# Patient Record
Sex: MALE | Race: WHITE | NOT HISPANIC OR LATINO | Employment: OTHER | URBAN - METROPOLITAN AREA
[De-identification: names, ages, dates, MRNs, and addresses within clinical notes are randomized per-mention and may not be internally consistent; named-entity substitution may affect disease eponyms.]

---

## 2019-02-14 ENCOUNTER — HOSPITAL ENCOUNTER (EMERGENCY)
Facility: HOSPITAL | Age: 69
Discharge: LEFT BEFORE DISCHARGE | End: 2019-02-14
Payer: OTHER GOVERNMENT

## 2019-02-14 ENCOUNTER — NURSE TRIAGE (OUTPATIENT)
Dept: ADMINISTRATIVE | Facility: CLINIC | Age: 69
End: 2019-02-14

## 2019-02-14 VITALS
DIASTOLIC BLOOD PRESSURE: 82 MMHG | TEMPERATURE: 98 F | HEART RATE: 86 BPM | SYSTOLIC BLOOD PRESSURE: 146 MMHG | HEIGHT: 71 IN | BODY MASS INDEX: 21 KG/M2 | WEIGHT: 150 LBS | OXYGEN SATURATION: 97 % | RESPIRATION RATE: 18 BRPM

## 2019-02-14 DIAGNOSIS — M54.50 LOWER BACK PAIN: ICD-10-CM

## 2019-02-14 PROCEDURE — 99283 EMERGENCY DEPT VISIT LOW MDM: CPT

## 2019-02-14 NOTE — TELEPHONE ENCOUNTER
"    Reason for Disposition   Patient sounds very sick or weak to the triager    Protocols used: ST BACK PAIN-A-[place]    Ho states he was "overcome with low back pain last night" when bringing groceries in to his hunting camp in [place], [place] and "I went to my knees."  States he did not walk last night due to the severity of pain, 8-9/10.  Says today, he found he had a "pill that says 7.5 mg with tylenol" and he took it a few minutes ago and can walk now.  He is barely able to walk, pain is still severe, and he wants to know what to do.  Recommend ED now for evaluation.  [facility] [place], and does not know "if anyone will get paid if I go anywhere else.  Encouraged him to either call insurer or go to the [facility] for [facility].  Recommended he:    CALL BACK IF:  *  Numbness or weakness occur  *  Bowel/bladder problems occur  *  Pain lasts for more than 2 weeks  *  You become worse  "

## 2019-02-14 NOTE — PROGRESS NOTES
02/14/20194:14 PM  I have evaluated and performed a medical screening assessment on this patient while awaiting a thorough evaluation and treatment. All of the emergency department beds are occupied at this time. When appropriate, laboratory studies will be ordered from triage. The patient has been advised of this process and care plan. Patient complains of lost feeling in lower legs and fell on floor yesterday and took pain medications and got better; this morning had lower back pain.     Orders pending: lumbar xray.  Disposition: stable

## 2019-02-15 ENCOUNTER — HOSPITAL ENCOUNTER (EMERGENCY)
Facility: HOSPITAL | Age: 69
Discharge: HOME | End: 2019-02-15
Attending: EMERGENCY MEDICINE
Payer: OTHER GOVERNMENT

## 2019-02-15 VITALS
OXYGEN SATURATION: 99 % | HEIGHT: 71 IN | HEART RATE: 85 BPM | DIASTOLIC BLOOD PRESSURE: 88 MMHG | SYSTOLIC BLOOD PRESSURE: 154 MMHG | BODY MASS INDEX: 20.99 KG/M2 | RESPIRATION RATE: 12 BRPM | TEMPERATURE: 99 F | WEIGHT: 149.94 LBS

## 2019-02-15 DIAGNOSIS — S39.012A STRAIN OF LUMBAR REGION, INITIAL ENCOUNTER: Primary | ICD-10-CM

## 2019-02-15 PROCEDURE — 99283 EMERGENCY DEPT VISIT LOW MDM: CPT

## 2019-02-15 NOTE — ED PROVIDER NOTES
"Encounter Date: 2/15/2019       History     Chief Complaint   Patient presents with    Back Pain     low back pain. Legs "gave out" yesterday and fell.     Patient is a 68 y.o. male who presents to the ED 02/15/2019 with a chief complaint of low back pain. Patient states 2 days ago he felt like his legs gave out while he was in the kitchen and he fell to the ground.  He denies hitting his head or loss of consciousness.  He denies any neck pain.  He states he felt like his legs were numb for 30 min but then returned to normal function spontaneously.  He has been ambulating eating.  He denies any loss of bowel or bladder control.  Denies any fever.  He has no history of cancer or IV drug abuse.  He states his back pain is getting better but is still a fracture present.  He has been taking his Norco for his knee pain which is helping.  The patient presented to the emergency department and was triaged at than left due to the long wait yesterday.             Review of patient's allergies indicates:  No Known Allergies  Past Medical History:   Diagnosis Date    Hypertension      Past Surgical History:   Procedure Laterality Date    KNEE SURGERY       History reviewed. No pertinent family history.  Social History     Tobacco Use    Smoking status: Former Smoker   Substance Use Topics    Alcohol use: No    Drug use: Not on file     Review of Systems   Constitutional: Negative for chills and fever.   Respiratory: Negative for chest tightness and shortness of breath.    Cardiovascular: Negative for chest pain.   Gastrointestinal: Negative for abdominal pain.   Genitourinary: Negative for difficulty urinating and dysuria.   Musculoskeletal: Positive for back pain. Negative for arthralgias and myalgias.   Skin: Negative for rash and wound.   Neurological: Negative for syncope, weakness and numbness.   Hematological: Does not bruise/bleed easily.       Physical Exam     Initial Vitals [02/15/19 0930]   BP Pulse Resp Temp " SpO2   (!) 154/88 85 12 98.7 °F (37.1 °C) 99 %      MAP       --         Physical Exam    Nursing note and vitals reviewed.  Constitutional: He appears well-developed and well-nourished.   HENT:   Head: Normocephalic and atraumatic.   Eyes: Conjunctivae are normal. Pupils are equal, round, and reactive to light. Right eye exhibits no discharge. Left eye exhibits no discharge.   Neck: Normal range of motion. Neck supple.   Cardiovascular: Normal rate, regular rhythm, normal heart sounds and intact distal pulses.   Pulmonary/Chest: Breath sounds normal.   Musculoskeletal: Normal range of motion.        Cervical back: Normal.        Thoracic back: Normal.        Lumbar back: He exhibits tenderness and pain. He exhibits normal range of motion, no bony tenderness, no swelling, no edema, no deformity, no laceration, no spasm and normal pulse.        Back:    Neurological: He is alert and oriented to person, place, and time. He has normal strength. No sensory deficit.   Skin: Skin is warm and dry.   Psychiatric: He has a normal mood and affect.         ED Course   Procedures  Labs Reviewed - No data to display       Imaging Results    None          Medical Decision Making:   Differential Diagnosis:   Lumbar strain  Fracture  Cauda equina        APC / Resident Notes:   Patient is a 68 y.o. male who presents to the ED 02/15/2019 who underwent emergent evaluation for low back pain. Patient had fall 2 days ago.  The patient has 5/5 strength and normal sensation bilateral lower extremities. He is ambulatory in the emergency department.  He has right paraspinal lumbar spine tenderness.  X-rays lumbar spine reviewed from  2/14/2019 without acute findings.  I do not think fracture.  Multiple chronic changes noted on x-ray.  This is discussed with patient in detail.  There are no signs of neurovascular compromise.  I do not think cauda equina or epidural abscess.  Patient is afebrile.  There are no signs of spinal cord impingement  I do not think emergent MRI or further imaging is indicated this time.  Patient will continue his Norco as he is prescribed it.  He is also encouraged to follow up with his PCP for referral to Ortho Spine physician.  Patient does not want referral today as he states he must go through [facility]. Based on my clinical evaluation, I do not appreciate any immediate, emergent, or life threatening condition or etiology that warrants additional workup today and feel that the patient can be discharged with close follow up care. Case discussed with Dr. Mendez who also evaluated patient and  who is agreeable to plan of care. Follow up and return precautions discussed; patient verbalized understanding and is agreeable to plan of care. Patient discharged home in stable condition.                         Clinical Impression:   The encounter diagnosis was Strain of lumbar region, initial encounter.      Disposition:   Disposition: Discharged  Condition: Stable                        Elisabet Soto NP  02/15/19 1351

## 2019-10-03 ENCOUNTER — HOSPITAL ENCOUNTER (EMERGENCY)
Facility: HOSPITAL | Age: 69
Discharge: HOME | End: 2019-10-03
Attending: EMERGENCY MEDICINE
Payer: OTHER GOVERNMENT

## 2019-10-03 VITALS
SYSTOLIC BLOOD PRESSURE: 142 MMHG | OXYGEN SATURATION: 96 % | BODY MASS INDEX: 20.99 KG/M2 | WEIGHT: 149.94 LBS | HEART RATE: 88 BPM | RESPIRATION RATE: 18 BRPM | DIASTOLIC BLOOD PRESSURE: 80 MMHG | HEIGHT: 71 IN | TEMPERATURE: 98 F

## 2019-10-03 DIAGNOSIS — S61.209A EXTENSOR TENDON LACERATION OF FINGER WITH OPEN WOUND, INITIAL ENCOUNTER: ICD-10-CM

## 2019-10-03 DIAGNOSIS — S56.429A EXTENSOR TENDON LACERATION OF FINGER WITH OPEN WOUND, INITIAL ENCOUNTER: ICD-10-CM

## 2019-10-03 DIAGNOSIS — S61.211A LACERATION OF LEFT INDEX FINGER WITHOUT FOREIGN BODY WITHOUT DAMAGE TO NAIL, INITIAL ENCOUNTER: Primary | ICD-10-CM

## 2019-10-03 PROCEDURE — 25000003 PHARM REV CODE 250: Performed by: NURSE PRACTITIONER

## 2019-10-03 PROCEDURE — 12001 RPR S/N/AX/GEN/TRNK 2.5CM/<: CPT

## 2019-10-03 PROCEDURE — 99283 EMERGENCY DEPT VISIT LOW MDM: CPT | Mod: 25

## 2019-10-03 RX ORDER — CEPHALEXIN 500 MG/1
500 CAPSULE ORAL 4 TIMES DAILY
Qty: 20 CAPSULE | Refills: 0 | Status: SHIPPED | OUTPATIENT
Start: 2019-10-03 | End: 2019-10-08

## 2019-10-03 RX ORDER — LIDOCAINE HYDROCHLORIDE 20 MG/ML
10 INJECTION, SOLUTION EPIDURAL; INFILTRATION; INTRACAUDAL; PERINEURAL
Status: COMPLETED | OUTPATIENT
Start: 2019-10-03 | End: 2019-10-03

## 2019-10-03 RX ADMIN — LIDOCAINE HYDROCHLORIDE 200 MG: 20 INJECTION, SOLUTION EPIDURAL; INFILTRATION; INTRACAUDAL; PERINEURAL at 04:10

## 2019-10-03 NOTE — ED NOTES
Given written and verbal DC instructions questions answered per MD aware to follow up with PCP encouraged to return if needed. Given RX with teaching

## 2019-10-03 NOTE — ED PROVIDER NOTES
Encounter Date: 10/3/2019    SCRIBE #1 NOTE: I, Telma Cao and am scribing for, and in the presence of, COLE Martin.       History     Chief Complaint   Patient presents with    Laceration     Cut 3rd and 4th digit on left hand.      Time seen by provider: 4:26 PM on 10/03/2019    Vic Reyez is a 69 y.o. male with PMHx of HTN who presents to the ED with an onset of lacerations to the left 2nd and 3rd fingers. The patient reports that he was cutting chicken approximately 2 hours ago when he accidentally cut his fingers. He reports that he couldn't stop the bleeding on his own. His last tetanus shot was around a year ago. The patient denies any other symptoms at this time. No PSHx of the left hand. No known drug allergies noted.    The history is provided by the patient.     Review of patient's allergies indicates:  No Known Allergies  Past Medical History:   Diagnosis Date    Hypertension      Past Surgical History:   Procedure Laterality Date    KNEE SURGERY       History reviewed. No pertinent family history.  Social History     Tobacco Use    Smoking status: Former Smoker   Substance Use Topics    Alcohol use: No    Drug use: Not on file     Review of Systems   Constitutional: Negative for fever.   HENT: Negative for sore throat.    Respiratory: Negative for shortness of breath.    Cardiovascular: Negative for chest pain.   Gastrointestinal: Negative for nausea.   Genitourinary: Negative for dysuria.   Musculoskeletal: Negative for back pain.   Skin: Positive for wound (left 2nd and 3rd fingers). Negative for rash.   Neurological: Negative for weakness.   Hematological: Does not bruise/bleed easily.       Physical Exam     Initial Vitals [10/03/19 1611]   BP Pulse Resp Temp SpO2   (!) 142/80 88 18 98 °F (36.7 °C) 96 %      MAP       --         Physical Exam    Nursing note and vitals reviewed.  Constitutional: Vital signs are normal. He appears well-developed and well-nourished.   HENT:    Head: Normocephalic and atraumatic.   Eyes: Pupils are equal, round, and reactive to light.   Neck: Neck supple.   Cardiovascular: Normal rate, regular rhythm, normal heart sounds and intact distal pulses. Exam reveals no gallop and no friction rub.    No murmur heard.  Pulmonary/Chest: Breath sounds normal. He has no wheezes. He has no rhonchi. He has no rales.   Abdominal: Normal appearance.   Neurological: He is alert and oriented to person, place, and time. He has normal strength.   Skin: Skin is warm and dry. Capillary refill takes less than 2 seconds. Abrasion and laceration noted.   Left index finger: 1 cm horizontal laceration between the PIP and DIP joints on the dorsal surface. The laceration is not circumferential. No visible tendon, bone, or foreign body. Normal flexion and extension at the PIP joints. Normal flexion at DIP joint but limited extension. <2 seconds capillary refill on the finger. Nail bed is intact. No significant swelling or deformity.     Left middle finger: Abrasion between the DIP and PIP joints. Normal flexion and extension at the DIP and PIP joints. No significant swelling or deformity to either finger.   Psychiatric: He has a normal mood and affect. His speech is normal and behavior is normal.         ED Course   Lac Repair  Date/Time: 10/3/2019 6:01 PM  Performed by: Elisabet Soto NP  Authorized by: Karthik Yang III, MD   Consent Done: Yes  Body area: upper extremity  Location details: left index finger  Laceration length: 1 cm  Foreign bodies: no foreign bodies  Anesthesia: local infiltration    Anesthesia:  Local Anesthetic: lidocaine 2% without epinephrine  Anesthetic total: 3 mL  Patient sedated: no  Preparation: Patient was prepped and draped in the usual sterile fashion.  Irrigation solution: saline  Irrigation method: syringe  Amount of cleaning: extensive  Skin closure: Ethilon (5-0)  Number of sutures: 5  Patient tolerance: Patient tolerated the procedure well with  no immediate complications    Splint Application  Date/Time: 10/3/2019 6:29 PM  Performed by: Mayte Guillermo RN  Authorized by: Karthik Yang III, MD   Consent Done: Yes  Location details: left index finger  Splint type: static finger  Post-procedure: The splinted body part was neurovascularly unchanged following the procedure.  Patient tolerance: Patient tolerated the procedure well with no immediate complications        Labs Reviewed - No data to display       Imaging Results          X-Ray Finger 2 or More Views Left (Final result)  Result time 10/03/19 17:31:50    Final result by Amy Rosas MD (10/03/19 17:31:50)                 Impression:      As above      Electronically signed by: Amy Rosas MD  Date:    10/03/2019  Time:    17:31             Narrative:    EXAMINATION:  XR FINGER 2 OR MORE VIEWS LEFT    CLINICAL HISTORY:  laceration;    TECHNIQUE:  Three views left index finger    COMPARISON:  None    FINDINGS:  There is soft tissue injury posteriorly at the level the distal aspect of the middle phalanx.  No acute fracture or dislocation.  No radiopaque foreign body seen in the soft tissues.                                 Medical Decision Making:   History:   Old Medical Records: I decided to obtain old medical records.  Differential Diagnosis:   Laceration  Contusion  Tendon injury  Clinical Tests:   Radiological Study: Ordered and Reviewed       APC / Resident Notes:   Patient is a 69 y.o. male who presents to the ED 10/03/2019 who underwent emergent evaluation for laceration to left index finger.  X-ray of left index finger without acute findings per Dr. Yang.  No visible bone or tendon.  No significant swelling or deformity.  Patient has some difficulty with full extension of the finger which is possibly due to swelling however discussed possible extensor tendon injury with patient he is given a referral to a hand surgeon for this.  Patient is also placed in finger splint.  There is  no underlying fracture.  Laceration is repaired as above.  Patient tolerated well.  Tetanus is up-to-date.  He is given prophylactic antibiotics.  I do not think IV antibiotics or emergent surgical consultation is indicated at this time. Based on my clinical evaluation, I do not appreciate any immediate, emergent, or life threatening condition or etiology that warrants additional workup today and feel that the patient can be discharged with close follow up care. Case discussed with Dr. Yang who is agreeable to plan of care. Follow up and return precautions discussed; patient verbalized understanding and is agreeable to plan of care. Patient discharged home in stable condition.               Scribe Attestation:   Scribe #1: I performed the above scribed service and the documentation accurately describes the services I performed. I attest to the accuracy of the note.    Attending Attestation:           Physician Attestation for Scribe:  Physician Attestation Statement for Scribe #1: I, Elisabet Soto, reviewed documentation, as scribed by in my presence, and it is both accurate and complete.     Comments: I, COLE Martin, personally performed the services described in this documentation. All medical record entries made by the scribe were at my direction and in my presence.  I have reviewed the chart and agree that the record reflects my personal performance and is accurate and complete. COLE Martin.  8:29 PM 10/03/2019                 Clinical Impression:       ICD-10-CM ICD-9-CM   1. Laceration of left index finger without foreign body without damage to nail, initial encounter S61.211A 883.0   2. Extensor tendon laceration of finger with open wound, initial encounter S56.429A 883.2    S61.209A          Disposition:   Disposition: Discharged  Condition: Stable                        Elisabet Soto NP  10/03/19 2029

## 2019-10-23 ENCOUNTER — HOSPITAL ENCOUNTER (EMERGENCY)
Facility: HOSPITAL | Age: 69
Discharge: HOME | End: 2019-10-23
Attending: EMERGENCY MEDICINE
Payer: COMMERCIAL

## 2019-10-23 VITALS
RESPIRATION RATE: 18 BRPM | HEART RATE: 88 BPM | TEMPERATURE: 98 F | DIASTOLIC BLOOD PRESSURE: 76 MMHG | HEIGHT: 71 IN | OXYGEN SATURATION: 98 % | WEIGHT: 170.44 LBS | SYSTOLIC BLOOD PRESSURE: 129 MMHG | BODY MASS INDEX: 23.86 KG/M2

## 2019-10-23 DIAGNOSIS — Z48.02 VISIT FOR SUTURE REMOVAL: Primary | ICD-10-CM

## 2019-10-23 PROCEDURE — 99281 EMR DPT VST MAYX REQ PHY/QHP: CPT

## 2019-10-23 NOTE — ED PROVIDER NOTES
Encounter Date: 10/23/2019       History     Chief Complaint   Patient presents with    Suture / Staple Removal     Needs stitches removed from left 4th digit.      Vic Reyez is a 69 y.o. Male presenting for suture removal to his left index finger.  He had the sutures placed in this facility about 2 weeks ago.  He feels as if the finger is healing well.  He has noticed no drainage or bleeding.  No redness.  No numbness, tingling or weakness.     The history is provided by the patient.     Review of patient's allergies indicates:  No Known Allergies  Past Medical History:   Diagnosis Date    Hypertension      Past Surgical History:   Procedure Laterality Date    KNEE SURGERY       No family history on file.  Social History     Tobacco Use    Smoking status: Former Smoker   Substance Use Topics    Alcohol use: No    Drug use: Not on file     Review of Systems   Constitutional: Negative for chills and fever.   Musculoskeletal: Negative for arthralgias, back pain, joint swelling, myalgias, neck pain and neck stiffness.   Skin: Positive for wound. Negative for color change, pallor and rash.   Neurological: Negative for weakness and numbness.   Hematological: Does not bruise/bleed easily.       Physical Exam     Initial Vitals [10/23/19 1257]   BP Pulse Resp Temp SpO2   129/76 88 18 98.1 °F (36.7 °C) 98 %      MAP       --         Physical Exam    Nursing note and vitals reviewed.  Constitutional: He appears well-developed and well-nourished. He is not diaphoretic. No distress.   Cardiovascular: Intact distal pulses.   Musculoskeletal: Normal range of motion. He exhibits no edema or tenderness.   4 intact sutures noted to left index finger without erythema, swelling or decreased ROM.    Neurological: He is alert and oriented to person, place, and time. He has normal strength. No sensory deficit.   Skin: Skin is warm and dry. No rash and no abscess noted. No erythema.   Psychiatric: He has a normal mood and  affect.         ED Course   Suture Removal  Date/Time: 10/23/2019 4:04 PM  Performed by: Jane Bradley PA-C  Authorized by: Karthik Yang III, MD   Body area: upper extremity  Location details: left index finger  Wound Appearance: clean, well healed, nontender, nonpurulent and normal color  Sutures Removed: 4  Post-removal: no dressing applied  Facility: sutures placed in this facility  Patient tolerance: Patient tolerated the procedure well with no immediate complications        Labs Reviewed - No data to display       Imaging Results    None                APC / Resident Notes:   Pt tolerated suture removal well.  He will be discharged home to follow-up with his PCP as needed.                   Clinical Impression:       ICD-10-CM ICD-9-CM   1. Visit for suture removal Z48.02 V58.32                                Jane Bradley PA-C  10/23/19 1606

## 2021-09-15 ENCOUNTER — HOSPITAL ENCOUNTER (EMERGENCY)
Facility: HOSPITAL | Age: 71
Discharge: LEFT BEFORE DISCHARGE | End: 2021-09-16
Attending: FAMILY MEDICINE
Payer: MEDICARE

## 2021-09-15 DIAGNOSIS — R53.1 WEAKNESS: ICD-10-CM

## 2021-09-15 DIAGNOSIS — U07.1 COVID-19: Primary | ICD-10-CM

## 2021-09-15 DIAGNOSIS — E87.20 LACTIC ACIDOSIS: ICD-10-CM

## 2021-09-15 DIAGNOSIS — U07.1 COVID-19 VIRUS DETECTED: ICD-10-CM

## 2021-09-15 DIAGNOSIS — I95.9 HYPOTENSION: ICD-10-CM

## 2021-09-15 LAB
ALBUMIN SERPL BCP-MCNC: 2.4 G/DL (ref 3.5–5.2)
ALLENS TEST: ABNORMAL
ALP SERPL-CCNC: 203 U/L (ref 55–135)
ALT SERPL W/O P-5'-P-CCNC: 36 U/L (ref 10–44)
ANION GAP SERPL CALC-SCNC: 14 MMOL/L (ref 8–16)
ANION GAP SERPL CALC-SCNC: 17 MMOL/L (ref 8–16)
AST SERPL-CCNC: 41 U/L (ref 10–40)
BASOPHILS # BLD AUTO: 0.01 K/UL (ref 0–0.2)
BASOPHILS NFR BLD: 0.3 % (ref 0–1.9)
BILIRUB SERPL-MCNC: 0.8 MG/DL (ref 0.1–1)
BILIRUB UR QL STRIP: NEGATIVE
BUN SERPL-MCNC: <2 MG/DL (ref 8–23)
BUN SERPL-MCNC: <2 MG/DL (ref 8–23)
CALCIUM SERPL-MCNC: 7.1 MG/DL (ref 8.7–10.5)
CALCIUM SERPL-MCNC: 9 MG/DL (ref 8.7–10.5)
CHLORIDE SERPL-SCNC: 104 MMOL/L (ref 95–110)
CHLORIDE SERPL-SCNC: 99 MMOL/L (ref 95–110)
CK MB SERPL-MCNC: 1.8 NG/ML (ref 0.1–6.5)
CK MB SERPL-RTO: 3.2 % (ref 0–5)
CK SERPL-CCNC: 56 U/L (ref 20–200)
CK SERPL-CCNC: 56 U/L (ref 20–200)
CLARITY UR: CLEAR
CO2 SERPL-SCNC: 10 MMOL/L (ref 23–29)
CO2 SERPL-SCNC: 18 MMOL/L (ref 23–29)
COLOR UR: YELLOW
CREAT SERPL-MCNC: 0.3 MG/DL (ref 0.5–1.4)
CREAT SERPL-MCNC: 0.6 MG/DL (ref 0.5–1.4)
DELSYS: ABNORMAL
DIFFERENTIAL METHOD: ABNORMAL
EOSINOPHIL # BLD AUTO: 0.2 K/UL (ref 0–0.5)
EOSINOPHIL NFR BLD: 6.9 % (ref 0–8)
ERYTHROCYTE [DISTWIDTH] IN BLOOD BY AUTOMATED COUNT: 13.4 % (ref 11.5–14.5)
EST. GFR  (AFRICAN AMERICAN): >60 ML/MIN/1.73 M^2
EST. GFR  (AFRICAN AMERICAN): >60 ML/MIN/1.73 M^2
EST. GFR  (NON AFRICAN AMERICAN): >60 ML/MIN/1.73 M^2
EST. GFR  (NON AFRICAN AMERICAN): >60 ML/MIN/1.73 M^2
ETHANOL SERPL-MCNC: 163 MG/DL (ref 0–10)
FIO2: 21
GLUCOSE SERPL-MCNC: 106 MG/DL (ref 70–110)
GLUCOSE SERPL-MCNC: 69 MG/DL (ref 70–110)
GLUCOSE UR QL STRIP: NEGATIVE
HCO3 UR-SCNC: 21 MMOL/L (ref 24–28)
HCT VFR BLD AUTO: 37.1 % (ref 40–54)
HGB BLD-MCNC: 13.1 G/DL (ref 14–18)
HGB UR QL STRIP: ABNORMAL
IMM GRANULOCYTES # BLD AUTO: 0.01 K/UL (ref 0–0.04)
IMM GRANULOCYTES NFR BLD AUTO: 0.3 % (ref 0–0.5)
KETONES UR QL STRIP: NEGATIVE
LACTATE SERPL-SCNC: 2.9 MMOL/L (ref 0.5–2.2)
LACTATE SERPL-SCNC: 3.2 MMOL/L (ref 0.5–2.2)
LACTATE SERPL-SCNC: 8.6 MMOL/L (ref 0.5–2.2)
LEUKOCYTE ESTERASE UR QL STRIP: NEGATIVE
LYMPHOCYTES # BLD AUTO: 1 K/UL (ref 1–4.8)
LYMPHOCYTES NFR BLD: 35.7 % (ref 18–48)
MCH RBC QN AUTO: 32 PG (ref 27–31)
MCHC RBC AUTO-ENTMCNC: 35.3 G/DL (ref 32–36)
MCV RBC AUTO: 91 FL (ref 82–98)
MODE: ABNORMAL
MONOCYTES # BLD AUTO: 0.5 K/UL (ref 0.3–1)
MONOCYTES NFR BLD: 15.8 % (ref 4–15)
NEUTROPHILS # BLD AUTO: 1.2 K/UL (ref 1.8–7.7)
NEUTROPHILS NFR BLD: 41 % (ref 38–73)
NITRITE UR QL STRIP: NEGATIVE
NRBC BLD-RTO: 0 /100 WBC
PCO2 BLDA: 32.7 MMHG (ref 35–45)
PH SMN: 7.42 [PH] (ref 7.35–7.45)
PH UR STRIP: 6 [PH] (ref 5–8)
PLATELET # BLD AUTO: 232 K/UL (ref 150–450)
PMV BLD AUTO: 8.3 FL (ref 9.2–12.9)
PO2 BLDA: 49 MMHG (ref 40–60)
POC BE: -4 MMOL/L
POC SATURATED O2: 85 % (ref 95–100)
POC TCO2: 22 MMOL/L (ref 24–29)
POTASSIUM SERPL-SCNC: 4.2 MMOL/L (ref 3.5–5.1)
POTASSIUM SERPL-SCNC: 4.4 MMOL/L (ref 3.5–5.1)
PROT SERPL-MCNC: 7.5 G/DL (ref 6–8.4)
PROT UR QL STRIP: NEGATIVE
RBC # BLD AUTO: 4.09 M/UL (ref 4.6–6.2)
SAMPLE: ABNORMAL
SARS-COV-2 RDRP RESP QL NAA+PROBE: POSITIVE
SITE: ABNORMAL
SODIUM SERPL-SCNC: 131 MMOL/L (ref 136–145)
SODIUM SERPL-SCNC: 131 MMOL/L (ref 136–145)
SP GR UR STRIP: <=1.005 (ref 1–1.03)
SP02: 96
TROPONIN I SERPL DL<=0.01 NG/ML-MCNC: 0.01 NG/ML (ref 0–0.03)
URN SPEC COLLECT METH UR: ABNORMAL
UROBILINOGEN UR STRIP-ACNC: NEGATIVE EU/DL
WBC # BLD AUTO: 2.91 K/UL (ref 3.9–12.7)

## 2021-09-15 PROCEDURE — 63600175 PHARM REV CODE 636 W HCPCS: Performed by: PHYSICIAN ASSISTANT

## 2021-09-15 PROCEDURE — 85025 COMPLETE CBC W/AUTO DIFF WBC: CPT | Performed by: PHYSICIAN ASSISTANT

## 2021-09-15 PROCEDURE — 93005 ELECTROCARDIOGRAM TRACING: CPT

## 2021-09-15 PROCEDURE — 99285 EMERGENCY DEPT VISIT HI MDM: CPT | Mod: 25

## 2021-09-15 PROCEDURE — 96372 THER/PROPH/DIAG INJ SC/IM: CPT | Mod: 59

## 2021-09-15 PROCEDURE — 96374 THER/PROPH/DIAG INJ IV PUSH: CPT

## 2021-09-15 PROCEDURE — 25000003 PHARM REV CODE 250: Performed by: PHYSICIAN ASSISTANT

## 2021-09-15 PROCEDURE — 96375 TX/PRO/DX INJ NEW DRUG ADDON: CPT

## 2021-09-15 PROCEDURE — 84484 ASSAY OF TROPONIN QUANT: CPT | Performed by: PHYSICIAN ASSISTANT

## 2021-09-15 PROCEDURE — U0002 COVID-19 LAB TEST NON-CDC: HCPCS | Performed by: PHYSICIAN ASSISTANT

## 2021-09-15 PROCEDURE — 71045 XR CHEST AP PORTABLE: ICD-10-PCS | Mod: 26,,, | Performed by: RADIOLOGY

## 2021-09-15 PROCEDURE — 83605 ASSAY OF LACTIC ACID: CPT | Performed by: FAMILY MEDICINE

## 2021-09-15 PROCEDURE — 81003 URINALYSIS AUTO W/O SCOPE: CPT | Mod: 59 | Performed by: PHYSICIAN ASSISTANT

## 2021-09-15 PROCEDURE — 80048 BASIC METABOLIC PNL TOTAL CA: CPT | Performed by: PHYSICIAN ASSISTANT

## 2021-09-15 PROCEDURE — 82553 CREATINE MB FRACTION: CPT | Performed by: PHYSICIAN ASSISTANT

## 2021-09-15 PROCEDURE — 71045 X-RAY EXAM CHEST 1 VIEW: CPT | Mod: 26,,, | Performed by: RADIOLOGY

## 2021-09-15 PROCEDURE — 99900035 HC TECH TIME PER 15 MIN (STAT)

## 2021-09-15 PROCEDURE — 82077 ASSAY SPEC XCP UR&BREATH IA: CPT | Performed by: PHYSICIAN ASSISTANT

## 2021-09-15 PROCEDURE — 71045 X-RAY EXAM CHEST 1 VIEW: CPT | Mod: TC,FY

## 2021-09-15 PROCEDURE — 83605 ASSAY OF LACTIC ACID: CPT | Mod: 91 | Performed by: PHYSICIAN ASSISTANT

## 2021-09-15 PROCEDURE — 80053 COMPREHEN METABOLIC PANEL: CPT | Performed by: PHYSICIAN ASSISTANT

## 2021-09-15 PROCEDURE — 87040 BLOOD CULTURE FOR BACTERIA: CPT | Performed by: PHYSICIAN ASSISTANT

## 2021-09-15 PROCEDURE — 82803 BLOOD GASES ANY COMBINATION: CPT

## 2021-09-15 RX ORDER — AZITHROMYCIN 250 MG/1
500 TABLET, FILM COATED ORAL
Status: DISCONTINUED | OUTPATIENT
Start: 2021-09-15 | End: 2021-09-15

## 2021-09-15 RX ORDER — DEXAMETHASONE SODIUM PHOSPHATE 10 MG/ML
6 INJECTION INTRAMUSCULAR; INTRAVENOUS
Status: COMPLETED | OUTPATIENT
Start: 2021-09-15 | End: 2021-09-15

## 2021-09-15 RX ADMIN — SODIUM CHLORIDE, SODIUM LACTATE, POTASSIUM CHLORIDE, AND CALCIUM CHLORIDE 1000 ML: .6; .31; .03; .02 INJECTION, SOLUTION INTRAVENOUS at 05:09

## 2021-09-15 RX ADMIN — SODIUM CHLORIDE, SODIUM LACTATE, POTASSIUM CHLORIDE, AND CALCIUM CHLORIDE 1000 ML: .6; .31; .03; .02 INJECTION, SOLUTION INTRAVENOUS at 09:09

## 2021-09-15 RX ADMIN — DEXAMETHASONE SODIUM PHOSPHATE 6 MG: 10 INJECTION INTRAMUSCULAR; INTRAVENOUS at 06:09

## 2021-09-15 RX ADMIN — CEFTRIAXONE 1 G: 1 INJECTION, SOLUTION INTRAVENOUS at 06:09

## 2021-09-15 RX ADMIN — FOLIC ACID: 5 INJECTION, SOLUTION INTRAMUSCULAR; INTRAVENOUS; SUBCUTANEOUS at 05:09

## 2021-09-16 VITALS
RESPIRATION RATE: 11 BRPM | OXYGEN SATURATION: 95 % | HEART RATE: 102 BPM | TEMPERATURE: 99 F | BODY MASS INDEX: 22.9 KG/M2 | HEIGHT: 70 IN | WEIGHT: 160 LBS | SYSTOLIC BLOOD PRESSURE: 139 MMHG | DIASTOLIC BLOOD PRESSURE: 98 MMHG

## 2021-09-21 LAB
BACTERIA BLD CULT: NORMAL
BACTERIA BLD CULT: NORMAL

## 2021-12-13 ENCOUNTER — IMMUNIZATION (OUTPATIENT)
Dept: FAMILY MEDICINE | Facility: CLINIC | Age: 71
End: 2021-12-13
Payer: MEDICARE

## 2021-12-13 DIAGNOSIS — Z23 NEED FOR VACCINATION: Primary | ICD-10-CM

## 2021-12-13 PROCEDURE — 0001A COVID-19, MRNA, LNP-S, PF, 30 MCG/0.3 ML DOSE VACCINE: CPT | Mod: PBBFAC | Performed by: FAMILY MEDICINE

## 2022-01-10 ENCOUNTER — IMMUNIZATION (OUTPATIENT)
Dept: FAMILY MEDICINE | Facility: CLINIC | Age: 72
End: 2022-01-10
Payer: MEDICARE

## 2022-01-10 DIAGNOSIS — Z23 NEED FOR VACCINATION: Primary | ICD-10-CM

## 2022-01-10 PROCEDURE — 0002A COVID-19, MRNA, LNP-S, PF, 30 MCG/0.3 ML DOSE VACCINE: CPT | Mod: PBBFAC | Performed by: FAMILY MEDICINE

## 2022-01-10 NOTE — PROGRESS NOTES
Patient arrive to the clinic for an immunization.      Vic Reyez arrive to clinic awake and alert.  Pt verified name and .   Allergies reviewed with patient.  Pt given 2nd Pfizer vaccine via Intramuscular Left deltoid.    Well tolerated by patient.  denies further needs.    Patient instructed to wait 15 mins after injection.   Patient states no vaccines in the last 14 days.  Vaccine information sheet was given to patient. Patient voiced understanding.    Desire Peña LPN

## 2022-01-31 ENCOUNTER — HOSPITAL ENCOUNTER (EMERGENCY)
Facility: HOSPITAL | Age: 72
Discharge: ANOTHER HOSPITAL | End: 2022-02-01
Attending: FAMILY MEDICINE
Payer: MEDICARE

## 2022-01-31 DIAGNOSIS — K74.60 CIRRHOSIS OF LIVER WITH ASCITES, UNSPECIFIED HEPATIC CIRRHOSIS TYPE: Primary | ICD-10-CM

## 2022-01-31 DIAGNOSIS — R06.02 SOB (SHORTNESS OF BREATH): ICD-10-CM

## 2022-01-31 DIAGNOSIS — R18.8 CIRRHOSIS OF LIVER WITH ASCITES, UNSPECIFIED HEPATIC CIRRHOSIS TYPE: Primary | ICD-10-CM

## 2022-01-31 DIAGNOSIS — R93.2 ABNORMAL FINDINGS ON IMAGING OF BILIARY TRACT: ICD-10-CM

## 2022-01-31 DIAGNOSIS — E80.6 HYPERBILIRUBINEMIA: ICD-10-CM

## 2022-01-31 LAB
ALBUMIN SERPL BCP-MCNC: 1.5 G/DL (ref 3.5–5.2)
ALP SERPL-CCNC: 712 U/L (ref 55–135)
ALT SERPL W/O P-5'-P-CCNC: 34 U/L (ref 10–44)
AMMONIA PLAS-SCNC: 59 UMOL/L (ref 10–50)
AMYLASE SERPL-CCNC: 52 U/L (ref 20–110)
ANION GAP SERPL CALC-SCNC: 12 MMOL/L (ref 8–16)
APTT BLDCRRT: 35.8 SEC (ref 21–32)
AST SERPL-CCNC: 107 U/L (ref 10–40)
BASOPHILS # BLD AUTO: 0.03 K/UL (ref 0–0.2)
BASOPHILS NFR BLD: 0.4 % (ref 0–1.9)
BILIRUB SERPL-MCNC: 21.8 MG/DL (ref 0.1–1)
BILIRUB UR QL STRIP: ABNORMAL
BUN SERPL-MCNC: 5 MG/DL (ref 8–23)
CALCIUM SERPL-MCNC: 8.2 MG/DL (ref 8.7–10.5)
CHLORIDE SERPL-SCNC: 100 MMOL/L (ref 95–110)
CLARITY UR: ABNORMAL
CO2 SERPL-SCNC: 16 MMOL/L (ref 23–29)
COLOR UR: ABNORMAL
CREAT SERPL-MCNC: 0.7 MG/DL (ref 0.5–1.4)
DIFFERENTIAL METHOD: ABNORMAL
EOSINOPHIL # BLD AUTO: 0.1 K/UL (ref 0–0.5)
EOSINOPHIL NFR BLD: 0.6 % (ref 0–8)
ERYTHROCYTE [DISTWIDTH] IN BLOOD BY AUTOMATED COUNT: 16 % (ref 11.5–14.5)
EST. GFR  (AFRICAN AMERICAN): >60 ML/MIN/1.73 M^2
EST. GFR  (NON AFRICAN AMERICAN): >60 ML/MIN/1.73 M^2
GLUCOSE SERPL-MCNC: 121 MG/DL (ref 70–110)
GLUCOSE UR QL STRIP: NEGATIVE
HCT VFR BLD AUTO: 31 % (ref 40–54)
HGB BLD-MCNC: 11.7 G/DL (ref 14–18)
HGB UR QL STRIP: ABNORMAL
IMM GRANULOCYTES # BLD AUTO: 0.04 K/UL (ref 0–0.04)
IMM GRANULOCYTES NFR BLD AUTO: 0.5 % (ref 0–0.5)
INR PPP: 1.6 (ref 0.8–1.2)
KETONES UR QL STRIP: NEGATIVE
LACTATE SERPL-SCNC: 2.6 MMOL/L (ref 0.5–2.2)
LEUKOCYTE ESTERASE UR QL STRIP: NEGATIVE
LIPASE SERPL-CCNC: 3 U/L (ref 4–60)
LYMPHOCYTES # BLD AUTO: 1.3 K/UL (ref 1–4.8)
LYMPHOCYTES NFR BLD: 15.7 % (ref 18–48)
MAGNESIUM SERPL-MCNC: 2 MG/DL (ref 1.6–2.6)
MCH RBC QN AUTO: 33.3 PG (ref 27–31)
MCHC RBC AUTO-ENTMCNC: 37.7 G/DL (ref 32–36)
MCV RBC AUTO: 88 FL (ref 82–98)
MONOCYTES # BLD AUTO: 0.8 K/UL (ref 0.3–1)
MONOCYTES NFR BLD: 10.3 % (ref 4–15)
NEUTROPHILS # BLD AUTO: 5.9 K/UL (ref 1.8–7.7)
NEUTROPHILS NFR BLD: 72.5 % (ref 38–73)
NITRITE UR QL STRIP: NEGATIVE
NRBC BLD-RTO: 0 /100 WBC
PH UR STRIP: 7 [PH] (ref 5–8)
PLATELET # BLD AUTO: 184 K/UL (ref 150–450)
PMV BLD AUTO: 9.1 FL (ref 9.2–12.9)
POTASSIUM SERPL-SCNC: 2.8 MMOL/L (ref 3.5–5.1)
PROT SERPL-MCNC: 8.1 G/DL (ref 6–8.4)
PROT UR QL STRIP: NEGATIVE
PROTHROMBIN TIME: 16.2 SEC (ref 9–12.5)
RBC # BLD AUTO: 3.51 M/UL (ref 4.6–6.2)
SARS-COV-2 RDRP RESP QL NAA+PROBE: NEGATIVE
SODIUM SERPL-SCNC: 128 MMOL/L (ref 136–145)
SP GR UR STRIP: 1.01 (ref 1–1.03)
TROPONIN I SERPL DL<=0.01 NG/ML-MCNC: 0.01 NG/ML (ref 0–0.03)
URN SPEC COLLECT METH UR: ABNORMAL
UROBILINOGEN UR STRIP-ACNC: NEGATIVE EU/DL
WBC # BLD AUTO: 8.07 K/UL (ref 3.9–12.7)

## 2022-01-31 PROCEDURE — 83690 ASSAY OF LIPASE: CPT | Performed by: FAMILY MEDICINE

## 2022-01-31 PROCEDURE — 93010 ELECTROCARDIOGRAM REPORT: CPT | Mod: ,,, | Performed by: INTERNAL MEDICINE

## 2022-01-31 PROCEDURE — 85730 THROMBOPLASTIN TIME PARTIAL: CPT | Performed by: FAMILY MEDICINE

## 2022-01-31 PROCEDURE — 83735 ASSAY OF MAGNESIUM: CPT | Performed by: FAMILY MEDICINE

## 2022-01-31 PROCEDURE — 71045 X-RAY EXAM CHEST 1 VIEW: CPT | Mod: 26,,, | Performed by: RADIOLOGY

## 2022-01-31 PROCEDURE — 96375 TX/PRO/DX INJ NEW DRUG ADDON: CPT | Mod: 59

## 2022-01-31 PROCEDURE — 85610 PROTHROMBIN TIME: CPT | Performed by: FAMILY MEDICINE

## 2022-01-31 PROCEDURE — 51702 INSERT TEMP BLADDER CATH: CPT

## 2022-01-31 PROCEDURE — U0002 COVID-19 LAB TEST NON-CDC: HCPCS | Performed by: FAMILY MEDICINE

## 2022-01-31 PROCEDURE — 82150 ASSAY OF AMYLASE: CPT | Performed by: FAMILY MEDICINE

## 2022-01-31 PROCEDURE — 93005 ELECTROCARDIOGRAM TRACING: CPT | Mod: 59

## 2022-01-31 PROCEDURE — 25000003 PHARM REV CODE 250: Performed by: FAMILY MEDICINE

## 2022-01-31 PROCEDURE — 74177 CT ABDOMEN PELVIS WITH CONTRAST: ICD-10-PCS | Mod: 26,,, | Performed by: RADIOLOGY

## 2022-01-31 PROCEDURE — 93010 EKG 12-LEAD: ICD-10-PCS | Mod: ,,, | Performed by: INTERNAL MEDICINE

## 2022-01-31 PROCEDURE — 74177 CT ABD & PELVIS W/CONTRAST: CPT | Mod: 26,,, | Performed by: RADIOLOGY

## 2022-01-31 PROCEDURE — 96361 HYDRATE IV INFUSION ADD-ON: CPT

## 2022-01-31 PROCEDURE — 71045 X-RAY EXAM CHEST 1 VIEW: CPT | Mod: TC,FY

## 2022-01-31 PROCEDURE — 96365 THER/PROPH/DIAG IV INF INIT: CPT | Mod: 59

## 2022-01-31 PROCEDURE — 99291 CRITICAL CARE FIRST HOUR: CPT | Mod: 25

## 2022-01-31 PROCEDURE — 25500020 PHARM REV CODE 255: Performed by: FAMILY MEDICINE

## 2022-01-31 PROCEDURE — 80053 COMPREHEN METABOLIC PANEL: CPT | Performed by: FAMILY MEDICINE

## 2022-01-31 PROCEDURE — 74177 CT ABD & PELVIS W/CONTRAST: CPT | Mod: TC

## 2022-01-31 PROCEDURE — 82140 ASSAY OF AMMONIA: CPT | Performed by: FAMILY MEDICINE

## 2022-01-31 PROCEDURE — 87040 BLOOD CULTURE FOR BACTERIA: CPT | Mod: 59 | Performed by: FAMILY MEDICINE

## 2022-01-31 PROCEDURE — 81003 URINALYSIS AUTO W/O SCOPE: CPT | Performed by: FAMILY MEDICINE

## 2022-01-31 PROCEDURE — 85025 COMPLETE CBC W/AUTO DIFF WBC: CPT | Performed by: FAMILY MEDICINE

## 2022-01-31 PROCEDURE — 63600175 PHARM REV CODE 636 W HCPCS: Performed by: FAMILY MEDICINE

## 2022-01-31 PROCEDURE — 71045 XR CHEST AP PORTABLE: ICD-10-PCS | Mod: 26,,, | Performed by: RADIOLOGY

## 2022-01-31 PROCEDURE — 83605 ASSAY OF LACTIC ACID: CPT | Performed by: FAMILY MEDICINE

## 2022-01-31 PROCEDURE — 80074 ACUTE HEPATITIS PANEL: CPT | Performed by: FAMILY MEDICINE

## 2022-01-31 PROCEDURE — 96367 TX/PROPH/DG ADDL SEQ IV INF: CPT

## 2022-01-31 PROCEDURE — 84484 ASSAY OF TROPONIN QUANT: CPT | Performed by: FAMILY MEDICINE

## 2022-01-31 RX ORDER — POTASSIUM CHLORIDE 20 MEQ/1
40 TABLET, EXTENDED RELEASE ORAL
Status: COMPLETED | OUTPATIENT
Start: 2022-01-31 | End: 2022-01-31

## 2022-01-31 RX ORDER — POTASSIUM CHLORIDE 7.45 MG/ML
10 INJECTION INTRAVENOUS
Status: COMPLETED | OUTPATIENT
Start: 2022-01-31 | End: 2022-01-31

## 2022-01-31 RX ORDER — POTASSIUM CHLORIDE 7.45 MG/ML
10 INJECTION INTRAVENOUS ONCE
Status: DISCONTINUED | OUTPATIENT
Start: 2022-01-31 | End: 2022-01-31

## 2022-01-31 RX ADMIN — SODIUM CHLORIDE 1000 ML: 0.9 INJECTION, SOLUTION INTRAVENOUS at 09:01

## 2022-01-31 RX ADMIN — POTASSIUM CHLORIDE 10 MEQ: 10 INJECTION, SOLUTION INTRAVENOUS at 10:01

## 2022-01-31 RX ADMIN — POTASSIUM CHLORIDE 40 MEQ: 1500 TABLET, EXTENDED RELEASE ORAL at 09:01

## 2022-01-31 RX ADMIN — CEFTRIAXONE 2 G: 2 INJECTION, SOLUTION INTRAVENOUS at 11:01

## 2022-01-31 RX ADMIN — IOHEXOL 75 ML: 350 INJECTION, SOLUTION INTRAVENOUS at 08:01

## 2022-02-01 VITALS
OXYGEN SATURATION: 99 % | TEMPERATURE: 98 F | DIASTOLIC BLOOD PRESSURE: 75 MMHG | SYSTOLIC BLOOD PRESSURE: 108 MMHG | HEIGHT: 71 IN | RESPIRATION RATE: 16 BRPM | WEIGHT: 180 LBS | BODY MASS INDEX: 25.2 KG/M2 | HEART RATE: 79 BPM

## 2022-02-01 LAB
HAV IGM SERPL QL IA: NEGATIVE
HBV CORE IGM SERPL QL IA: NEGATIVE
HBV SURFACE AG SERPL QL IA: NEGATIVE
HCV AB SERPL QL IA: NEGATIVE

## 2022-02-01 PROCEDURE — 63600175 PHARM REV CODE 636 W HCPCS

## 2022-02-01 PROCEDURE — 96375 TX/PRO/DX INJ NEW DRUG ADDON: CPT | Mod: 59

## 2022-02-01 PROCEDURE — 25000003 PHARM REV CODE 250: Performed by: EMERGENCY MEDICINE

## 2022-02-01 RX ORDER — LIDOCAINE HYDROCHLORIDE 20 MG/ML
10 SOLUTION OROPHARYNGEAL ONCE
Status: COMPLETED | OUTPATIENT
Start: 2022-02-01 | End: 2022-02-01

## 2022-02-01 RX ORDER — ONDANSETRON 2 MG/ML
INJECTION INTRAMUSCULAR; INTRAVENOUS
Status: COMPLETED
Start: 2022-02-01 | End: 2022-02-01

## 2022-02-01 RX ORDER — ONDANSETRON 2 MG/ML
4 INJECTION INTRAMUSCULAR; INTRAVENOUS ONCE
Status: COMPLETED | OUTPATIENT
Start: 2022-02-01 | End: 2022-02-01

## 2022-02-01 RX ORDER — MAG HYDROX/ALUMINUM HYD/SIMETH 200-200-20
30 SUSPENSION, ORAL (FINAL DOSE FORM) ORAL ONCE
Status: COMPLETED | OUTPATIENT
Start: 2022-02-01 | End: 2022-02-01

## 2022-02-01 RX ORDER — DICYCLOMINE HYDROCHLORIDE 10 MG/5ML
20 SOLUTION ORAL
Status: COMPLETED | OUTPATIENT
Start: 2022-02-01 | End: 2022-02-01

## 2022-02-01 RX ADMIN — ONDANSETRON 4 MG: 2 INJECTION INTRAMUSCULAR; INTRAVENOUS at 04:02

## 2022-02-01 RX ADMIN — LIDOCAINE HYDROCHLORIDE 10 ML: 20 SOLUTION ORAL; TOPICAL at 07:02

## 2022-02-01 RX ADMIN — DICYCLOMINE HYDROCHLORIDE 20 MG: 10 SOLUTION ORAL at 07:02

## 2022-02-01 RX ADMIN — ALUMINUM HYDROXIDE, MAGNESIUM HYDROXIDE, AND SIMETHICONE 30 ML: 200; 200; 20 SUSPENSION ORAL at 07:02

## 2022-02-01 NOTE — ED PROVIDER NOTES
Encounter Date: 1/31/2022       History     Chief Complaint   Patient presents with    Shortness of Breath     Patient comes our facility with complaints of jaundice.  The [facility].  They are not taking any patient at this time.  Patient stated that he talk to [facility] in informed them of his acute jaundice which started about 1 week prior.  Patient states they told him to come to the ER to be evaluated.  Patient denies any abdominal pain or chest pain.  Patient does have some mild to moderate shortness of breath without cough or wheezing.  Patient denies any previous history of jaundice.  Patient continues to drink what he describes as 2 beers per day.  Patient denies significant alcohol consumption.  Patient has had abdominal distension that he states has been present for 2 days now.  Patient denies any bleeding or easy bruising.        Review of patient's allergies indicates:  No Known Allergies  Past Medical History:   Diagnosis Date    Hypertension      Past Surgical History:   Procedure Laterality Date    APPENDECTOMY      EYE SURGERY      JOINT REPLACEMENT      KNEE SURGERY      RECONSTRUCTION OF SHOULDER Right      History reviewed. No pertinent family history.  Social History     Tobacco Use    Smoking status: Current Every Day Smoker     Packs/day: 0.50     Types: Cigarettes    Smokeless tobacco: Never Used   Substance Use Topics    Alcohol use: Yes     Alcohol/week: 4.0 standard drinks     Types: 4 Cans of beer per week    Drug use: Never     Review of Systems   Respiratory: Positive for shortness of breath. Negative for chest tightness and wheezing.    Cardiovascular: Negative for chest pain.   Gastrointestinal: Positive for abdominal distention. Negative for abdominal pain.   Skin: Positive for color change (Jaundice).        Diffuse jaundice   All other systems reviewed and are negative.      Physical Exam     Initial Vitals [01/31/22  1832]   BP Pulse Resp Temp SpO2   105/88 91 15 97.6 °F (36.4 °C) 100 %      MAP       --         Physical Exam    Nursing note and vitals reviewed.  Constitutional: He appears well-developed and well-nourished. No distress.   HENT:   Head: Normocephalic and atraumatic.   Nose: Nose normal.   Mouth/Throat: No oropharyngeal exudate.   Eyes: Conjunctivae and EOM are normal. Right eye exhibits no discharge. Left eye exhibits no discharge. Scleral icterus is present.   Neck: Neck supple.   Normal range of motion.  Cardiovascular: Normal rate, regular rhythm, normal heart sounds and intact distal pulses.   No murmur heard.  Pulmonary/Chest: Breath sounds normal. No respiratory distress. He has no wheezes. He has no rales.   Abdominal: Abdomen is soft. Bowel sounds are normal. He exhibits distension. He exhibits no mass. There is no abdominal tenderness.   Ascites There is no rebound and no guarding.   Musculoskeletal:         General: No tenderness or edema. Normal range of motion.      Cervical back: Normal range of motion and neck supple.     Lymphadenopathy:     He has no cervical adenopathy.   Neurological: He is alert and oriented to person, place, and time. He has normal strength. No cranial nerve deficit. GCS score is 15. GCS eye subscore is 4. GCS verbal subscore is 5. GCS motor subscore is 6.   Skin: Skin is warm and dry. Capillary refill takes less than 2 seconds. No rash and no abscess noted. No erythema. No pallor.   Psychiatric: He has a normal mood and affect. His behavior is normal. Judgment and thought content normal.         ED Course   Procedures  Labs Reviewed   CBC W/ AUTO DIFFERENTIAL - Abnormal; Notable for the following components:       Result Value    RBC 3.51 (*)     Hemoglobin 11.7 (*)     Hematocrit 31.0 (*)     MCH 33.3 (*)     MCHC 37.7 (*)     RDW 16.0 (*)     MPV 9.1 (*)     Lymph % 15.7 (*)     All other components within normal limits   COMPREHENSIVE METABOLIC PANEL - Abnormal; Notable  for the following components:    Sodium 128 (*)     Potassium 2.8 (*)     CO2 16 (*)     Glucose 121 (*)     BUN 5 (*)     Calcium 8.2 (*)     Albumin 1.5 (*)     Total Bilirubin 21.8 (*)     Alkaline Phosphatase 712 (*)      (*)     All other components within normal limits   URINALYSIS, REFLEX TO URINE CULTURE - Abnormal; Notable for the following components:    Color, UA Brown (*)     Appearance, UA Hazy (*)     Bilirubin (UA) 3+ (*)     Occult Blood UA Trace (*)     All other components within normal limits    Narrative:     Preferred Collection Type->Urine, Clean Catch  Specimen Source->Urine   LIPASE - Abnormal; Notable for the following components:    Lipase 3 (*)     All other components within normal limits   AMMONIA - Abnormal; Notable for the following components:    Ammonia 59 (*)     All other components within normal limits    Narrative:     Recoll. [id number] by [facility] at 01/31/2022 19:33, reason: incorrect   tube used to draw   LACTIC ACID, PLASMA - Abnormal; Notable for the following components:    Lactate (Lactic Acid) 2.6 (*)     All other components within normal limits   PROTIME-INR - Abnormal; Notable for the following components:    Prothrombin Time 16.2 (*)     INR 1.6 (*)     All other components within normal limits   APTT - Abnormal; Notable for the following components:    aPTT 35.8 (*)     All other components within normal limits   CULTURE, BLOOD   CULTURE, BLOOD   MAGNESIUM   SARS-COV-2 RNA AMPLIFICATION, QUAL    Narrative:     Is the patient symptomatic?->No   AMYLASE   TROPONIN I   HEPATITIS PANEL, ACUTE     EKG Readings: (Independently Interpreted)   Sinus rhythm with ventricular rate of 92. No ST elevations or depressions.  Motion artifact noted.       Imaging Results          CT Abdomen Pelvis With Contrast (In process)                X-Ray Chest AP Portable (In process)               X-Rays:   Independently Interpreted Readings:   Other Readings:  PROCEDURE  INFORMATION:  Exam: CT Abdomen And Pelvis With Contrast  Exam date and time: 1/31/2022 8:37 PM  Age: 71 years old  Clinical indication: Bloating and other: Jaundice; Patient HX: Sudden onset abd distention, severe  painless jaundice, ascites  TECHNIQUE:  Imaging protocol: Computed tomography of the abdomen and pelvis with contrast.  Contrast material: [id number]; Contrast volume: 75 ml; Contrast route: INTRAVENOUS (IV);  COMPARISON:  CT abdomen pelvis WITHOUT CONTRAST 2/23/2008 8:46 PM  FINDINGS:  Lungs: There is pulmonary parenchymal calcification consistent with remote granulomatous  organism exposure. There is minimal bibasilar atelectasis. Minimal linear atelectasis noted right  middle lobe and lingula.  Pleural spaces: Perhaps trace left pleural effusion.  Liver: Interval decreased size of the liver which currently has a cirrhotic appearance. There are 3  small hepatic cysts. Few other tiny low-density hepatic lesions are noted too small to characterize.  There is a mild intrahepatic biliary ductal dilation. There is wall thickening of the CBD. Some  increased density material noted distal CBD. Maximum CBD diameter is approximately 14 mm along  proximal segment.  Gallbladder and bile ducts: There is gallbladder sludge. The gallbladder is not distended. There is  wall thickening of the gallbladder mostly along the fundus.  Pancreas: Normal. No ductal dilation.  Spleen: There are splenic granulomas. The spleen measures 13 cm.  Adrenal glands: Normal. No mass.  Kidneys and ureters: Normal. No hydronephrosis.  VIDHYA LYNCH Accession: [id number] MRN: [id number]  Preliminary Radiology Report   (QA) DISCREPANCY?  If there is a discrepancy between the preliminary and final interpretation, please notify [facility] via [email or web address].  If you do not have access to our QA portal, call our QA team at [phone]  CONFIDENTIALITY STATEMENT  This report is intended only for the use of the referring physician,  and only in accordance with law, If you received this in error, call [phone]  Page 2 of 2  Stomach and bowel: There is diverticulosis of the colon without evidence of diverticulitis.No bowel  dilation.There is no evidence of intestinal obstruction. There is wall thickening cecum and right colon.  Some mild diffuse small bowel wall thickening is noted.The aorta demonstrates moderate  atherosclerotic calcification.  Appendix: There has been an appendectomy.  Intraperitoneal space: There is free fluid within the abdomen and pelvis.  Vasculature: The aorta is ectatic. There is short segment nonocclusive posterior wall thrombus  proximal portal vein causing about 40% stenosis. Proximal mesenteric vein is not well opacified there  may possibly be a thrombus. There are small varices within the anterior upper abdomen.  Lymph nodes: Unremarkable. No enlarged lymph nodes.  Urinary bladder: The bladder is moderately distended.  Reproductive: Unremarkable as visualized.  Bones/joints: Unremarkable. No acute fracture.  Soft tissues: There is anasarca.  IMPRESSION:  1. Numerous abnormalities.  2. Biliary ductal dilation secondary to some increased density within the distal CBD which may  represent sludge or noncalcified stone, indeterminate on this study and is apparently causing biliary  obstruction. The gallbladder is contracted however contains sludge or noncalcified stones and wall  thickening of the distal fundus is noted, a mass lesion is not excluded.  3. Portal vein thrombus. Possibly thrombus proximal mesenteric vein.  4. Cirrhosis. Heterogeneous liver. Small varices. Mild splenomegaly. Periodic screening for HCC is  advised.  5. Large amount of ascites.  6. Mild body wall edema.  7. Diffuse small bowel enteritis. Nonspecific colitis of the right colon and cecum. Findings may also be  related to hypoproteinemia.  Thank you for allowing us to participate in the care of your patient.  Dictated and Authenticated by:  Everett Pandya MD  01/31/2022 9:18 PM Central Time ([place] & [place]        Chest x-ray shows no acute findings.  Chronic changes noted throughout lung parenchyma.    Medications   iohexoL (OMNIPAQUE 350) injection 75 mL (75 mLs Intravenous Given 1/31/22 2040)   potassium chloride SA CR tablet 40 mEq (40 mEq Oral Given 1/31/22 2101)   sodium chloride 0.9% bolus 1,000 mL (1,000 mLs Intravenous New Bag 1/31/22 2142)   potassium chloride 10 mEq in 100 mL IVPB (0 mEq Intravenous Stopped 1/31/22 2228)   cefTRIAXone (ROCEPHIN) 2 g/50 mL D5W IVPB (0 g Intravenous Stopped 2/1/22 0009)     Medical Decision Making:   ED Management:  Patient comes our facility with significant jaundice.  Bilirubin was over 21. This appears to be an acute episode of painless jaundice.  Patient has noted ascites.  CT scan does show cirrhosis in addition to biliary outlet obstruction with dilated CBD.  There is concern for biliary mass versus biliary sludge.  Additionally, patient had a lactic acidosis and judicial IV fluids were given.  Meld score is 23. Potassium 2.8.  Supplemental potassium given to patient.  Patient will be transferred to facility with General surgery, hepatology, GI.  Currently, the patient has been accepted to [facility] by Dr. Rogers.  Dr. Jones of GI will evaluate patient on arrival.  Patient is currently waiting availability at [facility] in order for transfer to be initiated.        Total critical care time 50 minutes.                      Clinical Impression:   Final diagnoses:  [R06.02] SOB (shortness of breath)  [K74.60, R18.8] Cirrhosis of liver with ascites, unspecified hepatic cirrhosis type (Primary)  [E80.6] Hyperbilirubinemia  [R93.2] Abnormal findings on imaging of biliary tract - Biliary outlet obstruction secondary to biliary sludge versus mass          ED Disposition Condition    Transfer to Another Facility Stable              Neo Robles MD  02/01/22 0973

## 2022-02-01 NOTE — PROGRESS NOTES
Report given to Ivy on the [facility]. Patient expected to go to room 1052. Awaiting EMS for transfer.

## 2022-02-01 NOTE — PLAN OF CARE
Outside Transfer Acceptance Note / [facility] Referral [facility]    Referring facility: [facility]   Referring provider: ARTI SHERMAN  Accepting facility:   Accepting provider: TWYLA BRENNER  Admitting provider: Sue  Reason for transfer: Higher Level of Care   Transfer diagnosis: Biliary obstruction, possible ETOH cirrhosis  Transfer specialty requested: Hepatology  Transfer specialty notified: yes  Transfer level: NUMBER 1-5: 2  Bed type requested: Med-tele  Isolation status: No active isolations   Admission class or status: IP- Inpatient    Past Medical History:   Diagnosis Date    Hypertension      Narrative     Mr. Reyez is a 70yo man with a past medical history of HTN and ETOH abuse (daily beer drinker, but apparently heavier in the past).  He now presents to the ED at [place] with one week of worsening jaundice and fatigue.  He has also noted abdominal swelling.    In the ED he had normal vital signs with no fever.  His labs were significantly abnormal:   INR 1.6, Na 128, K 2.8, , Alb 1.5, TB 22, , ALT 34, Lipase 3.  His CBC showed WBC 8, Hg 11.7, .  NH3 was elevated to 59, though no signs of encephalopathy.    He had a CT abdomen at [facility] which revealed signs of biliary obstruction, dilated CBD proximally to 14mm with wall thickening, mild IH/EH biliary duct dilatation, changes of cirrhosis, and sludge vs a mass in the CBD.    He will need [facility] and Hepatology consults, work up for newly diagnosed cirrhosis and ascites, Liver US and doppler, and paracentesis.  The case was discussed with Dr. Mccollum with [facility], who is willing to assist in the case.    He was treated with empiric Rocephin 2g iv x 1, KCL 40meq po x 1 and 10 meq IV x1, and iv fluids in the ED.  Blood cultures are drawn.    MELD-Na score: 28 at 1/31/2022  6:57 PM  MELD score: 23 at 1/31/2022  6:57 PM  Calculated from:  Serum Creatinine: 0.7 mg/dL (Using min of 1 mg/dL) at 1/31/2022  6:57 PM  Serum Sodium:  128 mmol/L at 1/31/2022  6:57 PM  Total Bilirubin: 21.8 mg/dL at 1/31/2022  6:57 PM  INR(ratio): 1.6 at 1/31/2022  6:57 PM  Age: 71 years       Objective     Vitals:   Temp:  [97.6 °F (36.4 °C)] 97.6 °F (36.4 °C)  Pulse:  [72-91] 86  Resp:  [8-18] 14  SpO2:  [100 %] 100 %  BP: (100-126)/(73-90) 111/76     Recent Labs:   Recent Results (from the past 24 hour(s))   CBC auto differential    Collection Time: 01/31/22  6:57 PM   Result Value Ref Range    WBC 8.07 3.90 - 12.70 K/uL    RBC 3.51 (L) 4.60 - 6.20 M/uL    Hemoglobin 11.7 (L) 14.0 - 18.0 g/dL    Hematocrit 31.0 (L) 40.0 - 54.0 %    MCV 88 82 - 98 fL    MCH 33.3 (H) 27.0 - 31.0 pg    MCHC 37.7 (H) 32.0 - 36.0 g/dL    RDW 16.0 (H) 11.5 - 14.5 %    Platelets 184 150 - 450 K/uL    MPV 9.1 (L) 9.2 - 12.9 fL    Immature Granulocytes 0.5 0.0 - 0.5 %    Gran # (ANC) 5.9 1.8 - 7.7 K/uL    Immature Grans (Abs) 0.04 0.00 - 0.04 K/uL    Lymph # 1.3 1.0 - 4.8 K/uL    Mono # 0.8 0.3 - 1.0 K/uL    Eos # 0.1 0.0 - 0.5 K/uL    Baso # 0.03 0.00 - 0.20 K/uL    nRBC 0 0 /100 WBC    Gran % 72.5 38.0 - 73.0 %    Lymph % 15.7 (L) 18.0 - 48.0 %    Mono % 10.3 4.0 - 15.0 %    Eosinophil % 0.6 0.0 - 8.0 %    Basophil % 0.4 0.0 - 1.9 %    Differential Method Automated    Comprehensive metabolic panel    Collection Time: 01/31/22  6:57 PM   Result Value Ref Range    Sodium 128 (L) 136 - 145 mmol/L    Potassium 2.8 (L) 3.5 - 5.1 mmol/L    Chloride 100 95 - 110 mmol/L    CO2 16 (L) 23 - 29 mmol/L    Glucose 121 (H) 70 - 110 mg/dL    BUN 5 (L) 8 - 23 mg/dL    Creatinine 0.7 0.5 - 1.4 mg/dL    Calcium 8.2 (L) 8.7 - 10.5 mg/dL    Total Protein 8.1 6.0 - 8.4 g/dL    Albumin 1.5 (L) 3.5 - 5.2 g/dL    Total Bilirubin 21.8 (H) 0.1 - 1.0 mg/dL    Alkaline Phosphatase 712 (H) 55 - 135 U/L     (H) 10 - 40 U/L    ALT 34 10 - 44 U/L    Anion Gap 12 8 - 16 mmol/L    eGFR if African American >60.0 >60 mL/min/1.73 m^2    eGFR if non African American >60.0 >60 mL/min/1.73 m^2   Magnesium     Collection Time: 01/31/22  6:57 PM   Result Value Ref Range    Magnesium 2.0 1.6 - 2.6 mg/dL   Lipase    Collection Time: 01/31/22  6:57 PM   Result Value Ref Range    Lipase 3 (L) 4 - 60 U/L   Amylase    Collection Time: 01/31/22  6:57 PM   Result Value Ref Range    Amylase 52 20 - 110 U/L   Lactic acid, plasma    Collection Time: 01/31/22  6:57 PM   Result Value Ref Range    Lactate (Lactic Acid) 2.6 (H) 0.5 - 2.2 mmol/L   Troponin I    Collection Time: 01/31/22  6:57 PM   Result Value Ref Range    Troponin I 0.009 0.000 - 0.026 ng/mL   Protime-INR    Collection Time: 01/31/22  6:57 PM   Result Value Ref Range    Prothrombin Time 16.2 (H) 9.0 - 12.5 sec    INR 1.6 (H) 0.8 - 1.2   APTT    Collection Time: 01/31/22  6:57 PM   Result Value Ref Range    aPTT 35.8 (H) 21.0 - 32.0 sec   COVID-19 Rapid Screening    Collection Time: 01/31/22  7:45 PM   Result Value Ref Range    SARS-CoV-2 RNA, Amplification, Qual Negative Negative   Ammonia    Collection Time: 01/31/22  7:57 PM   Result Value Ref Range    Ammonia 59 (H) 10 - 50 umol/L   Urinalysis, Reflex to Urine Culture Urine, Clean Catch    Collection Time: 01/31/22  9:57 PM    Specimen: Urine, Catheterized   Result Value Ref Range    Specimen UA Urine, Clean Catch     Color, UA Brown (A) Yellow, Straw, [place]    Appearance, UA Hazy (A) Clear    pH, UA 7.0 5.0 - 8.0    Specific Gravity, UA 1.010 1.005 - 1.030    Protein, UA Negative Negative    Glucose, UA Negative Negative    Ketones, UA Negative Negative    Bilirubin (UA) 3+ (A) Negative    Occult Blood UA Trace (A) Negative    Nitrite, UA Negative Negative    Urobilinogen, UA Negative Negative EU/dL    Leukocytes, UA Negative Negative          IV access:        Peripheral IV - Single Lumen 01/31/22 1845 18 G Anterior;Right Forearm (Active)            Peripheral IV - Single Lumen 01/31/22 1843 18 G Anterior;Left Forearm (Active)     Infusions: None  Allergies: Review of patient's allergies indicates:  No Known  Allergies   NPO: No      Anticoagulation:   Anticoagulants     None           Instructions      Salo Santos-  Admit to Hospital Medicine  Upon patient arrival to floor, please send SecureChat to [facility] or call extension [phone] (if no answer, this will flip to a beeper, so enter your call back number) for Hospital Medicine admit team assignment and for additional admit orders for the patient.  Do not page the attending physician associated with the patient on arrival (this physician may not be on duty at the time of arrival).  Rather, always call [phone] to reach the triage physician for orders and team assignment.

## 2022-02-02 ENCOUNTER — HOSPITAL ENCOUNTER (INPATIENT)
Facility: HOSPITAL | Age: 72
LOS: 3 days | Discharge: HOME | DRG: 423 | End: 2022-02-05
Attending: INTERNAL MEDICINE | Admitting: INTERNAL MEDICINE
Payer: MEDICARE

## 2022-02-02 DIAGNOSIS — K83.1 COMMON BILE DUCT OBSTRUCTION: ICD-10-CM

## 2022-02-02 DIAGNOSIS — I10 PRIMARY HYPERTENSION: Chronic | ICD-10-CM

## 2022-02-02 DIAGNOSIS — K83.1 BILIARY OBSTRUCTION: ICD-10-CM

## 2022-02-02 DIAGNOSIS — F10.20 ALCOHOL USE DISORDER, SEVERE, DEPENDENCE: Chronic | ICD-10-CM

## 2022-02-02 DIAGNOSIS — K72.90 DECOMPENSATED HEPATIC CIRRHOSIS: Primary | ICD-10-CM

## 2022-02-02 DIAGNOSIS — D68.9 COAGULOPATHY: ICD-10-CM

## 2022-02-02 DIAGNOSIS — K74.60 DECOMPENSATED HEPATIC CIRRHOSIS: Primary | ICD-10-CM

## 2022-02-02 DIAGNOSIS — K86.9 PANCREATIC LESION: ICD-10-CM

## 2022-02-02 PROBLEM — K59.00 CONSTIPATION: Status: ACTIVE | Noted: 2022-02-02

## 2022-02-02 PROBLEM — E87.1 HYPONATREMIA: Status: ACTIVE | Noted: 2022-02-02

## 2022-02-02 PROBLEM — F10.10 ALCOHOL ABUSE: Status: ACTIVE | Noted: 2022-02-02

## 2022-02-02 PROBLEM — E87.6 HYPOKALEMIA: Status: ACTIVE | Noted: 2022-02-02

## 2022-02-02 PROBLEM — N17.9 AKI (ACUTE KIDNEY INJURY): Status: ACTIVE | Noted: 2022-02-02

## 2022-02-02 LAB
A1AT SERPL-MCNC: 156 MG/DL (ref 100–190)
ALBUMIN FLD-MCNC: <0.4 G/DL
ALBUMIN SERPL BCP-MCNC: 1.1 G/DL (ref 3.5–5.2)
ALP SERPL-CCNC: 552 U/L (ref 55–135)
ALT SERPL W/O P-5'-P-CCNC: 24 U/L (ref 10–44)
AMYLASE, BODY FLUID: 6 U/L
ANION GAP SERPL CALC-SCNC: 5 MMOL/L (ref 8–16)
APPEARANCE FLD: CLEAR
AST SERPL-CCNC: 82 U/L (ref 10–40)
BASOPHILS # BLD AUTO: 0.02 K/UL (ref 0–0.2)
BASOPHILS NFR BLD: 0.2 % (ref 0–1.9)
BILIRUB FLD-MCNC: 1.9 MG/DL
BILIRUB SERPL-MCNC: 17.1 MG/DL (ref 0.1–1)
BODY FLD TYPE: NORMAL
BODY FLUID SOURCE AMYLASE: NORMAL
BODY FLUID SOURCE, BILIRUBIN: NORMAL
BUN SERPL-MCNC: 6 MG/DL (ref 8–23)
CALCIUM SERPL-MCNC: 8 MG/DL (ref 8.7–10.5)
CERULOPLASMIN SERPL-MCNC: 51 MG/DL (ref 15–45)
CHLORIDE SERPL-SCNC: 106 MMOL/L (ref 95–110)
CO2 SERPL-SCNC: 20 MMOL/L (ref 23–29)
COLOR FLD: YELLOW
CREAT SERPL-MCNC: 0.7 MG/DL (ref 0.5–1.4)
CREAT UR-MCNC: 41 MG/DL (ref 23–375)
DIFFERENTIAL METHOD: ABNORMAL
EOSINOPHIL # BLD AUTO: 0.1 K/UL (ref 0–0.5)
EOSINOPHIL NFR BLD: 0.9 % (ref 0–8)
ERYTHROCYTE [DISTWIDTH] IN BLOOD BY AUTOMATED COUNT: 16.4 % (ref 11.5–14.5)
EST. GFR  (AFRICAN AMERICAN): >60 ML/MIN/1.73 M^2
EST. GFR  (NON AFRICAN AMERICAN): >60 ML/MIN/1.73 M^2
ETHANOL SERPL-MCNC: <10 MG/DL
FERRITIN SERPL-MCNC: 862 NG/ML (ref 20–300)
GLUCOSE SERPL-MCNC: 106 MG/DL (ref 70–110)
HCT VFR BLD AUTO: 26.3 % (ref 40–54)
HGB BLD-MCNC: 9.7 G/DL (ref 14–18)
HIV 1+2 AB+HIV1 P24 AG SERPL QL IA: NEGATIVE
IGG SERPL-MCNC: 2949 MG/DL (ref 650–1600)
IMM GRANULOCYTES # BLD AUTO: 0.04 K/UL (ref 0–0.04)
IMM GRANULOCYTES NFR BLD AUTO: 0.5 % (ref 0–0.5)
IRON SERPL-MCNC: 96 UG/DL (ref 45–160)
LACTATE SERPL-SCNC: 1.3 MMOL/L (ref 0.5–2.2)
LYMPHOCYTES # BLD AUTO: 1.3 K/UL (ref 1–4.8)
LYMPHOCYTES NFR BLD: 15.2 % (ref 18–48)
LYMPHOCYTES NFR FLD MANUAL: 73 %
MAGNESIUM SERPL-MCNC: 1.9 MG/DL (ref 1.6–2.6)
MCH RBC QN AUTO: 32.6 PG (ref 27–31)
MCHC RBC AUTO-ENTMCNC: 36.9 G/DL (ref 32–36)
MCV RBC AUTO: 88 FL (ref 82–98)
MESOTHL CELL NFR FLD MANUAL: 1 %
MONOCYTES # BLD AUTO: 0.9 K/UL (ref 0.3–1)
MONOCYTES NFR BLD: 10.5 % (ref 4–15)
MONOS+MACROS NFR FLD MANUAL: 20 %
NEUTROPHILS # BLD AUTO: 6.3 K/UL (ref 1.8–7.7)
NEUTROPHILS NFR BLD: 72.7 % (ref 38–73)
NEUTROPHILS NFR FLD MANUAL: 6 %
NRBC BLD-RTO: 0 /100 WBC
OSMOLALITY UR: 386 MOSM/KG (ref 50–1200)
PHOSPHATE SERPL-MCNC: 2.4 MG/DL (ref 2.7–4.5)
PLATELET # BLD AUTO: 160 K/UL (ref 150–450)
PMV BLD AUTO: 8.9 FL (ref 9.2–12.9)
POTASSIUM SERPL-SCNC: 3.2 MMOL/L (ref 3.5–5.1)
PROT SERPL-MCNC: 6.4 G/DL (ref 6–8.4)
PROT UR-MCNC: 29 MG/DL (ref 0–15)
PROT/CREAT UR: 0.71 MG/G{CREAT} (ref 0–0.2)
RBC # BLD AUTO: 2.98 M/UL (ref 4.6–6.2)
SATURATED IRON: 60 % (ref 20–50)
SODIUM SERPL-SCNC: 131 MMOL/L (ref 136–145)
SODIUM UR-SCNC: 120 MMOL/L (ref 20–250)
SPECIMEN SOURCE: NORMAL
TOTAL IRON BINDING CAPACITY: 161 UG/DL (ref 250–450)
TRANSFERRIN SERPL-MCNC: 109 MG/DL (ref 200–375)
WBC # BLD AUTO: 8.67 K/UL (ref 3.9–12.7)
WBC # FLD: 75 /CU MM

## 2022-02-02 PROCEDURE — 99223 1ST HOSP IP/OBS HIGH 75: CPT | Mod: ,,, | Performed by: INTERNAL MEDICINE

## 2022-02-02 PROCEDURE — 82390 ASSAY OF CERULOPLASMIN: CPT | Performed by: STUDENT IN AN ORGANIZED HEALTH CARE EDUCATION/TRAINING PROGRAM

## 2022-02-02 PROCEDURE — 84100 ASSAY OF PHOSPHORUS: CPT | Performed by: STUDENT IN AN ORGANIZED HEALTH CARE EDUCATION/TRAINING PROGRAM

## 2022-02-02 PROCEDURE — 86225 DNA ANTIBODY NATIVE: CPT | Performed by: STUDENT IN AN ORGANIZED HEALTH CARE EDUCATION/TRAINING PROGRAM

## 2022-02-02 PROCEDURE — 87205 SMEAR GRAM STAIN: CPT | Performed by: STUDENT IN AN ORGANIZED HEALTH CARE EDUCATION/TRAINING PROGRAM

## 2022-02-02 PROCEDURE — 99223 PR INITIAL HOSPITAL CARE,LEVL III: ICD-10-PCS | Mod: GC,,, | Performed by: STUDENT IN AN ORGANIZED HEALTH CARE EDUCATION/TRAINING PROGRAM

## 2022-02-02 PROCEDURE — 25000003 PHARM REV CODE 250: Performed by: STUDENT IN AN ORGANIZED HEALTH CARE EDUCATION/TRAINING PROGRAM

## 2022-02-02 PROCEDURE — 89051 BODY FLUID CELL COUNT: CPT | Performed by: STUDENT IN AN ORGANIZED HEALTH CARE EDUCATION/TRAINING PROGRAM

## 2022-02-02 PROCEDURE — 82077 ASSAY SPEC XCP UR&BREATH IA: CPT | Performed by: STUDENT IN AN ORGANIZED HEALTH CARE EDUCATION/TRAINING PROGRAM

## 2022-02-02 PROCEDURE — 80321 ALCOHOLS BIOMARKERS 1OR 2: CPT | Performed by: STUDENT IN AN ORGANIZED HEALTH CARE EDUCATION/TRAINING PROGRAM

## 2022-02-02 PROCEDURE — 99223 1ST HOSP IP/OBS HIGH 75: CPT | Mod: GC,,, | Performed by: STUDENT IN AN ORGANIZED HEALTH CARE EDUCATION/TRAINING PROGRAM

## 2022-02-02 PROCEDURE — 82042 OTHER SOURCE ALBUMIN QUAN EA: CPT | Performed by: STUDENT IN AN ORGANIZED HEALTH CARE EDUCATION/TRAINING PROGRAM

## 2022-02-02 PROCEDURE — 49082 ABD PARACENTESIS: CPT

## 2022-02-02 PROCEDURE — 80053 COMPREHEN METABOLIC PANEL: CPT | Performed by: STUDENT IN AN ORGANIZED HEALTH CARE EDUCATION/TRAINING PROGRAM

## 2022-02-02 PROCEDURE — 82247 BILIRUBIN TOTAL: CPT | Performed by: STUDENT IN AN ORGANIZED HEALTH CARE EDUCATION/TRAINING PROGRAM

## 2022-02-02 PROCEDURE — 20600001 HC STEP DOWN PRIVATE ROOM

## 2022-02-02 PROCEDURE — 86038 ANTINUCLEAR ANTIBODIES: CPT | Performed by: STUDENT IN AN ORGANIZED HEALTH CARE EDUCATION/TRAINING PROGRAM

## 2022-02-02 PROCEDURE — 86235 NUCLEAR ANTIGEN ANTIBODY: CPT | Mod: 59 | Performed by: STUDENT IN AN ORGANIZED HEALTH CARE EDUCATION/TRAINING PROGRAM

## 2022-02-02 PROCEDURE — 83735 ASSAY OF MAGNESIUM: CPT | Performed by: STUDENT IN AN ORGANIZED HEALTH CARE EDUCATION/TRAINING PROGRAM

## 2022-02-02 PROCEDURE — 86039 ANTINUCLEAR ANTIBODIES (ANA): CPT | Performed by: STUDENT IN AN ORGANIZED HEALTH CARE EDUCATION/TRAINING PROGRAM

## 2022-02-02 PROCEDURE — 36415 COLL VENOUS BLD VENIPUNCTURE: CPT | Performed by: STUDENT IN AN ORGANIZED HEALTH CARE EDUCATION/TRAINING PROGRAM

## 2022-02-02 PROCEDURE — 86256 FLUORESCENT ANTIBODY TITER: CPT | Performed by: STUDENT IN AN ORGANIZED HEALTH CARE EDUCATION/TRAINING PROGRAM

## 2022-02-02 PROCEDURE — A9585 GADOBUTROL INJECTION: HCPCS | Performed by: STUDENT IN AN ORGANIZED HEALTH CARE EDUCATION/TRAINING PROGRAM

## 2022-02-02 PROCEDURE — 63600175 PHARM REV CODE 636 W HCPCS: Performed by: STUDENT IN AN ORGANIZED HEALTH CARE EDUCATION/TRAINING PROGRAM

## 2022-02-02 PROCEDURE — 82103 ALPHA-1-ANTITRYPSIN TOTAL: CPT | Performed by: STUDENT IN AN ORGANIZED HEALTH CARE EDUCATION/TRAINING PROGRAM

## 2022-02-02 PROCEDURE — 99223 PR INITIAL HOSPITAL CARE,LEVL III: ICD-10-PCS | Mod: ,,, | Performed by: INTERNAL MEDICINE

## 2022-02-02 PROCEDURE — 82150 ASSAY OF AMYLASE: CPT | Performed by: STUDENT IN AN ORGANIZED HEALTH CARE EDUCATION/TRAINING PROGRAM

## 2022-02-02 PROCEDURE — 87075 CULTR BACTERIA EXCEPT BLOOD: CPT | Performed by: STUDENT IN AN ORGANIZED HEALTH CARE EDUCATION/TRAINING PROGRAM

## 2022-02-02 PROCEDURE — 87389 HIV-1 AG W/HIV-1&-2 AB AG IA: CPT | Performed by: STUDENT IN AN ORGANIZED HEALTH CARE EDUCATION/TRAINING PROGRAM

## 2022-02-02 PROCEDURE — 84466 ASSAY OF TRANSFERRIN: CPT | Performed by: STUDENT IN AN ORGANIZED HEALTH CARE EDUCATION/TRAINING PROGRAM

## 2022-02-02 PROCEDURE — 49083 ABD PARACENTESIS W/IMAGING: CPT

## 2022-02-02 PROCEDURE — 87102 FUNGUS ISOLATION CULTURE: CPT | Performed by: STUDENT IN AN ORGANIZED HEALTH CARE EDUCATION/TRAINING PROGRAM

## 2022-02-02 PROCEDURE — 87070 CULTURE OTHR SPECIMN AEROBIC: CPT | Performed by: STUDENT IN AN ORGANIZED HEALTH CARE EDUCATION/TRAINING PROGRAM

## 2022-02-02 PROCEDURE — 83935 ASSAY OF URINE OSMOLALITY: CPT | Performed by: STUDENT IN AN ORGANIZED HEALTH CARE EDUCATION/TRAINING PROGRAM

## 2022-02-02 PROCEDURE — 25500020 PHARM REV CODE 255: Performed by: STUDENT IN AN ORGANIZED HEALTH CARE EDUCATION/TRAINING PROGRAM

## 2022-02-02 PROCEDURE — 85025 COMPLETE CBC W/AUTO DIFF WBC: CPT | Performed by: STUDENT IN AN ORGANIZED HEALTH CARE EDUCATION/TRAINING PROGRAM

## 2022-02-02 PROCEDURE — 82728 ASSAY OF FERRITIN: CPT | Performed by: STUDENT IN AN ORGANIZED HEALTH CARE EDUCATION/TRAINING PROGRAM

## 2022-02-02 PROCEDURE — 83605 ASSAY OF LACTIC ACID: CPT | Performed by: STUDENT IN AN ORGANIZED HEALTH CARE EDUCATION/TRAINING PROGRAM

## 2022-02-02 PROCEDURE — 84156 ASSAY OF PROTEIN URINE: CPT | Performed by: STUDENT IN AN ORGANIZED HEALTH CARE EDUCATION/TRAINING PROGRAM

## 2022-02-02 PROCEDURE — 86235 NUCLEAR ANTIGEN ANTIBODY: CPT | Mod: 91 | Performed by: STUDENT IN AN ORGANIZED HEALTH CARE EDUCATION/TRAINING PROGRAM

## 2022-02-02 PROCEDURE — 84300 ASSAY OF URINE SODIUM: CPT | Performed by: STUDENT IN AN ORGANIZED HEALTH CARE EDUCATION/TRAINING PROGRAM

## 2022-02-02 PROCEDURE — 82784 ASSAY IGA/IGD/IGG/IGM EACH: CPT | Performed by: STUDENT IN AN ORGANIZED HEALTH CARE EDUCATION/TRAINING PROGRAM

## 2022-02-02 RX ORDER — NALOXONE HCL 0.4 MG/ML
0.02 VIAL (ML) INJECTION
Status: DISCONTINUED | OUTPATIENT
Start: 2022-02-02 | End: 2022-02-05 | Stop reason: HOSPADM

## 2022-02-02 RX ORDER — IBUPROFEN 200 MG
16 TABLET ORAL
Status: DISCONTINUED | OUTPATIENT
Start: 2022-02-02 | End: 2022-02-05 | Stop reason: HOSPADM

## 2022-02-02 RX ORDER — SPIRONOLACTONE 25 MG/1
50 TABLET ORAL DAILY
Status: DISCONTINUED | OUTPATIENT
Start: 2022-02-02 | End: 2022-02-04

## 2022-02-02 RX ORDER — IBUPROFEN 200 MG
24 TABLET ORAL
Status: DISCONTINUED | OUTPATIENT
Start: 2022-02-02 | End: 2022-02-05 | Stop reason: HOSPADM

## 2022-02-02 RX ORDER — ONDANSETRON 2 MG/ML
4 INJECTION INTRAMUSCULAR; INTRAVENOUS EVERY 8 HOURS PRN
Status: DISCONTINUED | OUTPATIENT
Start: 2022-02-02 | End: 2022-02-05 | Stop reason: HOSPADM

## 2022-02-02 RX ORDER — TALC
6 POWDER (GRAM) TOPICAL NIGHTLY PRN
Status: DISCONTINUED | OUTPATIENT
Start: 2022-02-02 | End: 2022-02-05 | Stop reason: HOSPADM

## 2022-02-02 RX ORDER — GLUCAGON 1 MG
1 KIT INJECTION
Status: DISCONTINUED | OUTPATIENT
Start: 2022-02-02 | End: 2022-02-05 | Stop reason: HOSPADM

## 2022-02-02 RX ORDER — SODIUM CHLORIDE 0.9 % (FLUSH) 0.9 %
10 SYRINGE (ML) INJECTION EVERY 12 HOURS PRN
Status: DISCONTINUED | OUTPATIENT
Start: 2022-02-02 | End: 2022-02-05 | Stop reason: HOSPADM

## 2022-02-02 RX ORDER — FUROSEMIDE 20 MG/1
20 TABLET ORAL DAILY
Status: DISCONTINUED | OUTPATIENT
Start: 2022-02-02 | End: 2022-02-03

## 2022-02-02 RX ORDER — LORAZEPAM 2 MG/ML
2 INJECTION INTRAMUSCULAR EVERY 4 HOURS PRN
Status: DISCONTINUED | OUTPATIENT
Start: 2022-02-02 | End: 2022-02-04

## 2022-02-02 RX ORDER — LACTULOSE 10 G/15ML
10 SOLUTION ORAL 3 TIMES DAILY
Status: DISCONTINUED | OUTPATIENT
Start: 2022-02-02 | End: 2022-02-02

## 2022-02-02 RX ORDER — LIDOCAINE HYDROCHLORIDE AND EPINEPHRINE 10; 10 MG/ML; UG/ML
1 INJECTION, SOLUTION INFILTRATION; PERINEURAL ONCE
Status: COMPLETED | OUTPATIENT
Start: 2022-02-02 | End: 2022-02-02

## 2022-02-02 RX ORDER — GADOBUTROL 604.72 MG/ML
10 INJECTION INTRAVENOUS
Status: COMPLETED | OUTPATIENT
Start: 2022-02-02 | End: 2022-02-02

## 2022-02-02 RX ORDER — SODIUM,POTASSIUM PHOSPHATES 280-250MG
2 POWDER IN PACKET (EA) ORAL ONCE
Status: COMPLETED | OUTPATIENT
Start: 2022-02-02 | End: 2022-02-02

## 2022-02-02 RX ADMIN — FUROSEMIDE 20 MG: 20 TABLET ORAL at 09:02

## 2022-02-02 RX ADMIN — CEFTRIAXONE 1 G: 1 INJECTION, POWDER, FOR SOLUTION INTRAMUSCULAR; INTRAVENOUS at 03:02

## 2022-02-02 RX ADMIN — SPIRONOLACTONE 50 MG: 25 TABLET ORAL at 09:02

## 2022-02-02 RX ADMIN — RIFAXIMIN 550 MG: 550 TABLET ORAL at 09:02

## 2022-02-02 RX ADMIN — POTASSIUM & SODIUM PHOSPHATES POWDER PACK 280-160-250 MG 2 PACKET: 280-160-250 PACK at 09:02

## 2022-02-02 RX ADMIN — LACTULOSE 10 G: 20 SOLUTION ORAL at 09:02

## 2022-02-02 RX ADMIN — LIDOCAINE HYDROCHLORIDE,EPINEPHRINE BITARTRATE 1 ML: 10; .01 INJECTION, SOLUTION INFILTRATION; PERINEURAL at 12:02

## 2022-02-02 RX ADMIN — GADOBUTROL 10 ML: 604.72 INJECTION INTRAVENOUS at 06:02

## 2022-02-02 NOTE — ED NOTES
VSR called to CHON Glasgow at [phone], room 1052, all questions answered. RN will call on pt departure.

## 2022-02-02 NOTE — ASSESSMENT & PLAN NOTE
Mild, asymptomatic. Thought to be due to cirrhosis. Mild LE edema, otherwise no JVD or hypoxia.     - furosemide 20 mg qd, was previously on this regimen outpatient  - f/u Mannie Criselda

## 2022-02-02 NOTE — ASSESSMENT & PLAN NOTE
Last BM 3 days ago. No abdominal pain, passing gas. Low suspicion for SBO at this time.   - scheduled lactulose

## 2022-02-02 NOTE — ASSESSMENT & PLAN NOTE
Hx of alcohol abuse, 6 drinks daily after work with co-workers. Now drinks 1-2 beers/night.   - encouraged cessation   - CIWA q4 hr  - monitor symptoms, start benzos if concern for AW, low at this time

## 2022-02-02 NOTE — ASSESSMENT & PLAN NOTE
BP 99/57 on admission. On home metoprolol 25 mg qd.   - hold antihypertensive home meds  - MAPs>65

## 2022-02-02 NOTE — H&P
[facility] Medicine  History & Physical    Patient Name: Vic Reyez  MRN: [id number]  Patient Class: IP- Inpatient  Admission Date: 2/2/2022  Attending Physician: Riki Jaime MD   Primary Care Provider: [facility]         Patient information was obtained from patient and ER records.     Subjective:     Principal Problem:Biliary obstruction    Chief Complaint: No chief complaint on file.       HPI: Mr. Reyez is a 71-year-old-man with HTN, constipation, and EtOH abuse, who presented to OSH with 1 week of worsening jaundice, abdominal distension and fatigue, found to have new decompensated cirrhosis and bilary obstruction. Transferred for AES and Hepatology evaluation. Patient reports no abdominal pain. States neighbor encouraged him to be evaluated for new yellowing. Noted his urine has been brown for ~ 1 week. No BM in 3 days. No fever or chills. Some nausea with meals. No emesis or hematemesis. No melena or hematochezia. No confusion, feels like himself. Lives alone. Drinks 1-2 beers/day. Previously drank 6 beers/day.     At OSH ED, afebrile, VS normal. Exam with ascites and normal mentation. INR 1.6, Na 128, K 2.8, , Alb 1.5, TB 22, , ALT 34, Lipase 3. WBC 8, Hg 11.7, .  Ammonia, 69. CT abdomen with bilary obstruction, dilated CBD 14mm with wall thickening, mild intra and extrahepatic biliary duct dilation, liver with chages consistent with cirrhosissigns of biliary obstruction, dilated CBD proximally to 14mm with wall thickening, mild IH/EH biliary duct dilatation, changes of cirrhosis, and portal hypertension. Sludge vs mass in CBD.     Case discussed with Dr. Mccollum in AES prior to transfer. Was treated with empiric Rocephin 2g iv x 1, KCL 40meq po x 1 and 10 meq IV x1, and iv fluids in the ED.  Blood cultures are drawn.     MELD-Na score: 28 at 1/31/2022  6:57 PM  MELD score: 23 at 1/31/2022  6:57 PM  Calculated from:  Serum Creatinine: 0.7 mg/dL  (Using min of 1 mg/dL) at 1/31/2022  6:57 PM  Serum Sodium: 128 mmol/L at 1/31/2022  6:57 PM  Total Bilirubin: 21.8 mg/dL at 1/31/2022  6:57 PM  INR(ratio): 1.6 at 1/31/2022  6:57 PM  Age: 71 years    Upon arrival to [facility], patient is alert and oriented, in no acute distress. Afebrile, BP 99/57, HR 79, stating well on RA without tachypnea. Repeat labs pending. AES and Hepatology consulted. Emperic IV ceftriaxone continued. Started lactulose, rifaximin, PO furosemide and spironolactone. NPO.       Past Medical History:   Diagnosis Date    Hypertension        Past Surgical History:   Procedure Laterality Date    APPENDECTOMY      EYE SURGERY      JOINT REPLACEMENT      KNEE SURGERY      RECONSTRUCTION OF SHOULDER Right        Review of patient's allergies indicates:  No Known Allergies    Current Facility-Administered Medications on File Prior to Encounter   Medication    [COMPLETED] aluminum-magnesium hydroxide-simethicone 200-200-20 mg/5 mL suspension 30 mL    And    [COMPLETED] LIDOcaine HCl 2% oral solution 10 mL    [COMPLETED] dicyclomine 10 mg/5 mL syrup 20 mg    [COMPLETED] ondansetron injection 4 mg     Current Outpatient Medications on File Prior to Encounter   Medication Sig    aspirin (ECOTRIN) 81 MG EC tablet Take 81 mg by mouth once daily.    calcium-vitamin D (OSCAL) 250 (625)-125 mg-unit per tablet Take 1 tablet by mouth once daily.    docusate sodium (COLACE) 50 MG capsule Take by mouth 2 (two) times daily.    ferrous fumarate (HEMOCYTE) 324 mg (106 mg iron) Tab Take by mouth.    hydrocodone-acetaminophen 7.5-325mg (NORCO) 7.5-325 mg per tablet Take 1 tablet by mouth every 6 (six) hours as needed.    metoprolol tartrate (LOPRESSOR) 25 MG tablet Take 25 mg by mouth 2 (two) times daily.    naproxen (NAPROSYN) 500 MG tablet Take 500 mg by mouth 2 (two) times daily.    pravastatin (PRAVACHOL) 40 MG tablet Take 40 mg by mouth once daily.     Family History    None       Tobacco Use     Smoking status: Current Every Day Smoker     Packs/day: 0.50     Types: Cigarettes    Smokeless tobacco: Never Used   Substance and Sexual Activity    Alcohol use: Yes     Alcohol/week: 4.0 standard drinks     Types: 4 Cans of beer per week    Drug use: Never    Sexual activity: Not Currently     Review of Systems   Constitutional: Negative for chills and fever.   HENT: Negative for congestion and rhinorrhea.    Eyes: Negative for discharge, redness and visual disturbance.   Respiratory: Negative for cough and shortness of breath.    Cardiovascular: Positive for leg swelling. Negative for chest pain and palpitations.   Gastrointestinal: Positive for abdominal distention, constipation and nausea. Negative for abdominal pain and diarrhea.   Genitourinary: Negative for dysuria, flank pain and hematuria.        Dark urine   Musculoskeletal: Negative for arthralgias and gait problem.   Skin: Positive for color change (yellowing).   Neurological: Negative for syncope, facial asymmetry, speech difficulty, light-headedness, numbness and headaches.   Psychiatric/Behavioral: Negative for agitation and confusion.     Objective:     Vital Signs (Most Recent):  Temp: 98.1 °F (36.7 °C) (02/02/22 0135)  Pulse: 79 (02/02/22 0135)  Resp: 17 (02/02/22 0135)  BP: (!) 99/57 (02/02/22 0135)  SpO2: 97 % (02/02/22 0135) Vital Signs (24h Range):  Temp:  [98 °F (36.7 °C)-98.1 °F (36.7 °C)] 98.1 °F (36.7 °C)  Pulse:  [77-96] 79  Resp:  [0-51] 17  SpO2:  [96 %-100 %] 97 %  BP: ()/(57-86) 99/57     Weight: 78.8 kg (173 lb 11.6 oz)  Body mass index is 24.23 kg/m².    Physical Exam  Constitutional:       General: He is not in acute distress.     Appearance: He is ill-appearing. He is not toxic-appearing or diaphoretic.   HENT:      Head: Normocephalic and atraumatic.      Nose: No congestion or rhinorrhea.      Mouth/Throat:      Mouth: Mucous membranes are moist.      Pharynx: Oropharynx is clear.   Eyes:      General: Scleral icterus  present.   Cardiovascular:      Rate and Rhythm: Normal rate and regular rhythm.      Pulses: Normal pulses.      Heart sounds: Normal heart sounds. No murmur heard.      Pulmonary:      Effort: Pulmonary effort is normal. No respiratory distress.      Breath sounds: Normal breath sounds. No wheezing or rales.   Abdominal:      General: Bowel sounds are normal. There is distension.      Tenderness: There is no abdominal tenderness. There is no right CVA tenderness, left CVA tenderness, guarding or rebound.      Comments: ascites with fluid wave    Musculoskeletal:         General: Normal range of motion.      Cervical back: Normal range of motion and neck supple.      Right lower leg: Edema (+1) present.      Left lower leg: Edema (+1) present.   Skin:     Coloration: Skin is jaundiced.   Neurological:      General: No focal deficit present.      Mental Status: He is alert and oriented to person, place, and time.      Sensory: No sensory deficit.      Motor: No weakness.      Comments: No asterixis    Psychiatric:         Mood and Affect: Mood normal.         Thought Content: Thought content normal.             Significant Labs: All pertinent labs within the past 24 hours have been reviewed.    Significant Imaging: I have reviewed all pertinent imaging results/findings within the past 24 hours.    Assessment/Plan:     * Biliary obstruction  Mr. Reyez is a 71-year-old-man with HTN, constipation, and EtOH abuse, who presented to OSH with 1 week of worsening jaundice, abdominal distension and fatigue, found to have new decompensated cirrhosis and bilary obstruction. Transferred for AES and Hepatology evaluation.     At OSH ED, afebrile, VS normal. Exam with ascites and normal mentation. INR 1.6, Na 128, K 2.8, , Alb 1.5, TB 22, , ALT 34, Lipase 3. WBC 8, Hg 11.7, .  Ammonia, 69. CT abdomen with bilary obstruction, dilated CBD 14mm with wall thickening, mild intra and extrahepatic biliary duct  dilation, liver with chages consistent with cirrhosissigns of biliary obstruction, dilated CBD proximally to 14mm with wall thickening, mild IH/EH biliary duct dilatation, changes of cirrhosis, and portal hypertension. Sludge vs mass in CBD.     Case discussed with Dr. Mccollum in AES prior to transfer. Was treated with empiric Rocephin 2g iv x 1, KCL 40meq po x 1 and 10 meq IV x1, and iv fluids in the ED.  Blood cultures are drawn.    No fevers or abdominal pain. Appears to be biliary obstruction without cholangitis.      MELD-Na score: 28 at 1/31/2022  6:57 PM  MELD score: 23 at 1/31/2022  6:57 PM  Calculated from:  Serum Creatinine: 0.7 mg/dL (Using min of 1 mg/dL) at 1/31/2022  6:57 PM  Serum Sodium: 128 mmol/L at 1/31/2022  6:57 PM  Total Bilirubin: 21.8 mg/dL at 1/31/2022  6:57 PM  INR(ratio): 1.6 at 1/31/2022  6:57 PM  Age: 71 years    - empiric IV ceftriaxone  - f/u blood cx   - AES consulted, appreciate recs  - NPO  - see decompensated cirrhosis management    Decompensated hepatic cirrhosis  1 week of worsening jaundice, abdominal distension and fatigue, found to have new decompensated cirrhosis and bilary obstruction. Transferred for AES and Hepatology evaluation.     MELD-Na score: 28 at 1/31/2022  6:57 PM  MELD score: 23 at 1/31/2022  6:57 PM  Calculated from:  Serum Creatinine: 0.7 mg/dL (Using min of 1 mg/dL) at 1/31/2022  6:57 PM  Serum Sodium: 128 mmol/L at 1/31/2022  6:57 PM  Total Bilirubin: 21.8 mg/dL at 1/31/2022  6:57 PM  INR(ratio): 1.6 at 1/31/2022  6:57 PM  Age: 71 years    No asterixis or HE.     - lactulose TID, titrate to 3 BMs/day  - rifaximin for SBP ppx  - lasix 20 mg qd  - spironolactone 50 mg qd  - empiric IV ceftriaxone   - hepatology consulted, appreciate recs  - f/u liver US with doppler  - ascites with fluid wave, however no abdominal pain or SOB, deferred therapeutic para     Constipation  Last BM 3 days ago. No abdominal pain, passing gas. Low suspicion for SBO at this time.   -  scheduled lactulose      Hyponatremia  Mild, asymptomatic. Thought to be due to cirrhosis. Mild LE edema, otherwise no JVD or hypoxia.     - furosemide 20 mg qd, was previously on this regimen outpatient  - f/u Uosm, Criselda    Hypokalemia  K 2.8,  replaced prior to arrival.     - f/u AM K  - started spironolactone 50 mg qd     SIMONE (acute kidney injury)  Patient with acute kidney injury likely d/t HRS Which is currently stable. Labs reviewed- Renal function/electrolytes with Estimated Creatinine Clearance: 106.1 mL/min (based on SCr of 0.7 mg/dL). according to latest data. Monitor urine output and serial BMP and adjust therapy as needed. Avoid nephrotoxins and renally dose meds for GFR listed above.   UA with 3+ bilirubin, otherwise unrevealing.     - f/u Criselda, consider HRS  - furosemide 20 mg qd, up-titrate as needed   - consider higher MAP goal if HRS   - f/u AM Cr    Primary hypertension  BP 99/57 on admission. On home metoprolol 25 mg qd.   - hold antihypertensive home meds  - MAPs>65    Alcohol abuse  Hx of alcohol abuse, 6 drinks daily after work with co-workers. Now drinks 1-2 beers/night.   - encouraged cessation   - CIWA q4 hr  - monitor symptoms, start benzos if concern for AW, low at this time      VTE Risk Mitigation (From admission, onward)         Ordered     IP VTE LOW RISK PATIENT  Once         02/02/22 0155     Place sequential compression device  Until discontinued         02/02/22 0155                   Cira Nunn MD  Department of Hospital Medicine   [facility]

## 2022-02-02 NOTE — HPI
Mr. Reyez is a 71-year-old-man with HTN, constipation, and EtOH abuse, who presented to OSH with 1 week of worsening jaundice, abdominal distension and fatigue, found to have new decompensated cirrhosis and bilary obstruction. Transferred for AES and Hepatology evaluation. Patient reports no abdominal pain. States neighbor encouraged him to be evaluated for new yellowing. Noted his urine has been brown for ~ 1 week. No BM in 3 days. No fever or chills. Some nausea with meals. No emesis or hematemesis. No melena or hematochezia. No confusion, feels like himself. Lives alone. Drinks 1-2 beers/day. Previously drank 6 beers/day.     At OSH ED, afebrile, VS normal. Exam with ascites and normal mentation. INR 1.6, Na 128, K 2.8, , Alb 1.5, TB 22, , ALT 34, Lipase 3. WBC 8, Hg 11.7, .  Ammonia, 69. CT abdomen with bilary obstruction, dilated CBD 14mm with wall thickening, mild intra and extrahepatic biliary duct dilation, liver with chages consistent with cirrhosissigns of biliary obstruction, dilated CBD proximally to 14mm with wall thickening, mild IH/EH biliary duct dilatation, changes of cirrhosis, and portal hypertension. Sludge vs mass in CBD.     Case discussed with Dr. Mccollum in AES prior to transfer. Was treated with empiric Rocephin 2g iv x 1, KCL 40meq po x 1 and 10 meq IV x1, and iv fluids in the ED.  Blood cultures are drawn.     MELD-Na score: 28 at 1/31/2022  6:57 PM  MELD score: 23 at 1/31/2022  6:57 PM  Calculated from:  Serum Creatinine: 0.7 mg/dL (Using min of 1 mg/dL) at 1/31/2022  6:57 PM  Serum Sodium: 128 mmol/L at 1/31/2022  6:57 PM  Total Bilirubin: 21.8 mg/dL at 1/31/2022  6:57 PM  INR(ratio): 1.6 at 1/31/2022  6:57 PM  Age: 71 years    [place] [facility] [place] is alert and oriented, in no acute distress. Afebrile, BP 99/57, HR 79, stating well on RA without tachypnea. Repeat labs pending. AES and Hepatology consulted. Emperic IV ceftriaxone continued. Started lactulose,  rifaximin, PO furosemide and spironolactone. NPO.

## 2022-02-02 NOTE — CONSULTS
[facility]  Advanced Endoscopy Service  Consult Note    Patient Name: Vic Reyez  MRN: [id number]  Admission Date: 2/2/2022  Hospital Length of Stay: 0 days  Code Status: Full Code   Attending Provider: Riki Jaime MD   Consulting Provider: Armando Bingham MD  Primary Care Physician: [facility]  Principal Problem:Biliary obstruction    Inpatient consult to Advanced Endoscopy Service (AES)  Consult performed by: Armando Bingham MD  Consult ordered by: Cira Nunn MD        Subjective:     HPI: Vic Reyez is a 71 y.o. male with history of alcohol use, HTN who presented to [place] with new onset jaundice, abdominal distention and fatigue, found to have decompensated cirrhosis. AES is consulted for biliary dilatation.    Patient presented to OSH with jaundice for one week. He denied any other symptoms, but per chart also complaining of abdominal distention at one point. He has never been told about liver disease before. Reports drinking 1-2 beers daily, and has a remote history of more heavy alcohol consumption. Denies history of hepatitis. Denies fevers, chills, shortness of breath, abdominal pain, leg swelling, confusion, overt GI bleeding. No history of gastric surgeries, still has GB in place. No blood thinners.    Here, afebrile borderline low blood pressures, otherwise his vital signs normal.  Platelets 184, INR 1.6  T bili 17.1, AST 82, ALT 24, alk-phos 552.  CTA s/p with contrast with cirrhosis, ascites nonocclusive portal vein thrombosis, cholelithiasis, mild intra and extra hepatic biliary dilatation with thickening of CBD.  On U/S, CBD is not dilated (4mm), no intrahepatic biliary dilatation.      Past Medical History:   Diagnosis Date    Hypertension        Past Surgical History:   Procedure Laterality Date    APPENDECTOMY      EYE SURGERY      JOINT REPLACEMENT      KNEE SURGERY      RECONSTRUCTION OF SHOULDER Right        No family history on  file.    Social History     Socioeconomic History    Marital status: Single   Tobacco Use    Smoking status: Current Every Day Smoker     Packs/day: 0.50     Types: Cigarettes    Smokeless tobacco: Never Used   Substance and Sexual Activity    Alcohol use: Yes     Alcohol/week: 4.0 standard drinks     Types: 4 Cans of beer per week    Drug use: Never    Sexual activity: Not Currently       Current Facility-Administered Medications on File Prior to Encounter   Medication Dose Route Frequency Provider Last Rate Last Admin    [COMPLETED] aluminum-magnesium hydroxide-simethicone 200-200-20 mg/5 mL suspension 30 mL  30 mL Oral Once Kashif Sinha MD   30 mL at 02/01/22 1933    And    [COMPLETED] LIDOcaine HCl 2% oral solution 10 mL  10 mL Oral Once Kashif Sinha MD   10 mL at 02/01/22 1933    [COMPLETED] dicyclomine 10 mg/5 mL syrup 20 mg  20 mg Oral ED 1 Time Kashif Sinha MD   20 mg at 02/01/22 1933    [COMPLETED] ondansetron injection 4 mg  4 mg Intravenous Once Riki ANTONIO Restrepo MD   4 mg at 02/01/22 1636     Current Outpatient Medications on File Prior to Encounter   Medication Sig Dispense Refill    aspirin (ECOTRIN) 81 MG EC tablet Take 81 mg by mouth once daily.      calcium-vitamin D (OSCAL) 250 (625)-125 mg-unit per tablet Take 1 tablet by mouth once daily.      docusate sodium (COLACE) 50 MG capsule Take by mouth 2 (two) times daily.      ferrous fumarate (HEMOCYTE) 324 mg (106 mg iron) Tab Take by mouth.      hydrocodone-acetaminophen 7.5-325mg (NORCO) 7.5-325 mg per tablet Take 1 tablet by mouth every 6 (six) hours as needed.      metoprolol tartrate (LOPRESSOR) 25 MG tablet Take 25 mg by mouth 2 (two) times daily.      naproxen (NAPROSYN) 500 MG tablet Take 500 mg by mouth 2 (two) times daily.      pravastatin (PRAVACHOL) 40 MG tablet Take 40 mg by mouth once daily.         Review of patient's allergies indicates:  No Known Allergies    Review of Systems:   Constitutional: no fever,  chills or change in weight   Eyes: no visual changes   ENT: no sore throat or dysphagia  Respiratory: no cough or shortness of breath   Cardiovascular: no chest pain or palpitations   Gastrointestinal: as per HPI  Hematologic/Lymphatic: no easy bruising or lymphadenopathy   Musculoskeletal: no arthralgias or myalgias   Neurological: no change in mental status  Behavioral/Psych: no change in mood    Objective:     Vitals:    02/02/22 0727   BP: 104/63   Pulse: 77   Resp: 18   Temp: 97.7 °F (36.5 °C)       General: Alert and Oriented, no distress  HEENT: Normocephalic, Atraumatic. + scleral icterus.  Resp:  Effort normal  Cardiac: RRR  Abdomen: Normoactive bowel sounds. Mildly distended. Soft. Non-tender. No peritoneal signs.  Extremities: No peripheral edema.   Neurologic: No gross neurological Deficits  Psych: Calm, cooperative. Normal mood and affect.    Significant Labs:  Recent Labs   Lab 01/31/22  1857   HGB 11.7*       Lab Results   Component Value Date    WBC 8.07 01/31/2022    HGB 11.7 (L) 01/31/2022    HCT 31.0 (L) 01/31/2022    MCV 88 01/31/2022     01/31/2022       Lab Results   Component Value Date     (L) 02/02/2022    K 3.2 (L) 02/02/2022     02/02/2022    CO2 20 (L) 02/02/2022    BUN 6 (L) 02/02/2022    CREATININE 0.7 02/02/2022    CALCIUM 8.0 (L) 02/02/2022    ANIONGAP 5 (L) 02/02/2022    ESTGFRAFRICA >60.0 02/02/2022    EGFRNONAA >60.0 02/02/2022       Lab Results   Component Value Date    ALT 24 02/02/2022    AST 82 (H) 02/02/2022    ALKPHOS 552 (H) 02/02/2022    BILITOT 17.1 (H) 02/02/2022       Lab Results   Component Value Date    INR 1.6 (H) 01/31/2022       Significant Imaging:  Reviewed pertinent radiology findings.       Assessment/Plan:     Vic Reyez is a 71 y.o. male with history of alcohol use, HTN who presented to [place] with new onset jaundice, abdominal distention and fatigue, found to have decompensated cirrhosis. AES is consulted for biliary  dilatation.    Problem List:  1. Decompensated cirrhosis  2. Elevated LFTs    Imaging showed mild biliary dilatation with CBD wall thickening on CT, but U/S today with no biliary dilatation. Would expect prominent biliary dilatation if this were a biliary source in setting of bilirubin 17. Suspect that his elevated LFTs are due to more alcoholic hepatitis than to a primary biliary source.     Plan:  - appreciate Hepatology assistance  - at this time, no plan for endoscopic intervention from AES standpoint      Thank you for involving us in the care of Vic Reyez. Please call with any additional questions, concerns or changes in the patient's clinical status.    Armando Bingham MD  Gastroenterology Fellow PGY VI   [facility]

## 2022-02-02 NOTE — MEDICAL/APP STUDENT
Progress Note  Hospital Medicine    Primary Team[place] [facility]  Admit Date: 2/2/2022   Length of Stay:  LOS: 0 days   SUBJECTIVE:   Reason for Admission:  Biliary obstruction    History of Present Illness: Patient is a 71 y.o. male that transferred to [facility] for evaluation of new onset jaundice, abdominal distension, and fatigue. PMHx is significant for hypertension and alcohol abuse.      ED Course: Pt's suspected biliary obs and decompensated cirrhosis was managed with empiric IV ceft and lactulose TID. US ordered. Pts BP on admission was low and home metoprolol was held. Alcohol abuse is monitored with CIWA q4 and cessation encouraged.    Interval History: NAEON. On hernandez rounds, pt was comfortable and upright. Cirrhosis is affirmed to be a new diagnosis. Plan of care was discussed with the patient and verbalized understanding. Seen by AES and hepatology.     Old chart was reviewed.    Review of Systems:  Review of Systems   Unable to perform ROS: Other (Pt at US)         OBJECTIVE:     Vital Signs (Most Recent)   Temp: 97.7 °F (36.5 °C) (02/02/22 0727)  Pulse: 77 (02/02/22 0727)  Resp: 18 (02/02/22 0727)  BP: 104/63 (02/02/22 0727)  SpO2: 99 % (02/02/22 0727) Body mass index is 24.23 kg/m².  Intake/Outake:  This Shift:  No intake/output data recorded.    Net I/O past 24h:     Intake/Output Summary (Last 24 hours) at 2/2/2022 1034  Last data filed at 2/2/2022 0415  Gross per 24 hour   Intake --   Output 150 ml   Net -150 ml             Physical Exam:  Physical Exam  Vitals reviewed: Unable to perform: Patient at US.          Laboratory:  CBC/Anemia Labs: Coags:    Recent Labs   Lab 01/31/22 1857 02/02/22 0537   WBC 8.07 8.67   HGB 11.7* 9.7*   HCT 31.0* 26.3*    160   MCV 88 88   RDW 16.0* 16.4*    Recent Labs   Lab 01/31/22 1857   INR 1.6*   APTT 35.8*        Chemistries: ABG:   Recent Labs   Lab 01/31/22 1857 02/02/22 0537   * 131*   K 2.8* 3.2*    106   CO2 16* 20*   BUN  5* 6*   CREATININE 0.7 0.7   CALCIUM 8.2* 8.0*   PROT 8.1 6.4   BILITOT 21.8* 17.1*   ALKPHOS 712* 552*   ALT 34 24   * 82*   MG 2.0 1.9   PHOS  --  2.4*    No results for input(s): PH, PCO2, PO2, HCO3, POCSATURATED, BE in the last 168 hours.     Diagnostic Results:   CT significant for:  - Nodular liver, partially distended gallbladder and circumferential thickening with 1.5cm poorly defined hyperdensity. Mild intra and extrahepatic biliary ductal dilation.   - Evidence of small perisplenic and perigastric varices. Heterogenous enhancement of SMV may represent small nonocclusive thrombosis.   - Extensive ascites and enlarged prota heaptic lymph nodes.    US significant for:  - Liver measuring 15.9cm, nodular liver  - Gallbladder wall thickening and underdistended  - Common duct not dilated  - Mildy enlarged spleen  - Moderate-volume ascites  - No evidence of thrombosis in portal system    Medications:  Scheduled Meds:   furosemide  20 mg Oral Daily    spironolactone  50 mg Oral Daily                             Continuous Infusions:  PRN Meds:dextrose 50%, dextrose 50%, glucagon (human recombinant), glucose, glucose, lorazepam, melatonin, naloxone, ondansetron, sodium chloride 0.9%     ASSESSMENT/PLAN:     Active Hospital Problems    Diagnosis  POA    *Biliary obstruction [K83.1]  Yes    Decompensated hepatic cirrhosis [K72.90, K74.60]  Yes    Alcohol abuse [F10.10]  Yes    Primary hypertension [I10]  Yes    SIMONE (acute kidney injury) [N17.9]  Yes    Hypokalemia [E87.6]  Yes    Hyponatremia [E87.1]  Yes    Constipation [K59.00]  Yes      Resolved Hospital Problems   No resolved problems to display.     1. Biliary Obs and decompensated cirrhosis  - Moderate ascites  - Labs trending down    Plan:  - Hepatology recs as follow  - D/C ceft IV 1mg (no evidence of SBP)  - D/C lactulose TID, rifaximin (No AMS)  - Check A1AT, ceruloplasmin, Fe panel, ODILIA, ASMA, IgG  - No steroids due to age.  - MRI abdomen  w/ w/out contrast and MRCP to rule out soft tissue obs  - Daily INR  - Check PETH  - Rehab  - Monitor labs and daily physical exam.    2. Sofy HTN  - BP below target (104/65)  - home BP meds (metoprolol 25mg qd) held    Plan:  - Continue to hold home BP meds    3. Alcohol abuse  - 1-2 beers today    Plan:  - CIWA q4  - Monitor for ISAI, benzos if needed  - Encourage cessation.    DVT ppx- None  CODE Status- FULL    Dispo- No, awaiting hepatology f/u    Dhara Lopez, MS3  [facility]  [phone]

## 2022-02-02 NOTE — ASSESSMENT & PLAN NOTE
Patient with acute kidney injury likely d/t HRS Which is currently stable. Labs reviewed- Renal function/electrolytes with Estimated Creatinine Clearance: 106.1 mL/min (based on SCr of 0.7 mg/dL). according to latest data. Monitor urine output and serial BMP and adjust therapy as needed. Avoid nephrotoxins and renally dose meds for GFR listed above.   UA with 3+ bilirubin, otherwise unrevealing.     - f/u Criselda, consider HRS  - furosemide 20 mg qd, up-titrate as needed   - consider higher MAP goal if HRS   - f/u [facility]

## 2022-02-02 NOTE — NURSING
Patient arrived on the unit per stretcher per [facility]. No verbalized complaints at this time. Respirations even and unlabored at this time.

## 2022-02-02 NOTE — PROCEDURES
"Vic Reyez is a 71 y.o. male patient.    Temp: 98.1 °F (36.7 °C) (02/02/22 1154)  Pulse: 81 (02/02/22 1154)  Resp: 18 (02/02/22 1154)  BP: (!) 100/59 (02/02/22 1154)  SpO2: 95 % (02/02/22 1154)  Weight: 78.8 kg (173 lb 11.6 oz) (02/02/22 0135)  Height: 5' 11" (180.3 cm) (02/02/22 0135)       Paracentesis    Date/Time: 2/2/2022 1:40 PM  Location procedure was performed: [facility] MEDICINE  Performed by: Rowena Hunter MD  Authorized by: Riki Jaime MD   Pre-operative diagnosis: decompensated cirrhosis  Post-operative diagnosis: decompensated cirrhosis  Consent Done: Yes  Consent: Verbal consent obtained.  Consent given by: patient  Patient understanding: patient states understanding of the procedure being performed  Patient consent: the patient's understanding of the procedure matches consent given  Procedure consent: procedure consent matches procedure scheduled  Relevant documents: relevant documents present and verified  Test results: test results available and properly labeled  Site marked: the operative site was marked  Imaging studies: imaging studies not available  Required items: required blood products, implants, devices, and special equipment available  Patient identity confirmed: name  Initial or subsequent exam: initial  Procedure purpose: diagnostic  Indications: new onset ascites  Anesthesia: local infiltration    Anesthesia:  Local Anesthetic: lidocaine 1% with epinephrine    Patient sedated: no  Preparation: Patient was prepped and draped in the usual sterile fashion.  Needle gauge: 20  Ultrasound guidance: yes  Puncture site: right lower quadrant  Fluid appearance: bilious  Complications: No  Estimated blood loss (mL): 0  Specimens: No  Implants: No          2/2/2022  "

## 2022-02-02 NOTE — ASSESSMENT & PLAN NOTE
1 week of worsening jaundice, abdominal distension and fatigue, found to have new decompensated cirrhosis and bilary obstruction. Transferred for AES and Hepatology evaluation.     MELD-Na score: 28 at 1/31/2022  6:57 PM  MELD score: 23 at 1/31/2022  6:57 PM  Calculated from:  Serum Creatinine: 0.7 mg/dL (Using min of 1 mg/dL) at 1/31/2022  6:57 PM  Serum Sodium: 128 mmol/L at 1/31/2022  6:57 PM  Total Bilirubin: 21.8 mg/dL at 1/31/2022  6:57 PM  INR(ratio): 1.6 at 1/31/2022  6:57 PM  Age: 71 years    No asterixis or HE.     - lactulose TID, titrate to 3 BMs/day  - rifaximin for SBP ppx  - lasix 20 mg qd  - spironolactone 50 mg qd  - empiric IV ceftriaxone   - hepatology consulted, appreciate recs  - f/u liver US with doppler  - ascites with fluid wave, however no abdominal pain or SOB, deferred therapeutic para

## 2022-02-02 NOTE — ASSESSMENT & PLAN NOTE
Mr. Reyez is a 71-year-old-man with HTN, constipation, and EtOH abuse, who presented to OSH with 1 week of worsening jaundice, abdominal distension and fatigue, found to have new decompensated cirrhosis and bilary obstruction. Transferred for AES and Hepatology evaluation.     At OSH ED, afebrile, VS normal. Exam with ascites and normal mentation. INR 1.6, Na 128, K 2.8, , Alb 1.5, TB 22, , ALT 34, Lipase 3. WBC 8, Hg 11.7, .  Ammonia, 69. CT abdomen with bilary obstruction, dilated CBD 14mm with wall thickening, mild intra and extrahepatic biliary duct dilation, liver with chages consistent with cirrhosissigns of biliary obstruction, dilated CBD proximally to 14mm with wall thickening, mild IH/EH biliary duct dilatation, changes of cirrhosis, and portal hypertension. Sludge vs mass in CBD.     Case discussed with Dr. Mccollum in AES prior to transfer. Was treated with empiric Rocephin 2g iv x 1, KCL 40meq po x 1 and 10 meq IV x1, and iv fluids in the ED.  Blood cultures are drawn.    No fevers or abdominal pain. Appears to be biliary obstruction without cholangitis.      MELD-Na score: 28 at 1/31/2022  6:57 PM  MELD score: 23 at 1/31/2022  6:57 PM  Calculated from:  Serum Creatinine: 0.7 mg/dL (Using min of 1 mg/dL) at 1/31/2022  6:57 PM  Serum Sodium: 128 mmol/L at 1/31/2022  6:57 PM  Total Bilirubin: 21.8 mg/dL at 1/31/2022  6:57 PM  INR(ratio): 1.6 at 1/31/2022  6:57 PM  Age: 71 years    - empiric IV ceftriaxone  - f/u blood cx   - AES consulted, appreciate recs  - NPO  - see decompensated cirrhosis management

## 2022-02-02 NOTE — PLAN OF CARE
Patient alert/oriented x4 in no acute distress. Cummings catheter remains in place. I & O measured. Ultrasound of liver completed. Bedside paracentesis completed by attending MD and labs collected. All scheduled medications administered and tolerated by patient this shift     Problem: Adult Inpatient Plan of Care  Goal: Plan of Care Review  Outcome: Ongoing, Progressing  Goal: Patient-Specific Goal (Individualized)  Outcome: Ongoing, Progressing  Goal: Absence of Hospital-Acquired Illness or Injury  Outcome: Ongoing, Progressing  Goal: Optimal Comfort and Wellbeing  Outcome: Ongoing, Progressing  Goal: Readiness for Transition of Care  Outcome: Ongoing, Progressing     Problem: Infection  Goal: Absence of Infection Signs and Symptoms  Outcome: Ongoing, Progressing     Problem: Skin Injury Risk Increased  Goal: Skin Health and Integrity  Outcome: Ongoing, Progressing     Problem: Fluid and Electrolyte Imbalance (Acute Kidney Injury/Impairment)  Goal: Fluid and Electrolyte Balance  Outcome: Ongoing, Progressing     Problem: Renal Function Impairment (Acute Kidney Injury/Impairment)  Goal: Effective Renal Function  Outcome: Ongoing, Progressing

## 2022-02-02 NOTE — CONSULTS
[facility]  Hepatology  Consult Note    Patient Name: Vic Reyez  MRN: [id number]  Admission Date: 2/2/2022  Hospital Length of Stay: 0 days  Code Status: Full Code   Attending Provider: Riki Jaime MD   Consulting Provider: Jl Walton MD  Primary Care Physician: [facility]  Principal Problem:Biliary obstruction    Inpatient consult to Hepatology  Consult performed by: Jl Walton MD  Consult ordered by: Cira Nunn MD        Subjective:     HPI: Vic Reyez is a 71 y.o. male with history HTN, Constipation and alcohol abuse. He presented with worsening jaundice, abdominal distention and fatigue for the past 1 week. Hepatology service was consulted for possible alcoholic cirrhosis.     No reported prior history of cirrhosis. He has history of heavy alcohol use reportedly 6 beers a day recently down to 2 beers a day. He reports darkening of his urine with fatigue for the past 1 week and his neighbor telling him that he appeared yellow few days ago. No reported history of cirrhosis.   Also reported constipation and no BM for 3 days.     CT scan of abdomen reported changes suggestive of early cirrhosis and portal hypertension. Extensive ascites throughout abdomen and pelvis.  Small nonocclusive thrombus of the proximal portal vein with questionable nonocclusive thrombus of the superior mesenteric vein versus incomplete opacification. Acute versus chronic cholecystitis with intraluminal noncalcified gallstone versus gallbladder mass. Mild intra and extrahepatic biliary ductal dilatation with mild circumferential wall thickening of the common bile duct.     He received his 2nd dose of COVID vaccine on 1/10/2022.    Since presentation VSS stable. Alkaline phosphatase 712, Na 128, Albumin 1.5, AST//34, INR 1.6, NH3 59, T.Bili 22, Plat 184, Hb 11.7, WBC 11, Cr 0.7. Acute hepatitis serology negative. . He was started on lasix and spironolactone along with  prophylactic Rocephin. Also started on rifaximin and lactulose. Doppler ultrasound negative for thrombosis and reports patency of portal and superior mesenteric vein.     Past Medical History:   Diagnosis Date    Hypertension        Past Surgical History:   Procedure Laterality Date    APPENDECTOMY      EYE SURGERY      JOINT REPLACEMENT      KNEE SURGERY      RECONSTRUCTION OF SHOULDER Right        No family history on file.    Social History     Socioeconomic History    Marital status: Single   Tobacco Use    Smoking status: Current Every Day Smoker     Packs/day: 0.50     Types: Cigarettes    Smokeless tobacco: Never Used   Substance and Sexual Activity    Alcohol use: Yes     Alcohol/week: 4.0 standard drinks     Types: 4 Cans of beer per week    Drug use: Never    Sexual activity: Not Currently       Current Facility-Administered Medications on File Prior to Encounter   Medication Dose Route Frequency Provider Last Rate Last Admin    [COMPLETED] aluminum-magnesium hydroxide-simethicone 200-200-20 mg/5 mL suspension 30 mL  30 mL Oral Once Kashif Sinha MD   30 mL at 02/01/22 1933    And    [COMPLETED] LIDOcaine HCl 2% oral solution 10 mL  10 mL Oral Once Kashif Sinha MD   10 mL at 02/01/22 1933    [COMPLETED] dicyclomine 10 mg/5 mL syrup 20 mg  20 mg Oral ED 1 Time Kashif Sinha MD   20 mg at 02/01/22 1933    [COMPLETED] ondansetron injection 4 mg  4 mg Intravenous Once Riki Restrepo MD   4 mg at 02/01/22 1636     Current Outpatient Medications on File Prior to Encounter   Medication Sig Dispense Refill    aspirin (ECOTRIN) 81 MG EC tablet Take 81 mg by mouth once daily.      calcium-vitamin D (OSCAL) 250 (625)-125 mg-unit per tablet Take 1 tablet by mouth once daily.      docusate sodium (COLACE) 50 MG capsule Take by mouth 2 (two) times daily.      ferrous fumarate (HEMOCYTE) 324 mg (106 mg iron) Tab Take by mouth.      hydrocodone-acetaminophen 7.5-325mg (NORCO) 7.5-325 mg per  tablet Take 1 tablet by mouth every 6 (six) hours as needed.      metoprolol tartrate (LOPRESSOR) 25 MG tablet Take 25 mg by mouth 2 (two) times daily.      naproxen (NAPROSYN) 500 MG tablet Take 500 mg by mouth 2 (two) times daily.      pravastatin (PRAVACHOL) 40 MG tablet Take 40 mg by mouth once daily.         Review of patient's allergies indicates:  No Known Allergies    Review of Systems   Constitutional: Negative for chills and fever.   HENT: Negative.    Eyes: Negative.    Respiratory: Negative.    Cardiovascular: Negative.    Gastrointestinal: Positive for constipation.   Genitourinary:        Dark urine   Musculoskeletal: Negative.    Skin:        Yellowing of skin   Neurological: Negative.    Psychiatric/Behavioral: Negative.         Objective:     Vitals:    02/02/22 0727   BP: 104/63   Pulse: 77   Resp: 18   Temp: 97.7 °F (36.5 °C)         Constitutional:  not in acute distress and well developed  HENT: Head: Normal, normocephalic, atraumatic.  Eyes: sclera icteric  Cardiovascular: regular rate and rhythm and no murmur  Respiratory: normal chest expansion & respiratory effort   and no accessory muscle use  GI: soft, non-tender, without masses or organomegaly and distended  Musculoskeletal: no muscular tenderness noted  Skin: jaundice present  Neurological: alert, oriented x3  Psychiatric: mood and affect are within normal limits    Significant Labs:  Recent Labs   Lab 01/31/22  1857 02/02/22  0537   HGB 11.7* 9.7*       Lab Results   Component Value Date    WBC 8.67 02/02/2022    HGB 9.7 (L) 02/02/2022    HCT 26.3 (L) 02/02/2022    MCV 88 02/02/2022     02/02/2022       Lab Results   Component Value Date     (L) 02/02/2022    K 3.2 (L) 02/02/2022     02/02/2022    CO2 20 (L) 02/02/2022    BUN 6 (L) 02/02/2022    CREATININE 0.7 02/02/2022    CALCIUM 8.0 (L) 02/02/2022    ANIONGAP 5 (L) 02/02/2022    ESTGFRAFRICA >60.0 02/02/2022    EGFRNONAA >60.0 02/02/2022       Lab Results    Component Value Date    ALT 24 02/02/2022    AST 82 (H) 02/02/2022    ALKPHOS 552 (H) 02/02/2022    BILITOT 17.1 (H) 02/02/2022       Lab Results   Component Value Date    INR 1.6 (H) 01/31/2022       Significant Imaging:  Reviewed pertinent radiology findings.       Assessment/Plan:      Vic Reyez is a 71 y.o. male with history HTN, Constipation and alcohol abuse. He presented with worsening jaundice, abdominal distention and fatigue for the past 1 week. Hepatology service was consulted for possible alcoholic cirrhosis    Problem List:  1. Alcoholic Hepatitis   2. Alcohol abuse        Assessment:  Mr. Ho presents with acute alcoholic hepatitis with MELD-Na score of 28. He is not encephalopathic and continues to improve. Doppler ultrasound is negative for portal vein thrombosis. Acute hepatitis panel is negative.     Recommendations:  - Since not encephalopathic recommend discontinue Rifaximin, lactulose.   - Discontinue empiric CTX. Recommend diagnostic paracentesis, will need SBP treatment if positive  - Recommend checking A1AT, Ceruloplasmin, iron panel, ODILIA, ASMA, IgG  - Maddrey's score of 45 however bilirubin down trending. Will not recommend steroids given his age.  Continue to monitor.   - recommend MRI Abdomen with and without contrast and MRCP to rule out soft tissue obstruction.  - Recommend daily INR.   - Check PETH.  - recommend rehab for alcohol abstinence.     Thank you for involving us in the care of Vic Reyez. Please call with any additional questions, concerns or changes in the patient's clinical status. We will continue to follow.    Jl Walton MD  Gastroenterology & Hepatology Fellow PGY IV   [facility]

## 2022-02-03 ENCOUNTER — ANESTHESIA EVENT (OUTPATIENT)
Dept: ENDOSCOPY | Facility: HOSPITAL | Age: 72
DRG: 423 | End: 2022-02-03
Payer: MEDICARE

## 2022-02-03 PROBLEM — K59.00 CONSTIPATION: Status: RESOLVED | Noted: 2022-02-02 | Resolved: 2022-02-03

## 2022-02-03 PROBLEM — D68.9 COAGULOPATHY: Status: ACTIVE | Noted: 2022-02-03

## 2022-02-03 PROBLEM — I10 PRIMARY HYPERTENSION: Chronic | Status: ACTIVE | Noted: 2022-02-02

## 2022-02-03 PROBLEM — F10.20 ALCOHOL USE DISORDER, SEVERE, DEPENDENCE: Chronic | Status: ACTIVE | Noted: 2022-02-02

## 2022-02-03 PROBLEM — F10.10 ALCOHOL ABUSE: Chronic | Status: ACTIVE | Noted: 2022-02-02

## 2022-02-03 PROBLEM — N17.9 AKI (ACUTE KIDNEY INJURY): Status: RESOLVED | Noted: 2022-02-02 | Resolved: 2022-02-03

## 2022-02-03 LAB
ALBUMIN SERPL BCP-MCNC: 1.2 G/DL (ref 3.5–5.2)
ALP SERPL-CCNC: 538 U/L (ref 55–135)
ALT SERPL W/O P-5'-P-CCNC: 27 U/L (ref 10–44)
ANION GAP SERPL CALC-SCNC: 5 MMOL/L (ref 8–16)
AST SERPL-CCNC: 80 U/L (ref 10–40)
BASOPHILS # BLD AUTO: 0.02 K/UL (ref 0–0.2)
BASOPHILS NFR BLD: 0.3 % (ref 0–1.9)
BILIRUB SERPL-MCNC: 16.5 MG/DL (ref 0.1–1)
BUN SERPL-MCNC: 6 MG/DL (ref 8–23)
CALCIUM SERPL-MCNC: 7.8 MG/DL (ref 8.7–10.5)
CHLORIDE SERPL-SCNC: 106 MMOL/L (ref 95–110)
CO2 SERPL-SCNC: 18 MMOL/L (ref 23–29)
CREAT SERPL-MCNC: 0.7 MG/DL (ref 0.5–1.4)
DIFFERENTIAL METHOD: ABNORMAL
EOSINOPHIL # BLD AUTO: 0.1 K/UL (ref 0–0.5)
EOSINOPHIL NFR BLD: 1.1 % (ref 0–8)
ERYTHROCYTE [DISTWIDTH] IN BLOOD BY AUTOMATED COUNT: 16.8 % (ref 11.5–14.5)
EST. GFR  (AFRICAN AMERICAN): >60 ML/MIN/1.73 M^2
EST. GFR  (NON AFRICAN AMERICAN): >60 ML/MIN/1.73 M^2
GLUCOSE SERPL-MCNC: 111 MG/DL (ref 70–110)
HCT VFR BLD AUTO: 26.7 % (ref 40–54)
HGB BLD-MCNC: 9.9 G/DL (ref 14–18)
IMM GRANULOCYTES # BLD AUTO: 0.02 K/UL (ref 0–0.04)
IMM GRANULOCYTES NFR BLD AUTO: 0.3 % (ref 0–0.5)
INR PPP: 1.6 (ref 0.8–1.2)
LYMPHOCYTES # BLD AUTO: 1.3 K/UL (ref 1–4.8)
LYMPHOCYTES NFR BLD: 18 % (ref 18–48)
MAGNESIUM SERPL-MCNC: 1.9 MG/DL (ref 1.6–2.6)
MCH RBC QN AUTO: 32.8 PG (ref 27–31)
MCHC RBC AUTO-ENTMCNC: 37.1 G/DL (ref 32–36)
MCV RBC AUTO: 88 FL (ref 82–98)
MONOCYTES # BLD AUTO: 0.9 K/UL (ref 0.3–1)
MONOCYTES NFR BLD: 12 % (ref 4–15)
NEUTROPHILS # BLD AUTO: 4.9 K/UL (ref 1.8–7.7)
NEUTROPHILS NFR BLD: 68.3 % (ref 38–73)
NRBC BLD-RTO: 0 /100 WBC
PHOSPHATE SERPL-MCNC: 2.6 MG/DL (ref 2.7–4.5)
PLATELET # BLD AUTO: 152 K/UL (ref 150–450)
PMV BLD AUTO: 8.8 FL (ref 9.2–12.9)
POTASSIUM SERPL-SCNC: 3.1 MMOL/L (ref 3.5–5.1)
PROT SERPL-MCNC: 6.2 G/DL (ref 6–8.4)
PROTHROMBIN TIME: 17.2 SEC (ref 9–12.5)
RBC # BLD AUTO: 3.02 M/UL (ref 4.6–6.2)
SARS-COV-2 RDRP RESP QL NAA+PROBE: NEGATIVE
SODIUM SERPL-SCNC: 129 MMOL/L (ref 136–145)
WBC # BLD AUTO: 7.23 K/UL (ref 3.9–12.7)

## 2022-02-03 PROCEDURE — 99232 SBSQ HOSP IP/OBS MODERATE 35: CPT | Mod: GC,,, | Performed by: HOSPITALIST

## 2022-02-03 PROCEDURE — 84100 ASSAY OF PHOSPHORUS: CPT | Performed by: STUDENT IN AN ORGANIZED HEALTH CARE EDUCATION/TRAINING PROGRAM

## 2022-02-03 PROCEDURE — 99223 PR INITIAL HOSPITAL CARE,LEVL III: ICD-10-PCS | Mod: ,,, | Performed by: PSYCHIATRY & NEUROLOGY

## 2022-02-03 PROCEDURE — 25000003 PHARM REV CODE 250: Performed by: STUDENT IN AN ORGANIZED HEALTH CARE EDUCATION/TRAINING PROGRAM

## 2022-02-03 PROCEDURE — 99223 1ST HOSP IP/OBS HIGH 75: CPT | Mod: ,,, | Performed by: PSYCHIATRY & NEUROLOGY

## 2022-02-03 PROCEDURE — 25000003 PHARM REV CODE 250: Performed by: HOSPITALIST

## 2022-02-03 PROCEDURE — 81256 HFE GENE: CPT | Performed by: STUDENT IN AN ORGANIZED HEALTH CARE EDUCATION/TRAINING PROGRAM

## 2022-02-03 PROCEDURE — 36415 COLL VENOUS BLD VENIPUNCTURE: CPT | Performed by: STUDENT IN AN ORGANIZED HEALTH CARE EDUCATION/TRAINING PROGRAM

## 2022-02-03 PROCEDURE — U0002 COVID-19 LAB TEST NON-CDC: HCPCS | Performed by: INTERNAL MEDICINE

## 2022-02-03 PROCEDURE — 83735 ASSAY OF MAGNESIUM: CPT | Performed by: STUDENT IN AN ORGANIZED HEALTH CARE EDUCATION/TRAINING PROGRAM

## 2022-02-03 PROCEDURE — 85610 PROTHROMBIN TIME: CPT | Performed by: STUDENT IN AN ORGANIZED HEALTH CARE EDUCATION/TRAINING PROGRAM

## 2022-02-03 PROCEDURE — 80053 COMPREHEN METABOLIC PANEL: CPT | Performed by: STUDENT IN AN ORGANIZED HEALTH CARE EDUCATION/TRAINING PROGRAM

## 2022-02-03 PROCEDURE — 20600001 HC STEP DOWN PRIVATE ROOM

## 2022-02-03 PROCEDURE — 63600175 PHARM REV CODE 636 W HCPCS: Performed by: STUDENT IN AN ORGANIZED HEALTH CARE EDUCATION/TRAINING PROGRAM

## 2022-02-03 PROCEDURE — 99232 PR SUBSEQUENT HOSPITAL CARE,LEVL II: ICD-10-PCS | Mod: GC,,, | Performed by: HOSPITALIST

## 2022-02-03 PROCEDURE — 85025 COMPLETE CBC W/AUTO DIFF WBC: CPT | Performed by: STUDENT IN AN ORGANIZED HEALTH CARE EDUCATION/TRAINING PROGRAM

## 2022-02-03 RX ORDER — FOLIC ACID 1 MG/1
1 TABLET ORAL DAILY
Qty: 360 TABLET | Refills: 0 | Status: SHIPPED | OUTPATIENT
Start: 2022-02-04 | End: 2022-11-30

## 2022-02-03 RX ORDER — SPIRONOLACTONE 50 MG/1
50 TABLET, FILM COATED ORAL DAILY
Qty: 30 TABLET | Refills: 11 | Status: SHIPPED | OUTPATIENT
Start: 2022-02-03 | End: 2022-02-05 | Stop reason: HOSPADM

## 2022-02-03 RX ORDER — THIAMINE HCL 100 MG
100 TABLET ORAL DAILY
Status: DISCONTINUED | OUTPATIENT
Start: 2022-02-03 | End: 2022-02-05 | Stop reason: HOSPADM

## 2022-02-03 RX ORDER — SODIUM,POTASSIUM PHOSPHATES 280-250MG
2 POWDER IN PACKET (EA) ORAL EVERY 4 HOURS
Status: COMPLETED | OUTPATIENT
Start: 2022-02-03 | End: 2022-02-03

## 2022-02-03 RX ORDER — LANOLIN ALCOHOL/MO/W.PET/CERES
100 CREAM (GRAM) TOPICAL DAILY
Qty: 360 TABLET | Refills: 0 | Status: SHIPPED | OUTPATIENT
Start: 2022-02-04 | End: 2023-01-19

## 2022-02-03 RX ORDER — MULTIVITAMIN
1 TABLET ORAL DAILY
Qty: 30 TABLET | Refills: 0 | Status: SHIPPED | OUTPATIENT
Start: 2022-02-04 | End: 2022-11-30

## 2022-02-03 RX ORDER — NAPROXEN 500 MG/1
500 TABLET ORAL 2 TIMES DAILY PRN
Qty: 30 TABLET | Refills: 2 | Status: SHIPPED | OUTPATIENT
Start: 2022-02-03 | End: 2022-02-03 | Stop reason: HOSPADM

## 2022-02-03 RX ORDER — FOLIC ACID 1 MG/1
1 TABLET ORAL DAILY
Status: DISCONTINUED | OUTPATIENT
Start: 2022-02-03 | End: 2022-02-05 | Stop reason: HOSPADM

## 2022-02-03 RX ORDER — MUPIROCIN 20 MG/G
OINTMENT TOPICAL 2 TIMES DAILY
Status: DISCONTINUED | OUTPATIENT
Start: 2022-02-03 | End: 2022-02-05 | Stop reason: HOSPADM

## 2022-02-03 RX ORDER — SODIUM,POTASSIUM PHOSPHATES 280-250MG
2 POWDER IN PACKET (EA) ORAL EVERY 4 HOURS
Status: DISCONTINUED | OUTPATIENT
Start: 2022-02-03 | End: 2022-02-03

## 2022-02-03 RX ADMIN — PHYTONADIONE 10 MG: 10 INJECTION, EMULSION INTRAMUSCULAR; INTRAVENOUS; SUBCUTANEOUS at 01:02

## 2022-02-03 RX ADMIN — SPIRONOLACTONE 50 MG: 25 TABLET ORAL at 11:02

## 2022-02-03 RX ADMIN — Medication 100 MG: at 09:02

## 2022-02-03 RX ADMIN — POTASSIUM & SODIUM PHOSPHATES POWDER PACK 280-160-250 MG 2 PACKET: 280-160-250 PACK at 08:02

## 2022-02-03 RX ADMIN — MUPIROCIN: 20 OINTMENT TOPICAL at 09:02

## 2022-02-03 RX ADMIN — POTASSIUM BICARBONATE 25 MEQ: 978 TABLET, EFFERVESCENT ORAL at 11:02

## 2022-02-03 RX ADMIN — FOLIC ACID 1 MG: 1 TABLET ORAL at 09:02

## 2022-02-03 RX ADMIN — MULTIPLE VITAMINS W/ MINERALS TAB 1 TABLET: TAB at 09:02

## 2022-02-03 RX ADMIN — POTASSIUM & SODIUM PHOSPHATES POWDER PACK 280-160-250 MG 2 PACKET: 280-160-250 PACK at 10:02

## 2022-02-03 RX ADMIN — POTASSIUM BICARBONATE 25 MEQ: 978 TABLET, EFFERVESCENT ORAL at 03:02

## 2022-02-03 NOTE — ASSESSMENT & PLAN NOTE
Mild, asymptomatic. Thought to be due to cirrhosis. Mild LE edema, otherwise no JVD or hypoxia. Urine studies collected after first dose of Lasix consistent with SIADH vs diuretic use.    - monitor on labs  - Lasix stopped 2/3

## 2022-02-03 NOTE — ASSESSMENT & PLAN NOTE
1 week of worsening jaundice, abdominal distension and fatigue, found to have new decompensated cirrhosis and bilary obstruction. Transferred for AES and Hepatology evaluation. No asterixis or HE. Initially concern for obstruction on CT a/p but no dilation on Liver US--no plan for scope. LFTs downtrending. Lasix stopped for hyponatremia.     MELD-Na score: 27 at 2/3/2022  3:40 AM  MELD score: 22 at 2/3/2022  3:40 AM  Calculated from:  Serum Creatinine: 0.7 mg/dL (Using min of 1 mg/dL) at 2/3/2022  3:40 AM  Serum Sodium: 129 mmol/L at 2/3/2022  3:40 AM  Total Bilirubin: 16.5 mg/dL at 2/3/2022  3:40 AM  INR(ratio): 1.6 at 2/3/2022  3:40 AM  Age: 71 years     IgG elevated. Alpha 1 antitrypsin negative.    Plan:  - spironolactone 50 mg QD  - Hepatology consulted, appreciate recommendations  - f/u diagnostic para labs  - f/u MRI abdomen w/wo contrast  - f/u ODILIA, anti-mitochondrial Ab, anti-smooth muscle Ab, hemochromatosis DNA analysis

## 2022-02-03 NOTE — PLAN OF CARE
Salo HEBERT  Initial Discharge Assessment       [facility] SCHEDULED AS REQUESTED WITH ZHOU BILLY OD    Admission Diagnosis: Common bile duct obstruction [K83.1]    Admission Date: 2/2/2022  Expected Discharge Date: 2/4/2022    Payor: [employer] MANAGED MEDICARE / Plan: [employer] MEDICARE PPO / Product Type: Medicare Advantage /     Extended Emergency Contact Information  Primary Emergency Contact: KodiBhargavi   [facility]  Home Phone: [phone]  Relation: Sister    Discharge Plan A: Home  Discharge Plan B: Home Health      [facility] [place] [facility] [address]  [address]  [place] [address]  Phone: [phone] Fax: [phone]      Patient is a 71 year old male admitted in transfer from [facility] with Jaundice.  Patient's medical and discharge plan pending. Patient will need a ride home at discharge.    Patient lives alone in a mobile home with 6 steps with no railing to enter. Patient stated he usually walks to the grocery or his sister drives him to the grocery and MD appointments. Patient used straight cane for for ambulation prior to admit. [facility] Discharge Booklet given to patient and/or family with understanding verbalized. CM name and number written on white board in patient's room with request to call for any questions and concerns. Will continue to follow for needs.    Lulu Lopez RN, [facility] (ext: [phone])        Initial Assessment (most recent)     Adult Discharge Assessment - 02/03/22 0959        Discharge Assessment    Assessment Type Discharge Planning Assessment     Confirmed/corrected address, phone number and insurance Yes     Confirmed Demographics Correct on Facesheet     Source of Information patient     Communicated NAFISA with patient/caregiver Yes     Reason For Admission Jaundice     Lives With alone     Facility Arrived From: [facility]     Do you expect to  return to your current living situation? Yes     Do you have help at home or someone to help you manage your care at home? No     Prior to hospitilization cognitive status: Alert/Oriented     Current cognitive status: Alert/Oriented     Walking or Climbing Stairs Difficulty ambulation difficulty, requires equipment     Mobility Management straight cane     Dressing/Bathing Difficulty none     Home Accessibility stairs to enter home     Number of Stairs, Main Entrance six     Stair Railings, Main Entrance railings safe and in good condition     Home Layout Able to live on 1st floor     Equipment Currently Used at Home cane, straight     Do you take prescription medications? Yes     Do you have prescription coverage? Yes     Do you have any problems affording any of your prescribed medications? No     Is the patient taking medications as prescribed? yes     Who is going to help you get home at discharge? NEEDS RIDE HOME     How do you get to doctors appointments? public transportation;family or friend will provide     Discharge Plan A Home     Discharge Plan B Home Health     Discharge Plan discussed with: Patient

## 2022-02-03 NOTE — ANESTHESIA PREPROCEDURE EVALUATION
[facility]  Anesthesia Pre-Operative Evaluation         Patient Name: Vic Reyez  YOB: 1950  MRN: [id number]    SUBJECTIVE:     Pre-operative evaluation for Procedure(s) (LRB):  ERCP (ENDOSCOPIC RETROGRADE CHOLANGIOPANCREATOGRAPHY) (N/A)     02/03/2022    Vic Reyez is a 71 y.o. male w/ a significant PMHx of alcohol use, HTN who presented to [place] with new onset jaundice, abdominal distention and fatigue, found to have decompensated cirrhosis.    Patient now presents for the above procedure(s).      LDA: None documented.       Peripheral IV - Single Lumen 01/31/22 1845 18 G Anterior;Right Forearm (Active)   Site Assessment Clean;Dry;Intact;No warmth;No drainage;No swelling;No redness 02/03/22 1600   Extremity Assessment Distal to IV No abnormal discoloration;No redness;No swelling;No warmth 02/03/22 0800   Line Status Flushed;Saline locked 02/03/22 0800   Dressing Status Clean;Dry;Intact 02/03/22 0800   Dressing Intervention Integrity maintained 02/03/22 0800   Dressing Change Due 02/03/22 02/02/22 2010   Site Change Due 02/03/22 02/03/22 0800   Reason Not Rotated Anticipated discharge 02/03/22 0800   Number of days: 2            Peripheral IV - Single Lumen 01/31/22 1843 18 G Anterior;Left Forearm (Active)   Site Assessment Clean;Dry;Intact;No warmth;No drainage;No redness;No swelling 02/03/22 1600   Extremity Assessment Distal to IV No abnormal discoloration;No redness;No swelling;No warmth 02/03/22 0800   Line Status Flushed;Saline locked 02/03/22 0800   Dressing Status Clean;Dry;Intact 02/03/22 0800   Dressing Intervention Integrity maintained 02/03/22 0800   Dressing Change Due 02/03/22 02/02/22 2010   Site Change Due 02/03/22 02/03/22 0800   Reason Not Rotated Anticipated discharge 02/03/22 0800   Number of days: 2       Prev airway: None documented.    Drips: None documented.      Patient Active Problem List   Diagnosis    Decompensated hepatic cirrhosis    Biliary  obstruction    Alcohol use disorder, severe, dependence    Primary hypertension    Hypokalemia    Hyponatremia    Coagulopathy       Review of patient's allergies indicates:  No Known Allergies    Current Inpatient Medications:   folic acid  1 mg Oral Daily    multivitamin  1 tablet Oral Daily    mupirocin   Nasal BID    spironolactone  50 mg Oral Daily    thiamine  100 mg Oral Daily       No current facility-administered medications on file prior to encounter.     Current Outpatient Medications on File Prior to Encounter   Medication Sig Dispense Refill    aspirin (ECOTRIN) 81 MG EC tablet Take 81 mg by mouth once daily.      calcium-vitamin D (OSCAL) 250 (625)-125 mg-unit per tablet Take 1 tablet by mouth once daily.      docusate sodium (COLACE) 50 MG capsule Take by mouth 2 (two) times daily.      ferrous fumarate (HEMOCYTE) 324 mg (106 mg iron) Tab Take by mouth.      hydrocodone-acetaminophen 7.5-325mg (NORCO) 7.5-325 mg per tablet Take 1 tablet by mouth every 6 (six) hours as needed.      pravastatin (PRAVACHOL) 40 MG tablet Take 40 mg by mouth once daily.         Past Surgical History:   Procedure Laterality Date    APPENDECTOMY      EYE SURGERY      JOINT REPLACEMENT      KNEE SURGERY      RECONSTRUCTION OF SHOULDER Right        Social History     Socioeconomic History    Marital status: Single   Tobacco Use    Smoking status: Current Every Day Smoker     Packs/day: 0.50     Types: Cigarettes    Smokeless tobacco: Never Used   Substance and Sexual Activity    Alcohol use: Yes     Alcohol/week: 4.0 standard drinks     Types: 4 Cans of beer per week    Drug use: Never    Sexual activity: Not Currently       OBJECTIVE:     Vital Signs Range (Last 24H):  Temp:  [36.5 °C (97.7 °F)-37 °C (98.6 °F)]   Pulse:  [77-93]   Resp:  [16-18]   BP: ()/(49-65)   SpO2:  [94 %-98 %]       Significant Labs:  Lab Results   Component Value Date    WBC 7.23 02/03/2022    HGB 9.9 (L) 02/03/2022     HCT 26.7 (L) 02/03/2022     02/03/2022    ALT 27 02/03/2022    AST 80 (H) 02/03/2022     (L) 02/03/2022    K 3.1 (L) 02/03/2022     02/03/2022    CREATININE 0.7 02/03/2022    BUN 6 (L) 02/03/2022    CO2 18 (L) 02/03/2022    INR 1.6 (H) 02/03/2022       Diagnostic Studies: No relevant studies.    EKG:   Results for orders placed or performed during the hospital encounter of 01/31/22   EKG 12-lead    Collection Time: 01/31/22  6:42 PM    Narrative    Test Reason : R06.02,    Vent. Rate : 092 BPM     Atrial Rate : 120 BPM     P-R Int : 000 ms          QRS Dur : 106 ms      QT Int : 376 ms       P-R-T Axes : 000 020 -04 degrees     QTc Int : 464 ms    Atrial fibrillation  Possible Anterior infarct (cited on or before 15-SEP-2021)  Abnormal ECG  When compared with ECG of 15-SEP-2021 20:52,  Atrial fibrillation has replaced Sinus rhythm  Questionable change in initial forces of Septal leads  Nonspecific T wave abnormality, worse in Inferior leads  T wave amplitude has decreased in Anterior leads  Confirmed by Lai Harrington MD ([id number]) on 2/1/2022 8:21:12 AM    Referred By: CLIFFORD   SELF           Confirmed By:Lai Harrington MD       2D ECHO:  TTE:  No results found for this or any previous visit.    TYRONE:  No results found for this or any previous visit.    ASSESSMENT/PLAN:                                                                                                                  02/03/2022  Vic Reyez is a 71 y.o., male.    Anesthesia Evaluation    I have reviewed the Patient Summary Reports.    I have reviewed the Nursing Notes. I have reviewed the NPO Status.      Review of Systems  Anesthesia Hx:  Denies Family Hx of Anesthesia complications.   Denies Personal Hx of Anesthesia complications.   Cardiovascular:   Hypertension    Hepatic/GI:   Liver Disease,    Psych:   Psychiatric History          Physical Exam  General:  Jaundice    Airway/Jaw/Neck:  Airway Findings: Mouth Opening: Normal General  Airway Assessment: Adult  Mallampati: II      Dental:  Dental Findings: In tact   Chest/Lungs:  Chest/Lungs Findings: Normal Respiratory Rate         Mental Status:  Mental Status Findings:  Cooperative, Alert and Oriented         Anesthesia Plan  Type of Anesthesia, risks & benefits discussed:  Anesthesia Type:  general, MAC    Patient's Preference:   Plan Factors:          Intra-op Monitoring Plan: standard ASA monitors  Intra-op Monitoring Plan Comments:   Post Op Pain Control Plan:   Post Op Pain Control Plan Comments:     Induction:   IV  Beta Blocker:         Informed Consent: Patient understands risks and agrees with Anesthesia plan.  Questions answered. Anesthesia consent signed with patient.  ASA Score: 3     Day of Surgery Review of History & Physical:    H&P update referred to the surgeon.         Ready For Surgery From Anesthesia Perspective.

## 2022-02-03 NOTE — HOSPITAL COURSE
Lasix discontinued for hyponatremia, spironolactone continued. Counseled patient that alcohol abstinence is critical to avoid worsening of his liver function. Addiction Psychiatry consulted regarding alcohol use - patient did not show interest in assistance with alcohol abstinence. Workup of etiology for cirrhosis showed elevated IgG and positive ODILIA, hemochromatosis DNA analysis and anti-smooth muscle Ab pending at discharge. MRI abdomen w/wo contrast showed pancreatic tail mass and possible biliary stricture. Underwent ERCP with AES 2/4 with stent placed for biliary stricture, FNA of enlarged lymph node, and brushings of bifurcation of hepatic duct and cells. Discharged on spironolactone 100 mg daily with AES follow up in 6 weeks for stent exchange and Hepatology follow up. Outpatient Addiction Psychiatry referral placed.

## 2022-02-03 NOTE — ASSESSMENT & PLAN NOTE
BP 99/57 on admission. On metoprolol 25 mg QD prior to admission.     - hold antihypertensive home meds for low BP

## 2022-02-03 NOTE — ASSESSMENT & PLAN NOTE
Last BM 3 days prior to transfer. No abdominal pain, passing gas. Low suspicion for SBO at this time.

## 2022-02-03 NOTE — NURSING
Patient returned back to the unit  Per transport per stretcher during shift change. No verbalized complaints at this time. Patient transferred from stretcher to his bed without complications.

## 2022-02-03 NOTE — SUBJECTIVE & OBJECTIVE
"Interval History: No acute events overnight. This morning patient states he wishes to have Cummings catheter removed so he can be more mobile, no other complaints. Last BM yesterday 2/3 which was "normal" per patient.     Review of Systems   Constitutional: Negative for chills and fever.   HENT: Negative for rhinorrhea and sneezing.    Eyes: Negative for pain and redness.   Respiratory: Negative for cough and shortness of breath.    Gastrointestinal: Positive for abdominal distention and nausea. Negative for abdominal pain and vomiting.   Genitourinary: Negative for difficulty urinating and dysuria.   Musculoskeletal: Negative for back pain and neck pain.   Skin: Positive for color change (jaundice). Negative for rash.   Neurological: Negative for tremors and speech difficulty.     Objective:     Vital Signs (Most Recent):  Temp: 97.9 °F (36.6 °C) (02/03/22 1144)  Pulse: 93 (02/03/22 1144)  Resp: 18 (02/03/22 1144)  BP: 96/65 (02/03/22 1144)  SpO2: 96 % (02/03/22 1144) Vital Signs (24h Range):  Temp:  [97.7 °F (36.5 °C)-98.6 °F (37 °C)] 97.9 °F (36.6 °C)  Pulse:  [77-93] 93  Resp:  [16-18] 18  SpO2:  [94 %-98 %] 96 %  BP: ()/(49-65) 96/65     Weight: 78.8 kg (173 lb 11.6 oz)  Body mass index is 24.23 kg/m².    Intake/Output Summary (Last 24 hours) at 2/3/2022 1228  Last data filed at 2/3/2022 1100  Gross per 24 hour   Intake 240 ml   Output 1200 ml   Net -960 ml      Physical Exam  Vitals and nursing note reviewed.   Constitutional:       General: He is not in acute distress.     Appearance: He is ill-appearing. He is not toxic-appearing or diaphoretic.   HENT:      Head: Normocephalic and atraumatic.      Nose: No congestion or rhinorrhea.      Mouth/Throat:      Mouth: Mucous membranes are moist.      Pharynx: Oropharynx is clear.   Eyes:      General: Scleral icterus present.      Extraocular Movements: Extraocular movements intact.   Cardiovascular:      Rate and Rhythm: Normal rate and regular rhythm.      " Pulses: Normal pulses.      Heart sounds: Normal heart sounds. No murmur heard.      Pulmonary:      Effort: Pulmonary effort is normal. No respiratory distress.      Breath sounds: Normal breath sounds.   Abdominal:      General: Bowel sounds are normal. There is distension.      Tenderness: There is no abdominal tenderness. There is no right CVA tenderness, left CVA tenderness, guarding or rebound.      Comments: ascites with fluid wave    Musculoskeletal:         General: Normal range of motion.      Cervical back: Normal range of motion and neck supple.      Right lower leg: Edema present.      Left lower leg: Edema present.   Skin:     General: Skin is warm and dry.      Capillary Refill: Capillary refill takes less than 2 seconds.      Coloration: Skin is jaundiced.   Neurological:      General: No focal deficit present.      Mental Status: He is alert and oriented to person, place, and time.      Sensory: No sensory deficit.      Motor: No weakness.      Comments: No asterixis    Psychiatric:         Mood and Affect: Mood normal.         Thought Content: Thought content normal.         Significant Labs: All pertinent labs within the past 24 hours have been reviewed.    Significant Imaging: I have reviewed all pertinent imaging results/findings within the past 24 hours.

## 2022-02-03 NOTE — ASSESSMENT & PLAN NOTE
Mr. Reyez is a 71-year-old-man with HTN, constipation, and EtOH abuse, who presented to OSH with 1 week of worsening jaundice, abdominal distension and fatigue, found to have new decompensated cirrhosis and bilary obstruction. Transferred for AES and Hepatology evaluation.     At OSH ED, afebrile, VS normal. Exam with ascites and normal mentation. INR 1.6, Na 128, K 2.8, , Alb 1.5, TB 22, , ALT 34, Lipase 3. WBC 8, Hg 11.7, .  Ammonia, 69. CT abdomen with bilary obstruction, dilated CBD 14mm with wall thickening, mild intra and extrahepatic biliary duct dilation, liver with changes consistent with cirrhosis signs of biliary obstruction, dilated CBD proximally to 14mm with wall thickening, mild IH/EH biliary duct dilatation, changes of cirrhosis, and portal hypertension. Sludge vs mass in CBD. However, no dilation seen on Liver US. Evaluated by AES, no plan for scope.    Case discussed with Dr. Mccollum in [facility] prior to transfer. Was treated with empiric Rocephin 2g iv x 1, KCL 40meq po x 1 and 10 meq IV x1, and iv fluids in the ED.  Blood cultures are drawn. No fevers or abdominal pain.       - AES consulted, appreciate recs  - f/u blood cx   - see decompensated cirrhosis management

## 2022-02-03 NOTE — ASSESSMENT & PLAN NOTE
Unclear baseline Creatinine--has been 0.4 to 0.7 recently. Labs reviewed- Renal function/electrolytes with Estimated Creatinine Clearance: 106.1 mL/min (based on SCr of 0.7 mg/dL). according to latest data. Monitor urine output and serial BMP and adjust therapy as needed. Avoid nephrotoxins and renally dose meds for GFR listed above.   UA with 3+ bilirubin, otherwise unrevealing.     - f/u Criselda, consider HRS  - furosemide 20 mg qd, up-titrate as needed   - consider higher MAP goal if HRS   - f/u AM Cr

## 2022-02-03 NOTE — TREATMENT PLAN
AES Treatment Plan    Patient seen yesterday. See consult note.  Contacted by Hepatology regarding MRCP findings of possible PSC and pancreatic tail lesion.  On review of MRCP, there is a narrowed area around the hilum with prominent intrahepatic bile ducts. The pancreatic lesion has a mural nodule, will plan on EUS as well.    - plan for EUS / ERCP tomorrow  - NPO after midnight  - IV Vit K 10mg today to correct INR  - hold any chemical DVT ppx      Thank you for involving us in the care of Vic Reyez. We will continue to follow. Please call with any additional questions, concerns or changes in the patient's clinical status.      Armando Bingham MD  Gastroenterology Fellow, PGY-VI  [facility]

## 2022-02-03 NOTE — MEDICAL/APP STUDENT
Progress Note  [facility]    Primary Team: [facility]  Admit Date: 2/2/2022   Length of Stay:  LOS: 1 day   SUBJECTIVE:   Reason for Admission:  Decompensated hepatic cirrhosis    History of Present Illness: Patient is a 71 y.o. male that transferred to [facility] for evaluation of new onset jaundice, abdominal distension, and fatigue. PMHx is significant for hypertension and alcohol abuse.    Hospital course:  Pt's suspected biliary obs and decompensated cirrhosis was initially managed with empiric IV ceft and lactulose TID which were D/C'd 2/2. Ascites is currentl;y treated with furosemide and spironolactone. Pts BP on admission was low and home metoprolol was held. Alcohol abuse is monitored with CIWA q4 and cessation encouraged.    Interval history: Paracentesis performed on 2/2. NAOEN. CIWA 0. Pt was comfortable and upright about to eat breakfast. Wound site dry. Urine is lighter in color compared to admission. Pt expressed desire to remove catheter out. Pt given additional potassium and phosphate in the morning to correct levels. Pt is jaundiced.    Old chart was reviewed.    Review of Systems:  Review of Systems   Constitutional: Negative for chills, fever and malaise/fatigue.   HENT: Negative.    Eyes: Negative.    Respiratory: Negative for shortness of breath.    Cardiovascular: Negative for chest pain and palpitations.   Gastrointestinal: Negative for abdominal pain, nausea and vomiting.   Genitourinary: Negative for dysuria.   Musculoskeletal: Negative for joint pain.   Skin: Negative for itching.   Neurological: Negative for sensory change, speech change, weakness and headaches.   Endo/Heme/Allergies: Negative.          OBJECTIVE:     Vital Signs (Most Recent)   Temp: 98.3 °F (36.8 °C) (02/03/22 0728)  Pulse: 85 (02/03/22 0728)  Resp: 18 (02/03/22 0728)  BP: 92/61 (02/03/22 0728)  SpO2: (!) 94 % (02/03/22 0728) Body mass index is 24.23 kg/m².  Intake/Outake:  This Shift:  No  intake/output data recorded.    Net I/O past 24h:     Intake/Output Summary (Last 24 hours) at 2/3/2022 0804  Last data filed at 2/3/2022 0700  Gross per 24 hour   Intake 360 ml   Output 1500 ml   Net -1140 ml             Physical Exam:  Physical Exam  Constitutional:       General: He is not in acute distress.  Cardiovascular:      Rate and Rhythm: Normal rate and regular rhythm.   Pulmonary:      Effort: No respiratory distress.      Breath sounds: Normal breath sounds.   Abdominal:      General: There is distension.      Tenderness: There is no abdominal tenderness. There is no right CVA tenderness or left CVA tenderness.   Musculoskeletal:      Right lower le+ Edema present.      Left lower le+ Edema present.   Skin:     Coloration: Skin is jaundiced.   Neurological:      Mental Status: He is alert and oriented to person, place, and time.          Laboratory:  CBC/Anemia Labs: Coags:    Recent Labs   Lab 2237 22  1016 22  0340   WBC 8.07 8.67  --  7.23   HGB 11.7* 9.7*  --  9.9*   HCT 31.0* 26.3*  --  26.7*    160  --  152   MCV 88 88  --  88   RDW 16.0* 16.4*  --  16.8*   IRON  --   --  96  --    FERRITIN  --   --  862*  --     Recent Labs   Lab 22  0340   INR 1.6* 1.6*   APTT 35.8*  --         Chemistries: ABG:   Recent Labs   Lab 22  0537 22  0340   * 131* 129*   K 2.8* 3.2* 3.1*    106 106   CO2 16* 20* 18*   BUN 5* 6* 6*   CREATININE 0.7 0.7 0.7   CALCIUM 8.2* 8.0* 7.8*   PROT 8.1 6.4 6.2   BILITOT 21.8* 17.1* 16.5*   ALKPHOS 712* 552* 538*   ALT 34 24 27   * 82* 80*   MG 2.0 1.9 1.9   PHOS  --  2.4* 2.6*    No results for input(s): PH, PCO2, PO2, HCO3, POCSATURATED, BE in the last 168 hours.     Diagnostic Results: MRI results in process  - Peritoneal fluid: Yellow, clear WBC 75, Amyalse 6, Bilirubin 1.9, albumin 0.4  - MRI results waiting  A-1 Antitrypsin: 156  IgG 2949  Fe profile: Iron  96, TIBC 161, Fe Sat 60, Transferrin 109, Ferritin 862  Ceruloplasmin: 51.0  Alcohol: <10  Hepatology Ab titers in process     MELD-Na score: 27 at 2/3/2022  3:40 AM  MELD score: 22 at 2/3/2022  3:40 AM  Calculated from:  Serum Creatinine: 0.7 mg/dL (Using min of 1 mg/dL) at 2/3/2022  3:40 AM  Serum Sodium: 129 mmol/L at 2/3/2022  3:40 AM  Total Bilirubin: 16.5 mg/dL at 2/3/2022  3:40 AM  INR(ratio): 1.6 at 2/3/2022  3:40 AM  Age: 71 years       Medications:  Scheduled Meds:   furosemide  20 mg Oral Daily    potassium bicarbonate  25 mEq Oral Q4H    potassium, sodium phosphates  2 packet Oral Q4H    spironolactone  50 mg Oral Daily                             Continuous Infusions:  PRN Meds:dextrose 50%, dextrose 50%, glucagon (human recombinant), glucose, glucose, lorazepam, melatonin, naloxone, ondansetron, sodium chloride 0.9%     ASSESSMENT/PLAN:     Active Hospital Problems    Diagnosis  POA    *Decompensated hepatic cirrhosis [K72.90, K74.60]  Yes    Biliary obstruction [K83.1]  Yes    Alcohol abuse [F10.10]  Yes     Chronic    Primary hypertension [I10]  Yes     Chronic    SIMONE (acute kidney injury) [N17.9]  Yes    Hypokalemia [E87.6]  Yes    Hyponatremia [E87.1]  Yes    Constipation [K59.00]  Yes      Resolved Hospital Problems   No resolved problems to display.     1. Biliary Obs and decompensated cirrhosis  - Moderate ascites  - Labs trending down  - D/C'd ceft IV 1mg  - D/C'd lactulose TID, rifaximin   - MRI abdomen ordere w/ w/out contrast and MRCP to rule out soft tissue obs  - Tx with spironolactone 50mg PO and furosemide 20mg PO daily     Plan:  - Check A1AT, ceruloplasmin, Fe panel, ODILIA, ASMA, IgG  - No steroids due to age.  - f/u MRI abdomen w/ w/out contrast and MRCP to rule out soft tissue obs  - Check PETH  - Continue tx with spironolactone 50mg PO and furosemide 20mg PO daily  - Rehab  - Monitor labs and daily obs.  - Consult with hepatology as needed   - Remove catheter     2. Sofy  HTN  - BP below target (92/61)  - home BP meds (metoprolol 25mg qd) held     Plan:  - Continue to hold home BP meds  - Monitor BP     3. Alcohol abuse  - 1-2 beers today     Plan:  - CIWA q4  - Monitor for ISAI, benzos if needed  - Encourage cessation.    DVT ppx- None  CODE Status- FULL    Dispo- No, awaiting MRI and labs    Dhara Lopez, MS3  [facility]  [phone]

## 2022-02-03 NOTE — PLAN OF CARE
Patient alert/oriented x4 in no acute distress. Cummings catheter discontinued today.  Awaiting post cath void. Patient had one bowel movement today. No complaints of pain today. Patient tolerating diet. Patient ambulated to restroom with standby assist and sat up in recliner chair.  All scheduled medications administered and tolerated by patient this shift. Patient will be NPO at midnight for ERCP tomorrow.     Problem: Adult Inpatient Plan of Care  Goal: Plan of Care Review  Outcome: Ongoing, Progressing  Goal: Patient-Specific Goal (Individualized)  Outcome: Ongoing, Progressing  Goal: Absence of Hospital-Acquired Illness or Injury  Outcome: Ongoing, Progressing  Goal: Optimal Comfort and Wellbeing  Outcome: Ongoing, Progressing  Goal: Readiness for Transition of Care  Outcome: Ongoing, Progressing     Problem: Infection  Goal: Absence of Infection Signs and Symptoms  Outcome: Ongoing, Progressing     Problem: Skin Injury Risk Increased  Goal: Skin Health and Integrity  Outcome: Ongoing, Progressing     Problem: Fluid and Electrolyte Imbalance (Acute Kidney Injury/Impairment)  Goal: Fluid and Electrolyte Balance  Outcome: Ongoing, Progressing     Problem: Oral Intake Inadequate (Acute Kidney Injury/Impairment)  Goal: Optimal Nutrition Intake  Outcome: Ongoing, Progressing     Problem: Renal Function Impairment (Acute Kidney Injury/Impairment)  Goal: Effective Renal Function  Outcome: Ongoing, Progressing

## 2022-02-03 NOTE — PLAN OF CARE
"No acute changes during the night. CIWA checked every four hours and has been a score of "0" each time. Patient has been alert and oriented with no changes in behaviors. Cummings catheter remains in place hanging to gravity. Urine is becoming lighter in color. Patient abdomen remains rounded, yet he denies abdominal pain. Checked the site of paracentesis site from yesterday, bandage remains dry. Patient denies any breathing difficulties. Systolic blood pressure remains in the 90's. Generalized jaundice remains present. Bilateral +2 nonpitting feet edema remains present.     Problem: Adult Inpatient Plan of Care  Goal: Plan of Care Review  Outcome: Ongoing, Progressing     Problem: Fluid and Electrolyte Imbalance (Acute Kidney Injury/Impairment)  Goal: Fluid and Electrolyte Balance  Outcome: Ongoing, Progressing     Problem: Renal Function Impairment (Acute Kidney Injury/Impairment)  Goal: Effective Renal Function  Outcome: Ongoing, Progressing     "

## 2022-02-03 NOTE — ASSESSMENT & PLAN NOTE
Hx of alcohol abuse, 6 drinks daily after work with co-workers. Now drinks 1-2 beers/night.     - Addiction Psychiatry consulted, appreciate recommendations   - encouraged cessation  - CIWA q4 hr with PRN ativan  - folate, thiamine, MVI

## 2022-02-03 NOTE — ASSESSMENT & PLAN NOTE
ASSESSMENT:     DIAGNOSES & PROBLEMS    Alcohol Use Disorder, Severe      STRENGTHS AND LIABILITIES   Strength: Patient has reasonable judgment., Strength: Patient is stable., Liability: Patient has poor health., Liability: pt appears to have very poor insight into degree of alcohol dependence and degree to which treatment will likely be necessary for obtaining long-term sobriety      MOTIVATION TO PURSUE RECOVERY: moderate    ACCEPTANCE OF ADDICTION: limited    ABILITY TO ADHERE TO TREATMENT PLAN: low      PLAN:       MANAGEMENT PLAN, TREATMENT GOALS, THERAPEUTIC TECHNIQUES/APPROACHES & CLINICAL REASONING     Though pt is not showing signs of active withdrawal and states last use was over a month ago, chart is inconsistent on last use, and it is reasonable to keep CIWA protocols & Lorazepam PRN in place at this time   No scheduled meds at this time   Addictions Psychiatry to see patient again tomorrow to re-assess willingness to pursue formal treatment options   Resources list provided today for patient to review      · Patient counseled on abstinence from alcohol and substances of abuse (illicit and prescription).  · Additional workup planned to address substance use disorder, in order to guide and refine ongoing management options, includes serial alcohol and drug laboratory testing (e.g. PETH, urine toxicology).  · Relapse prevention and motivational interviewing provided.  · Education provided on 12 step recovery programs.      PRESCRIPTION DRUG MANAGEMENT  - The risks and benefits of medication were discussed with this patient.  - Possible expectable adverse effects of any current or proposed individual psychotropic agents were discussed with this patient.  - Counseling was provided on the importance of full compliance with medication regimens.      In cases of emergency, daily coverage provided by Acute/ER Psych MD, NP, or SW, with contact numbers located in [facility] On Call Schedule    Case  discussed with staff addiction psychiatrist: BLUE GUTIÉRREZ MD

## 2022-02-03 NOTE — HPI
"2/3/2022 12:31 PM  Vic Reyez  1950  [id number]      ADDICTION CONSULT INITIAL EVALUATION     DEPARTMENT:  Psychiatry  SITE: [facility] [address]    DATE OF ADMISSION: 2/2/2022  1:30 AM  LENGTH OF STAY: 1 days    EXAMINING PRACTITIONER: Winston Jimenez    CONSULT REQUESTED BY: Winston Zendejas MD      SUBJECTIVE     CHIEF COMPLAINT  Vic Reyez is a 71 y.o. male who is seen today for an initial psychiatric evaluation by the addiction psychiatry consult service.  Vic Reyez presents with the chief complaint of: alcohol abuse with cirrhosis decompensation      HISTORY OF PRESENT ILLNESS    Per Primary MD:  Mr. Reyez is a 71-year-old-man with HTN, constipation, and EtOH abuse, who presented to OSH with 1 week of worsening jaundice, abdominal distension and fatigue, found to have new decompensated cirrhosis and bilary obstruction. Transferred for AES and Hepatology evaluation. Patient reports no abdominal pain. States neighbor encouraged him to be evaluated for new yellowing. Noted his urine has been brown for ~ 1 week. No BM in 3 days. No fever or chills. Some nausea with meals. No emesis or hematemesis. No melena or hematochezia. No confusion, feels like himself. Lives alone. Drinks 1-2 beers/day. Previously drank 6 beers/day    Per Addiction Psych MD:  Pt seen in room, is welcoming and receptive to examination.  States he has "no doubt" his drinking contributed to liver problems/cirrhosis.  Started drinking mildly in highschool, but began drinking heavily in the [employer], followed by heavy drinking in college and then continued drinking roughly a 6-pack of beer a day until this past New Year's Day, 2022.  States in the fall he started "getting a feeling" he should cut back, and reports he had one beer on New Year's Day and none since.  States his is finished with alcohol, however, does not feel he needs the help of recovery programs such as AA, rehab or medications.  Endorses attending AA " for roughly 2 years approximately 10 to 15 year ago, and while he found the camaraderie of the groups enjoyable did not find it helpful with alcohol use cessation.  Though he is not interested in help with cessation today, he is agreeable to accepting a resources list of alcohol use treatment options (provided to patient) and is also agreeable to having another conversation with addictions psychiatry tomorrow.     Patient denies any other history of substance use (no illicit or recreational substances other than alcohol).  Also reports he only drinks beer, no hard liqour.  States he is the only drinker in his family, noting no one else has substance use issues.  Denies personal or familial history of psychiatric disorders, symptoms or treatment.   Denies SI/HI at present or in the past.      COLLATERAL  None    SUBSTANCE ABUSE HISTORY  Substance(s) of Choice: alcohol  Substances Used: alcohol  History of IVDU?: no  Use of Alcohol: 6-beers daily most of adult life, reporting abstinence since 1/1/22  Average Consumption: as above  Last Drink: 1/1/22  Use of Medications for Alcohol/Opioid Use Disorder: no  History of Complicated Withdrawal: denies  History of Detox: no  Rehab History: no  AA/NA involvement: ~2 years in AA 10 to 15 years ago, not currently involved  Tobacco: no  Spouse/Partner Consumption: not   Patient Aware of Biomedical Complications: yes, aware it has contributed to his cirrhosis    DSM-5 SUBSTANCE USE DISORDER CRITERIA   Mild (1-3), Moderate (4-5), Severe (?6)  Often take in larger amounts or over a longer period of time than was intended.  Persistent desire or unsuccessful efforts to cut down or control use.  Great deal of time spent in activities necessary to obtain substance, use, or recover from effects.  Craving/strong desire for substance or urge to use.  Use resulting in failure to fulfill major role obligations at home, work or school.  Social, occupational, recreational activities  decreased because of use.  Continued use despite having persistent or recurrent social or interpersonal problems cause or exaserbated by the substance.  Recurrent use in situations in which it is physically hazardous.  Use despite physical or psychological problems that are likely to have been caused or exacerbated by the substance.  Tolerance, as defined by either of the following.   A. A need for markedly increased amounts of substance to achieve intoxication or desired effect. -OR-    B. A markedly diminished effect with continued use of the same amount of substance.  Withdrawal, as manifested by the following.   A. The characteristic withdrawal syndrome for substance. -AND-   B. Substance is taken to relieve or avoid withdrawal symptoms.    ARE THE CRITERIA MET FOR DSM-5 SUBSTANCE USE DISORDER: Alcohol Use Disorder, Severe      PSYCHIATRIC HISTORY  Reported Diagnose(s): Denies  Previous Medication Trials: Denies  Previous Psychiatric Hospitalizations: Denies  Outpatient Psychiatrist?: No      SUICIDE/HOMICIDE RISK ASSESSMENT  Current/active suicidal ideation/plan/intent: Denies  Previous suicide attempts: Denies  Current/active homicidal ideation/plan/intent: Denies      HISTORY OF TRAUMA, ABUSE & VIOLENCE  Trauma: Denies  Physical Abuse: no  Sexual Abuse: no  Violent Conduct: no    Access to Gun: yes       FAMILY PSYCHIATRIC HISTORY   Denies       SOCIAL HISTORY  Lives alone, , 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      PAST MEDICAL HISTORY   HTN, Cirrhosis, biliary obstruction      PSYCHOSOCIAL FACTORS  Stressors (Psychosocial and Environmental): financial and health.       PSYCHIATRIC ROS  Depression: denies major symptoms or episodes  Anxiety: denies major symptoms or episodes  Sharron: denies symptoms consistent with this   Psychosis: denies symptoms consistent with this    MEDICAL ROS    Complete review of systems performed covering Constitutional, Eyes, ENT/Mouth, Cardiovascular, Respiratory,  Gastrointestinal, Genitourinary, Musculoskeletal, Skin, Neurologic, Endocrine, Heme/Lymph, and Allergy/Immune.     Complete review of systems was negative with the exception of the following positive symptoms: jaundice, otherwise feels well      ALLERGIES  Patient has no known allergies.      MEDICATIONS    Psychotropics:  None    Infusions:       Scheduled:   folic acid  1 mg Oral Daily    multivitamin  1 tablet Oral Daily    mupirocin   Nasal BID    phytonadione ((AQUA-MEPHYTON) IVPB  10 mg Intravenous Once    potassium bicarbonate  25 mEq Oral Q4H    spironolactone  50 mg Oral Daily    thiamine  100 mg Oral Daily       PRN:  dextrose 50%, dextrose 50%, glucagon (human recombinant), glucose, glucose, lorazepam, melatonin, naloxone, ondansetron, sodium chloride 0.9%    Home Medications:  Prior to Admission medications    Medication Sig Start Date End Date Taking? Authorizing Provider   aspirin (ECOTRIN) 81 MG EC tablet Take 81 mg by mouth once daily.   Yes Historical Provider   calcium-vitamin D (OSCAL) 250 (625)-125 mg-unit per tablet Take 1 tablet by mouth once daily.    Historical Provider   docusate sodium (COLACE) 50 MG capsule Take by mouth 2 (two) times daily.    Historical Provider   ferrous fumarate (HEMOCYTE) 324 mg (106 mg iron) Tab Take by mouth.    Historical Provider   folic acid (FOLVITE) 1 MG tablet Take 1 tablet (1 mg total) by mouth once daily. 2/4/22 2/4/23  Mayte Bai MD   hydrocodone-acetaminophen 7.5-325mg (NORCO) 7.5-325 mg per tablet Take 1 tablet by mouth every 6 (six) hours as needed.    Historical Provider   multivitamin (THERAGRAN) per tablet Take 1 tablet by mouth once daily. 2/4/22 1/30/23  Mayte Bai MD   pravastatin (PRAVACHOL) 40 MG tablet Take 40 mg by mouth once daily.    Historical Provider   spironolactone (ALDACTONE) 50 MG tablet Take 1 tablet (50 mg total) by mouth once daily. 2/3/22 2/3/23  Mayte Bai MD   thiamine 100 MG tablet Take 1 tablet (100 mg total) by  "mouth once daily. 2/4/22 1/30/23  Mayte Bai MD   metoprolol tartrate (LOPRESSOR) 25 MG tablet Take 25 mg by mouth 2 (two) times daily.  2/3/22  Historical Provider   naproxen (NAPROSYN) 500 MG tablet Take 500 mg by mouth 2 (two) times daily.  2/3/22  Historical Provider   naproxen (NAPROSYN) 500 MG tablet Take 1 tablet (500 mg total) by mouth 2 (two) times daily as needed (pain). 2/3/22 2/3/22  Mayte Bai MD         OBJECTIVE:     EXAMINATION    Vitals:    02/03/22 0432 02/03/22 0728 02/03/22 0955 02/03/22 1144   BP: (!) 84/49 92/61 (!) 96/57 96/65   BP Location: Right arm Right arm     Patient Position: Lying Sitting  Sitting   Pulse: 86 85 84 93   Resp: 16 18  18   Temp: 98.6 °F (37 °C) 98.3 °F (36.8 °C)  97.9 °F (36.6 °C)   TempSrc: Oral Oral  Oral   SpO2: 98% (!) 94%  96%   Weight:       Height:           PAIN   0/10    CONSTITUTIONAL  General Appearance and Manner: Jaundiced, hospital gown, NAD    MUSCULOSKELETAL  Abnormal Involuntary Movements: None  Muscle Strength and Tone:  WNL  Gait and Station: WNL, though not observed ambulating    PSYCHIATRIC   Orientation: person, place, date, situation  Speech: normal rate, rhythm and tone, talkative but WNL  Language: normal and aphasic  Mood: "good"  Affect: full-range, appropriate  Thought Process: liear  Associations: in tact  Thought Content: no SI/HI, no delusional nor bizarre content displayed  Memory: long & short term in tact  Attention and Concentration: in tact  Fund of Knowledge: adequate for education  Insight: Limited  Judgment: Fair        LABS/IMAGING/STUDIES   Recent Results (from the past 24 hour(s))   WBC & Diff,Body Fluid Peritoneal Fluid    Collection Time: 02/02/22  1:55 PM   Result Value Ref Range    Body Fluid Type Peritoneal Fluid     Fluid Appearance Clear     Fluid Color Yellow     WBC, Body Fluid 75 /cu mm    Segs, Fluid 6 %    Lymphs, Fluid 73 %    Monocytes/Macrophages, Fluid 20 %    Mesothelial Cells, Fluid 1 %   Culture, Body " Fluid (Aerobic) w/ GS    Collection Time: 02/02/22  1:55 PM    Specimen: Peritoneal Fluid; Body Fluid   Result Value Ref Range    AEROBIC CULTURE - FLUID No growth     Gram Stain Result Rare WBC's     Gram Stain Result No organisms seen    Amylase, Peritoneal, Pleural Fluid or KHANH Drainage, In-House Peritoneal Fluid    Collection Time: 02/02/22  1:55 PM   Result Value Ref Range    Body Fluid Source Amylase Peritoneal Fluid     Amylase, Fluid 6 Not established U/L   Albumin, Peritoneal, Pleural Fluid or KHANH Drainage, In-House Peritoneal Fluid    Collection Time: 02/02/22  1:55 PM   Result Value Ref Range    Body Fluid Source, Albumin Peritoneal Fluid     Body Fluid, Albumin <0.4 See text g/dL   Bilirubin, Peritoneal, Pleural Fluid or KHANH Drainage, In-House Peritoneal Fluid    Collection Time: 02/02/22  1:55 PM   Result Value Ref Range    Body Fluid Source, Bilirubin Peritoneal Fluid     Bilirubin, BF 1.9 Not established mg/dL   Protime-INR    Collection Time: 02/03/22  3:40 AM   Result Value Ref Range    Prothrombin Time 17.2 (H) 9.0 - 12.5 sec    INR 1.6 (H) 0.8 - 1.2   Phosphorus    Collection Time: 02/03/22  3:40 AM   Result Value Ref Range    Phosphorus 2.6 (L) 2.7 - 4.5 mg/dL   Magnesium    Collection Time: 02/03/22  3:40 AM   Result Value Ref Range    Magnesium 1.9 1.6 - 2.6 mg/dL   Comprehensive Metabolic Panel (CMP)    Collection Time: 02/03/22  3:40 AM   Result Value Ref Range    Sodium 129 (L) 136 - 145 mmol/L    Potassium 3.1 (L) 3.5 - 5.1 mmol/L    Chloride 106 95 - 110 mmol/L    CO2 18 (L) 23 - 29 mmol/L    Glucose 111 (H) 70 - 110 mg/dL    BUN 6 (L) 8 - 23 mg/dL    Creatinine 0.7 0.5 - 1.4 mg/dL    Calcium 7.8 (L) 8.7 - 10.5 mg/dL    Total Protein 6.2 6.0 - 8.4 g/dL    Albumin 1.2 (L) 3.5 - 5.2 g/dL    Total Bilirubin 16.5 (H) 0.1 - 1.0 mg/dL    Alkaline Phosphatase 538 (H) 55 - 135 U/L    AST 80 (H) 10 - 40 U/L    ALT 27 10 - 44 U/L    Anion Gap 5 (L) 8 - 16 mmol/L    eGFR if African American >60.0 >60  mL/min/1.73 m^2    eGFR if non African American >60.0 >60 mL/min/1.73 m^2   CBC with Automated Differential    Collection Time: 02/03/22  3:40 AM   Result Value Ref Range    WBC 7.23 3.90 - 12.70 K/uL    RBC 3.02 (L) 4.60 - 6.20 M/uL    Hemoglobin 9.9 (L) 14.0 - 18.0 g/dL    Hematocrit 26.7 (L) 40.0 - 54.0 %    MCV 88 82 - 98 fL    MCH 32.8 (H) 27.0 - 31.0 pg    MCHC 37.1 (H) 32.0 - 36.0 g/dL    RDW 16.8 (H) 11.5 - 14.5 %    Platelets 152 150 - 450 K/uL    MPV 8.8 (L) 9.2 - 12.9 fL    Immature Granulocytes 0.3 0.0 - 0.5 %    Gran # (ANC) 4.9 1.8 - 7.7 K/uL    Immature Grans (Abs) 0.02 0.00 - 0.04 K/uL    Lymph # 1.3 1.0 - 4.8 K/uL    Mono # 0.9 0.3 - 1.0 K/uL    Eos # 0.1 0.0 - 0.5 K/uL    Baso # 0.02 0.00 - 0.20 K/uL    nRBC 0 0 /100 WBC    Gran % 68.3 38.0 - 73.0 %    Lymph % 18.0 18.0 - 48.0 %    Mono % 12.0 4.0 - 15.0 %    Eosinophil % 1.1 0.0 - 8.0 %    Basophil % 0.3 0.0 - 1.9 %    Differential Method Automated

## 2022-02-03 NOTE — CONSULTS
[facility]  Consult Note    Patient Name: Vic Reyez  MRN: [id number]   Code Status: Full Code  Admission Date: 2/2/2022  Hospital Length of Stay: 1 days  Attending Physician: Winston Zendejas MD  Primary Care Provider: [facility]    Current Legal Status: Uncontested    Patient information was obtained from patient, ER records, primary team and EMR.   Inpatient consult to Psychiatry  Consult performed by: Winston Jimenez MD  Consult ordered by: Mayte Bai MD        Subjective:     Principal Problem:Decompensated hepatic cirrhosis    Chief Complaint:  Alcohol use disorder     HPI:   2/3/2022 12:31 PM  Vic Reyez  1950  [id number]      ADDICTION CONSULT INITIAL EVALUATION     DEPARTMENT:  Psychiatry  SITE: [facility], [address]    DATE OF ADMISSION: 2/2/2022  1:30 AM  LENGTH OF STAY: 1 days    EXAMINING PRACTITIONER: Winston Jimenez    CONSULT REQUESTED BY: Winston Zendejas MD      SUBJECTIVE     CHIEF COMPLAINT  Vic Reyez is a 71 y.o. male who is seen today for an initial psychiatric evaluation by the addiction psychiatry consult service.  Vic Reyez presents with the chief complaint of: alcohol abuse with cirrhosis decompensation      HISTORY OF PRESENT ILLNESS    Per Primary MD:  Mr. Reyez is a 71-year-old-man with HTN, constipation, and EtOH abuse, who presented to OSH with 1 week of worsening jaundice, abdominal distension and fatigue, found to have new decompensated cirrhosis and bilary obstruction. Transferred for AES and Hepatology evaluation. Patient reports no abdominal pain. \[facility]\"" neighbor encouraged him to be evaluated for new yellowing. Noted his urine has been brown for ~ 1 week. No BM in 3 days. No fever or chills. Some nausea with meals. No emesis or hematemesis. No melena or hematochezia. No confusion, feels like himself. Lives alone. Drinks 1-2 beers/day. Previously drank 6 beers/day    Per Addiction Psych MD:  Pt seen in  "room, is welcoming and receptive to examination.  States he has "no doubt" his drinking contributed to liver problems/cirrhosis.  Started drinking mildly in highschool, but began drinking heavily in the [employer], followed by heavy drinking in college and then continued drinking roughly a 6-pack of beer a day until this past New Year's Day, 2022.  States in the fall he started "getting a feeling" he should cut back, and reports he had one beer on New Year's Day and none since.  States his is finished with alcohol, however, does not feel he needs the help of recovery programs such as AA, rehab or medications.  Endorses attending AA for roughly 2 years approximately 10 to 15 year ago, and while he found the camaraderie of the groups enjoyable did not find it helpful with alcohol use cessation.  Though he is not interested in help with cessation today, he is agreeable to accepting a resources list of alcohol use treatment options (provided to patient) and is also agreeable to having another conversation with addictions psychiatry tomorrow.     Patient denies any other history of substance use (no illicit or recreational substances other than alcohol).  Also reports he only drinks beer, no hard liqour.  States he is the only drinker in his family, noting no one else has substance use issues.  Denies personal or familial history of psychiatric disorders, symptoms or treatment.   Denies SI/HI at present or in the past.      COLLATERAL  None    SUBSTANCE ABUSE HISTORY  Substance(s) of Choice: alcohol  Substances Used: alcohol  History of IVDU?: no  Use of Alcohol: 6-beers daily most of adult life, reporting abstinence since 1/1/22  Average Consumption: as above  Last Drink: 1/1/22  Use of Medications for Alcohol/Opioid Use Disorder: no  History of Complicated Withdrawal: denies  History of Detox: no  Rehab History: no  AA/NA involvement: ~2 years in AA 10 to 15 years ago, not currently involved  Tobacco: no  Spouse/Partner " Consumption: not   Patient Aware of Biomedical Complications: yes, aware it has contributed to his cirrhosis    DSM-5 SUBSTANCE USE DISORDER CRITERIA   Mild (1-3), Moderate (4-5), Severe (?6)  1. Often take in larger amounts or over a longer period of time than was intended.  2. Persistent desire or unsuccessful efforts to cut down or control use.  3. Great deal of time spent in activities necessary to obtain substance, use, or recover from effects.  4. Craving/strong desire for substance or urge to use.  5. Use resulting in failure to fulfill major role obligations at home, work or school.  6. Social, occupational, recreational activities decreased because of use.  7. Continued use despite having persistent or recurrent social or interpersonal problems cause or exaserbated by the substance.  8. Recurrent use in situations in which it is physically hazardous.  9. Use despite physical or psychological problems that are likely to have been caused or exacerbated by the substance.  10. Tolerance, as defined by either of the following.   A. A need for markedly increased amounts of substance to achieve intoxication or desired effect. -OR-    B. A markedly diminished effect with continued use of the same amount of substance.  11. Withdrawal, as manifested by the following.   A. The characteristic withdrawal syndrome for substance. -AND-   B. Substance is taken to relieve or avoid withdrawal symptoms.    ARE THE CRITERIA MET FOR DSM-5 SUBSTANCE USE DISORDER: Alcohol Use Disorder, Severe      PSYCHIATRIC HISTORY  Reported Diagnose(s): Denies  Previous Medication Trials: Denies  Previous Psychiatric Hospitalizations: Denies  Outpatient Psychiatrist?: No      SUICIDE/HOMICIDE RISK ASSESSMENT  Current/active suicidal ideation/plan/intent: Denies  Previous suicide attempts: Denies  Current/active homicidal ideation/plan/intent: Denies      HISTORY OF TRAUMA, ABUSE & VIOLENCE  Trauma: Denies  Physical Abuse: no  Sexual  Abuse: no  Violent Conduct: no    Access to Gun: yes       FAMILY PSYCHIATRIC HISTORY   Denies       SOCIAL HISTORY  Lives alone, , 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      PAST MEDICAL HISTORY   HTN, Cirrhosis, biliary obstruction      PSYCHOSOCIAL FACTORS  Stressors (Psychosocial and Environmental): financial and health.       PSYCHIATRIC ROS  Depression: denies major symptoms or episodes  Anxiety: denies major symptoms or episodes  Sharron: denies symptoms consistent with this   Psychosis: denies symptoms consistent with this    MEDICAL ROS    Complete review of systems performed covering Constitutional, Eyes, ENT/Mouth, Cardiovascular, Respiratory, Gastrointestinal, Genitourinary, Musculoskeletal, Skin, Neurologic, Endocrine, Heme/Lymph, and Allergy/Immune.     Complete review of systems was negative with the exception of the following positive symptoms: jaundice, otherwise feels well      ALLERGIES  Patient has no known allergies.      MEDICATIONS    Psychotropics:  None    Infusions:       Scheduled:   folic acid  1 mg Oral Daily    multivitamin  1 tablet Oral Daily    mupirocin   Nasal BID    phytonadione ((AQUA-MEPHYTON) IVPB  10 mg Intravenous Once    potassium bicarbonate  25 mEq Oral Q4H    spironolactone  50 mg Oral Daily    thiamine  100 mg Oral Daily       PRN:  dextrose 50%, dextrose 50%, glucagon (human recombinant), glucose, glucose, lorazepam, melatonin, naloxone, ondansetron, sodium chloride 0.9%    Home Medications:  Prior to Admission medications    Medication Sig Start Date End Date Taking? Authorizing Provider   aspirin (ECOTRIN) 81 MG EC tablet Take 81 mg by mouth once daily.   Yes Historical Provider   calcium-vitamin D (OSCAL) 250 (625)-125 mg-unit per tablet Take 1 tablet by mouth once daily.    Historical Provider   docusate sodium (COLACE) 50 MG capsule Take by mouth 2 (two) times daily.    Historical Provider   ferrous fumarate (HEMOCYTE) 324 mg (106 mg iron) Tab Take  "by mouth.    Historical Provider   folic acid (FOLVITE) 1 MG tablet Take 1 tablet (1 mg total) by mouth once daily. 2/4/22 2/4/23  Mayte Bai MD   hydrocodone-acetaminophen 7.5-325mg (NORCO) 7.5-325 mg per tablet Take 1 tablet by mouth every 6 (six) hours as needed.    Historical Provider   multivitamin (THERAGRAN) per tablet Take 1 tablet by mouth once daily. 2/4/22 1/30/23  Mayte Bai MD   pravastatin (PRAVACHOL) 40 MG tablet Take 40 mg by mouth once daily.    Historical Provider   spironolactone (ALDACTONE) 50 MG tablet Take 1 tablet (50 mg total) by mouth once daily. 2/3/22 2/3/23  Mayte Bai MD   thiamine 100 MG tablet Take 1 tablet (100 mg total) by mouth once daily. 2/4/22 1/30/23  Mayte Bai MD   metoprolol tartrate (LOPRESSOR) 25 MG tablet Take 25 mg by mouth 2 (two) times daily.  2/3/22  Historical Provider   naproxen (NAPROSYN) 500 MG tablet Take 500 mg by mouth 2 (two) times daily.  2/3/22  Historical Provider   naproxen (NAPROSYN) 500 MG tablet Take 1 tablet (500 mg total) by mouth 2 (two) times daily as needed (pain). 2/3/22 2/3/22  Mayte Bai MD         OBJECTIVE:     EXAMINATION    Vitals:    02/03/22 0432 02/03/22 0728 02/03/22 0955 02/03/22 1144   BP: (!) 84/49 92/61 (!) 96/57 96/65   BP Location: Right arm Right arm     Patient Position: Lying Sitting  Sitting   Pulse: 86 85 84 93   Resp: 16 18  18   Temp: 98.6 °F (37 °C) 98.3 °F (36.8 °C)  97.9 °F (36.6 °C)   TempSrc: Oral Oral  Oral   SpO2: 98% (!) 94%  96%   Weight:       Height:           PAIN   0/10    CONSTITUTIONAL  General Appearance and Manner: Jaundiced, hospital gown, NAD    MUSCULOSKELETAL  Abnormal Involuntary Movements: None  Muscle Strength and Tone:  WNL  Gait and Station: WNL, though not observed ambulating    PSYCHIATRIC   Orientation: person, place, date, situation  Speech: normal rate, rhythm and tone, talkative but WNL  Language: normal and aphasic  Mood: "good"  Affect: full-range, appropriate  Thought " Process: liear  Associations: in tact  Thought Content: no SI/HI, no delusional nor bizarre content displayed  Memory: long & short term in tact  Attention and Concentration: in tact  Fund of Knowledge: adequate for education  Insight: Limited  Judgment: Fair        LABS/IMAGING/STUDIES   Recent Results (from the past 24 hour(s))   WBC & Diff,Body Fluid Peritoneal Fluid    Collection Time: 02/02/22  1:55 PM   Result Value Ref Range    Body Fluid Type Peritoneal Fluid     Fluid Appearance Clear     Fluid Color Yellow     WBC, Body Fluid 75 /cu mm    Segs, Fluid 6 %    Lymphs, Fluid 73 %    Monocytes/Macrophages, Fluid 20 %    Mesothelial Cells, Fluid 1 %   Culture, Body Fluid (Aerobic) w/ GS    Collection Time: 02/02/22  1:55 PM    Specimen: Peritoneal Fluid; Body Fluid   Result Value Ref Range    AEROBIC CULTURE - FLUID No growth     Gram Stain Result Rare WBC's     Gram Stain Result No organisms seen    Amylase, Peritoneal, Pleural Fluid or KHANH Drainage, In-House Peritoneal Fluid    Collection Time: 02/02/22  1:55 PM   Result Value Ref Range    Body Fluid Source Amylase Peritoneal Fluid     Amylase, Fluid 6 Not established U/L   Albumin, Peritoneal, Pleural Fluid or KHANH Drainage, In-House Peritoneal Fluid    Collection Time: 02/02/22  1:55 PM   Result Value Ref Range    Body Fluid Source, Albumin Peritoneal Fluid     Body Fluid, Albumin <0.4 See text g/dL   Bilirubin, Peritoneal, Pleural Fluid or KHANH Drainage, In-House Peritoneal Fluid    Collection Time: 02/02/22  1:55 PM   Result Value Ref Range    Body Fluid Source, Bilirubin Peritoneal Fluid     Bilirubin, BF 1.9 Not established mg/dL   Protime-INR    Collection Time: 02/03/22  3:40 AM   Result Value Ref Range    Prothrombin Time 17.2 (H) 9.0 - 12.5 sec    INR 1.6 (H) 0.8 - 1.2   Phosphorus    Collection Time: 02/03/22  3:40 AM   Result Value Ref Range    Phosphorus 2.6 (L) 2.7 - 4.5 mg/dL   Magnesium    Collection Time: 02/03/22  3:40 AM   Result Value Ref Range     Magnesium 1.9 1.6 - 2.6 mg/dL   Comprehensive Metabolic Panel (CMP)    Collection Time: 02/03/22  3:40 AM   Result Value Ref Range    Sodium 129 (L) 136 - 145 mmol/L    Potassium 3.1 (L) 3.5 - 5.1 mmol/L    Chloride 106 95 - 110 mmol/L    CO2 18 (L) 23 - 29 mmol/L    Glucose 111 (H) 70 - 110 mg/dL    BUN 6 (L) 8 - 23 mg/dL    Creatinine 0.7 0.5 - 1.4 mg/dL    Calcium 7.8 (L) 8.7 - 10.5 mg/dL    Total Protein 6.2 6.0 - 8.4 g/dL    Albumin 1.2 (L) 3.5 - 5.2 g/dL    Total Bilirubin 16.5 (H) 0.1 - 1.0 mg/dL    Alkaline Phosphatase 538 (H) 55 - 135 U/L    AST 80 (H) 10 - 40 U/L    ALT 27 10 - 44 U/L    Anion Gap 5 (L) 8 - 16 mmol/L    eGFR if African American >60.0 >60 mL/min/1.73 m^2    eGFR if non African American >60.0 >60 mL/min/1.73 m^2   CBC with Automated Differential    Collection Time: 02/03/22  3:40 AM   Result Value Ref Range    WBC 7.23 3.90 - 12.70 K/uL    RBC 3.02 (L) 4.60 - 6.20 M/uL    Hemoglobin 9.9 (L) 14.0 - 18.0 g/dL    Hematocrit 26.7 (L) 40.0 - 54.0 %    MCV 88 82 - 98 fL    MCH 32.8 (H) 27.0 - 31.0 pg    MCHC 37.1 (H) 32.0 - 36.0 g/dL    RDW 16.8 (H) 11.5 - 14.5 %    Platelets 152 150 - 450 K/uL    MPV 8.8 (L) 9.2 - 12.9 fL    Immature Granulocytes 0.3 0.0 - 0.5 %    Gran # (ANC) 4.9 1.8 - 7.7 K/uL    Immature Grans (Abs) 0.02 0.00 - 0.04 K/uL    Lymph # 1.3 1.0 - 4.8 K/uL    Mono # 0.9 0.3 - 1.0 K/uL    Eos # 0.1 0.0 - 0.5 K/uL    Baso # 0.02 0.00 - 0.20 K/uL    nRBC 0 0 /100 WBC    Gran % 68.3 38.0 - 73.0 %    Lymph % 18.0 18.0 - 48.0 %    Mono % 12.0 4.0 - 15.0 %    Eosinophil % 1.1 0.0 - 8.0 %    Basophil % 0.3 0.0 - 1.9 %    Differential Method Automated                Hospital Course: No notes on file    No new subjective & objective note has been filed under this hospital service since the last note was generated.    Assessment/Plan:     Alcohol use disorder, severe, dependence    ASSESSMENT:     DIAGNOSES & PROBLEMS    Alcohol Use Disorder, Severe      STRENGTHS AND LIABILITIES    Strength: Patient has reasonable judgment., Strength: Patient is stable., Liability: Patient has poor health., Liability: pt appears to have very poor insight into degree of alcohol dependence and degree to which treatment will likely be necessary for obtaining long-term sobriety      MOTIVATION TO PURSUE RECOVERY: moderate    ACCEPTANCE OF ADDICTION: limited    ABILITY TO ADHERE TO TREATMENT PLAN: low      PLAN:       MANAGEMENT PLAN, TREATMENT GOALS, THERAPEUTIC TECHNIQUES/APPROACHES & CLINICAL REASONING     Though pt is not showing signs of active withdrawal and states last use was over a month ago, chart is inconsistent on last use, and it is reasonable to keep CIWA protocols & Lorazepam PRN in place at this time   No scheduled meds at this time   Addictions Psychiatry to see patient again tomorrow to re-assess willingness to pursue formal treatment options   Resources list provided today for patient to review      · Patient counseled on abstinence from alcohol and substances of abuse (illicit and prescription).  · Additional workup planned to address substance use disorder, in order to guide and refine ongoing management options, includes serial alcohol and drug laboratory testing (e.g. PETH, urine toxicology).  · Relapse prevention and motivational interviewing provided.  · Education provided on 12 step recovery programs.      PRESCRIPTION DRUG MANAGEMENT  - The risks and benefits of medication were discussed with this patient.  - Possible expectable adverse effects of any current or proposed individual psychotropic agents were discussed with this patient.  - Counseling was provided on the importance of full compliance with medication regimens.      In cases of emergency, daily coverage provided by Acute/ER Psych MD, NP, or SW, with contact numbers located in [facility] On Call Schedule    Case discussed with staff addiction psychiatrist: BLEU GUTIÉRREZ MD     As above, will continue to  follow.    Juventino Jimenez MD  [employer]  PGY-4

## 2022-02-03 NOTE — PROGRESS NOTES
[facility] Medicine  Progress Note    Patient Name: Vic Reyez  MRN: [id number]  Patient Class: IP- Inpatient   Admission Date: 2/2/2022  Length of Stay: 1 days  Attending Physician: Winston Zendejas MD  Primary Care Provider: [facility]        Subjective:     Principal Problem:Decompensated hepatic cirrhosis        HPI:  Mr. Reyez is a 71-year-old-man with HTN, constipation, and EtOH abuse, who presented to OSH with 1 week of worsening jaundice, abdominal distension and fatigue, found to have new decompensated cirrhosis and bilary obstruction. Transferred for AES and Hepatology evaluation. Patient reports no abdominal pain. States neighbor encouraged him to be evaluated for new yellowing. Noted his urine has been brown for ~ 1 week. No BM in 3 days. No fever or chills. Some nausea with meals. No emesis or hematemesis. No melena or hematochezia. No confusion, feels like himself. Lives alone. Drinks 1-2 beers/day. Previously drank 6 beers/day.     At OSH ED, afebrile, VS normal. Exam with ascites and normal mentation. INR 1.6, Na 128, K 2.8, , Alb 1.5, TB 22, , ALT 34, Lipase 3. WBC 8, Hg 11.7, .  Ammonia, 69. CT abdomen with bilary obstruction, dilated CBD 14mm with wall thickening, mild intra and extrahepatic biliary duct dilation, liver with chages consistent with cirrhosissigns of biliary obstruction, dilated CBD proximally to 14mm with wall thickening, mild IH/EH biliary duct dilatation, changes of cirrhosis, and portal hypertension. Sludge vs mass in CBD.     Case discussed with Dr. Mccollum in [facility] prior to transfer. Was treated with empiric Rocephin 2g iv x 1, KCL 40meq po x 1 and 10 meq IV x1, and iv fluids in the ED.  Blood cultures are drawn.     MELD-Na score: 28 at 1/31/2022  6:57 PM  MELD score: 23 at 1/31/2022  6:57 PM  Calculated from:  Serum Creatinine: 0.7 mg/dL (Using min of 1 mg/dL) at 1/31/2022  6:57 PM  Serum Sodium: 128 mmol/L at  "1/31/2022  6:57 PM  Total Bilirubin: 21.8 mg/dL at 1/31/2022  6:57 PM  INR(ratio): 1.6 at 1/31/2022  6:57 PM  Age: 71 years    Upon arrival to [facility], patient is alert and oriented, in no acute distress. Afebrile, BP 99/57, HR 79, stating well on RA without tachypnea. Repeat labs pending. AES and Hepatology consulted. Emperic IV ceftriaxone continued. Started lactulose, rifaximin, PO furosemide and spironolactone. NPO.       Overview/Hospital Course:  Lasix discontinued for hyponatremia, spironolactone continued. Addiction Psychiatry consulted regarding alcohol use. MRI abdomen w/wo contrast completed, read pending.    Anticipate discharge with Hepatology follow up.      Interval History: No acute events overnight. This morning patient states he wishes to have Cummings catheter removed so he can be more mobile, no other complaints. Last BM yesterday 2/3 which was "normal" per patient.     Review of Systems   Constitutional: Negative for chills and fever.   HENT: Negative for rhinorrhea and sneezing.    Eyes: Negative for pain and redness.   Respiratory: Negative for cough and shortness of breath.    Gastrointestinal: Positive for abdominal distention and nausea. Negative for abdominal pain and vomiting.   Genitourinary: Negative for difficulty urinating and dysuria.   Musculoskeletal: Negative for back pain and neck pain.   Skin: Positive for color change (jaundice). Negative for rash.   Neurological: Negative for tremors and speech difficulty.     Objective:     Vital Signs (Most Recent):  Temp: 97.9 °F (36.6 °C) (02/03/22 1144)  Pulse: 93 (02/03/22 1144)  Resp: 18 (02/03/22 1144)  BP: 96/65 (02/03/22 1144)  SpO2: 96 % (02/03/22 1144) Vital Signs (24h Range):  Temp:  [97.7 °F (36.5 °C)-98.6 °F (37 °C)] 97.9 °F (36.6 °C)  Pulse:  [77-93] 93  Resp:  [16-18] 18  SpO2:  [94 %-98 %] 96 %  BP: ()/(49-65) 96/65     Weight: 78.8 kg (173 lb 11.6 oz)  Body mass index is 24.23 kg/m².    Intake/Output Summary (Last 24 hours) " at 2/3/2022 1228  Last data filed at 2/3/2022 1100  Gross per 24 hour   Intake 240 ml   Output 1200 ml   Net -960 ml      Physical Exam  Vitals and nursing note reviewed.   Constitutional:       General: He is not in acute distress.     Appearance: He is ill-appearing. He is not toxic-appearing or diaphoretic.   HENT:      Head: Normocephalic and atraumatic.      Nose: No congestion or rhinorrhea.      Mouth/Throat:      Mouth: Mucous membranes are moist.      Pharynx: Oropharynx is clear.   Eyes:      General: Scleral icterus present.      Extraocular Movements: Extraocular movements intact.   Cardiovascular:      Rate and Rhythm: Normal rate and regular rhythm.      Pulses: Normal pulses.      Heart sounds: Normal heart sounds. No murmur heard.      Pulmonary:      Effort: Pulmonary effort is normal. No respiratory distress.      Breath sounds: Normal breath sounds.   Abdominal:      General: Bowel sounds are normal. There is distension.      Tenderness: There is no abdominal tenderness. There is no right CVA tenderness, left CVA tenderness, guarding or rebound.      Comments: ascites with fluid wave    Musculoskeletal:         General: Normal range of motion.      Cervical back: Normal range of motion and neck supple.      Right lower leg: Edema present.      Left lower leg: Edema present.   Skin:     General: Skin is warm and dry.      Capillary Refill: Capillary refill takes less than 2 seconds.      Coloration: Skin is jaundiced.   Neurological:      General: No focal deficit present.      Mental Status: He is alert and oriented to person, place, and time.      Sensory: No sensory deficit.      Motor: No weakness.      Comments: No asterixis    Psychiatric:         Mood and Affect: Mood normal.         Thought Content: Thought content normal.         Significant Labs: All pertinent labs within the past 24 hours have been reviewed.    Significant Imaging: I have reviewed all pertinent imaging results/findings  within the past 24 hours.      Assessment/Plan:      * Decompensated hepatic cirrhosis  1 week of worsening jaundice, abdominal distension and fatigue, found to have new decompensated cirrhosis and bilary obstruction. Transferred for AES and Hepatology evaluation. No asterixis or HE. Initially concern for obstruction on CT a/p but no dilation on Liver US--no plan for scope. LFTs downtrending. Lasix stopped for hyponatremia.     MELD-Na score: 27 at 2/3/2022  3:40 AM  MELD score: 22 at 2/3/2022  3:40 AM  Calculated from:  Serum Creatinine: 0.7 mg/dL (Using min of 1 mg/dL) at 2/3/2022  3:40 AM  Serum Sodium: 129 mmol/L at 2/3/2022  3:40 AM  Total Bilirubin: 16.5 mg/dL at 2/3/2022  3:40 AM  INR(ratio): 1.6 at 2/3/2022  3:40 AM  Age: 71 years     IgG elevated. Alpha 1 antitrypsin negative.    Plan:  - spironolactone 50 mg QD  - Hepatology consulted, appreciate recommendations  - f/u diagnostic para labs  - f/u MRI abdomen w/wo contrast  - f/u ODILIA, anti-mitochondrial Ab, anti-smooth muscle Ab, hemochromatosis DNA analysis    Biliary obstruction  Mr. Reyez is a 71-year-old-man with HTN, constipation, and EtOH abuse, who presented to OSH with 1 week of worsening jaundice, abdominal distension and fatigue, found to have new decompensated cirrhosis and bilary obstruction. Transferred for AES and Hepatology evaluation.     At OSH ED, afebrile, VS normal. Exam with ascites and normal mentation. INR 1.6, Na 128, K 2.8, , Alb 1.5, TB 22, , ALT 34, Lipase 3. WBC 8, Hg 11.7, .  Ammonia, 69. CT abdomen with bilary obstruction, dilated CBD 14mm with wall thickening, mild intra and extrahepatic biliary duct dilation, liver with changes consistent with cirrhosis signs of biliary obstruction, dilated CBD proximally to 14mm with wall thickening, mild IH/EH biliary duct dilatation, changes of cirrhosis, and portal hypertension. Sludge vs mass in CBD. However, no dilation seen on Liver US. Evaluated by AES, no plan  for scope.    Case discussed with Dr. Mccollum in [facility] prior to transfer. Was treated with empiric Rocephin 2g iv x 1, KCL 40meq po x 1 and 10 meq IV x1, and iv fluids in the ED.  Blood cultures are drawn. No fevers or abdominal pain.       - AES consulted, appreciate recs  - f/u blood cx   - see decompensated cirrhosis management    Hyponatremia  Mild, asymptomatic. Thought to be due to cirrhosis. Mild LE edema, otherwise no JVD or hypoxia. Urine studies collected after first dose of Lasix consistent with SIADH vs diuretic use.    - monitor on labs  - Lasix stopped 2/3    Hypokalemia  K 2.8,  replaced prior to arrival.     - replete PRN to maintain K >4  - monitor on labs  - started spironolactone 50 mg QD     Primary hypertension  BP 99/57 on admission. On metoprolol 25 mg QD prior to admission.     - hold antihypertensive home meds for low BP    Alcohol use disorder, severe, dependence  Hx of alcohol abuse, 6 drinks daily after work with co-workers. Now drinks 1-2 beers/night.     - Addiction Psychiatry consulted, appreciate recommendations   - encouraged cessation  - CIWA q4 hr with PRN ativan  - folate, thiamine, MVI    VTE Risk Mitigation (From admission, onward)         Ordered     IP VTE LOW RISK PATIENT  Once         02/02/22 0155     Place sequential compression device  Until discontinued         02/02/22 0155                Discharge Planning   NAFISA: 2/5/2022     Code Status: Full Code   Is the patient medically ready for discharge?: No    Reason for patient still in hospital (select all that apply): Patient trending condition, Laboratory test and Consult recommendations  Discharge Plan A: Home          Mayte Bai MD  Department of Hospital Medicine   [facility]

## 2022-02-03 NOTE — ASSESSMENT & PLAN NOTE
K 2.8,  replaced prior to arrival.     - replete PRN to maintain K >4  - monitor on labs  - started spironolactone 50 mg QD

## 2022-02-04 ENCOUNTER — ANESTHESIA (OUTPATIENT)
Dept: ENDOSCOPY | Facility: HOSPITAL | Age: 72
DRG: 423 | End: 2022-02-04
Payer: MEDICARE

## 2022-02-04 PROBLEM — K86.9 PANCREATIC LESION: Status: ACTIVE | Noted: 2022-02-04

## 2022-02-04 LAB
ALBUMIN SERPL BCP-MCNC: 1.1 G/DL (ref 3.5–5.2)
ALP SERPL-CCNC: 505 U/L (ref 55–135)
ALT SERPL W/O P-5'-P-CCNC: 28 U/L (ref 10–44)
ANA PATTERN 1: NORMAL
ANA SER QL IF: POSITIVE
ANA TITR SER IF: NORMAL {TITER}
ANION GAP SERPL CALC-SCNC: 5 MMOL/L (ref 8–16)
AST SERPL-CCNC: 90 U/L (ref 10–40)
BASOPHILS # BLD AUTO: 0.03 K/UL (ref 0–0.2)
BASOPHILS NFR BLD: 0.4 % (ref 0–1.9)
BILIRUB SERPL-MCNC: 17.5 MG/DL (ref 0.1–1)
BUN SERPL-MCNC: 7 MG/DL (ref 8–23)
CALCIUM SERPL-MCNC: 7.9 MG/DL (ref 8.7–10.5)
CANCER AG19-9 SERPL-ACNC: 294.9 U/ML (ref 0–40)
CEA SERPL-MCNC: 3.7 NG/ML (ref 0–5)
CHLORIDE SERPL-SCNC: 103 MMOL/L (ref 95–110)
CO2 SERPL-SCNC: 20 MMOL/L (ref 23–29)
CREAT SERPL-MCNC: 0.6 MG/DL (ref 0.5–1.4)
DIFFERENTIAL METHOD: ABNORMAL
EOSINOPHIL # BLD AUTO: 0.1 K/UL (ref 0–0.5)
EOSINOPHIL NFR BLD: 1.5 % (ref 0–8)
ERYTHROCYTE [DISTWIDTH] IN BLOOD BY AUTOMATED COUNT: 17.1 % (ref 11.5–14.5)
EST. GFR  (AFRICAN AMERICAN): >60 ML/MIN/1.73 M^2
EST. GFR  (NON AFRICAN AMERICAN): >60 ML/MIN/1.73 M^2
GLUCOSE SERPL-MCNC: 97 MG/DL (ref 70–110)
HCT VFR BLD AUTO: 25.7 % (ref 40–54)
HGB BLD-MCNC: 9.6 G/DL (ref 14–18)
IMM GRANULOCYTES # BLD AUTO: 0.04 K/UL (ref 0–0.04)
IMM GRANULOCYTES NFR BLD AUTO: 0.6 % (ref 0–0.5)
INR PPP: 1.3 (ref 0.8–1.2)
LYMPHOCYTES # BLD AUTO: 1.6 K/UL (ref 1–4.8)
LYMPHOCYTES NFR BLD: 22.6 % (ref 18–48)
MAGNESIUM SERPL-MCNC: 1.8 MG/DL (ref 1.6–2.6)
MCH RBC QN AUTO: 33.7 PG (ref 27–31)
MCHC RBC AUTO-ENTMCNC: 37.4 G/DL (ref 32–36)
MCV RBC AUTO: 90 FL (ref 82–98)
MITOCHONDRIA AB TITR SER IF: NORMAL {TITER}
MONOCYTES # BLD AUTO: 0.9 K/UL (ref 0.3–1)
MONOCYTES NFR BLD: 12.9 % (ref 4–15)
NEUTROPHILS # BLD AUTO: 4.4 K/UL (ref 1.8–7.7)
NEUTROPHILS NFR BLD: 62 % (ref 38–73)
NRBC BLD-RTO: 0 /100 WBC
PHOSPHATE SERPL-MCNC: 2.6 MG/DL (ref 2.7–4.5)
PLATELET # BLD AUTO: 142 K/UL (ref 150–450)
PMV BLD AUTO: 9.5 FL (ref 9.2–12.9)
POTASSIUM SERPL-SCNC: 3.5 MMOL/L (ref 3.5–5.1)
PROT SERPL-MCNC: 6.1 G/DL (ref 6–8.4)
PROTHROMBIN TIME: 12.8 SEC (ref 9–12.5)
RBC # BLD AUTO: 2.85 M/UL (ref 4.6–6.2)
SODIUM SERPL-SCNC: 128 MMOL/L (ref 136–145)
WBC # BLD AUTO: 7.11 K/UL (ref 3.9–12.7)

## 2022-02-04 PROCEDURE — 88173 CYTOPATH EVAL FNA REPORT: CPT | Mod: 26,,, | Performed by: STUDENT IN AN ORGANIZED HEALTH CARE EDUCATION/TRAINING PROGRAM

## 2022-02-04 PROCEDURE — 85610 PROTHROMBIN TIME: CPT | Performed by: STUDENT IN AN ORGANIZED HEALTH CARE EDUCATION/TRAINING PROGRAM

## 2022-02-04 PROCEDURE — 63600175 PHARM REV CODE 636 W HCPCS: Performed by: NURSE ANESTHETIST, CERTIFIED REGISTERED

## 2022-02-04 PROCEDURE — 99232 PR SUBSEQUENT HOSPITAL CARE,LEVL II: ICD-10-PCS | Mod: ,,, | Performed by: PSYCHIATRY & NEUROLOGY

## 2022-02-04 PROCEDURE — 43242 EGD US FINE NEEDLE BX/ASPIR: CPT | Performed by: INTERNAL MEDICINE

## 2022-02-04 PROCEDURE — 88173 CYTOPATH EVAL FNA REPORT: CPT | Mod: 59 | Performed by: STUDENT IN AN ORGANIZED HEALTH CARE EDUCATION/TRAINING PROGRAM

## 2022-02-04 PROCEDURE — 88341 IMHCHEM/IMCYTCHM EA ADD ANTB: CPT | Performed by: STUDENT IN AN ORGANIZED HEALTH CARE EDUCATION/TRAINING PROGRAM

## 2022-02-04 PROCEDURE — 82378 CARCINOEMBRYONIC ANTIGEN: CPT | Performed by: STUDENT IN AN ORGANIZED HEALTH CARE EDUCATION/TRAINING PROGRAM

## 2022-02-04 PROCEDURE — 99233 SBSQ HOSP IP/OBS HIGH 50: CPT | Mod: GC,,, | Performed by: HOSPITALIST

## 2022-02-04 PROCEDURE — 37000009 HC ANESTHESIA EA ADD 15 MINS: Performed by: INTERNAL MEDICINE

## 2022-02-04 PROCEDURE — 84100 ASSAY OF PHOSPHORUS: CPT | Performed by: STUDENT IN AN ORGANIZED HEALTH CARE EDUCATION/TRAINING PROGRAM

## 2022-02-04 PROCEDURE — 88112 CYTOPATH CELL ENHANCE TECH: CPT | Performed by: STUDENT IN AN ORGANIZED HEALTH CARE EDUCATION/TRAINING PROGRAM

## 2022-02-04 PROCEDURE — 43274 ERCP DUCT STENT PLACEMENT: CPT | Mod: ,,, | Performed by: INTERNAL MEDICINE

## 2022-02-04 PROCEDURE — 97535 SELF CARE MNGMENT TRAINING: CPT

## 2022-02-04 PROCEDURE — 80053 COMPREHEN METABOLIC PANEL: CPT | Performed by: STUDENT IN AN ORGANIZED HEALTH CARE EDUCATION/TRAINING PROGRAM

## 2022-02-04 PROCEDURE — 74328 X-RAY BILE DUCT ENDOSCOPY: CPT | Performed by: INTERNAL MEDICINE

## 2022-02-04 PROCEDURE — 88342 CHG IMMUNOCYTOCHEMISTRY: ICD-10-PCS | Mod: 26,,, | Performed by: STUDENT IN AN ORGANIZED HEALTH CARE EDUCATION/TRAINING PROGRAM

## 2022-02-04 PROCEDURE — 88341 IMHCHEM/IMCYTCHM EA ADD ANTB: CPT | Mod: 26,,, | Performed by: STUDENT IN AN ORGANIZED HEALTH CARE EDUCATION/TRAINING PROGRAM

## 2022-02-04 PROCEDURE — 88172 PR  EVALUATION OF FNA SMEAR TO DETERMINE ADEQUACY, FIRST EVAL: ICD-10-PCS | Mod: 26,,, | Performed by: STUDENT IN AN ORGANIZED HEALTH CARE EDUCATION/TRAINING PROGRAM

## 2022-02-04 PROCEDURE — C2617 STENT, NON-COR, TEM W/O DEL: HCPCS | Performed by: INTERNAL MEDICINE

## 2022-02-04 PROCEDURE — 88305 TISSUE EXAM BY PATHOLOGIST: CPT | Mod: 59 | Performed by: STUDENT IN AN ORGANIZED HEALTH CARE EDUCATION/TRAINING PROGRAM

## 2022-02-04 PROCEDURE — 88342 IMHCHEM/IMCYTCHM 1ST ANTB: CPT | Mod: 26,,, | Performed by: STUDENT IN AN ORGANIZED HEALTH CARE EDUCATION/TRAINING PROGRAM

## 2022-02-04 PROCEDURE — 43242 PR UPGI ENDOSCOPY,FN NEEDLE BX,GUIDED: ICD-10-PCS | Mod: 51,,, | Performed by: INTERNAL MEDICINE

## 2022-02-04 PROCEDURE — 88112 CYTOPATH CELL ENHANCE TECH: CPT | Mod: 26,59,, | Performed by: STUDENT IN AN ORGANIZED HEALTH CARE EDUCATION/TRAINING PROGRAM

## 2022-02-04 PROCEDURE — 25000003 PHARM REV CODE 250: Performed by: INTERNAL MEDICINE

## 2022-02-04 PROCEDURE — 88172 CYTP DX EVAL FNA 1ST EA SITE: CPT | Mod: 59 | Performed by: STUDENT IN AN ORGANIZED HEALTH CARE EDUCATION/TRAINING PROGRAM

## 2022-02-04 PROCEDURE — 88341 PR IHC OR ICC EACH ADD'L SINGLE ANTIBODY  STAINPR: ICD-10-PCS | Mod: 26,,, | Performed by: STUDENT IN AN ORGANIZED HEALTH CARE EDUCATION/TRAINING PROGRAM

## 2022-02-04 PROCEDURE — D9220A PRA ANESTHESIA: Mod: CRNA,,, | Performed by: NURSE ANESTHETIST, CERTIFIED REGISTERED

## 2022-02-04 PROCEDURE — 43274 PR ERCP W/STENT PLCMNT BILIARY/PANCREATIC DUCT: ICD-10-PCS | Mod: ,,, | Performed by: INTERNAL MEDICINE

## 2022-02-04 PROCEDURE — 25000003 PHARM REV CODE 250: Performed by: NURSE ANESTHETIST, CERTIFIED REGISTERED

## 2022-02-04 PROCEDURE — 88112 PR  CYTOPATH, CELL ENHANCE TECH: ICD-10-PCS | Mod: 26,59,, | Performed by: STUDENT IN AN ORGANIZED HEALTH CARE EDUCATION/TRAINING PROGRAM

## 2022-02-04 PROCEDURE — D9220A PRA ANESTHESIA: ICD-10-PCS | Mod: ANES,,, | Performed by: ANESTHESIOLOGY

## 2022-02-04 PROCEDURE — 63600175 PHARM REV CODE 636 W HCPCS: Performed by: INTERNAL MEDICINE

## 2022-02-04 PROCEDURE — 99233 PR SUBSEQUENT HOSPITAL CARE,LEVL III: ICD-10-PCS | Mod: GC,,, | Performed by: HOSPITALIST

## 2022-02-04 PROCEDURE — 43274 ERCP DUCT STENT PLACEMENT: CPT | Performed by: INTERNAL MEDICINE

## 2022-02-04 PROCEDURE — 97165 OT EVAL LOW COMPLEX 30 MIN: CPT

## 2022-02-04 PROCEDURE — 83735 ASSAY OF MAGNESIUM: CPT | Performed by: STUDENT IN AN ORGANIZED HEALTH CARE EDUCATION/TRAINING PROGRAM

## 2022-02-04 PROCEDURE — 25000003 PHARM REV CODE 250: Performed by: STUDENT IN AN ORGANIZED HEALTH CARE EDUCATION/TRAINING PROGRAM

## 2022-02-04 PROCEDURE — 27202410 HC FORCEPS, WIRE-GUIDED: Performed by: INTERNAL MEDICINE

## 2022-02-04 PROCEDURE — 43261 ENDO CHOLANGIOPANCREATOGRAPH: CPT | Mod: 59,,, | Performed by: INTERNAL MEDICINE

## 2022-02-04 PROCEDURE — 43261 PR ERCP,BIOPSY: ICD-10-PCS | Mod: 59,,, | Performed by: INTERNAL MEDICINE

## 2022-02-04 PROCEDURE — 94761 N-INVAS EAR/PLS OXIMETRY MLT: CPT

## 2022-02-04 PROCEDURE — 88342 IMHCHEM/IMCYTCHM 1ST ANTB: CPT | Performed by: STUDENT IN AN ORGANIZED HEALTH CARE EDUCATION/TRAINING PROGRAM

## 2022-02-04 PROCEDURE — 27202127 HC STENT INTRODUCER: Performed by: INTERNAL MEDICINE

## 2022-02-04 PROCEDURE — 88173 PR  INTERPRETATION OF FNA SMEAR: ICD-10-PCS | Mod: 26,,, | Performed by: STUDENT IN AN ORGANIZED HEALTH CARE EDUCATION/TRAINING PROGRAM

## 2022-02-04 PROCEDURE — C1769 GUIDE WIRE: HCPCS | Performed by: INTERNAL MEDICINE

## 2022-02-04 PROCEDURE — 74328 X-RAY BILE DUCT ENDOSCOPY: CPT | Mod: 26,,, | Performed by: INTERNAL MEDICINE

## 2022-02-04 PROCEDURE — 20600001 HC STEP DOWN PRIVATE ROOM

## 2022-02-04 PROCEDURE — 88305 TISSUE EXAM BY PATHOLOGIST: ICD-10-PCS | Mod: 26,,, | Performed by: STUDENT IN AN ORGANIZED HEALTH CARE EDUCATION/TRAINING PROGRAM

## 2022-02-04 PROCEDURE — 43242 EGD US FINE NEEDLE BX/ASPIR: CPT | Mod: 51,,, | Performed by: INTERNAL MEDICINE

## 2022-02-04 PROCEDURE — D9220A PRA ANESTHESIA: ICD-10-PCS | Mod: CRNA,,, | Performed by: NURSE ANESTHETIST, CERTIFIED REGISTERED

## 2022-02-04 PROCEDURE — 36415 COLL VENOUS BLD VENIPUNCTURE: CPT | Performed by: STUDENT IN AN ORGANIZED HEALTH CARE EDUCATION/TRAINING PROGRAM

## 2022-02-04 PROCEDURE — 85025 COMPLETE CBC W/AUTO DIFF WBC: CPT | Performed by: STUDENT IN AN ORGANIZED HEALTH CARE EDUCATION/TRAINING PROGRAM

## 2022-02-04 PROCEDURE — D9220A PRA ANESTHESIA: Mod: ANES,,, | Performed by: ANESTHESIOLOGY

## 2022-02-04 PROCEDURE — 27202059 HC NEEDLE, FNA (ANY): Performed by: INTERNAL MEDICINE

## 2022-02-04 PROCEDURE — 27201674 HC SPHINCTERTOME: Performed by: INTERNAL MEDICINE

## 2022-02-04 PROCEDURE — 86301 IMMUNOASSAY TUMOR CA 19-9: CPT | Performed by: STUDENT IN AN ORGANIZED HEALTH CARE EDUCATION/TRAINING PROGRAM

## 2022-02-04 PROCEDURE — 37000008 HC ANESTHESIA 1ST 15 MINUTES: Performed by: INTERNAL MEDICINE

## 2022-02-04 PROCEDURE — 88305 TISSUE EXAM BY PATHOLOGIST: CPT | Mod: 26,,, | Performed by: STUDENT IN AN ORGANIZED HEALTH CARE EDUCATION/TRAINING PROGRAM

## 2022-02-04 PROCEDURE — 43261 ENDO CHOLANGIOPANCREATOGRAPH: CPT | Performed by: INTERNAL MEDICINE

## 2022-02-04 PROCEDURE — 88172 CYTP DX EVAL FNA 1ST EA SITE: CPT | Mod: 26,,, | Performed by: STUDENT IN AN ORGANIZED HEALTH CARE EDUCATION/TRAINING PROGRAM

## 2022-02-04 PROCEDURE — 74328 PR  X-RAY FOR BILE DUCT ENDOSCOPY: ICD-10-PCS | Mod: 26,,, | Performed by: INTERNAL MEDICINE

## 2022-02-04 PROCEDURE — 99232 SBSQ HOSP IP/OBS MODERATE 35: CPT | Mod: ,,, | Performed by: PSYCHIATRY & NEUROLOGY

## 2022-02-04 DEVICE — BILIARY STENT
Type: IMPLANTABLE DEVICE | Site: BILE DUCT | Status: NON-FUNCTIONAL
Brand: ADVANIX™ BILIARY
Removed: 2022-04-19

## 2022-02-04 RX ORDER — PROPOFOL 10 MG/ML
VIAL (ML) INTRAVENOUS
Status: DISCONTINUED | OUTPATIENT
Start: 2022-02-04 | End: 2022-02-04

## 2022-02-04 RX ORDER — SODIUM,POTASSIUM PHOSPHATES 280-250MG
2 POWDER IN PACKET (EA) ORAL ONCE
Status: COMPLETED | OUTPATIENT
Start: 2022-02-04 | End: 2022-02-04

## 2022-02-04 RX ORDER — FENTANYL CITRATE 50 UG/ML
INJECTION, SOLUTION INTRAMUSCULAR; INTRAVENOUS
Status: DISCONTINUED | OUTPATIENT
Start: 2022-02-04 | End: 2022-02-04

## 2022-02-04 RX ORDER — PROPOFOL 10 MG/ML
VIAL (ML) INTRAVENOUS CONTINUOUS PRN
Status: DISCONTINUED | OUTPATIENT
Start: 2022-02-04 | End: 2022-02-04

## 2022-02-04 RX ORDER — SPIRONOLACTONE 25 MG/1
75 TABLET ORAL DAILY
Status: DISCONTINUED | OUTPATIENT
Start: 2022-02-05 | End: 2022-02-05 | Stop reason: HOSPADM

## 2022-02-04 RX ADMIN — PROPOFOL 50 MG: 10 INJECTION, EMULSION INTRAVENOUS at 11:02

## 2022-02-04 RX ADMIN — PHYTONADIONE 10 MG: 10 INJECTION, EMULSION INTRAMUSCULAR; INTRAVENOUS; SUBCUTANEOUS at 02:02

## 2022-02-04 RX ADMIN — MUPIROCIN: 20 OINTMENT TOPICAL at 09:02

## 2022-02-04 RX ADMIN — GLYCOPYRROLATE 0.2 MG: 0.2 INJECTION INTRAMUSCULAR; INTRAVENOUS at 11:02

## 2022-02-04 RX ADMIN — POTASSIUM & SODIUM PHOSPHATES POWDER PACK 280-160-250 MG 2 PACKET: 280-160-250 PACK at 09:02

## 2022-02-04 RX ADMIN — MULTIPLE VITAMINS W/ MINERALS TAB 1 TABLET: TAB at 09:02

## 2022-02-04 RX ADMIN — FOLIC ACID 1 MG: 1 TABLET ORAL at 09:02

## 2022-02-04 RX ADMIN — Medication 100 MCG/KG/MIN: at 11:02

## 2022-02-04 RX ADMIN — Medication 100 MG: at 09:02

## 2022-02-04 RX ADMIN — FENTANYL CITRATE 50 MCG: 50 INJECTION INTRAMUSCULAR; INTRAVENOUS at 11:02

## 2022-02-04 NOTE — SUBJECTIVE & OBJECTIVE
Interval History: No acute events overnight. Continues to void spontaneously without difficulty after Cummings removal. Patient with no complaints this morning, awaiting ERCP.    Review of Systems   Constitutional: Negative for chills and fever.   HENT: Negative for rhinorrhea and sneezing.    Eyes: Negative for pain and redness.   Respiratory: Negative for cough and shortness of breath.    Gastrointestinal: Positive for abdominal distention and nausea. Negative for abdominal pain and vomiting.   Genitourinary: Negative for difficulty urinating and dysuria.   Musculoskeletal: Negative for back pain and neck pain.   Skin: Positive for color change (jaundice). Negative for rash.   Neurological: Negative for tremors and speech difficulty.     Objective:     Vital Signs (Most Recent):  Temp: 97.9 °F (36.6 °C) (02/04/22 1312)  Pulse: 102 (02/04/22 1330)  Resp: 16 (02/04/22 1330)  BP: (!) 94/52 (02/04/22 1330)  SpO2: 100 % (02/04/22 1330) Vital Signs (24h Range):  Temp:  [97.9 °F (36.6 °C)-98.3 °F (36.8 °C)] 97.9 °F (36.6 °C)  Pulse:  [] 102  Resp:  [16-18] 16  SpO2:  [93 %-100 %] 100 %  BP: ()/(51-67) 94/52     Weight: 78.8 kg (173 lb 11.6 oz)  Body mass index is 24.23 kg/m².    Intake/Output Summary (Last 24 hours) at 2/4/2022 1402  Last data filed at 2/4/2022 0600  Gross per 24 hour   Intake 240 ml   Output 700 ml   Net -460 ml      Physical Exam  Vitals and nursing note reviewed.   Constitutional:       General: He is not in acute distress.     Appearance: He is ill-appearing. He is not toxic-appearing or diaphoretic.   HENT:      Head: Normocephalic and atraumatic.      Nose: No congestion or rhinorrhea.      Mouth/Throat:      Mouth: Mucous membranes are moist.      Pharynx: Oropharynx is clear.   Eyes:      General: Scleral icterus present.      Extraocular Movements: Extraocular movements intact.   Cardiovascular:      Rate and Rhythm: Normal rate and regular rhythm.      Pulses: Normal pulses.      Heart  sounds: Normal heart sounds. No murmur heard.      Pulmonary:      Effort: Pulmonary effort is normal. No respiratory distress.      Breath sounds: Normal breath sounds.   Abdominal:      General: Bowel sounds are normal. There is distension.      Palpations: Abdomen is soft.      Tenderness: There is no abdominal tenderness. There is no right CVA tenderness, left CVA tenderness, guarding or rebound.      Comments: ascites with fluid wave    Musculoskeletal:         General: Normal range of motion.      Cervical back: Normal range of motion and neck supple.      Right lower leg: Edema present.      Left lower leg: Edema present.   Skin:     General: Skin is warm and dry.      Capillary Refill: Capillary refill takes less than 2 seconds.      Coloration: Skin is jaundiced.   Neurological:      General: No focal deficit present.      Mental Status: He is alert and oriented to person, place, and time.      Sensory: No sensory deficit.      Motor: No weakness.      Comments: No asterixis    Psychiatric:         Mood and Affect: Mood normal.         Thought Content: Thought content normal.         Significant Labs: All pertinent labs within the past 24 hours have been reviewed.    Significant Imaging: I have reviewed all pertinent imaging results/findings within the past 24 hours.

## 2022-02-04 NOTE — MEDICAL/APP STUDENT
Progress Note  [facility] Medicine    Primary Team: [facility] MED 1  Admit Date: 2/2/2022   Length of Stay:  LOS: 2 days   SUBJECTIVE:   Reason for Admission:  Decompensated hepatic cirrhosis    History of Present Illness: Patient is a 71 y.o. male that transferred to [facility] for evaluation of new onset jaundice, abdominal distension, and fatigue. PMHx is significant for hypertension and alcohol abuse.    Hospital course:  Pt's suspected biliary obs and decompensated cirrhosis was initially managed with empiric IV ceft and lactulose TID which were D/C'd 2/2. Ascites is currently treated with spironolactone. Pts BP on admission was low and home metoprolol was held. Alcohol abuse is monitored with CIWA q4 and cessation encouraged.    Interval history: Seen by psych substance abuse. Currently not interested in utilizing resources for cessation. Made NPO for ERCP today due to MRCP findings for poss PSC & pancreatic lesions. Seen by anesthesiology and fit for procedure. NAEON. Slept comfortably overnight. No complaints.     Old chart was reviewed.    Review of Systems:  Review of Systems   Constitutional: Negative for chills and fever.   HENT: Negative for congestion and sore throat.    Eyes: Negative for photophobia and redness.   Respiratory: Negative for cough and shortness of breath.    Cardiovascular: Negative for chest pain, palpitations, claudication and leg swelling.   Gastrointestinal: Negative for abdominal pain, nausea and vomiting.   Genitourinary: Negative for dysuria and hematuria.   Musculoskeletal: Negative for falls and myalgias.   Neurological: Negative for dizziness, weakness and headaches.         OBJECTIVE:     Vital Signs (Most Recent)   Temp: 98.1 °F (36.7 °C) (02/04/22 1108)  Pulse: 94 (02/04/22 1108)  Resp: 18 (02/04/22 1108)  BP: 104/67 (02/04/22 1108)  SpO2: 99 % (02/04/22 1108) Body mass index is 24.23 kg/m².  Intake/Outake:  This Shift:  No intake/output data recorded.    Net I/O  past 24h:     Intake/Output Summary (Last 24 hours) at 2022 1133  Last data filed at 2022 0600  Gross per 24 hour   Intake 240 ml   Output 700 ml   Net -460 ml             Physical Exam:  Physical Exam  Eyes:      General: Scleral icterus present.   Cardiovascular:      Rate and Rhythm: Normal rate and regular rhythm.   Pulmonary:      Effort: Pulmonary effort is normal.      Breath sounds: Normal breath sounds.   Abdominal:      General: There is distension.      Tenderness: There is no abdominal tenderness.   Musculoskeletal:      Right lower le+ Edema present.      Left lower le+ Edema present.   Skin:     Coloration: Skin is jaundiced.   Neurological:      Mental Status: He is alert and oriented to person, place, and time.          Laboratory:  CBC/Anemia Labs: Coags:    Recent Labs   Lab 22  0537 22  1016 22  0340 22  0539   WBC 8.67  --  7.23 7.11   HGB 9.7*  --  9.9* 9.6*   HCT 26.3*  --  26.7* 25.7*     --  152 142*   MCV 88  --  88 90   RDW 16.4*  --  16.8* 17.1*   IRON  --  96  --   --    FERRITIN  --  862*  --   --     Recent Labs   Lab 22  1857 22  0340 22  0539   INR 1.6* 1.6* 1.3*   APTT 35.8*  --   --         Chemistries: ABG:   Recent Labs   Lab 22  0537 22  0340 22  0539   * 129* 128*   K 3.2* 3.1* 3.5    106 103   CO2 20* 18* 20*   BUN 6* 6* 7*   CREATININE 0.7 0.7 0.6   CALCIUM 8.0* 7.8* 7.9*   PROT 6.4 6.2 6.1   BILITOT 17.1* 16.5* 17.5*   ALKPHOS 552* 538* 505*   ALT 24 27 28   AST 82* 80* 90*   MG 1.9 1.9 1.8   PHOS 2.4* 2.6* 2.6*    No results for input(s): PH, PCO2, PO2, HCO3, POCSATURATED, BE in the last 168 hours.     Diagnostic Results: CA 19-9 elevated at 294.9, CEA 3.7    Medications:  Scheduled Meds:   folic acid  1 mg Oral Daily    multivitamin  1 tablet Oral Daily    mupirocin   Nasal BID    phytonadione ((AQUA-MEPHYTON) IVPB  10 mg Intravenous Once    [START ON 2022] spironolactone   75 mg Oral Daily    thiamine  100 mg Oral Daily                             Continuous Infusions:  PRN Meds:dextrose 10%, dextrose 10%, glucagon (human recombinant), glucose, glucose, melatonin, naloxone, ondansetron, sodium chloride 0.9%     ASSESSMENT/PLAN:     Active Hospital Problems    Diagnosis  POA    *Decompensated hepatic cirrhosis [K72.90, K74.60]  Yes    Coagulopathy [D68.9]  Yes    Biliary obstruction [K83.1]  Yes    Alcohol use disorder, severe, dependence [F10.20]  Yes     Chronic    Primary hypertension [I10]  Yes     Chronic    Hypokalemia [E87.6]  Yes    Hyponatremia [E87.1]  Yes      Resolved Hospital Problems    Diagnosis Date Resolved POA    SIMONE (acute kidney injury) [N17.9] 02/03/2022 Yes    Constipation [K59.00] 02/03/2022 Yes     1. Biliary Obs and decompensated cirrhosis  - Moderate ascites  - Labs trending down  - D/C'd ceft IV 1mg  - D/C'd lactulose TID, rifaximin   - MRI abd showed possible PSC and pancreatic tail lesion  - Spironolactone dose increased to 75mg PO daily  - INR corrected with Vit K     Plan:  - Check A1AT, ceruloplasmin, Fe panel, ODILIA, ASMA, IgG  - No steroids due to age.  - Check PETH  - Continue tx with spironolactone 75mg PO  - Give vit K as needed  - Add furosemide when able  - Rehab  - Monitor labs and daily obs.  - Consult with hepatology for discharge goals     2. Sofy HTN  - BP below target (92/61)  - home BP meds (metoprolol 25mg qd) held     Plan:  - Continue to hold home BP meds  - Monitor BP     3. Alcohol abuse  - 1-2 beers today  - Seen by psych substance abuse     Plan:  - CIWA q4  - Monitor for ISAI, benzos if needed  - Encourage cessation.    DVT ppx- None  CODE Status- FULL    Dispo- No, awaiting recs from hepatology    Dhara Lopez, MS3  [facility]  [phone]

## 2022-02-04 NOTE — PROGRESS NOTES
Salo [facility] - [facility] Medicine  Progress Note    Patient Name: Vic Reyez  MRN: [id number]  Patient Class: IP- Inpatient   Admission Date: 2/2/2022  Length of Stay: 2 days  Attending Physician: Winston Zendejas MD  Primary Care Provider: [facility]        Subjective:     Principal Problem:Decompensated hepatic cirrhosis        HPI:  Mr. Reyez is a 71-year-old-man with HTN, constipation, and EtOH abuse, who presented to OSH with 1 week of worsening jaundice, abdominal distension and fatigue, found to have new decompensated cirrhosis and bilary obstruction. Transferred for AES and Hepatology evaluation. Patient reports no abdominal pain. States neighbor encouraged him to be evaluated for new yellowing. Noted his urine has been brown for ~ 1 week. No BM in 3 days. No fever or chills. Some nausea with meals. No emesis or hematemesis. No melena or hematochezia. No confusion, feels like himself. Lives alone. Drinks 1-2 beers/day. Previously drank 6 beers/day.     At OSH ED, afebrile, VS normal. Exam with ascites and normal mentation. INR 1.6, Na 128, K 2.8, , Alb 1.5, TB 22, , ALT 34, Lipase 3. WBC 8, Hg 11.7, .  Ammonia, 69. CT abdomen with bilary obstruction, dilated CBD 14mm with wall thickening, mild intra and extrahepatic biliary duct dilation, liver with chages consistent with cirrhosissigns of biliary obstruction, dilated CBD proximally to 14mm with wall thickening, mild IH/EH biliary duct dilatation, changes of cirrhosis, and portal hypertension. Sludge vs mass in CBD.     Case discussed with Dr. Mccollum in AES prior to transfer. Was treated with empiric Rocephin 2g iv x 1, KCL 40meq po x 1 and 10 meq IV x1, and iv fluids in the ED.  Blood cultures are drawn.     MELD-Na score: 28 at 1/31/2022  6:57 PM  MELD score: 23 at 1/31/2022  6:57 PM  Calculated from:  Serum Creatinine: 0.7 mg/dL (Using min of 1 mg/dL) at 1/31/2022  6:57 PM  Serum Sodium: 128 mmol/L  at 1/31/2022  6:57 PM  Total Bilirubin: 21.8 mg/dL at 1/31/2022  6:57 PM  INR(ratio): 1.6 at 1/31/2022  6:57 PM  Age: 71 years    Upon arrival to [facility], patient is alert and oriented, in no acute distress. Afebrile, BP 99/57, HR 79, stating well on RA without tachypnea. Repeat labs pending. AES and Hepatology consulted. Emperic IV ceftriaxone continued. Started lactulose, rifaximin, PO furosemide and spironolactone. NPO.       Overview/Hospital Course:  Lasix discontinued for hyponatremia, spironolactone continued. Addiction Psychiatry consulted regarding alcohol use. MRI abdomen w/wo contrast showed pancreatic tail mass and possible biliary stricture. Underwent ERCP with AES 2/4.    Anticipate discharge with Hepatology follow up.      Interval History: No acute events overnight. Continues to void spontaneously without difficulty after Cummings removal. Patient with no complaints this morning, awaiting ERCP.    Review of Systems   Constitutional: Negative for chills and fever.   HENT: Negative for rhinorrhea and sneezing.    Eyes: Negative for pain and redness.   Respiratory: Negative for cough and shortness of breath.    Gastrointestinal: Positive for abdominal distention and nausea. Negative for abdominal pain and vomiting.   Genitourinary: Negative for difficulty urinating and dysuria.   Musculoskeletal: Negative for back pain and neck pain.   Skin: Positive for color change (jaundice). Negative for rash.   Neurological: Negative for tremors and speech difficulty.     Objective:     Vital Signs (Most Recent):  Temp: 97.9 °F (36.6 °C) (02/04/22 1312)  Pulse: 102 (02/04/22 1330)  Resp: 16 (02/04/22 1330)  BP: (!) 94/52 (02/04/22 1330)  SpO2: 100 % (02/04/22 1330) Vital Signs (24h Range):  Temp:  [97.9 °F (36.6 °C)-98.3 °F (36.8 °C)] 97.9 °F (36.6 °C)  Pulse:  [] 102  Resp:  [16-18] 16  SpO2:  [93 %-100 %] 100 %  BP: ()/(51-67) 94/52     Weight: 78.8 kg (173 lb 11.6 oz)  Body mass index is 24.23  kg/m².    Intake/Output Summary (Last 24 hours) at 2/4/2022 1402  Last data filed at 2/4/2022 0600  Gross per 24 hour   Intake 240 ml   Output 700 ml   Net -460 ml      Physical Exam  Vitals and nursing note reviewed.   Constitutional:       General: He is not in acute distress.     Appearance: He is ill-appearing. He is not toxic-appearing or diaphoretic.   HENT:      Head: Normocephalic and atraumatic.      Nose: No congestion or rhinorrhea.      Mouth/Throat:      Mouth: Mucous membranes are moist.      Pharynx: Oropharynx is clear.   Eyes:      General: Scleral icterus present.      Extraocular Movements: Extraocular movements intact.   Cardiovascular:      Rate and Rhythm: Normal rate and regular rhythm.      Pulses: Normal pulses.      Heart sounds: Normal heart sounds. No murmur heard.      Pulmonary:      Effort: Pulmonary effort is normal. No respiratory distress.      Breath sounds: Normal breath sounds.   Abdominal:      General: Bowel sounds are normal. There is distension.      Palpations: Abdomen is soft.      Tenderness: There is no abdominal tenderness. There is no right CVA tenderness, left CVA tenderness, guarding or rebound.      Comments: ascites with fluid wave    Musculoskeletal:         General: Normal range of motion.      Cervical back: Normal range of motion and neck supple.      Right lower leg: Edema present.      Left lower leg: Edema present.   Skin:     General: Skin is warm and dry.      Capillary Refill: Capillary refill takes less than 2 seconds.      Coloration: Skin is jaundiced.   Neurological:      General: No focal deficit present.      Mental Status: He is alert and oriented to person, place, and time.      Sensory: No sensory deficit.      Motor: No weakness.      Comments: No asterixis    Psychiatric:         Mood and Affect: Mood normal.         Thought Content: Thought content normal.         Significant Labs: All pertinent labs within the past 24 hours have been  reviewed.    Significant Imaging: I have reviewed all pertinent imaging results/findings within the past 24 hours.      Assessment/Plan:      * Decompensated hepatic cirrhosis  71M with alcohol abuse presented to OSH for 1 week of worsening jaundice, abdominal distension and fatigue, found to have new decompensated cirrhosis and bilary obstruction. Transferred for AES and Hepatology evaluation. No asterixis or HE. Initially concern for obstruction on CT a/p but no dilation on Liver US--no plan for scope. LFTs downtrending. Lasix stopped for hyponatremia. MRI abdomen w/wo contrast showed splenomegaly, biliary duct dilation, cystic pancreatic tail lesion, and upper abdominal LAD.    MELD-Na score: 26 at 2/4/2022  5:39 AM  MELD score: 20 at 2/4/2022  5:39 AM  Calculated from:  Serum Creatinine: 0.6 mg/dL (Using min of 1 mg/dL) at 2/4/2022  5:39 AM  Serum Sodium: 128 mmol/L at 2/4/2022  5:39 AM  Total Bilirubin: 17.5 mg/dL at 2/4/2022  5:39 AM  INR(ratio): 1.3 at 2/4/2022  5:39 AM  Age: 71 years     IgG elevated. Alpha 1 antitrypsin negative.    Plan:  - spironolactone 75 mg QD  - Hepatology consulted, appreciate recommendations  - f/u diagnostic para labs  - f/u ODILIA, anti-mitochondrial Ab, anti-smooth muscle Ab, hemochromatosis DNA analysis    Pancreatic lesion  Seen on MRI abdomen. AES consulted.     - ERCP 2/4  - f/u ERCP results    Biliary obstruction  71-year-old-man with EtOH abuse who presented to OSH with 1 week of worsening jaundice, abdominal distension and fatigue, found to have new decompensated cirrhosis and bilary obstruction. Transferred for AES and Hepatology evaluation.     At OSH ED, afebrile, VS normal. Exam with ascites and normal mentation. INR 1.6, Na 128, K 2.8, , Alb 1.5, TB 22, , ALT 34, Lipase 3. WBC 8, Hg 11.7, .  Ammonia, 69. CT abdomen with bilary obstruction, dilated CBD 14mm with wall thickening, mild intra and extrahepatic biliary duct dilation, liver with changes  consistent with cirrhosis signs of biliary obstruction, dilated CBD proximally to 14mm with wall thickening, mild IH/EH biliary duct dilatation, changes of cirrhosis, and portal hypertension. Sludge vs mass in CBD. However, no dilation seen on Liver US. Evaluated by AES, initially no plan for scope.    Case discussed with Dr. Mccollum in AES prior to transfer. Was treated with empiric Rocephin 2g iv x 1, KCL 40meq po x 1 and 10 meq IV x1, and iv fluids in the ED.  Blood cultures are drawn. No fevers or abdominal pain.       - AES consulted, appreciate recs  - f/u blood cx   - see decompensated cirrhosis management    Hyponatremia  Mild, asymptomatic. Thought to be due to cirrhosis. Mild LE edema, otherwise no JVD or hypoxia. Urine studies collected after first dose of Lasix consistent with SIADH vs diuretic use.    - monitor on labs  - Lasix stopped 2/3    Hypokalemia  Initial K 2.8,  replaced prior to arrival.     - replete PRN to maintain K >4  - monitor on labs  - spironolactone 75 mg QD     Primary hypertension  BP 99/57 on admission. On metoprolol 25 mg QD prior to admission.     - hold antihypertensive home meds for low BP    Alcohol use disorder, severe, dependence  Hx of alcohol abuse, 6 drinks daily after work with co-workers. Now drinks 1-2 beers/night. CIWA and PRN ativan discontinued after out of withdrawal window based on reported last drink 1/30.    - Addiction Psychiatry consulted, appreciate recommendations    - patient declined intervention or therapy at this time   - outpatient Psych referral  - encouraged cessation  - folate, thiamine, MVI    VTE Risk Mitigation (From admission, onward)         Ordered     IP VTE LOW RISK PATIENT  Once         02/02/22 0155     Place sequential compression device  Until discontinued         02/02/22 0155                Discharge Planning   NAFISA: 2/5/2022     Code Status: Full Code   Is the patient medically ready for discharge?: No    Reason for patient still in  hospital (select all that apply): Laboratory test, Treatment, Imaging and Consult recommendations  Discharge Plan A: Home          Mayte Bai MD  Department of Hospital Medicine   [facility] - Endoscopy

## 2022-02-04 NOTE — PLAN OF CARE
POC reviewed w/ pt, verbalized understanding. Pt AAOx4. VS stable on RA. IS bedside. Denies nausea, chest pain, & SOB. SCD's in place. Pt remains free of fall & injury. No acute events. Bed in lowest position, call light in reach, frequent rounds made for safety.    WCTM

## 2022-02-04 NOTE — ANESTHESIA POSTPROCEDURE EVALUATION
Anesthesia Post Evaluation    Patient: Vic Reyez    Procedure(s) Performed: Procedure(s) (LRB):  ERCP (ENDOSCOPIC RETROGRADE CHOLANGIOPANCREATOGRAPHY) (N/A)  ULTRASOUND, UPPER GI TRACT, ENDOSCOPIC    Final Anesthesia Type: general      Patient location during evaluation: PACU  Patient participation: Yes- Able to Participate  Level of consciousness: awake and alert and oriented  Post-procedure vital signs: reviewed and stable  Pain management: adequate  Airway patency: patent    PONV status at discharge: No PONV  Anesthetic complications: no      Cardiovascular status: blood pressure returned to baseline, hemodynamically stable and stable  Respiratory status: unassisted, room air and spontaneous ventilation  Hydration status: euvolemic  Follow-up not needed.          Vitals Value Taken Time   /72 02/04/22 1407   Temp 36.2 °C (97.1 °F) 02/04/22 1407   Pulse 96 02/04/22 1407   Resp 18 02/04/22 1407   SpO2 96 % 02/04/22 1407         Event Time   Out of Recovery 02/04/2022 13:48:10         Pain/Minda Score: Minda Score: 10 (2/4/2022  1:30 PM)

## 2022-02-04 NOTE — ASSESSMENT & PLAN NOTE
ASSESSMENT:     DIAGNOSES & PROBLEMS    Alcohol Use Disorder, Severe      STRENGTHS AND LIABILITIES   Strength: Patient has reasonable judgment., Strength: Patient is stable., Liability: Patient has poor health., Liability: pt appears to have very poor insight into degree of alcohol dependence and degree to which treatment will likely be necessary for obtaining long-term sobriety      MOTIVATION TO PURSUE RECOVERY: moderate    ACCEPTANCE OF ADDICTION: limited    ABILITY TO ADHERE TO TREATMENT PLAN: low      PLAN:       MANAGEMENT PLAN, TREATMENT GOALS, THERAPEUTIC TECHNIQUES/APPROACHES & CLINICAL REASONING     At this time pt continues to show limited insight into likely need for some form of substance use treatment or support to maintain long term sobriety; declines current offers of intervention or therapy.  o Pt encouraged to reach out to [facility] in the future if he later becomes interested in substance-related psychiatry services; pt acknowledges.  o Resources list for outpatient treatment options provided to pt on 2/3/22.   No medications/treatment indicated at this time given patient refusal of such intervention.   Pt appears to be outside of withdrawal window (continues to maintain sobriety since New Year's Day and has been in hospital without access to alcohol since 1/31/22), OK to discontinue PRN Ativan      · Patient counseled on abstinence from alcohol and substances of abuse (illicit and prescription).  · Additional workup planned to address substance use disorder, in order to guide and refine ongoing management options, includes serial alcohol and drug laboratory testing (e.g. PETH, urine toxicology).  · Relapse prevention and motivational interviewing provided.  · Education provided on 12 step recovery programs.      PRESCRIPTION DRUG MANAGEMENT  - The risks and benefits of medication were discussed with this patient.  - Possible expectable adverse effects of any current or proposed  individual psychotropic agents were discussed with this patient.  - Counseling was provided on the importance of full compliance with medication regimens.      In cases of emergency, daily coverage provided by Acute/ER Psych MD, NP, or SW, with contact numbers located in [facility] On Call Schedule    Case discussed with staff addiction psychiatrist: BLUE GUTIÉRREZ MD

## 2022-02-04 NOTE — ASSESSMENT & PLAN NOTE
Initial K 2.8,  replaced prior to arrival.     - replete PRN to maintain K >4  - monitor on labs  - spironolactone 75 mg QD

## 2022-02-04 NOTE — ASSESSMENT & PLAN NOTE
71M with alcohol abuse presented to OSH for 1 week of worsening jaundice, abdominal distension and fatigue, found to have new decompensated cirrhosis and bilary obstruction. Transferred for AES and Hepatology evaluation. No asterixis or HE. Initially concern for obstruction on CT a/p but no dilation on Liver US--no plan for scope. LFTs downtrending. Lasix stopped for hyponatremia. MRI abdomen w/wo contrast showed splenomegaly, biliary duct dilation, cystic pancreatic tail lesion, and upper abdominal LAD.    MELD-Na score: 26 at 2/4/2022  5:39 AM  MELD score: 20 at 2/4/2022  5:39 AM  Calculated from:  Serum Creatinine: 0.6 mg/dL (Using min of 1 mg/dL) at 2/4/2022  5:39 AM  Serum Sodium: 128 mmol/L at 2/4/2022  5:39 AM  Total Bilirubin: 17.5 mg/dL at 2/4/2022  5:39 AM  INR(ratio): 1.3 at 2/4/2022  5:39 AM  Age: 71 years     IgG elevated. Alpha 1 antitrypsin negative. Anti mitochondrial Ab negative. ODILIA positive.    Plan:  - spironolactone 75 mg QD  - Hepatology consulted, appreciate recommendations  - f/u diagnostic para labs  - f/u anti-smooth muscle Ab, hemochromatosis DNA analysis

## 2022-02-04 NOTE — PROVATION PATIENT INSTRUCTIONS
Discharge Summary/Instructions after an Endoscopic Procedure  Patient Name: Vic Reyez  Patient MRN: [id number]  Patient YOB: 1950  Friday, February 4, 2022  Chuy Bay MD  Dear patient,  As a result of recent federal legislation (The Federal Cures Act), you may   receive lab or pathology results from your procedure in your [facility]   account before your physician is able to contact you. Your physician or   their representative will relay the results to you with their   recommendations at their soonest availability.  Thank you,  RESTRICTIONS:  During your procedure today, you received medications for sedation.  These   medications may affect your judgment, balance and coordination.  Therefore,   for 24 hours, you have the following restrictions:   - DO NOT drive a car, operate machinery, make legal/financial decisions,   sign important papers or drink alcohol.    ACTIVITY:  Today: no heavy lifting, straining or running due to procedural   sedation/anesthesia.  The following day: return to full activity including work.  DIET:  Eat and drink normally unless instructed otherwise.     TREATMENT FOR COMMON SIDE EFFECTS:  - Mild abdominal pain, nausea, belching, bloating or excessive gas:  rest,   eat lightly and use a heating pad.  - Sore Throat: treat with throat lozenges and/or gargle with warm salt   water.  - Because air was used during the procedure, expelling large amounts of air   from your rectum or belching is normal.  - If a bowel prep was taken, you may not have a bowel movement for 1-3 days.    This is normal.  SYMPTOMS TO WATCH FOR AND REPORT TO YOUR PHYSICIAN:  1. Abdominal pain or bloating, other than gas cramps.  2. Chest pain.  3. Back pain.  4. Signs of infection such as: chills or fever occurring within 24 hours   after the procedure.  5. Rectal bleeding, which would show as bright red, maroon, or black stools.   (A tablespoon of blood from the rectum is not serious, especially if    hemorrhoids are present.)  6. Vomiting.  7. Weakness or dizziness.  GO DIRECTLY TO THE NEAREST EMERGENCY ROOM IF YOU HAVE ANY OF THE FOLLOWING:      Difficulty breathing              Chills and/or fever over 101 F   Persistent vomiting and/or vomiting blood   Severe abdominal pain   Severe chest pain   Black, tarry stools   Bleeding- more than one tablespoon   Any other symptom or condition that you feel may need urgent attention  Your doctor recommends these additional instructions:  If any biopsies were taken, your doctors clinic will contact you in 1 to 2   weeks with any results.  - [facility] to [facility].   - Resume previous diet.   - Continue present medications.   - Await cytology results and await path results.   - Repeat ERCP in 6 weeks to exchange stent.  For questions, problems or results please call your physician - Chuy Bay MD at Work:  [phone].  [facility], EMERGENCY ROOM PHONE NUMBER: [phone]  IF A COMPLICATION OR EMERGENCY SITUATION ARISES AND YOU ARE UNABLE TO REACH   YOUR PHYSICIAN - GO DIRECTLY TO THE EMERGENCY ROOM.  Chuy Bay MD  2/4/2022 1:14:19 PM  This report has been verified and signed electronically.  Dear patient,  As a result of recent federal legislation (The Federal Cures Act), you may   receive lab or pathology results from your procedure in your [facility]   account before your physician is able to contact you. Your physician or   their representative will relay the results to you with their   recommendations at their soonest availability.  Thank you,  PROVATION

## 2022-02-04 NOTE — ASSESSMENT & PLAN NOTE
71M with alcohol abuse presented to OSH for 1 week of worsening jaundice, abdominal distension and fatigue, found to have new decompensated cirrhosis and bilary obstruction. Transferred for AES and Hepatology evaluation. No asterixis or HE. Initially concern for obstruction on CT a/p but no dilation on Liver US--no plan for scope. LFTs downtrending. Lasix stopped for hyponatremia. MRI abdomen w/wo contrast showed splenomegaly, biliary duct dilation, cystic pancreatic tail lesion, and upper abdominal LAD.    MELD-Na score: 26 at 2/4/2022  5:39 AM  MELD score: 20 at 2/4/2022  5:39 AM  Calculated from:  Serum Creatinine: 0.6 mg/dL (Using min of 1 mg/dL) at 2/4/2022  5:39 AM  Serum Sodium: 128 mmol/L at 2/4/2022  5:39 AM  Total Bilirubin: 17.5 mg/dL at 2/4/2022  5:39 AM  INR(ratio): 1.3 at 2/4/2022  5:39 AM  Age: 71 years     IgG elevated. Alpha 1 antitrypsin negative.    Plan:  - spironolactone 75 mg QD  - Hepatology consulted, appreciate recommendations  - f/u diagnostic para labs  - f/u ODILIA, anti-mitochondrial Ab, anti-smooth muscle Ab, hemochromatosis DNA analysis

## 2022-02-04 NOTE — PLAN OF CARE
POC reviewed with patient, sibling. AAOX4, VSS on RA. Skin intact, jaundice. Voided per urinal, concentrated brown urine. No BM. NPO before ERCP, Low Na+ diet thereafter. No complaints of pain or nausea. Call light within reach. Frequent monitoring occurring.

## 2022-02-04 NOTE — PLAN OF CARE
Problem: Occupational Therapy Goal  Goal: Occupational Therapy Goal  Outcome: Met      OT evaluation completed with goals met for discharge home. Patient is functioning for ADLs and ADL transfers at an independent level which is his baseline. Patient with no further skilled OT needs.

## 2022-02-04 NOTE — TRANSFER OF CARE
"Anesthesia Transfer of Care Note    Patient: Vic Reyez    Procedure(s) Performed: Procedure(s) (LRB):  ERCP (ENDOSCOPIC RETROGRADE CHOLANGIOPANCREATOGRAPHY) (N/A)  ULTRASOUND, UPPER GI TRACT, ENDOSCOPIC    Patient location: PACU    Anesthesia Type: general    Transport from OR: Transported from OR on 6-10 L/min O2 by face mask with adequate spontaneous ventilation    Post pain: adequate analgesia    Post assessment: no apparent anesthetic complications and tolerated procedure well    Post vital signs: stable    Level of consciousness: lethargic    Nausea/Vomiting: no nausea/vomiting    Complications: none    Transfer of care protocol was followed      Last vitals:   Visit Vitals  BP (!) 87/52   Pulse 101   Temp 36.6 °C (97.9 °F) (Temporal)   Resp 16   Ht 5' 11" (1.803 m)   Wt 78.8 kg (173 lb 11.6 oz)   SpO2 100%   BMI 24.23 kg/m²     "

## 2022-02-04 NOTE — PT/OT/SLP EVAL
"Occupational Therapy   Evaluation and Discharge Note    Name: Vic Reyez  MRN: [id number]  Admitting Diagnosis:  Decompensated hepatic cirrhosis   Recent Surgery: Procedure(s) (LRB):  ERCP (ENDOSCOPIC RETROGRADE CHOLANGIOPANCREATOGRAPHY) (N/A)  ULTRASOUND, UPPER GI TRACT, ENDOSCOPIC Day of Surgery    Recommendations:     Discharge Recommendations: home  Discharge Equipment Recommendations:  none  Barriers to discharge:  None    Assessment:     Vic Reyez is a 71 y.o. male with a medical diagnosis of Decompensated hepatic cirrhosis. At this time, patient is functioning at their prior level of function and does not require further acute OT services.     Plan:     During this hospitalization, patient does not require further acute OT services.  Please re-consult if situation changes.    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  Patient found supine with  (no active lines) upon OT entry to room.    General Precautions: Standard, fall   Orthopedic Precautions:N/A   Braces: N/A  Respiratory Status: Room air     Occupational Performance:    Bed Mobility:    · Patient completed Scooting/Bridging with independence  · Patient completed Supine to Sit with independence  · Patient completed Sit to Supine with " independence    Functional Mobility/Transfers:  · Patient completed Sit <> Stand Transfer with independence  with  no assistive device   · Functional Mobility: patient ambulated to/from bathroom with no use of assistive device at supervision     Activities of Daily Living:  · Grooming: independence standing at sink to brush teeth, wash face and shave    · Patient fully dressed upon OT arrival     Cognitive/Visual Perceptual:  Cognitive/Psychosocial Skills:     -       Oriented to: Person, Place and Time   -       Follows Commands/attention:Follows multistep  commands    Physical Exam:  Upper Extremity Range of Motion:     -       Right Upper Extremity: WFL  -       Left Upper Extremity: WFL  Upper Extremity Strength:    -       Right Upper Extremity: WFL  -       Left Upper Extremity: WFL    AMPAC 6 Click ADL:  AMPAC Total Score: 24    Treatment & Education:  -Patient educated on role of OT and plan of care   Education:    Patient left supine with call button in reach    GOALS:   Multidisciplinary Problems     Occupational Therapy Goals     Not on file          Multidisciplinary Problems (Resolved)        Problem: Occupational Therapy Goal    Goal Priority Disciplines Outcome Interventions   Occupational Therapy Goal   (Resolved)     OT, PT/OT Met                    History:     Past Medical History:   Diagnosis Date    Alcohol abuse 2/2/2022    Decompensated hepatic cirrhosis 2/2/2022    Hypertension     Lab test positive for detection of COVID-19 virus 09/2021       Past Surgical History:   Procedure Laterality Date    APPENDECTOMY      EYE SURGERY      JOINT REPLACEMENT      KNEE SURGERY      RECONSTRUCTION OF SHOULDER Right        Time Tracking:     OT Date of Treatment: 02/04/22  OT Start Time: 0852  OT Stop Time: 0910  OT Total Time (min): 18 min    Billable Minutes:Evaluation 10  Self Care/Home Management 8    2/4/2022

## 2022-02-04 NOTE — NURSING TRANSFER
Nursing Transfer Note      2/4/2022     Reason patient is being transferred: back to room post endo procedure    Transfer To: 1052    Transfer via stretcher    Transfer with pt clothes    Transported by PCT    Medicines sent: none    Any special needs or follow-up needed: no    Chart send with patient: Yes    Patient reassessed at: 0915

## 2022-02-04 NOTE — ASSESSMENT & PLAN NOTE
71-year-old-man with EtOH abuse who presented to OSH with 1 week of worsening jaundice, abdominal distension and fatigue, found to have new decompensated cirrhosis and bilary obstruction. Transferred for [facility] and Hepatology evaluation.     At OSH ED, afebrile, VS normal. Exam with ascites and normal mentation. INR 1.6, Na 128, K 2.8, , Alb 1.5, TB 22, , ALT 34, Lipase 3. WBC 8, Hg 11.7, .  Ammonia, 69. CT abdomen with bilary obstruction, dilated CBD 14mm with wall thickening, mild intra and extrahepatic biliary duct dilation, liver with changes consistent with cirrhosis signs of biliary obstruction, dilated CBD proximally to 14mm with wall thickening, mild IH/EH biliary duct dilatation, changes of cirrhosis, and portal hypertension. Sludge vs mass in CBD. However, no dilation seen on Liver US. Evaluated by [facility], initially no plan for scope.    Case discussed with Dr. Mccollum in [facility] prior to transfer. Was treated with empiric Rocephin 2g iv x 1, KCL 40meq po x 1 and 10 meq IV x1, and iv fluids in the ED.  Blood cultures are drawn. No fevers or abdominal pain.       - [facility] consulted, appreciate recs  - f/u blood cx   - see decompensated cirrhosis management

## 2022-02-04 NOTE — TREATMENT PLAN
[facility] Treatment Plan      EUS and ERCP completed  - normal esophagus, stomach and examined duodenum. PHG. Pancreatic abnormalities with diffuse echogenicity, hyperechoic foci, lobularity in entire pancreas s/p FNA. Enlarged lymph node in nilson hepatis region s/p FNA, cystic lesion in pancreatic tail  - localized biliary stricture, biliary sphincterotomy performed, biopsy at bifurcation of hepatic duct and cells obtained by brushing, CBD stent placed    - s/p EUS and ERCP (see procedure note)  - resume diet  - resume meds  - f/u path  - repeat ERCP in 6 weeks to exchange stent      Thank you for involving us in the care of Vic Reyez. Please call with any additional questions, concerns or changes in the patient's clinical status.      Armando Bingham MD  Gastroenterology Fellow, PGY-VI  [facility]

## 2022-02-04 NOTE — PROGRESS NOTES
[facility]  Psychiatry  Progress Note    Patient Name: Vic Reyez  MRN: [id number]   Code Status: Full Code  Admission Date: 2/2/2022  Hospital Length of Stay: 2 days  Expected Discharge Date: 2/5/2022  Attending Physician: Winston Zendejas MD  Primary Care Provider: [facility]    Current Legal Status: Uncontested    Patient information was obtained from patient, ER records and medical record.       Subjective:     Patient is a 71 y.o., male, presents with:    Principal Problem:Decompensated hepatic cirrhosis    Chief Complaint: Alcohol dependence    HPI:   2/3/2022 12:31 PM  Vic Reyez  1950  [id number]      ADDICTION CONSULT INITIAL EVALUATION     DEPARTMENT:  Psychiatry  SITE: [facility], [address]    DATE OF ADMISSION: 2/2/2022  1:30 AM  LENGTH OF STAY: 1 days    EXAMINING PRACTITIONER: Winston Jimenez    CONSULT REQUESTED BY: Winston Zendejas MD      SUBJECTIVE     CHIEF COMPLAINT  Vic Reyez is a 71 y.o. male who is seen today for an initial psychiatric evaluation by the addiction psychiatry consult service.  Vic Reyez presents with the chief complaint of: alcohol abuse with cirrhosis decompensation      HISTORY OF PRESENT ILLNESS    Per Primary MD:  Mr. Reyez is a 71-year-old-man with HTN, constipation, and EtOH abuse, who presented to OSH with 1 week of worsening jaundice, abdominal distension and fatigue, found to have new decompensated cirrhosis and bilary obstruction. Transferred for AES and Hepatology evaluation. Patient reports no abdominal pain. [facility] neighbor encouraged him to be evaluated for new yellowing. Noted his urine has been brown for ~ 1 week. No BM in 3 days. No fever or chills. Some nausea with meals. No emesis or hematemesis. No melena or hematochezia. No confusion, feels like himself. Lives alone. Drinks 1-2 beers/day. Previously drank 6 beers/day    Per Addiction Psych MD:  Pt seen in room, is welcoming and receptive  "to examination.  States he has "no doubt" his drinking contributed to liver problems/cirrhosis.  Started drinking mildly in highschool, but began drinking heavily in the [employer], followed by heavy drinking in college and then continued drinking roughly a 6-pack of beer a day until this past New Year's Day, 2022.  States in the fall he started "getting a feeling" he should cut back, and reports he had one beer on New Year's Day and none since.  States his is finished with alcohol, however, does not feel he needs the help of recovery programs such as AA, rehab or medications.  Endorses attending AA for roughly 2 years approximately 10 to 15 year ago, and while he found the camaraderie of the groups enjoyable did not find it helpful with alcohol use cessation.  Though he is not interested in help with cessation today, he is agreeable to accepting a resources list of alcohol use treatment options (provided to patient) and is also agreeable to having another conversation with addictions psychiatry tomorrow.     Patient denies any other history of substance use (no illicit or recreational substances other than alcohol).  Also reports he only drinks beer, no hard liqour.  States he is the only drinker in his family, noting no one else has substance use issues.  Denies personal or familial history of psychiatric disorders, symptoms or treatment.   Denies SI/HI at present or in the past.      COLLATERAL  None    SUBSTANCE ABUSE HISTORY  Substance(s) of Choice: alcohol  Substances Used: alcohol  History of IVDU?: no  Use of Alcohol: 6-beers daily most of adult life, reporting abstinence since 1/1/22  Average Consumption: as above  Last Drink: 1/1/22  Use of Medications for Alcohol/Opioid Use Disorder: no  History of Complicated Withdrawal: denies  History of Detox: no  Rehab History: no  AA/NA involvement: ~2 years in AA 10 to 15 years ago, not currently involved  Tobacco: no  Spouse/Partner Consumption: not   Patient " Aware of Biomedical Complications: yes, aware it has contributed to his cirrhosis    DSM-5 SUBSTANCE USE DISORDER CRITERIA   Mild (1-3), Moderate (4-5), Severe (?6)  1. Often take in larger amounts or over a longer period of time than was intended.  2. Persistent desire or unsuccessful efforts to cut down or control use.  3. Great deal of time spent in activities necessary to obtain substance, use, or recover from effects.  4. Craving/strong desire for substance or urge to use.  5. Use resulting in failure to fulfill major role obligations at home, work or school.  6. Social, occupational, recreational activities decreased because of use.  7. Continued use despite having persistent or recurrent social or interpersonal problems cause or exaserbated by the substance.  8. Recurrent use in situations in which it is physically hazardous.  9. Use despite physical or psychological problems that are likely to have been caused or exacerbated by the substance.  10. Tolerance, as defined by either of the following.   A. A need for markedly increased amounts of substance to achieve intoxication or desired effect. -OR-    B. A markedly diminished effect with continued use of the same amount of substance.  11. Withdrawal, as manifested by the following.   A. The characteristic withdrawal syndrome for substance. -AND-   B. Substance is taken to relieve or avoid withdrawal symptoms.    ARE THE CRITERIA MET FOR DSM-5 SUBSTANCE USE DISORDER: Alcohol Use Disorder, Severe      PSYCHIATRIC HISTORY  Reported Diagnose(s): Denies  Previous Medication Trials: Denies  Previous Psychiatric Hospitalizations: Denies  Outpatient Psychiatrist?: No      SUICIDE/HOMICIDE RISK ASSESSMENT  Current/active suicidal ideation/plan/intent: Denies  Previous suicide attempts: Denies  Current/active homicidal ideation/plan/intent: Denies      HISTORY OF TRAUMA, ABUSE & VIOLENCE  Trauma: Denies  Physical Abuse: no  Sexual Abuse: no  Violent Conduct:  no    Access to Gun: yes       FAMILY PSYCHIATRIC HISTORY   Denies       SOCIAL HISTORY  Lives alone, , 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      PAST MEDICAL HISTORY   HTN, Cirrhosis, biliary obstruction      PSYCHOSOCIAL FACTORS  Stressors (Psychosocial and Environmental): financial and health.       PSYCHIATRIC ROS  Depression: denies major symptoms or episodes  Anxiety: denies major symptoms or episodes  Sharron: denies symptoms consistent with this   Psychosis: denies symptoms consistent with this    MEDICAL ROS    Complete review of systems performed covering Constitutional, Eyes, ENT/Mouth, Cardiovascular, Respiratory, Gastrointestinal, Genitourinary, Musculoskeletal, Skin, Neurologic, Endocrine, Heme/Lymph, and Allergy/Immune.     Complete review of systems was negative with the exception of the following positive symptoms: jaundice, otherwise feels well      ALLERGIES  Patient has no known allergies.      MEDICATIONS    Psychotropics:  None    Infusions:       Scheduled:   folic acid  1 mg Oral Daily    multivitamin  1 tablet Oral Daily    mupirocin   Nasal BID    phytonadione ((AQUA-MEPHYTON) IVPB  10 mg Intravenous Once    potassium bicarbonate  25 mEq Oral Q4H    spironolactone  50 mg Oral Daily    thiamine  100 mg Oral Daily       PRN:  dextrose 50%, dextrose 50%, glucagon (human recombinant), glucose, glucose, lorazepam, melatonin, naloxone, ondansetron, sodium chloride 0.9%    Home Medications:  Prior to Admission medications    Medication Sig Start Date End Date Taking? Authorizing Provider   aspirin (ECOTRIN) 81 MG EC tablet Take 81 mg by mouth once daily.   Yes Historical Provider   calcium-vitamin D (OSCAL) 250 (625)-125 mg-unit per tablet Take 1 tablet by mouth once daily.    Historical Provider   docusate sodium (COLACE) 50 MG capsule Take by mouth 2 (two) times daily.    Historical Provider   ferrous fumarate (HEMOCYTE) 324 mg (106 mg iron) Tab Take by mouth.    Historical  "Provider   folic acid (FOLVITE) 1 MG tablet Take 1 tablet (1 mg total) by mouth once daily. 2/4/22 2/4/23  Mayte Bai MD   hydrocodone-acetaminophen 7.5-325mg (NORCO) 7.5-325 mg per tablet Take 1 tablet by mouth every 6 (six) hours as needed.    Historical Provider   multivitamin (THERAGRAN) per tablet Take 1 tablet by mouth once daily. 2/4/22 1/30/23  Mayte Bai MD   pravastatin (PRAVACHOL) 40 MG tablet Take 40 mg by mouth once daily.    Historical Provider   spironolactone (ALDACTONE) 50 MG tablet Take 1 tablet (50 mg total) by mouth once daily. 2/3/22 2/3/23  Mayte Bai MD   thiamine 100 MG tablet Take 1 tablet (100 mg total) by mouth once daily. 2/4/22 1/30/23  Mayte Bai MD   metoprolol tartrate (LOPRESSOR) 25 MG tablet Take 25 mg by mouth 2 (two) times daily.  2/3/22  Historical Provider   naproxen (NAPROSYN) 500 MG tablet Take 500 mg by mouth 2 (two) times daily.  2/3/22  Historical Provider   naproxen (NAPROSYN) 500 MG tablet Take 1 tablet (500 mg total) by mouth 2 (two) times daily as needed (pain). 2/3/22 2/3/22  Mayte Bai MD         OBJECTIVE:     EXAMINATION    Vitals:    02/03/22 0432 02/03/22 0728 02/03/22 0955 02/03/22 1144   BP: (!) 84/49 92/61 (!) 96/57 96/65   BP Location: Right arm Right arm     Patient Position: Lying Sitting  Sitting   Pulse: 86 85 84 93   Resp: 16 18  18   Temp: 98.6 °F (37 °C) 98.3 °F (36.8 °C)  97.9 °F (36.6 °C)   TempSrc: Oral Oral  Oral   SpO2: 98% (!) 94%  96%   Weight:       Height:           PAIN   0/10    CONSTITUTIONAL  General Appearance and Manner: Jaundiced, hospital gown, NAD    MUSCULOSKELETAL  Abnormal Involuntary Movements: None  Muscle Strength and Tone:  WNL  Gait and Station: WNL, though not observed ambulating    PSYCHIATRIC   Orientation: person, place, date, situation  Speech: normal rate, rhythm and tone, talkative but WNL  Language: normal and aphasic  Mood: "good"  Affect: full-range, appropriate  Thought Process: " liear  Associations: in tact  Thought Content: no SI/HI, no delusional nor bizarre content displayed  Memory: long & short term in tact  Attention and Concentration: in tact  Fund of Knowledge: adequate for education  Insight: Limited  Judgment: Fair        LABS/IMAGING/STUDIES   Recent Results (from the past 24 hour(s))   WBC & Diff,Body Fluid Peritoneal Fluid    Collection Time: 02/02/22  1:55 PM   Result Value Ref Range    Body Fluid Type Peritoneal Fluid     Fluid Appearance Clear     Fluid Color Yellow     WBC, Body Fluid 75 /cu mm    Segs, Fluid 6 %    Lymphs, Fluid 73 %    Monocytes/Macrophages, Fluid 20 %    Mesothelial Cells, Fluid 1 %   Culture, Body Fluid (Aerobic) w/ GS    Collection Time: 02/02/22  1:55 PM    Specimen: Peritoneal Fluid; Body Fluid   Result Value Ref Range    AEROBIC CULTURE - FLUID No growth     Gram Stain Result Rare WBC's     Gram Stain Result No organisms seen    Amylase, Peritoneal, Pleural Fluid or KHANH Drainage, In-House Peritoneal Fluid    Collection Time: 02/02/22  1:55 PM   Result Value Ref Range    Body Fluid Source Amylase Peritoneal Fluid     Amylase, Fluid 6 Not established U/L   Albumin, Peritoneal, Pleural Fluid or KHANH Drainage, In-House Peritoneal Fluid    Collection Time: 02/02/22  1:55 PM   Result Value Ref Range    Body Fluid Source, Albumin Peritoneal Fluid     Body Fluid, Albumin <0.4 See text g/dL   Bilirubin, Peritoneal, Pleural Fluid or KHANH Drainage, In-House Peritoneal Fluid    Collection Time: 02/02/22  1:55 PM   Result Value Ref Range    Body Fluid Source, Bilirubin Peritoneal Fluid     Bilirubin, BF 1.9 Not established mg/dL   Protime-INR    Collection Time: 02/03/22  3:40 AM   Result Value Ref Range    Prothrombin Time 17.2 (H) 9.0 - 12.5 sec    INR 1.6 (H) 0.8 - 1.2   Phosphorus    Collection Time: 02/03/22  3:40 AM   Result Value Ref Range    Phosphorus 2.6 (L) 2.7 - 4.5 mg/dL   Magnesium    Collection Time: 02/03/22  3:40 AM   Result Value Ref Range     Magnesium 1.9 1.6 - 2.6 mg/dL   Comprehensive Metabolic Panel (CMP)    Collection Time: 02/03/22  3:40 AM   Result Value Ref Range    Sodium 129 (L) 136 - 145 mmol/L    Potassium 3.1 (L) 3.5 - 5.1 mmol/L    Chloride 106 95 - 110 mmol/L    CO2 18 (L) 23 - 29 mmol/L    Glucose 111 (H) 70 - 110 mg/dL    BUN 6 (L) 8 - 23 mg/dL    Creatinine 0.7 0.5 - 1.4 mg/dL    Calcium 7.8 (L) 8.7 - 10.5 mg/dL    Total Protein 6.2 6.0 - 8.4 g/dL    Albumin 1.2 (L) 3.5 - 5.2 g/dL    Total Bilirubin 16.5 (H) 0.1 - 1.0 mg/dL    Alkaline Phosphatase 538 (H) 55 - 135 U/L    AST 80 (H) 10 - 40 U/L    ALT 27 10 - 44 U/L    Anion Gap 5 (L) 8 - 16 mmol/L    eGFR if African American >60.0 >60 mL/min/1.73 m^2    eGFR if non African American >60.0 >60 mL/min/1.73 m^2   CBC with Automated Differential    Collection Time: 02/03/22  3:40 AM   Result Value Ref Range    WBC 7.23 3.90 - 12.70 K/uL    RBC 3.02 (L) 4.60 - 6.20 M/uL    Hemoglobin 9.9 (L) 14.0 - 18.0 g/dL    Hematocrit 26.7 (L) 40.0 - 54.0 %    MCV 88 82 - 98 fL    MCH 32.8 (H) 27.0 - 31.0 pg    MCHC 37.1 (H) 32.0 - 36.0 g/dL    RDW 16.8 (H) 11.5 - 14.5 %    Platelets 152 150 - 450 K/uL    MPV 8.8 (L) 9.2 - 12.9 fL    Immature Granulocytes 0.3 0.0 - 0.5 %    Gran # (ANC) 4.9 1.8 - 7.7 K/uL    Immature Grans (Abs) 0.02 0.00 - 0.04 K/uL    Lymph # 1.3 1.0 - 4.8 K/uL    Mono # 0.9 0.3 - 1.0 K/uL    Eos # 0.1 0.0 - 0.5 K/uL    Baso # 0.02 0.00 - 0.20 K/uL    nRBC 0 0 /100 WBC    Gran % 68.3 38.0 - 73.0 %    Lymph % 18.0 18.0 - 48.0 %    Mono % 12.0 4.0 - 15.0 %    Eosinophil % 1.1 0.0 - 8.0 %    Basophil % 0.3 0.0 - 1.9 %    Differential Method Automated                Hospital Course: 2/4/22: Pt seen in room this morning, awake and alert.  Able to discuss his medical care, and remains with insight into the fact that alcohol use contributed to his cirrhosis & decompensation, but remains steadfast in resolve that he has stopped drinking and needs no additional help or treatment.  Reports stable  "mood, minimal anxiety, no alcohol cravings, no SI/HI and is future oriented in his thinking.  Has no requests for additional help from psychiatry at this time.       Interval History: see hospital course    Family History    None       Tobacco Use    Smoking status: Current Every Day Smoker     Packs/day: 0.50     Types: Cigarettes    Smokeless tobacco: Never Used   Substance and Sexual Activity    Alcohol use: Yes     Alcohol/week: 4.0 standard drinks     Types: 4 Cans of beer per week    Drug use: Never    Sexual activity: Not Currently     Psychotherapeutics (From admission, onward)            None           Review of Systems   Constitutional: Negative.    Gastrointestinal: Negative.    Musculoskeletal: Negative.    Neurological: Negative.    Psychiatric/Behavioral: Negative.      Objective:     Vital Signs (Most Recent):  Temp: 97.9 °F (36.6 °C) (02/04/22 0740)  Pulse: 84 (02/04/22 0740)  Resp: 18 (02/04/22 0740)  BP: (!) 94/59 (02/04/22 0740)  SpO2: 95 % (02/04/22 0740) Vital Signs (24h Range):  Temp:  [97.9 °F (36.6 °C)-98.3 °F (36.8 °C)] 97.9 °F (36.6 °C)  Pulse:  [79-93] 84  Resp:  [16-18] 18  SpO2:  [93 %-100 %] 95 %  BP: ()/(51-66) 94/59     Height: 5' 11" (180.3 cm)  Weight: 78.8 kg (173 lb 11.6 oz)  Body mass index is 24.23 kg/m².      Intake/Output Summary (Last 24 hours) at 2/4/2022 0900  Last data filed at 2/4/2022 0600  Gross per 24 hour   Intake 240 ml   Output 950 ml   Net -710 ml       Physical Exam  Psychiatric:      Comments: Mental Status Exam:  Appearance: age appropriate, lying in bed, sweatpants with hospital gown, jaundice possibly mildly improved (less yellow appearing)  Behavior/Cooperation: normal, friendly and cooperative  Speech: normal tone, normal rate, normal pitch, normal volume  Mood: "good"  Affect: euthymic and inappropriate  Thought Process: normal and logical  Thought Content: normal, no suicidality, no homicidality, delusions, or paranoia   Orientation: person, " place, situation, time/date  Memory: Intact  Attention Span/Concentration: Normal  Insight: limited  Judgment: fair            Significant Labs: All pertinent labs within the past 24 hours have been reviewed.    Significant Imaging: None       Scheduled Medications:   folic acid  1 mg Oral Daily    multivitamin  1 tablet Oral Daily    mupirocin   Nasal BID    potassium, sodium phosphates  2 packet Oral Once    spironolactone  50 mg Oral Daily    thiamine  100 mg Oral Daily       PRN Medications:  dextrose 50%, dextrose 50%, glucagon (human recombinant), glucose, glucose, melatonin, naloxone, ondansetron, sodium chloride 0.9%    Review of patient's allergies indicates:  No Known Allergies    Assessment/Plan:     Alcohol use disorder, severe, dependence    ASSESSMENT:     DIAGNOSES & PROBLEMS    Alcohol Use Disorder, Severe      STRENGTHS AND LIABILITIES   Strength: Patient has reasonable judgment., Strength: Patient is stable., Liability: Patient has poor health., Liability: pt appears to have very poor insight into degree of alcohol dependence and degree to which treatment will likely be necessary for obtaining long-term sobriety      MOTIVATION TO PURSUE RECOVERY: moderate    ACCEPTANCE OF ADDICTION: limited    ABILITY TO ADHERE TO TREATMENT PLAN: low      PLAN:       MANAGEMENT PLAN, TREATMENT GOALS, THERAPEUTIC TECHNIQUES/APPROACHES & CLINICAL REASONING     At this time pt continues to show limited insight into likely need for some form of substance use treatment or support to maintain long term sobriety; declines current offers of intervention or therapy.  o Pt encouraged to reach out to [facility] in the future if he later becomes interested in substance-related psychiatry services; pt acknowledges.  o Resources list for outpatient treatment options provided to pt on 2/3/22.   No medications/treatment indicated at this time given patient refusal of such intervention.   Pt appears to be outside  of withdrawal window (continues to maintain sobriety since New Year's Day and has been in hospital without access to alcohol since 1/31/22), OK to discontinue PRN Ativan      · Patient counseled on abstinence from alcohol and substances of abuse (illicit and prescription).  · Additional workup planned to address substance use disorder, in order to guide and refine ongoing management options, includes serial alcohol and drug laboratory testing (e.g. PETH, urine toxicology).  · Relapse prevention and motivational interviewing provided.  · Education provided on 12 step recovery programs.      PRESCRIPTION DRUG MANAGEMENT  - The risks and benefits of medication were discussed with this patient.  - Possible expectable adverse effects of any current or proposed individual psychotropic agents were discussed with this patient.  - Counseling was provided on the importance of full compliance with medication regimens.      In cases of emergency, daily coverage provided by Acute/ER Psych MD, NP, or SW, with contact numbers located in [facility] On Call Schedule    Case discussed with staff addiction psychiatrist: BLUE GUTIÉRREZ MD     Addictions Psychiatry will sign off at this time, thank you for involving us in this patient's care.     Juventino Jimenez MD  [facility]  PGY-4

## 2022-02-04 NOTE — HOSPITAL COURSE
2/4/22: Pt seen in room this morning, awake and alert.  Able to discuss his medical care, and remains with insight into the fact that alcohol use contributed to his cirrhosis & decompensation, but remains steadfast in resolve that he has stopped drinking and needs no additional help or treatment.  Reports stable mood, minimal anxiety, no alcohol cravings, no SI/HI and is future oriented in his thinking.  Has no requests for additional help from psychiatry at this time.

## 2022-02-04 NOTE — NURSING TRANSFER
Nursing Transfer Note      2/4/2022     Reason patient is being transferred: returning from ERCP    Transfer From: Endoscopy    Transfer via stretcher    Transfer with patient belongings    Transported by Pct    Medicines sent: Vit K    Any special needs or follow-up needed: none    Chart send with patient: Yes    Notified: patient notified sister    Patient reassessed at:  (02/04/22 1410)    Upon arrival to floor: patient oriented to room, call bell in reach and bed in lowest position

## 2022-02-04 NOTE — ASSESSMENT & PLAN NOTE
71-year-old-man with EtOH abuse who presented to OSH with 1 week of worsening jaundice, abdominal distension and fatigue, found to have new decompensated cirrhosis and bilary obstruction. No fevers or abdominal pain. Transferred for AES and Hepatology evaluation.     At OSH ED, afebrile, VS normal. Exam with ascites and normal mentation. INR 1.6, Na 128, K 2.8, , Alb 1.5, TB 22, , ALT 34, Lipase 3. WBC 8, Hg 11.7, .  Ammonia, 69. CT abdomen with bilary obstruction, dilated CBD 14mm with wall thickening, mild intra and extrahepatic biliary duct dilation, liver with changes consistent with cirrhosis signs of biliary obstruction, dilated CBD proximally to 14mm with wall thickening, mild IH/EH biliary duct dilatation, changes of cirrhosis, and portal hypertension. Sludge vs mass in CBD.     Evaluated by AES, initially no plan for scope due to no dilation seen on Liver US. MRI abdomen with possible biliary stricture. Biliary stent placed during ERCP 2/4.     - AES consulted, appreciate recommendations  - f/u pathology  - f/u blood cx   - see decompensated cirrhosis management

## 2022-02-04 NOTE — ASSESSMENT & PLAN NOTE
"Seen on MRI abdomen. AES consulted. ERCP showed "pancreatic abnormalities with diffuse echogenicity, hyperechoic foci, lobularity in entire pancreas s/p FNA. Enlarged lymph node in nilson hepatis region s/p FNA, cystic lesion in pancreatic tail."    - s/p ERCP 2/4  - f/u pathology  "

## 2022-02-04 NOTE — SUBJECTIVE & OBJECTIVE
"Interval History: see hospital course    Family History    None       Tobacco Use    Smoking status: Current Every Day Smoker     Packs/day: 0.50     Types: Cigarettes    Smokeless tobacco: Never Used   Substance and Sexual Activity    Alcohol use: Yes     Alcohol/week: 4.0 standard drinks     Types: 4 Cans of beer per week    Drug use: Never    Sexual activity: Not Currently     Psychotherapeutics (From admission, onward)            None           Review of Systems   Constitutional: Negative.    Gastrointestinal: Negative.    Musculoskeletal: Negative.    Neurological: Negative.    Psychiatric/Behavioral: Negative.      Objective:     Vital Signs (Most Recent):  Temp: 97.9 °F (36.6 °C) (02/04/22 0740)  Pulse: 84 (02/04/22 0740)  Resp: 18 (02/04/22 0740)  BP: (!) 94/59 (02/04/22 0740)  SpO2: 95 % (02/04/22 0740) Vital Signs (24h Range):  Temp:  [97.9 °F (36.6 °C)-98.3 °F (36.8 °C)] 97.9 °F (36.6 °C)  Pulse:  [79-93] 84  Resp:  [16-18] 18  SpO2:  [93 %-100 %] 95 %  BP: ()/(51-66) 94/59     Height: 5' 11" (180.3 cm)  Weight: 78.8 kg (173 lb 11.6 oz)  Body mass index is 24.23 kg/m².      Intake/Output Summary (Last 24 hours) at 2/4/2022 0900  Last data filed at 2/4/2022 0600  Gross per 24 hour   Intake 240 ml   Output 950 ml   Net -710 ml       Physical Exam  Psychiatric:      Comments: Mental Status Exam:  Appearance: age appropriate, lying in bed, sweatpants with hospital gown, jaundice possibly mildly improved (less yellow appearing)  Behavior/Cooperation: normal, friendly and cooperative  Speech: normal tone, normal rate, normal pitch, normal volume  Mood: "good"  Affect: euthymic and inappropriate  Thought Process: normal and logical  Thought Content: normal, no suicidality, no homicidality, delusions, or paranoia   Orientation: person, place, situation, time/date  Memory: Intact  Attention Span/Concentration: Normal  Insight: limited  Judgment: fair            Significant Labs: All pertinent labs " within the past 24 hours have been reviewed.    Significant Imaging: None

## 2022-02-04 NOTE — PROVATION PATIENT INSTRUCTIONS
Discharge Summary/Instructions after an Endoscopic Procedure  Patient Name: Vic Reyez  Patient MRN: [id number]  Patient YOB: 1950  Friday, February 4, 2022  Chuy Bay MD  Dear patient,  As a result of recent federal legislation (The Federal Cures Act), you may   receive lab or pathology results from your procedure in your [facility]   account before your physician is able to contact you. Your physician or   their representative will relay the results to you with their   recommendations at their soonest availability.  Thank you,  RESTRICTIONS:  During your procedure today, you received medications for sedation.  These   medications may affect your judgment, balance and coordination.  Therefore,   for 24 hours, you have the following restrictions:   - DO NOT drive a car, operate machinery, make legal/financial decisions,   sign important papers or drink alcohol.    ACTIVITY:  Today: no heavy lifting, straining or running due to procedural   sedation/anesthesia.  The following day: return to full activity including work.  DIET:  Eat and drink normally unless instructed otherwise.     TREATMENT FOR COMMON SIDE EFFECTS:  - Mild abdominal pain, nausea, belching, bloating or excessive gas:  rest,   eat lightly and use a heating pad.  - Sore Throat: treat with throat lozenges and/or gargle with warm salt   water.  - Because air was used during the procedure, expelling large amounts of air   from your rectum or belching is normal.  - If a bowel prep was taken, you may not have a bowel movement for 1-3 days.    This is normal.  SYMPTOMS TO WATCH FOR AND REPORT TO YOUR PHYSICIAN:  1. Abdominal pain or bloating, other than gas cramps.  2. Chest pain.  3. Back pain.  4. Signs of infection such as: chills or fever occurring within 24 hours   after the procedure.  5. Rectal bleeding, which would show as bright red, maroon, or black stools.   (A tablespoon of blood from the rectum is not serious, especially if    hemorrhoids are present.)  6. Vomiting.  7. Weakness or dizziness.  GO DIRECTLY TO THE NEAREST EMERGENCY ROOM IF YOU HAVE ANY OF THE FOLLOWING:      Difficulty breathing              Chills and/or fever over 101 F   Persistent vomiting and/or vomiting blood   Severe abdominal pain   Severe chest pain   Black, tarry stools   Bleeding- more than one tablespoon   Any other symptom or condition that you feel may need urgent attention  Your doctor recommends these additional instructions:  If any biopsies were taken, your doctors clinic will contact you in 1 to 2   weeks with any results.  - [facility].   - Return patient to [facility] for [facility] care.   - Resume previous diet.   - Await cytology results.   - Perform an ERCP today.  For questions, problems or results please call your physician - Chuy Bay MD at Work:  [phone].  [facility], EMERGENCY ROOM PHONE NUMBER: [phone]  IF A COMPLICATION OR EMERGENCY SITUATION ARISES AND YOU ARE UNABLE TO REACH   YOUR PHYSICIAN - GO DIRECTLY TO THE EMERGENCY ROOM.  Chuy Bay MD  2/4/2022 1:10:12 PM  This report has been verified and signed electronically.  Dear patient,  As a result of recent federal legislation (The Federal Cures Act), you may   receive lab or pathology results from your procedure in your [facility]   account before your physician is able to contact you. Your physician or   their representative will relay the results to you with their   recommendations at their soonest availability.  Thank you,  PROVATION

## 2022-02-04 NOTE — H&P
Short Stay Endoscopy History and Physical    PCP - [facility]  Referring Physician - Winston Zendejas MD  [address] [place] [address]    Procedure - eus/ercp  ASA - per anesthesia  Mallampati - per anesthesia  History of Anesthesia problems - no  Family history Anesthesia problems -  no   Plan of anesthesia - General    HPI:  This is a 71 y.o. male here for evaluation of: jaundice    Reflux - no  Dysphagia - no  Abdominal pain - no  Diarrhea - no    ROS:  Constitutional: No fevers, chills, No weight loss  CV: No chest pain  Pulm: No cough, No shortness of breath  Ophtho: No vision changes  GI: see HPI  Derm: No rash    Medical History:  has a past medical history of Alcohol abuse (2/2/2022), Decompensated hepatic cirrhosis (2/2/2022), Hypertension, and Lab test positive for detection of COVID-19 virus (09/2021).    Surgical History:  has a past surgical history that includes Knee surgery; Appendectomy; Joint replacement; Eye surgery; and Reconstruction of shoulder (Right).    Family History: family history is not on file..    Social History:  reports that he has been smoking cigarettes. He has been smoking about 0.50 packs per day. He has never used smokeless tobacco. He reports current alcohol use of about 4.0 standard drinks of alcohol per week. He reports that he does not use drugs.    Review of patient's allergies indicates:  No Known Allergies    Medications:   Medications Prior to Admission   Medication Sig Dispense Refill Last Dose    aspirin (ECOTRIN) 81 MG EC tablet Take 81 mg by mouth once daily.   Past Week at Unknown time    calcium-vitamin D (OSCAL) 250 (625)-125 mg-unit per tablet Take 1 tablet by mouth once daily.       docusate sodium (COLACE) 50 MG capsule Take by mouth 2 (two) times daily.       ferrous fumarate (HEMOCYTE) 324 mg (106 mg iron) Tab Take by mouth.       hydrocodone-acetaminophen 7.5-325mg (NORCO) 7.5-325 mg per tablet Take 1  tablet by mouth every 6 (six) hours as needed.       pravastatin (PRAVACHOL) 40 MG tablet Take 40 mg by mouth once daily.       [DISCONTINUED] metoprolol tartrate (LOPRESSOR) 25 MG tablet Take 25 mg by mouth 2 (two) times daily.          Physical Exam:    Vital Signs:   Vitals:    02/04/22 1108   BP: 104/67   Pulse: 94   Resp: 18   Temp: 98.1 °F (36.7 °C)       General Appearance: Well appearing in no acute distress    Labs:  Lab Results   Component Value Date    WBC 7.11 02/04/2022    HGB 9.6 (L) 02/04/2022    HCT 25.7 (L) 02/04/2022     (L) 02/04/2022    ALT 28 02/04/2022    AST 90 (H) 02/04/2022     (L) 02/04/2022    K 3.5 02/04/2022     02/04/2022    CREATININE 0.6 02/04/2022    BUN 7 (L) 02/04/2022    CO2 20 (L) 02/04/2022    INR 1.3 (H) 02/04/2022       I have explained the risks and benefits of this endoscopic procedure to the patient including but not limited to bleeding, inflammation, infection, perforation, and death.      Chuy Bay MD

## 2022-02-05 VITALS
HEART RATE: 86 BPM | RESPIRATION RATE: 18 BRPM | SYSTOLIC BLOOD PRESSURE: 104 MMHG | TEMPERATURE: 97 F | HEIGHT: 71 IN | DIASTOLIC BLOOD PRESSURE: 65 MMHG | WEIGHT: 173.75 LBS | BODY MASS INDEX: 24.32 KG/M2 | OXYGEN SATURATION: 93 %

## 2022-02-05 LAB
AFP SERPL-MCNC: <2 NG/ML (ref 0–8.4)
ALBUMIN SERPL BCP-MCNC: 1.2 G/DL (ref 3.5–5.2)
ALP SERPL-CCNC: 530 U/L (ref 55–135)
ALT SERPL W/O P-5'-P-CCNC: 29 U/L (ref 10–44)
ANION GAP SERPL CALC-SCNC: 4 MMOL/L (ref 8–16)
AST SERPL-CCNC: 99 U/L (ref 10–40)
BASOPHILS # BLD AUTO: 0.02 K/UL (ref 0–0.2)
BASOPHILS NFR BLD: 0.2 % (ref 0–1.9)
BILIRUB SERPL-MCNC: 18.3 MG/DL (ref 0.1–1)
BUN SERPL-MCNC: 8 MG/DL (ref 8–23)
CALCIUM SERPL-MCNC: 8.3 MG/DL (ref 8.7–10.5)
CHLORIDE SERPL-SCNC: 106 MMOL/L (ref 95–110)
CO2 SERPL-SCNC: 21 MMOL/L (ref 23–29)
CREAT SERPL-MCNC: 0.7 MG/DL (ref 0.5–1.4)
DIFFERENTIAL METHOD: ABNORMAL
EOSINOPHIL # BLD AUTO: 0.1 K/UL (ref 0–0.5)
EOSINOPHIL NFR BLD: 1 % (ref 0–8)
ERYTHROCYTE [DISTWIDTH] IN BLOOD BY AUTOMATED COUNT: 17.2 % (ref 11.5–14.5)
EST. GFR  (AFRICAN AMERICAN): >60 ML/MIN/1.73 M^2
EST. GFR  (NON AFRICAN AMERICAN): >60 ML/MIN/1.73 M^2
GLUCOSE SERPL-MCNC: 112 MG/DL (ref 70–110)
HCT VFR BLD AUTO: 27.3 % (ref 40–54)
HGB BLD-MCNC: 10 G/DL (ref 14–18)
IMM GRANULOCYTES # BLD AUTO: 0.04 K/UL (ref 0–0.04)
IMM GRANULOCYTES NFR BLD AUTO: 0.5 % (ref 0–0.5)
INR PPP: 1.2 (ref 0.8–1.2)
LYMPHOCYTES # BLD AUTO: 1.4 K/UL (ref 1–4.8)
LYMPHOCYTES NFR BLD: 17.4 % (ref 18–48)
MAGNESIUM SERPL-MCNC: 1.9 MG/DL (ref 1.6–2.6)
MCH RBC QN AUTO: 34 PG (ref 27–31)
MCHC RBC AUTO-ENTMCNC: 36.6 G/DL (ref 32–36)
MCV RBC AUTO: 93 FL (ref 82–98)
MONOCYTES # BLD AUTO: 0.9 K/UL (ref 0.3–1)
MONOCYTES NFR BLD: 11 % (ref 4–15)
NEUTROPHILS # BLD AUTO: 5.7 K/UL (ref 1.8–7.7)
NEUTROPHILS NFR BLD: 69.9 % (ref 38–73)
NRBC BLD-RTO: 0 /100 WBC
PETH 16:0/18.1 (POPETH): 129 NG/ML
PETH 16:0/18.2 (PLPETH): 111 NG/ML
PHOSPHATE SERPL-MCNC: 2.8 MG/DL (ref 2.7–4.5)
PLATELET # BLD AUTO: 160 K/UL (ref 150–450)
PMV BLD AUTO: 10.1 FL (ref 9.2–12.9)
POTASSIUM SERPL-SCNC: 3.6 MMOL/L (ref 3.5–5.1)
PROT SERPL-MCNC: 6.4 G/DL (ref 6–8.4)
PROTHROMBIN TIME: 11.9 SEC (ref 9–12.5)
RBC # BLD AUTO: 2.94 M/UL (ref 4.6–6.2)
SODIUM SERPL-SCNC: 131 MMOL/L (ref 136–145)
WBC # BLD AUTO: 8.16 K/UL (ref 3.9–12.7)

## 2022-02-05 PROCEDURE — 97161 PT EVAL LOW COMPLEX 20 MIN: CPT

## 2022-02-05 PROCEDURE — 85610 PROTHROMBIN TIME: CPT | Performed by: INTERNAL MEDICINE

## 2022-02-05 PROCEDURE — 99238 HOSP IP/OBS DSCHRG MGMT 30/<: CPT | Mod: GC,,, | Performed by: HOSPITALIST

## 2022-02-05 PROCEDURE — 84100 ASSAY OF PHOSPHORUS: CPT | Performed by: INTERNAL MEDICINE

## 2022-02-05 PROCEDURE — 82105 ALPHA-FETOPROTEIN SERUM: CPT | Performed by: INTERNAL MEDICINE

## 2022-02-05 PROCEDURE — 1111F DSCHRG MED/CURRENT MED MERGE: CPT | Mod: CPTII,GC,, | Performed by: HOSPITALIST

## 2022-02-05 PROCEDURE — 83735 ASSAY OF MAGNESIUM: CPT | Performed by: INTERNAL MEDICINE

## 2022-02-05 PROCEDURE — 36415 COLL VENOUS BLD VENIPUNCTURE: CPT | Performed by: INTERNAL MEDICINE

## 2022-02-05 PROCEDURE — 99238 PR HOSPITAL DISCHARGE DAY,<30 MIN: ICD-10-PCS | Mod: GC,,, | Performed by: HOSPITALIST

## 2022-02-05 PROCEDURE — 1111F PR DISCHARGE MEDS RECONCILED W/ CURRENT OUTPATIENT MED LIST: ICD-10-PCS | Mod: CPTII,GC,, | Performed by: HOSPITALIST

## 2022-02-05 PROCEDURE — 97116 GAIT TRAINING THERAPY: CPT

## 2022-02-05 PROCEDURE — 25000003 PHARM REV CODE 250: Performed by: INTERNAL MEDICINE

## 2022-02-05 PROCEDURE — 80053 COMPREHEN METABOLIC PANEL: CPT | Performed by: INTERNAL MEDICINE

## 2022-02-05 PROCEDURE — 85025 COMPLETE CBC W/AUTO DIFF WBC: CPT | Performed by: INTERNAL MEDICINE

## 2022-02-05 RX ORDER — SIMETHICONE 80 MG
1 TABLET,CHEWABLE ORAL 3 TIMES DAILY PRN
Status: DISCONTINUED | OUTPATIENT
Start: 2022-02-05 | End: 2022-02-05 | Stop reason: HOSPADM

## 2022-02-05 RX ORDER — SPIRONOLACTONE 25 MG/1
75 TABLET ORAL DAILY
Qty: 90 TABLET | Refills: 11 | Status: SHIPPED | OUTPATIENT
Start: 2022-02-05 | End: 2022-02-05

## 2022-02-05 RX ORDER — SPIRONOLACTONE 100 MG/1
100 TABLET, FILM COATED ORAL DAILY
Qty: 30 TABLET | Refills: 11 | Status: SHIPPED | OUTPATIENT
Start: 2022-02-05 | End: 2022-04-21

## 2022-02-05 RX ADMIN — Medication 100 MG: at 09:02

## 2022-02-05 RX ADMIN — SPIRONOLACTONE 75 MG: 25 TABLET ORAL at 09:02

## 2022-02-05 RX ADMIN — MUPIROCIN: 20 OINTMENT TOPICAL at 09:02

## 2022-02-05 RX ADMIN — MULTIPLE VITAMINS W/ MINERALS TAB 1 TABLET: TAB at 09:02

## 2022-02-05 RX ADMIN — FOLIC ACID 1 MG: 1 TABLET ORAL at 09:02

## 2022-02-05 NOTE — PT/OT/SLP EVAL
Physical Therapy Evaluation and Discharge Note    Patient Name:  Vic Reyez   MRN:  [id number]    Recommendations:     Discharge Recommendations:  home   Discharge Equipment Recommendations: none   Barriers to discharge: None    Assessment:     Vic Reyez is a 71 y.o. male admitted with a medical diagnosis of Decompensated hepatic cirrhosis. .  At this time, patient is functioning at their prior level of function and does not require further acute PT services.     Recent Surgery: Procedure(s) (LRB):  ERCP (ENDOSCOPIC RETROGRADE CHOLANGIOPANCREATOGRAPHY) (N/A)  ULTRASOUND, UPPER GI TRACT, ENDOSCOPIC 1 Day Post-Op    Plan:     During this hospitalization, patient does not require further acute PT services.  Please re-consult if situation changes.      Subjective     Chief Complaint: none  Patient/Family Comments/goals: to go home  Pain/Comfort:  · Pain Rating 1: 0/10  · Pain Rating Post-Intervention 1: 0/10    Patients cultural, spiritual, Advent conflicts given the current situation: no    Living Environment:  Pt. Lives with alone in mobile home with 6 MUNIR and grab bar close by.  Prior to admission, patients level of function was amb. With SC.  Equipment used at home: cane, straight.  Upon discharge, patient will not have assistance.    Objective:     Communicated with nursing prior to session.  Patient found supine with peripheral IV upon PT entry to room.    General Precautions: Standard, fall   Orthopedic Precautions:N/A   Braces: N/A   Respiratory Status: Room air    Exams:  · RLE ROM: WFL  · RLE Strength: WFL  · LLE ROM: WFL  · LLE Strength: WFL    Functional Mobility:  · Bed Mobility:     · Rolling Right: modified independence  · Scooting: modified independence  · Supine to Sit: modified independence  · Transfers:     · Sit to Stand:  modified independence with no AD  · Gait: >300' with RW and Supervision without LOB  · Balance: fair+    AM-PAC 6 CLICK MOBILITY  Total Score:22       Therapeutic  Activities and Exercises:   Discussed therapy needs, goals, and POC.    AM-PAC 6 CLICK MOBILITY  Total Score:22     Patient left supine with all lines intact and call button in reach.    GOALS:   Multidisciplinary Problems     Physical Therapy Goals     Not on file                History:     Past Medical History:   Diagnosis Date    Alcohol abuse 2/2/2022    Decompensated hepatic cirrhosis 2/2/2022    Hypertension     Lab test positive for detection of COVID-19 virus 09/2021       Past Surgical History:   Procedure Laterality Date    APPENDECTOMY      EYE SURGERY      JOINT REPLACEMENT      KNEE SURGERY      RECONSTRUCTION OF SHOULDER Right        Time Tracking:     PT Received On: 02/05/22  PT Start Time: 1034     PT Stop Time: 1058  PT Total Time (min): 24 min     Billable Minutes: Evaluation 14 and Gait Training 10      02/05/2022

## 2022-02-05 NOTE — DISCHARGE INSTRUCTIONS
For your liver cirrhosis (scarring), you were started on a new medication called spironolactone to prevent fluid from building up in your body. Continue taking this, as well as the vitamins: folate, thiamine, and multivitamin. It is very important that you stop consuming any alcohol to avoid worsening your liver function. You have a referral to see Psychiatry if you want to see them in clinic for assistance stopping drinking. Do not take Tylenol at home--it can worsen your liver function.    Stop taking metoprolol -- your blood pressure has not been elevated during hospitalization.    Follow up with Hepatology (liver specialists) and Oncology in clinic. You also need to follow up with [facility] in 6 weeks regarding exchange of the stent in your bile duct. You should receive a call to schedule these appointments in a few days. If you do not, please call back to schedule.    You have PCP follow up scheduled for 2/21.    Return to the ER if you develop new symptoms including fever, abdominal pain, or vomiting.

## 2022-02-05 NOTE — PLAN OF CARE
POC reviewed w/ pt, verbalized understanding. Pt AAOx4. VS stable on RA. IS bedside. Denies nausea, chest pain, & SOB. SCD's in place. Pt remains free of fall & injury. No acute events. Bed in lowest position, call light in reach, frequent rounds made for safety.    - pt voids per urinal, dark brown, oily output.  - standy by assist after ERCP procedure yesterday.  - no c/o pain overnight      WCTM

## 2022-02-05 NOTE — MEDICAL/APP STUDENT
Progress Note  [facility] Medicine    Primary Team: [facility] MED 1  Admit Date: 2/2/2022   Length of Stay:  LOS: 3 days   SUBJECTIVE:   Reason for Admission:  Decompensated hepatic cirrhosis    History of Present Illness: Patient is a 71 y.o. male that transferred to [facility] for evaluation of new onset jaundice, abdominal distension, and fatigue. PMHx is significant for hypertension and alcohol abuse.     Hospital course: Pt's suspected biliary obs and decompensated cirrhosis was initially managed with empiric IV ceft and lactulose TID which were D/C'd 2/2. Ascites is currently treated with spironolactone. Pts BP on admission was low and home metoprolol was held. Alcohol abuse is monitored with CIWA q4 and cessation encouraged. Seen by psych substance abuse. Currently not interested in utilizing resources for cessation. MRCP findings for poss PSC & pancreatic lesions. ERCP/EUS performed and awaiting cytology results.    Interval history: Sibling informed of POC. Voided conc brown urine. NAEON. Slept comfortably overnight. Expressed interest in leaving.     Old chart was reviewed.    Review of Systems:  Review of Systems   Constitutional: Negative for chills and fever.   HENT: Negative for congestion and sore throat.    Eyes: Negative for photophobia and redness.   Respiratory: Negative for cough and shortness of breath.    Cardiovascular: Negative for chest pain, palpitations, claudication and leg swelling.   Gastrointestinal: Negative for abdominal pain, nausea and vomiting.   Genitourinary: Negative for dysuria and hematuria.   Musculoskeletal: Negative for falls and myalgias.   Skin: Negative for itching.   Neurological: Negative for dizziness, weakness and headaches.         OBJECTIVE:     Vital Signs (Most Recent)   Temp: 97.2 °F (36.2 °C) (02/05/22 0744)  Pulse: 86 (02/05/22 0744)  Resp: 18 (02/05/22 0744)  BP: 104/65 (02/05/22 0744)  SpO2: (!) 93 % (02/05/22 0744) Body mass index is 24.23 kg/m².   Intake/Outake:  This Shift:  No intake/output data recorded.    Net I/O past 24h:     Intake/Output Summary (Last 24 hours) at 2/5/2022 0753  Last data filed at 2/5/2022 0400  Gross per 24 hour   Intake 470 ml   Output 1250 ml   Net -780 ml             Physical Exam:  Physical Exam  Eyes:      General: Scleral icterus present.   Cardiovascular:      Rate and Rhythm: Normal rate and regular rhythm.   Pulmonary:      Effort: Pulmonary effort is normal.      Breath sounds: Normal breath sounds.   Abdominal:      General: Bowel sounds are normal. There is distension.      Tenderness: There is no abdominal tenderness.   Skin:     Coloration: Skin is jaundiced.   Neurological:      Mental Status: He is alert and oriented to person, place, and time.          Laboratory:  CBC/Anemia Labs: Coags:    Recent Labs   Lab 02/02/22  0537 02/02/22  1016 02/03/22  0340 02/04/22  0539   WBC 8.67  --  7.23 7.11   HGB 9.7*  --  9.9* 9.6*   HCT 26.3*  --  26.7* 25.7*     --  152 142*   MCV 88  --  88 90   RDW 16.4*  --  16.8* 17.1*   IRON  --  96  --   --    FERRITIN  --  862*  --   --     Recent Labs   Lab 01/31/22  1857 01/31/22  1857 02/03/22  0340 02/04/22  0539 02/05/22  0504   INR 1.6*   < > 1.6* 1.3* 1.2   APTT 35.8*  --   --   --   --     < > = values in this interval not displayed.        Chemistries: ABG:   Recent Labs   Lab 02/03/22  0340 02/04/22  0539 02/05/22  0504   * 128* 131*   K 3.1* 3.5 3.6    103 106   CO2 18* 20* 21*   BUN 6* 7* 8   CREATININE 0.7 0.6 0.7   CALCIUM 7.8* 7.9* 8.3*   PROT 6.2 6.1 6.4   BILITOT 16.5* 17.5* 18.3*   ALKPHOS 538* 505* 530*   ALT 27 28 29   AST 80* 90* 99*   MG 1.9 1.8 1.9   PHOS 2.6* 2.6* 2.8    No results for input(s): PH, PCO2, PO2, HCO3, POCSATURATED, BE in the last 168 hours.     Diagnostic Results:  - ODILIA positive, titer 1:1280  - Negative antimitochondrial-Ab  - PETH 111-129 (moderate alcohol consumption)    Medications:  Scheduled Meds:   folic acid  1 mg Oral  Daily    multivitamin  1 tablet Oral Daily    mupirocin   Nasal BID    spironolactone  75 mg Oral Daily    thiamine  100 mg Oral Daily                             Continuous Infusions:  PRN Meds:dextrose 10%, dextrose 10%, glucagon (human recombinant), glucose, glucose, melatonin, naloxone, ondansetron, sodium chloride 0.9%     ASSESSMENT/PLAN:     Active Hospital Problems    Diagnosis  POA    *Decompensated hepatic cirrhosis [K72.90, K74.60]  Yes    Pancreatic lesion [K86.9]  Yes    Coagulopathy [D68.9]  Yes    Biliary stricture [K83.1]  Yes    Alcohol use disorder, severe, dependence [F10.20]  Yes     Chronic    Primary hypertension [I10]  Yes     Chronic    Hypokalemia [E87.6]  Yes    Hyponatremia [E87.1]  Yes      Resolved Hospital Problems    Diagnosis Date Resolved POA    SIMONE (acute kidney injury) [N17.9] 02/03/2022 Yes    Constipation [K59.00] 02/03/2022 Yes     1. Biliary Obs and decompensated cirrhosis  - Moderate ascites  - Labs trending down  - MRI abd showed possible PSC and pancreatic tail lesion  - Spironolactone dose increased to 75mg PO daily  - INR corrected with Vit K     Plan:  - Check ASMA  - No steroids due to age.  - Continue tx with spironolactone 75mg PO  - Give vit K as needed  - Add furosemide when able  - Rehab  - Monitor labs and daily obs.  - Consult with hepatology for discharge goals     2. Sofy HTN  - BP below target   - home BP meds (metoprolol 25mg qd) held     Plan:  - Continue to hold home BP meds  - Monitor BP     3. Alcohol abuse  - 1-2 beers today  - Seen by psych substance abuse     Plan:  - CIWA q4  - Monitor for ISAI, benzos if needed  - Encourage cessation.     VTE Risk Mitigation (From admission, onward)         Ordered     IP VTE LOW RISK PATIENT  Once         02/02/22 0155     Place sequential compression device  Until discontinued         02/02/22 0155              CODE Status- FULL    Dispo- No, awaiting hepatology rec    Dhara Lopez, MS3  [facility]  Medicine  [phone]

## 2022-02-05 NOTE — TREATMENT PLAN
AES Follow-up Note     Vic Reyez was seen and examined.   1 Day Post-Op s/p ERCP   No abdominal discomfort. Tolerating diet.    Vitals stable. Afebrile.     Lab Results   Component Value Date    ALT 29 02/05/2022    AST 99 (H) 02/05/2022    ALKPHOS 530 (H) 02/05/2022    BILITOT 18.3 (H) 02/05/2022    BILITOT 17.5 (H) 02/04/2022    BILITOT 16.5 (H) 02/03/2022       No sx/sx of post-ERCP pancreatitis or other procedural complications.   AES will sign off. Please call if any questions/concerns.  Patient will be contacted with follow-up information.     Nicolas Stroud MD  Gastroenterology & Hepatology Fellow

## 2022-02-05 NOTE — DISCHARGE SUMMARY
[facility] Medicine  Discharge Summary      Patient Name: Vic Reyez  MRN: [id number]  Patient Class: IP- Inpatient  Admission Date: 2/2/2022  Hospital Length of Stay: 3 days  Discharge Date and Time:  02/05/2022 9:46 AM  Attending Physician: Winston Zendejas MD   Discharging Provider: Mayte Bai MD  Primary Care Provider: [facility] Medicine Team: [facility]OSP MED 1 Mayte Bai MD    HPI:   Mr. Reyez is a 71-year-old-man with HTN, constipation, and EtOH abuse, who presented to OSH with 1 week of worsening jaundice, abdominal distension and fatigue, found to have new decompensated cirrhosis and bilary obstruction. Transferred for AES and Hepatology evaluation. Patient reports no abdominal pain. States neighbor encouraged him to be evaluated for new yellowing. Noted his urine has been brown for ~ 1 week. No BM in 3 days. No fever or chills. Some nausea with meals. No emesis or hematemesis. No melena or hematochezia. No confusion, feels like himself. Lives alone. Drinks 1-2 beers/day. Previously drank 6 beers/day.     At OSH ED, afebrile, VS normal. Exam with ascites and normal mentation. INR 1.6, Na 128, K 2.8, , Alb 1.5, TB 22, , ALT 34, Lipase 3. WBC 8, Hg 11.7, .  Ammonia, 69. CT abdomen with bilary obstruction, dilated CBD 14mm with wall thickening, mild intra and extrahepatic biliary duct dilation, liver with chages consistent with cirrhosissigns of biliary obstruction, dilated CBD proximally to 14mm with wall thickening, mild IH/EH biliary duct dilatation, changes of cirrhosis, and portal hypertension. Sludge vs mass in CBD.     Case discussed with Dr. Mccollum in [facility] prior to transfer. Was treated with empiric Rocephin 2g iv x 1, KCL 40meq po x 1 and 10 meq IV x1, and iv fluids in the ED.  Blood cultures are drawn.     MELD-Na score: 28 at 1/31/2022  6:57 PM  MELD score: 23 at 1/31/2022  6:57 PM  Calculated from:  Serum Creatinine:  "0.7 mg/dL (Using min of 1 mg/dL) at 1/31/2022  6:57 PM  Serum Sodium: 128 mmol/L at 1/31/2022  6:57 PM  Total Bilirubin: 21.8 mg/dL at 1/31/2022  6:57 PM  INR(ratio): 1.6 at 1/31/2022  6:57 PM  Age: 71 years    Upon arrival to [facility] [place], patient is alert and oriented, in no acute distress. Afebrile, BP 99/57, HR 79, stating well on RA without tachypnea. Repeat labs pending. AES and Hepatology consulted. Emperic IV ceftriaxone continued. Started lactulose, rifaximin, PO furosemide and spironolactone. NPO.       Procedure(s) (LRB):  ERCP (ENDOSCOPIC RETROGRADE CHOLANGIOPANCREATOGRAPHY) (N/A)  ULTRASOUND, UPPER GI TRACT, ENDOSCOPIC      Hospital Course:   Lasix discontinued for hyponatremia, spironolactone continued. Counseled patient that alcohol abstinence is critical to avoid worsening of his liver function. Addiction Psychiatry consulted regarding alcohol use - patient did not show interest in assistance with alcohol abstinence. Workup of etiology for cirrhosis showed elevated IgG and positive ODILIA, hemochromatosis DNA analysis and anti-smooth muscle Ab pending at discharge. MRI abdomen w/wo contrast showed pancreatic tail mass and possible biliary stricture. Underwent ERCP with AES 2/4 with stent placed for biliary stricture, FNA of enlarged lymph node, and brushings of bifurcation of hepatic duct and cells. Discharged on spironolactone 100 mg daily.    Follow Up:  - PCP on 2/21/22  - AES in 6 weeks for stent exchange  - Hepatology referral placed  - Oncology referral placed  - outpatient Addiction Psychiatry referral placed     Discussed need to return to ER for new or worsening symptoms, including fever, abdominal pain, or vomiting.    Goals of Care Treatment Preferences:  Code Status: Full Code    /65   Pulse 86   Temp 97.2 °F (36.2 °C) (Oral)   Resp 18   Ht 5' 11" (1.803 m)   Wt 78.8 kg (173 lb 11.6 oz)   SpO2 (!) 93%   BMI 24.23 kg/m²     Physical Exam  Vitals and nursing note reviewed. "   Constitutional:       General: He is not in acute distress.     Appearance: He is ill-appearing. He is not toxic-appearing or diaphoretic.   HENT:      Head: Normocephalic and atraumatic.      Nose: No congestion or rhinorrhea.      Mouth/Throat:      Mouth: Mucous membranes are moist.      Pharynx: Oropharynx is clear.   Eyes:      General: Scleral icterus present.      Extraocular Movements: Extraocular movements intact.   Cardiovascular:      Rate and Rhythm: Normal rate and regular rhythm.      Pulses: Normal pulses.      Heart sounds: Normal heart sounds. No murmur heard.  Pulmonary:      Effort: Pulmonary effort is normal. No respiratory distress.      Breath sounds: Normal breath sounds.   Abdominal:      General: Bowel sounds are normal. There is distension.      Palpations: Abdomen is soft.      Tenderness: There is no abdominal tenderness. There is no right CVA tenderness, left CVA tenderness, guarding or rebound.      Comments: ascites with fluid wave    Musculoskeletal:         General: Normal range of motion.      Cervical back: Normal range of motion and neck supple.      Right lower leg: Edema present.      Left lower leg: Edema present.   Skin:     General: Skin is warm and dry.      Capillary Refill: Capillary refill takes less than 2 seconds.      Coloration: Skin is jaundiced.   Neurological:      General: No focal deficit present.      Mental Status: He is alert and oriented to person, place, and time.      Sensory: No sensory deficit.      Motor: No weakness.      Comments: No asterixis    Psychiatric:         Mood and Affect: Mood normal.         Thought Content: Thought content normal.     Consults:   Consults (From admission, onward)        Status Ordering Provider     Inpatient consult to Psychiatry  Once        Provider:  (Not yet assigned)    Completed MAAME ROMERO consult to case management  Once        Provider:  (Not yet assigned)    Acknowledged KIRIT NAIR     Inpatient  consult to Hepatology  Once        Provider:  (Not yet assigned)    Completed ROBBIN RUIZ     Inpatient consult to [facility] ([facility])  Once        Provider:  (Not yet assigned)    Completed ROBBIN RUIZ          No new Assessment & Plan notes have been filed under this hospital service since the last note was generated.  Service: Hospital Medicine    Final Active Diagnoses:    Diagnosis Date Noted POA    PRINCIPAL PROBLEM:  Decompensated hepatic cirrhosis [K72.90, K74.60] 02/02/2022 Yes    Pancreatic lesion [K86.9] 02/04/2022 Yes    Biliary stricture [K83.1] 02/02/2022 Yes    Hypokalemia [E87.6] 02/02/2022 Yes    Hyponatremia [E87.1] 02/02/2022 Yes    Alcohol use disorder, severe, dependence [F10.20] 02/02/2022 Yes     Chronic    Primary hypertension [I10] 02/02/2022 Yes     Chronic    Coagulopathy [D68.9] 02/03/2022 Yes      Problems Resolved During this Admission:    Diagnosis Date Noted Date Resolved POA    Constipation [K59.00] 02/02/2022 02/03/2022 Yes    SIMONE (acute kidney injury) [N17.9] 02/02/2022 02/03/2022 Yes       Discharged Condition: stable    Disposition: Home or Self Care    Follow Up:   Follow-up Information     [facility] Physician [facility] On 2/21/2022.    Specialty: Neurosurgery  Why: ESTABLISH CARE WITH NEW PRIMARY CARE PHYSICIAN, Aleks Prakash DO, 2/21/2022 @ 3:30pm.  PLEASE ARRIVE AT 3:00PM WITH MEDICATION BOTTLES, INSURANCE CARD, ID and wear a masl.  Contact information:  [address]  [place] [address] [phone]             Schedule an appointment as soon as possible for a visit with Hepatology Clinic - [facility].    Specialty: Hepatology  Why: regarding liver cirrhosis  Contact information:  [address]  [facility] [address] [phone]  Additional information:  [facility] - Main Building, 1st floor near Clinic elevator   Please park in [facility] and walk through Clinic elevator  [facility]           [place] [facility] - [facility] 4th Fl. Schedule an appointment as soon as possible for a visit in 6 weeks.    Specialty: Gastroenterology  Why: for stent follow up  Contact information:  [address], 4th Floor  [facility] [id number] [phone]  Additional information:  GI Center - Main Building, 4th Floor   Please park in [address] and use Atrium elevator           Schedule an appointment as soon as possible for a visit with [place] - Psych [facility] 4th Fl.    Specialty: Psychiatry  Why: regarding alcohol abstinence  Contact information:  [address]  [facility] [id number] [phone]  Additional information:  [facility] 4th Floor, [address]   Please park in [address] and use [facility] elevator           Schedule an appointment as soon as possible for a visit with [facility] - Lab 3rd Fl.    Specialty: Lab  Why: regarding pathology results  Contact information:  [address]  [facility] [phone] [phone]  Additional information:  Please park in the Cancer Center surface lot on the [place] side and check in on the 3rd floor                     Patient Instructions:      Ambulatory referral/consult to Hepatology   Standing Status: Future   Referral Priority: Routine Referral Type: Consultation   Referral Reason: Specialty Services Required   Requested Specialty: Hepatology   Number of Visits Requested: 1     Ambulatory referral/consult to Addiction Psychiatry   Standing Status: Future   Referral Priority: Routine Referral Type: Psychiatric   Referral Reason: Specialty Services Required   Requested Specialty: Addiction Medicine   Number of Visits Requested: 1     Ambulatory referral/consult to Hematology / Oncology   Standing Status: Future   Referral Priority: Routine Referral Type: Consultation   Referral Reason: Specialty Services Required   Requested Specialty: Hematology and Oncology   Number of  Visits Requested: 1       Significant Diagnostic Studies: Labs:   CMP   Recent Labs   Lab 02/04/22  0539 02/05/22  0504   * 131*   K 3.5 3.6    106   CO2 20* 21*   GLU 97 112*   BUN 7* 8   CREATININE 0.6 0.7   CALCIUM 7.9* 8.3*   PROT 6.1 6.4   ALBUMIN 1.1* 1.2*   BILITOT 17.5* 18.3*   ALKPHOS 505* 530*   AST 90* 99*   ALT 28 29   ANIONGAP 5* 4*   ESTGFRAFRICA >60.0 >60.0   EGFRNONAA >60.0 >60.0     Microbiology:   Blood Culture   Lab Results   Component Value Date    LABBLOO No Growth to date 01/31/2022    LABBLOO No Growth to date 01/31/2022    LABBLOO No Growth to date 01/31/2022    LABBLOO No Growth to date 01/31/2022    LABBLOO No Growth to date 01/31/2022    LABBLOO No Growth to date 01/31/2022    LABBLOO No Growth to date 01/31/2022    LABBLOO No Growth to date 01/31/2022     Endoscopy: ERCP 2/4/22  Findings:  - normal esophagus, stomach and examined duodenum. PHG. Pancreatic abnormalities with diffuse echogenicity, hyperechoic foci, lobularity in entire pancreas s/p FNA. Enlarged lymph node in nilson hepatis region s/p FNA, cystic lesion in pancreatic tail  - localized biliary stricture, biliary sphincterotomy performed, biopsy at bifurcation of hepatic duct and cells obtained by brushing, CBD stent placed    Recommendations:  - f/u path  - repeat ERCP in 6 weeks to exchange stent    Pending Diagnostic Studies:     Procedure Component Value Units Date/Time    Anti-Smooth Muscle Antibody [[id number]] Collected: 02/02/22 1016    Order Status: Sent Lab Status: In process Updated: 02/02/22 1053    Specimen: Blood     Cytology, Fluid/Wash/Brush [[id number]] Collected: 02/04/22 1307    Order Status: Sent Lab Status: In process Updated: 02/05/22 0054    Cytology- FNA Radiology Guided, Bronch/EBUS, EUS/GI [[id number]] Collected: 02/04/22 1240    Order Status: Sent Lab Status: In process Updated: 02/04/22 1240    Hemochromatosis DNA Analysis (PCR) [[id number]] Collected: 02/03/22 1004    Order Status: Sent Lab  Status: In process Updated: 02/03/22 1039    Specimen: Blood          Medications:  Reconciled Home Medications:      Medication List      START taking these medications    folic acid 1 MG tablet  Commonly known as: FOLVITE  Take 1 tablet (1 mg total) by mouth once daily.     multivitamin with folic acid 400 mcg Tab  Commonly known as: DAILY-ROHAN (WITH FOLIC ACID)  Take 1 tablet by mouth once daily.     spironolactone 100 MG tablet  Commonly known as: ALDACTONE  Take 1 tablet (100 mg total) by mouth once daily.     thiamine 100 MG tablet  Take 1 tablet (100 mg total) by mouth once daily.        CONTINUE taking these medications    aspirin 81 MG EC tablet  Commonly known as: ECOTRIN  Take 81 mg by mouth once daily.     calcium-vitamin D 250 (625)-125 mg-unit per tablet  Commonly known as: OSCAL  Take 1 tablet by mouth once daily.     docusate sodium 50 MG capsule  Commonly known as: COLACE  Take by mouth 2 (two) times daily.     HEMOCYTE 324 mg (106 mg iron) Tab  Generic drug: ferrous fumarate  Take by mouth.     HYDROcodone-acetaminophen 7.5-325 mg per tablet  Commonly known as: NORCO  Take 1 tablet by mouth every 6 (six) hours as needed.     pravastatin 40 MG tablet  Commonly known as: PRAVACHOL  Take 40 mg by mouth once daily.        STOP taking these medications    metoprolol tartrate 25 MG tablet  Commonly known as: LOPRESSOR     naproxen 500 MG tablet  Commonly known as: NAPROSYN            Indwelling Lines/Drains at time of discharge:   Lines/Drains/Airways     None                 Time spent on the discharge of patient: 35 minutes         Mayte Bai MD  Department of Hospital Medicine  [facility]

## 2022-02-05 NOTE — NURSING
Pt discharged to home with sister. Discharge instructions reviewed with pt, pt verbalized understanding. Follow-up care reviewed with pt, who verbalized understanding. Meds delivered bedside by pharmacy. Pt left with all belongings.

## 2022-02-06 LAB
BACTERIA BLD CULT: NORMAL
BACTERIA BLD CULT: NORMAL

## 2022-02-07 ENCOUNTER — TELEPHONE (OUTPATIENT)
Dept: HEPATOLOGY | Facility: CLINIC | Age: 72
End: 2022-02-07
Payer: OTHER GOVERNMENT

## 2022-02-07 ENCOUNTER — TELEPHONE (OUTPATIENT)
Dept: ENDOSCOPY | Facility: HOSPITAL | Age: 72
End: 2022-02-07
Payer: OTHER GOVERNMENT

## 2022-02-07 DIAGNOSIS — K83.1 BILIARY STRICTURE: Primary | ICD-10-CM

## 2022-02-07 LAB
ANTI SM ANTIBODY: 0.09 RATIO (ref 0–0.99)
ANTI SM/RNP ANTIBODY: 0.09 RATIO (ref 0–0.99)
ANTI-SM INTERPRETATION: NEGATIVE
ANTI-SM/RNP INTERPRETATION: NEGATIVE
ANTI-SSA ANTIBODY: 0.08 RATIO (ref 0–0.99)
ANTI-SSA INTERPRETATION: NEGATIVE
ANTI-SSB ANTIBODY: 0.07 RATIO (ref 0–0.99)
ANTI-SSB INTERPRETATION: NEGATIVE
BACTERIA FLD AEROBE CULT: NO GROWTH
DSDNA AB SER-ACNC: NORMAL [IU]/ML
GRAM STN SPEC: NORMAL
GRAM STN SPEC: NORMAL

## 2022-02-07 NOTE — TELEPHONE ENCOUNTER
----- Message from Abby Ballesteros, RN sent at 2/7/2022  1:31 PM CST -----  Regarding: Saturday Hospital discharge  Maryjane Reyez is a patient seen in the hospital with a biliary dilatation requiring an ERCP. MRI abd showed possible PSC and pancreatic tail lesion. He also has a history of alcohol but states he has cut back a great deal.  Dr. Khan needs to see him in 2-4 weeks. I am sure he will be discussed at IR conference.   Annabelle

## 2022-02-08 LAB — SMOOTH MUSCLE AB TITR SER IF: NORMAL {TITER}

## 2022-02-09 LAB — BACTERIA SPEC ANAEROBE CULT: NORMAL

## 2022-02-09 NOTE — PHYSICIAN QUERY
PT Name: Vic Reyez  MR #: [id number]  DOCUMENTATION CLARIFICATION      CDS/: Carolann Hung RN, CDI            Contact information:[email or web address]    This form is a permanent document in the medical record.      Query Date: February 9, 2022    By submitting this query, we are merely seeking further clarification of documentation. Please utilize your independent clinical judgment when addressing the question(s) below.    The Medical Record contains the following:   Indicators  Supporting Clinical Findings Location in Medical Record   x PT        INR        PTT INR 1.6-->1.3-->1.2 Lab 2/3-5   x Platelets 152-->142-->160 Lab 2/3-5   x Coagulopathy or Coagulation Defect documented Coagulopathy  DC summary 2/5   x Acute/Chronic Illness 71-year-old-man with HTN, constipation, and EtOH abuse, who presented to OSH with 1 week of worsening jaundice, abdominal distension and fatigue, found to have new decompensated cirrhosis and bilary obstruction.  PN 2/3   x Treatment vit k for INR     Recommend daily INR.   PN 2/3    Hept consult 2/2    Other       Provider, please specify coagulopathy associated with above clinical findings.  [   ] Coagulation Defect due to (please specify):_________   [X] Coagulation Factor Deficiency due to Liver Disease    [   ] Coagulation deficiency unspecified   [   ] Other hematological diagnosis (please specify):_______   [   ] Clinically Undetermined       Please document in your progress notes daily for the duration of treatment until resolved, and include in your discharge summary.

## 2022-02-12 LAB
GENETICIST REVIEW: NORMAL
HFE GENE MUT ANL BLD/T: NORMAL
HFE RELEASED BY: NORMAL
HFE RESULT SUMMARY: NEGATIVE
REF LAB TEST METHOD: NORMAL
SPECIMEN SOURCE: NORMAL
SPECIMEN,  HEMOCHROMATOSIS: NORMAL

## 2022-02-15 ENCOUNTER — TELEPHONE (OUTPATIENT)
Dept: HEMATOLOGY/ONCOLOGY | Facility: CLINIC | Age: 72
End: 2022-02-15
Payer: OTHER GOVERNMENT

## 2022-02-17 LAB
ADEQUACY: NORMAL
COMMENT: ABNORMAL
COMMENT: NORMAL
FINAL PATHOLOGIC DIAGNOSIS: ABNORMAL
FINAL PATHOLOGIC DIAGNOSIS: NORMAL
Lab: ABNORMAL
Lab: NORMAL
MICROSCOPIC EXAM: ABNORMAL
MICROSCOPIC EXAM: NORMAL

## 2022-02-23 ENCOUNTER — TELEPHONE (OUTPATIENT)
Dept: HEPATOLOGY | Facility: CLINIC | Age: 72
End: 2022-02-23
Payer: OTHER GOVERNMENT

## 2022-02-23 NOTE — TELEPHONE ENCOUNTER
Spoke with patient. Patient stated that he lives in [place] and he can't come to [place] for hospital follow up. Scheduled him in [place] with Dr. Toledo. Mailed appointment letter to patient.

## 2022-03-02 LAB — FUNGUS SPEC CULT: NORMAL

## 2022-03-10 ENCOUNTER — TELEPHONE (OUTPATIENT)
Dept: ENDOSCOPY | Facility: HOSPITAL | Age: 72
End: 2022-03-10
Payer: OTHER GOVERNMENT

## 2022-03-10 NOTE — TELEPHONE ENCOUNTER
Telephoned patient to confirm appointment for ERCP on 3/18/2022 at 11:00am with no answer. Left voicemail message with my direct contact number for patient to return phone call.

## 2022-03-11 ENCOUNTER — TELEPHONE (OUTPATIENT)
Dept: ENDOSCOPY | Facility: HOSPITAL | Age: 72
End: 2022-03-11
Payer: OTHER GOVERNMENT

## 2022-03-14 ENCOUNTER — TELEPHONE (OUTPATIENT)
Dept: ENDOSCOPY | Facility: HOSPITAL | Age: 72
End: 2022-03-14
Payer: OTHER GOVERNMENT

## 2022-03-14 NOTE — TELEPHONE ENCOUNTER
Anita,    Patient needs to be rescheduled for procedure (ERCP) on a later date.    Thanks,  Amber Saucedo

## 2022-03-14 NOTE — TELEPHONE ENCOUNTER
Telephoned patient to confirm appointment for ERCP on 3/18/2022 at 11:00am with no answer. Left voicemail message with my direct contact number for patient to return phone call

## 2022-04-01 ENCOUNTER — TELEPHONE (OUTPATIENT)
Dept: ENDOSCOPY | Facility: HOSPITAL | Age: 72
End: 2022-04-01
Payer: OTHER GOVERNMENT

## 2022-04-01 DIAGNOSIS — K74.60 HEPATIC CIRRHOSIS, UNSPECIFIED HEPATIC CIRRHOSIS TYPE, UNSPECIFIED WHETHER ASCITES PRESENT: Primary | ICD-10-CM

## 2022-04-01 NOTE — TELEPHONE ENCOUNTER
Received request to schedule patient for ERCP Spoke with Patient's sister. Reviewed medical history and medications. Instructed on procedure and prep. Patient's sister verbalized understanding of instructions. E-mailed copy of instructions to address in chart and mailed to patient and sister.

## 2022-04-12 ENCOUNTER — TELEPHONE (OUTPATIENT)
Dept: ENDOSCOPY | Facility: HOSPITAL | Age: 72
End: 2022-04-12
Payer: OTHER GOVERNMENT

## 2022-04-12 NOTE — TELEPHONE ENCOUNTER
Telephoned pt's sister with new arrival time of 10:45am for pt's ERCP on 4/19/22.  Bhargavi confirmed.  Updated instructions sent via email-DS

## 2022-04-18 NOTE — PROGRESS NOTES
[facility] Consultation Note    Reason for Consult:  The encounter diagnosis was Decompensated hepatic cirrhosis.    PCP: [facility]   [address] / [place] [id number]    HPI:  This is a 71 y.o. male here for evaluation of: decomp cirrhosis    71-year-old-man with EtOH abuse, presented to OSH with 1 week of worsening jaundice, abdominal distension and fatigue, found to have new decompensated cirrhosis and biliary obstruction. On 2/2/22, he was [place] [facility] [place] [facility] [place] evaluation. Patient reports no abdominal pain. No fever or chills. Some nausea with meals. No emesis or hematemesis. No melena or hematochezia. No confusion, feels like himself. Lives alone. Drinks 1-2 beers/day. Previously drank 6 beers/day.  4/21/22: Patient states he has stopped drinking alcohol.      Exam with ascites and normal mentation. INR 1.6, Na 128, K 2.8, , Alb 1.5, TB 22, , ALT 34, Lipase 3. WBC 8, Hg 11.7, .  Ammonia, 69. CT abdomen with bilary obstruction, dilated CBD 14mm with wall thickening, mild intra and extrahepatic biliary duct dilation, liver with chages consistent with cirrhosis signs of biliary obstruction, dilated CBD proximally to 14mm with wall thickening, mild IH/EH biliary duct dilatation, changes of cirrhosis, and portal hypertension. Sludge vs mass in CBD.      Case discussed with Dr. Mccollum in [facility] prior to transfer. Was treated with empiric Rocephin 2g iv x 1, KCL 40meq po x 1 and 10 meq IV x1, and iv fluids in the ED.  Blood cultures are drawn.     MELD-Na score: 28 at 1/31/2022  6:57 PM  MELD score: 23 at 1/31/2022  6:57 PM  Calculated from:  Serum Creatinine: 0.7 mg/dL (Using min of 1 mg/dL) at 1/31/2022  6:57 PM  Serum Sodium: 128 mmol/L at 1/31/2022  6:57 PM  Total Bilirubin: 21.8 mg/dL at 1/31/2022  6:57 PM  INR(ratio): 1.6 at 1/31/2022  6:57 PM  Age: 71 years     Upon arrival to [facility], patient is alert and oriented, in no  acute distress. Afebrile, BP 99/57, HR 79, stating well on RA without tachypnea. Repeat labs pending. AES and Hepatology consulted. Emperic IV ceftriaxone continued. Started lactulose, rifaximin, PO furosemide and spironolactone. NPO.         Procedure(s) (LRB):  ERCP (ENDOSCOPIC RETROGRADE CHOLANGIOPANCREATOGRAPHY) (N/A)  ULTRASOUND, UPPER GI TRACT, ENDOSCOPIC       Hospital Course:   Lasix discontinued for hyponatremia, spironolactone continued. Counseled patient that alcohol abstinence is critical to avoid worsening of his liver function. Addiction Psychiatry consulted regarding alcohol use - patient did not show interest in assistance with alcohol abstinence. Workup of etiology for cirrhosis showed elevated IgG and positive ODILIA, hemochromatosis DNA analysis and anti-smooth muscle Ab pending at discharge. MRI abdomen w/wo contrast showed pancreatic tail mass and possible biliary stricture. Underwent ERCP with AES 2/4 with stent placed for biliary stricture, FNA of enlarged lymph node, and brushings of bifurcation of hepatic duct and cells. Discharged on spironolactone 100 mg daily.      Endoscopy: ERCP 2/4/22  Findings:  - normal esophagus, stomach and examined duodenum. PHG. Pancreatic abnormalities with diffuse echogenicity, hyperechoic foci, lobularity in entire pancreas s/p FNA. Enlarged lymph node in nilson hepatis region s/p FNA, cystic lesion in pancreatic tail  - localized biliary stricture, biliary sphincterotomy performed, biopsy at bifurcation of hepatic duct and cells obtained by brushing, CBD stent placed.    Since his discharge patient developed abd distention,      Recommendations:  - f/u path  - repeat ERCP in 6 weeks to exchange stent      Interval History 4/21/22:     Patient states he has stopped drinking alcohol.  Abd distention started 2 days after he was discharged.  He returned to [facility] on 4/19/22, for repeat ERCP.  Stent was removed and not replaced.      He needed paracentesis which was not  done.  He is here today with abd discomfort from ascites.      1.  Get CBC, CMP, PT INR today, and every 2 weeks x 4, after that every 4 weeks.   2.  Paracentesis (after checking with [facility] if witholding for 5 days is needed) with fluid for cell count and diff.    3.  HCC surveillance with AFP and US limited every 6 months.   4.  Start furosemide 20 mg/d and spironolactone 25 mg/d     Symptoms: abd discomfort due to ascites.    Primary hepatic manifestations:  Fatigue:Yes  Edema:Yes  Ascites:Yes  Encephalopathy:No  Abdominal pain:No  GI bleeds: No  Pruritus:No  Weight Changes:Yes  Changes in Bowel habits: No  Muscle cramps:No    Risk factors for liver disease:  No jaundice  No transfusions  No IVDU  Did not snort cocaine or similar agents  Did not live with anyone with hepatitis B or C  Sexual partner not tested  No hepatotoxic medications  No exposure to industrial toxins  Alcohol: stopped drinking      ROS:  Constitutional: No fevers, chills, weight changes, fatigue  ENT: No allergies, nosebleeds,   CV: No chest pain  Pulm: No cough, shortness of breath  Ophtho: No vision changes  GI/Liver: see HPI  Derm: No rash, itching  Heme: No swollen glands, bruising  MSK: No joint pains, joint swelling  : No dysuria, hematuria, decrease in urine output  Endo: No hot or cold intolerance  Neuro: No confusion, disorientation, difficulty with sleep, memory, concentration, syncope, seizure  Psych: No anxiety, depression    Medical History:  has a past medical history of Alcohol abuse (02/02/2022), Decompensated hepatic cirrhosis (02/02/2022), Encounter for blood transfusion, Hypertension, and Lab test positive for detection of COVID-19 virus (09/2021).    Surgical History:  has a past surgical history that includes Knee surgery; Appendectomy; Joint replacement; Eye surgery; Reconstruction of shoulder (Right); ERCP (N/A, 02/04/2022); Endoscopic ultrasound of upper gastrointestinal tract (02/04/2022); Colonoscopy; Tonsillectomy;  and ERCP (N/A, 4/19/2022).    Family History: family history is not on file..     Social History:  reports that he has been smoking cigarettes. He has been smoking about 0.25 packs per day. He has never used smokeless tobacco. He reports current alcohol use of about 4.0 standard drinks of alcohol per week. He reports that he does not use drugs.    Review of patient's allergies indicates:  No Known Allergies    Current Outpatient Rx   Medication Sig Dispense Refill    aspirin (ECOTRIN) 81 MG EC tablet Take 81 mg by mouth once daily.      calcium-vitamin D (OSCAL) 250 (625)-125 mg-unit per tablet Take 1 tablet by mouth once daily.      docusate sodium (COLACE) 50 MG capsule Take by mouth 2 (two) times daily.      ferrous fumarate 324 mg (106 mg iron) Tab Take by mouth.      folic acid (FOLVITE) 1 MG tablet Take 1 tablet (1 mg total) by mouth once daily. 360 tablet 0    hydrocodone-acetaminophen 7.5-325mg (NORCO) 7.5-325 mg per tablet Take 1 tablet by mouth every 6 (six) hours as needed.      multivitamin with folic acid (DAILY-ROHAN, WITH FOLIC ACID,) 400 mcg Tab Take 1 tablet by mouth once daily. 30 tablet 0    pravastatin (PRAVACHOL) 40 MG tablet Take 40 mg by mouth once daily.      thiamine 100 MG tablet Take 1 tablet (100 mg total) by mouth once daily. 360 tablet 0    furosemide (LASIX) 40 MG tablet Take 0.5 tablets (20 mg total) by mouth once daily. 15 tablet 11    spironolactone (ALDACTONE) 50 MG tablet Take 0.5 tablets (25 mg total) by mouth once daily. 15 tablet 11       Objective Findings:    Vital Signs:  BP 94/65 (BP Location: Left arm, Patient Position: Sitting, BP Method: Small (Automatic))   Pulse 70   Temp 98.4 °F (36.9 °C) (Oral)   Wt 73 kg (160 lb 15 oz)   BMI 22.45 kg/m²   Body mass index is 22.45 kg/m².    Physical Exam:  General Appearance: Well appearing in no acute distress  Head:   Normocephalic, without obvious abnormality  Eyes:    No scleral icterus, EOMI  ENT: Neck supple,  Lips, mucosa, and tongue normal; teeth and gums normal  Lungs: CTA bilaterally in anterior and posterior fields, no wheezes, no crackles.  Heart:  Regular rate and rhythm, S1, S2 normal, no murmurs heard  Abdomen: Soft, non tender, non distended with positive bowel sounds in all four quadrants. No hepatosplenomegaly, ascites, or mass  Extremities: 2+ pulses, no clubbing, cyanosis or edema  Skin: No rash  Neurologic: CN II-XII intact, alert, oriented x 3. No asterixis      Labs:  Lab Results   Component Value Date    WBC 4.50 04/19/2022    HGB 12.1 (L) 04/19/2022    HCT 35.2 (L) 04/19/2022     04/19/2022    INR 1.1 04/19/2022    CREATININE 0.7 02/05/2022    BUN 8 02/05/2022    BILITOT 18.3 (H) 02/05/2022    ALT 29 02/05/2022    AST 99 (H) 02/05/2022    ALKPHOS 530 (H) 02/05/2022     (L) 02/05/2022    K 3.6 02/05/2022     02/05/2022    CO2 21 (L) 02/05/2022    AFP <2.0 02/05/2022       Imaging:       Endoscopy:      Assessment:  1. Decompensated hepatic cirrhosis      Decomp cirrhosis,   Biliary stricture - post ERCP stent placement.  Biopsy/brushings cytology:   1. Direct smears and cell block sections show predominantly peripheral blood   and rare gastrointestinal contaminant with sparse tiny crushed fragments of   fibrotic and chronically inflamed pancreatic parenchyma.   Immunohistochemistry is performed for pancytokeratin on block 1A, which is   negative in fibroinflammatory stromal fragments, supportive of the above   diagnosis.   2. Direct smears and cell block sections show lymphoid tissue without   evidence of carcinoma. There is focal preparation artifact on smeared slides.   Immunohistochemistry is performed for pancytokeratin and CD45 on block 2A.   Lymphoid tissue is diffusely positive for CD45 and negative for   pancytokeratin, in keeping with the above diagnosis.     Recommendations:  1.  Get CBC, CMP, PT INR today, and every 2 weeks x 4, then every 4 weeks.   2.  Paracentesis (after  checking with [facility] if witholding for 5 days is needed) u/s guided, with fluid for cell count and diff.  Limit 5 liters.   3.  HCC surveillance with AFP and US limited every 6 months.   4.  Start furosemide 20 mg/d and spironolactone 25 mg/d  -  Low salt in the diet, avoid canned, bottled and processed foods.  -  Continue current meds  -  Start spironolactone 25 mg daily  -  Start furosemide 20 mg daily.   -  Avoid high intake of Tylenol (more than 4 extra-strength pills in one day)  -  Call us if any bleeding, fevers, confusion, disorientation occur  -  Endoscopy: per GI at [facility]  -  Transplant option discussed, will evaluate when MELD >15.  -  Return in 2 months.       Follow up in about 2 months (around 6/21/2022).      Order summary:  Orders Placed This Encounter   Procedures    US Abdomen Limited    AFP Tumor Marker    CBC Auto Differential    Comprehensive Metabolic Panel    Protime-INR       Thank you so much for allowing me to participate in the care of Vic JERZY Toledo MD

## 2022-04-19 ENCOUNTER — ANESTHESIA (OUTPATIENT)
Dept: ENDOSCOPY | Facility: HOSPITAL | Age: 72
End: 2022-04-19
Payer: MEDICARE

## 2022-04-19 ENCOUNTER — HOSPITAL ENCOUNTER (OUTPATIENT)
Facility: HOSPITAL | Age: 72
Discharge: HOME | End: 2022-04-19
Attending: INTERNAL MEDICINE | Admitting: INTERNAL MEDICINE
Payer: MEDICARE

## 2022-04-19 ENCOUNTER — ANESTHESIA EVENT (OUTPATIENT)
Dept: ENDOSCOPY | Facility: HOSPITAL | Age: 72
End: 2022-04-19
Payer: MEDICARE

## 2022-04-19 VITALS
SYSTOLIC BLOOD PRESSURE: 122 MMHG | RESPIRATION RATE: 18 BRPM | BODY MASS INDEX: 23.8 KG/M2 | DIASTOLIC BLOOD PRESSURE: 87 MMHG | WEIGHT: 170 LBS | HEIGHT: 71 IN | HEART RATE: 103 BPM | OXYGEN SATURATION: 100 % | TEMPERATURE: 98 F

## 2022-04-19 DIAGNOSIS — K83.1 BILIARY OBSTRUCTION: ICD-10-CM

## 2022-04-19 PROCEDURE — 74328 PR  X-RAY FOR BILE DUCT ENDOSCOPY: ICD-10-PCS | Mod: 26,,, | Performed by: INTERNAL MEDICINE

## 2022-04-19 PROCEDURE — 00732 ANES UPR GI NDSC PX ERCP: CPT | Performed by: INTERNAL MEDICINE

## 2022-04-19 PROCEDURE — 25500020 PHARM REV CODE 255: Performed by: INTERNAL MEDICINE

## 2022-04-19 PROCEDURE — D9220A PRA ANESTHESIA: Mod: ANES,,, | Performed by: STUDENT IN AN ORGANIZED HEALTH CARE EDUCATION/TRAINING PROGRAM

## 2022-04-19 PROCEDURE — 27201089 HC SNARE, DISP (ANY): Performed by: INTERNAL MEDICINE

## 2022-04-19 PROCEDURE — 43275 ERCP REMOVE FORGN BODY DUCT: CPT | Performed by: INTERNAL MEDICINE

## 2022-04-19 PROCEDURE — D9220A PRA ANESTHESIA: ICD-10-PCS | Mod: CRNA,,, | Performed by: NURSE ANESTHETIST, CERTIFIED REGISTERED

## 2022-04-19 PROCEDURE — 27202125 HC BALLOON, EXTRACTION (ANY): Performed by: INTERNAL MEDICINE

## 2022-04-19 PROCEDURE — 43275 ERCP REMOVE FORGN BODY DUCT: CPT | Mod: ,,, | Performed by: INTERNAL MEDICINE

## 2022-04-19 PROCEDURE — 37000009 HC ANESTHESIA EA ADD 15 MINS: Performed by: INTERNAL MEDICINE

## 2022-04-19 PROCEDURE — 25000003 PHARM REV CODE 250: Performed by: NURSE ANESTHETIST, CERTIFIED REGISTERED

## 2022-04-19 PROCEDURE — C1769 GUIDE WIRE: HCPCS | Performed by: INTERNAL MEDICINE

## 2022-04-19 PROCEDURE — 25000003 PHARM REV CODE 250: Performed by: INTERNAL MEDICINE

## 2022-04-19 PROCEDURE — 74328 X-RAY BILE DUCT ENDOSCOPY: CPT | Mod: 26,,, | Performed by: INTERNAL MEDICINE

## 2022-04-19 PROCEDURE — D9220A PRA ANESTHESIA: ICD-10-PCS | Mod: ANES,,, | Performed by: STUDENT IN AN ORGANIZED HEALTH CARE EDUCATION/TRAINING PROGRAM

## 2022-04-19 PROCEDURE — 74328 X-RAY BILE DUCT ENDOSCOPY: CPT | Performed by: INTERNAL MEDICINE

## 2022-04-19 PROCEDURE — 63600175 PHARM REV CODE 636 W HCPCS: Performed by: NURSE ANESTHETIST, CERTIFIED REGISTERED

## 2022-04-19 PROCEDURE — 43275 PR ERCP W/REMOVAL FOREIGN BODY/STENT FROM BILIARY/PANCREATIC DUCT: ICD-10-PCS | Mod: ,,, | Performed by: INTERNAL MEDICINE

## 2022-04-19 PROCEDURE — 37000008 HC ANESTHESIA 1ST 15 MINUTES: Performed by: INTERNAL MEDICINE

## 2022-04-19 PROCEDURE — D9220A PRA ANESTHESIA: Mod: CRNA,,, | Performed by: NURSE ANESTHETIST, CERTIFIED REGISTERED

## 2022-04-19 RX ORDER — PROPOFOL 10 MG/ML
VIAL (ML) INTRAVENOUS CONTINUOUS PRN
Status: DISCONTINUED | OUTPATIENT
Start: 2022-04-19 | End: 2022-04-19

## 2022-04-19 RX ORDER — FENTANYL CITRATE 50 UG/ML
25 INJECTION, SOLUTION INTRAMUSCULAR; INTRAVENOUS EVERY 5 MIN PRN
Status: DISCONTINUED | OUTPATIENT
Start: 2022-04-19 | End: 2022-04-19 | Stop reason: HOSPADM

## 2022-04-19 RX ORDER — HYDROMORPHONE HYDROCHLORIDE 1 MG/ML
0.2 INJECTION, SOLUTION INTRAMUSCULAR; INTRAVENOUS; SUBCUTANEOUS EVERY 5 MIN PRN
Status: DISCONTINUED | OUTPATIENT
Start: 2022-04-19 | End: 2022-04-19 | Stop reason: HOSPADM

## 2022-04-19 RX ORDER — ONDANSETRON 2 MG/ML
4 INJECTION INTRAMUSCULAR; INTRAVENOUS DAILY PRN
Status: DISCONTINUED | OUTPATIENT
Start: 2022-04-19 | End: 2022-04-19 | Stop reason: HOSPADM

## 2022-04-19 RX ORDER — PROPOFOL 10 MG/ML
VIAL (ML) INTRAVENOUS
Status: DISCONTINUED | OUTPATIENT
Start: 2022-04-19 | End: 2022-04-19

## 2022-04-19 RX ORDER — SODIUM CHLORIDE 9 MG/ML
INJECTION, SOLUTION INTRAVENOUS CONTINUOUS
Status: DISCONTINUED | OUTPATIENT
Start: 2022-04-19 | End: 2022-04-19 | Stop reason: HOSPADM

## 2022-04-19 RX ORDER — LIDOCAINE HYDROCHLORIDE 20 MG/ML
INJECTION INTRAVENOUS
Status: DISCONTINUED | OUTPATIENT
Start: 2022-04-19 | End: 2022-04-19

## 2022-04-19 RX ORDER — FENTANYL CITRATE 50 UG/ML
INJECTION, SOLUTION INTRAMUSCULAR; INTRAVENOUS
Status: DISCONTINUED | OUTPATIENT
Start: 2022-04-19 | End: 2022-04-19

## 2022-04-19 RX ORDER — OXYCODONE HYDROCHLORIDE 5 MG/1
5 TABLET ORAL
Status: DISCONTINUED | OUTPATIENT
Start: 2022-04-19 | End: 2022-04-19 | Stop reason: HOSPADM

## 2022-04-19 RX ADMIN — PROPOFOL 40 MG: 10 INJECTION, EMULSION INTRAVENOUS at 12:04

## 2022-04-19 RX ADMIN — IOHEXOL 3 ML: 300 INJECTION, SOLUTION INTRAVENOUS at 01:04

## 2022-04-19 RX ADMIN — PROPOFOL 10 MG: 10 INJECTION, EMULSION INTRAVENOUS at 01:04

## 2022-04-19 RX ADMIN — Medication 175 MCG/KG/MIN: at 12:04

## 2022-04-19 RX ADMIN — FENTANYL CITRATE 50 MCG: 50 INJECTION, SOLUTION INTRAMUSCULAR; INTRAVENOUS at 12:04

## 2022-04-19 RX ADMIN — LIDOCAINE HYDROCHLORIDE 50 MG: 20 INJECTION, SOLUTION INTRAVENOUS at 12:04

## 2022-04-19 RX ADMIN — SODIUM CHLORIDE: 0.9 INJECTION, SOLUTION INTRAVENOUS at 12:04

## 2022-04-19 NOTE — ANESTHESIA PREPROCEDURE EVALUATION
04/19/2022  Pre-operative evaluation for Procedure(s) (LRB):  ERCP (ENDOSCOPIC RETROGRADE CHOLANGIOPANCREATOGRAPHY) (N/A)    Vic Reyez is a 71 y.o. male w/ etoh cirrhosis, htn, now with biliary structure     Patient Active Problem List   Diagnosis    Decompensated hepatic cirrhosis    Biliary stricture    Alcohol use disorder, severe, dependence    Primary hypertension    Hypokalemia    Hyponatremia    Coagulopathy    Pancreatic lesion       Review of patient's allergies indicates:  No Known Allergies    No current facility-administered medications on file prior to encounter.     Current Outpatient Medications on File Prior to Encounter   Medication Sig Dispense Refill    aspirin (ECOTRIN) 81 MG EC tablet Take 81 mg by mouth once daily.      calcium-vitamin D (OSCAL) 250 (625)-125 mg-unit per tablet Take 1 tablet by mouth once daily.      docusate sodium (COLACE) 50 MG capsule Take by mouth 2 (two) times daily.      ferrous fumarate (HEMOCYTE) 324 mg (106 mg iron) Tab Take by mouth.      folic acid (FOLVITE) 1 MG tablet Take 1 tablet (1 mg total) by mouth once daily. 360 tablet 0    hydrocodone-acetaminophen 7.5-325mg (NORCO) 7.5-325 mg per tablet Take 1 tablet by mouth every 6 (six) hours as needed.      multivitamin with folic acid (DAILY-ROHAN, WITH FOLIC ACID,) 400 mcg Tab Take 1 tablet by mouth once daily. 30 tablet 0    pravastatin (PRAVACHOL) 40 MG tablet Take 40 mg by mouth once daily.      spironolactone (ALDACTONE) 100 MG tablet Take 1 tablet (100 mg total) by mouth once daily. 30 tablet 11    thiamine 100 MG tablet Take 1 tablet (100 mg total) by mouth once daily. 360 tablet 0       Past Surgical History:   Procedure Laterality Date    APPENDECTOMY      ENDOSCOPIC ULTRASOUND OF UPPER GASTROINTESTINAL TRACT  2/4/2022    Procedure: ULTRASOUND, UPPER GI TRACT, ENDOSCOPIC;   Surgeon: Chuy Bay MD;  Location: [facility] ([address] [place] [address]);  Service: Endoscopy;;    ERCP N/A 2/4/2022    Procedure: ERCP (ENDOSCOPIC RETROGRADE CHOLANGIOPANCREATOGRAPHY);  Surgeon: Chuy Bay MD;  Location: [facility] ([address] [place] [address]);  Service: Endoscopy;  Laterality: N/A;    EYE SURGERY      JOINT REPLACEMENT      KNEE SURGERY      RECONSTRUCTION OF SHOULDER Right        Social History     Socioeconomic History    Marital status: Single   Tobacco Use    Smoking status: Current Every Day Smoker     Packs/day: 0.50     Types: Cigarettes    Smokeless tobacco: Never Used   Substance and Sexual Activity    Alcohol use: Yes     Alcohol/week: 4.0 standard drinks     Types: 4 Cans of beer per week    Drug use: Never    Sexual activity: Not Currently         CBC: No results for input(s): WBC, RBC, HGB, HCT, PLT, MCV, MCH, MCHC in the last 72 hours.    CMP: No results for input(s): NA, K, CL, CO2, BUN, CREATININE, GLU, MG, PHOS, CALCIUM, ALBUMIN, PROT, ALKPHOS, ALT, AST, BILITOT in the last 72 hours.    INR  No results for input(s): PT, INR, PROTIME, APTT in the last 72 hours.          2D Echo:  No results found for this or any previous visit.      Pre-op Assessment    I have reviewed the Patient Summary Reports.     I have reviewed the Nursing Notes. I have reviewed the NPO Status.   I have reviewed the Medications.     Review of Systems  Cardiovascular:   Hypertension    Hepatic/GI:   Liver Disease,        Physical Exam  General: Well nourished and Cooperative    Airway:  Mallampati: II   Mouth Opening: Normal  TM Distance: Normal  Tongue: Normal  Neck ROM: Normal ROM    Chest/Lungs:  Clear to auscultation, Normal Respiratory Rate    Heart:  Rate: Normal  Rhythm: Regular Rhythm  Sounds: Normal        Anesthesia Plan  Type of Anesthesia, risks & benefits discussed:    Anesthesia Type: Gen ETT, Gen Natural Airway  Intra-op Monitoring Plan: Standard ASA Monitors  Post Op Pain Control Plan: multimodal analgesia and  IV/PO Opioids PRN  Induction:  IV  Airway Plan: Direct and Video, Post-Induction  Informed Consent: Informed consent signed with the Patient and all parties understand the risks and agree with anesthesia plan.  All questions answered.   ASA Score: 3    Ready For Surgery From Anesthesia Perspective.     .

## 2022-04-19 NOTE — PLAN OF CARE
Patient received discharge instructions and prescriptions.  Patient verbalized understanding of all instructions given and all questions were addressed prior to patient's discharge.  Patient's vital signs are stable and within patient's baseline.  Patient tolerated clear liquids PO.  Patient states pain is tolerable.  Patient denies nausea and vomiting at this time.  Patient meets all criteria for discharge at this time.

## 2022-04-19 NOTE — TRANSFER OF CARE
"Anesthesia Transfer of Care Note    Patient: Vic Reyez    Procedure(s) Performed: Procedure(s) (LRB):  ERCP (ENDOSCOPIC RETROGRADE CHOLANGIOPANCREATOGRAPHY) (N/A)    Patient location: [facility]    Anesthesia Type: general    Transport from OR: Transported from OR on 2-3 L/min O2 by NC with adequate spontaneous ventilation    Post pain: adequate analgesia    Post assessment: no apparent anesthetic complications and tolerated procedure well    Post vital signs: stable    Level of consciousness: sedated and responds to stimulation    Nausea/Vomiting: no nausea/vomiting    Complications: none    Transfer of care protocol was followed      Last vitals:   Visit Vitals  /85   Pulse 110   Temp 36.8 °C (98.2 °F) (Temporal)   Resp 18   Ht 5' 11" (1.803 m)   Wt 77.1 kg (170 lb)   SpO2 100%   BMI 23.71 kg/m²     "

## 2022-04-19 NOTE — PROVATION PATIENT INSTRUCTIONS
Discharge Summary/Instructions after an Endoscopic Procedure  Patient Name: Vic Reyez  Patient MRN: [id number]  Patient YOB: 1950  Tuesday, April 19, 2022  Chuy Bay MD  Dear patient,  As a result of recent federal legislation (The Federal Cures Act), you may   receive lab or pathology results from your procedure in your [facility]   account before your physician is able to contact you. Your physician or   their representative will relay the results to you with their   recommendations at their soonest availability.  Thank you,  RESTRICTIONS:  During your procedure today, you received medications for sedation.  These   medications may affect your judgment, balance and coordination.  Therefore,   for 24 hours, you have the following restrictions:   - DO NOT drive a car, operate machinery, make legal/financial decisions,   sign important papers or drink alcohol.    ACTIVITY:  Today: no heavy lifting, straining or running due to procedural   sedation/anesthesia.  The following day: return to full activity including work.  DIET:  Eat and drink normally unless instructed otherwise.     TREATMENT FOR COMMON SIDE EFFECTS:  - Mild abdominal pain, nausea, belching, bloating or excessive gas:  rest,   eat lightly and use a heating pad.  - Sore Throat: treat with throat lozenges and/or gargle with warm salt   water.  - Because air was used during the procedure, expelling large amounts of air   from your rectum or belching is normal.  - If a bowel prep was taken, you may not have a bowel movement for 1-3 days.    This is normal.  SYMPTOMS TO WATCH FOR AND REPORT TO YOUR PHYSICIAN:  1. Abdominal pain or bloating, other than gas cramps.  2. Chest pain.  3. Back pain.  4. Signs of infection such as: chills or fever occurring within 24 hours   after the procedure.  5. Rectal bleeding, which would show as bright red, maroon, or black stools.   (A tablespoon of blood from the rectum is not serious, especially if    hemorrhoids are present.)  6. Vomiting.  7. Weakness or dizziness.  GO DIRECTLY TO THE NEAREST EMERGENCY ROOM IF YOU HAVE ANY OF THE FOLLOWING:      Difficulty breathing              Chills and/or fever over 101 F   Persistent vomiting and/or vomiting blood   Severe abdominal pain   Severe chest pain   Black, tarry stools   Bleeding- more than one tablespoon   Any other symptom or condition that you feel may need urgent attention  Your doctor recommends these additional instructions:  If any biopsies were taken, your doctors clinic will contact you in 1 to 2   weeks with any results.  - Discharge patient to home.   - Resume previous diet.   - Continue present medications.   - Return to referring physician as previously scheduled.  For questions, problems or results please call your physician - Chuy Bay MD at Work:  [phone].  [facility], EMERGENCY ROOM PHONE NUMBER: [phone]  IF A COMPLICATION OR EMERGENCY SITUATION ARISES AND YOU ARE UNABLE TO REACH   YOUR PHYSICIAN - GO DIRECTLY TO THE EMERGENCY ROOM.  Chuy Bay MD  4/19/2022 1:24:39 PM  This report has been verified and signed electronically.  Dear patient,  As a result of recent federal legislation (The Federal Cures Act), you may   receive lab or pathology results from your procedure in your [facility]   account before your physician is able to contact you. Your physician or   their representative will relay the results to you with their   recommendations at their soonest availability.  Thank you,  PROVATION

## 2022-04-19 NOTE — H&P
Short Stay Endoscopy History and Physical    PCP - [facility]  Referring Physician - Winston Zendejas MD  [address] [place] [address]    Procedure - ercp  ASA - per anesthesia  Mallampati - per anesthesia  History of Anesthesia problems - no  Family history Anesthesia problems -  no   Plan of anesthesia - General    HPI:  This is a 71 y.o. male here for evaluation of: stricture    Reflux - no  Dysphagia - no  Abdominal pain - no  Diarrhea - no    ROS:  Constitutional: No fevers, chills, No weight loss  CV: No chest pain  Pulm: No cough, No shortness of breath  Ophtho: No vision changes  GI: see HPI  Derm: No rash    Medical History:  has a past medical history of Alcohol abuse (02/02/2022), Decompensated hepatic cirrhosis (02/02/2022), Encounter for blood transfusion, Hypertension, and Lab test positive for detection of COVID-19 virus (09/2021).    Surgical History:  has a past surgical history that includes Knee surgery; Appendectomy; Joint replacement; Eye surgery; Reconstruction of shoulder (Right); ERCP (N/A, 02/04/2022); Endoscopic ultrasound of upper gastrointestinal tract (02/04/2022); Colonoscopy; and Tonsillectomy.    Family History: family history is not on file..    Social History:  reports that he has been smoking cigarettes. He has been smoking about 0.25 packs per day. He has never used smokeless tobacco. He reports current alcohol use of about 4.0 standard drinks of alcohol per week. He reports that he does not use drugs.    Review of patient's allergies indicates:  No Known Allergies    Medications:   Medications Prior to Admission   Medication Sig Dispense Refill Last Dose    aspirin (ECOTRIN) 81 MG EC tablet Take 81 mg by mouth once daily.   4/18/2022 at Unknown time    multivitamin with folic acid (DAILY-ROHAN, WITH FOLIC ACID,) 400 mcg Tab Take 1 tablet by mouth once daily. 30 tablet 0 4/18/2022 at Unknown time    calcium-vitamin D (OSCAL) 250  (625)-125 mg-unit per tablet Take 1 tablet by mouth once daily.       docusate sodium (COLACE) 50 MG capsule Take by mouth 2 (two) times daily.   Unknown at Unknown time    ferrous fumarate 324 mg (106 mg iron) Tab Take by mouth.   Unknown at Unknown time    folic acid (FOLVITE) 1 MG tablet Take 1 tablet (1 mg total) by mouth once daily. 360 tablet 0     hydrocodone-acetaminophen 7.5-325mg (NORCO) 7.5-325 mg per tablet Take 1 tablet by mouth every 6 (six) hours as needed.   More than a month at Unknown time    pravastatin (PRAVACHOL) 40 MG tablet Take 40 mg by mouth once daily.       spironolactone (ALDACTONE) 100 MG tablet Take 1 tablet (100 mg total) by mouth once daily. 30 tablet 11 Unknown at Unknown time    thiamine 100 MG tablet Take 1 tablet (100 mg total) by mouth once daily. 360 tablet 0 Unknown at Unknown time       Physical Exam:    Vital Signs:   Vitals:    04/19/22 1237   BP: 121/85   Pulse:    Resp:    Temp:        General Appearance: Well appearing in no acute distress    Labs:  Lab Results   Component Value Date    WBC 8.16 02/05/2022    HGB 10.0 (L) 02/05/2022    HCT 27.3 (L) 02/05/2022     02/05/2022    ALT 29 02/05/2022    AST 99 (H) 02/05/2022     (L) 02/05/2022    K 3.6 02/05/2022     02/05/2022    CREATININE 0.7 02/05/2022    BUN 8 02/05/2022    CO2 21 (L) 02/05/2022    INR 1.2 02/05/2022       I have explained the risks and benefits of this endoscopic procedure to the patient including but not limited to bleeding, inflammation, infection, perforation, and death.      Chuy Bay MD

## 2022-04-21 ENCOUNTER — OFFICE VISIT (OUTPATIENT)
Dept: HEPATOLOGY | Facility: CLINIC | Age: 72
End: 2022-04-21
Payer: MEDICARE

## 2022-04-21 ENCOUNTER — TELEPHONE (OUTPATIENT)
Dept: HEPATOLOGY | Facility: CLINIC | Age: 72
End: 2022-04-21
Payer: OTHER GOVERNMENT

## 2022-04-21 VITALS
DIASTOLIC BLOOD PRESSURE: 65 MMHG | BODY MASS INDEX: 22.45 KG/M2 | HEART RATE: 70 BPM | WEIGHT: 160.94 LBS | TEMPERATURE: 98 F | SYSTOLIC BLOOD PRESSURE: 94 MMHG

## 2022-04-21 DIAGNOSIS — K72.90 DECOMPENSATED HEPATIC CIRRHOSIS: Primary | ICD-10-CM

## 2022-04-21 DIAGNOSIS — K74.60 DECOMPENSATED HEPATIC CIRRHOSIS: ICD-10-CM

## 2022-04-21 DIAGNOSIS — K72.90 DECOMPENSATED HEPATIC CIRRHOSIS: ICD-10-CM

## 2022-04-21 DIAGNOSIS — K74.60 DECOMPENSATED HEPATIC CIRRHOSIS: Primary | ICD-10-CM

## 2022-04-21 PROCEDURE — 99215 OFFICE O/P EST HI 40 MIN: CPT | Mod: S$GLB,,, | Performed by: INTERNAL MEDICINE

## 2022-04-21 PROCEDURE — 99215 PR OFFICE/OUTPT VISIT, EST, LEVL V, 40-54 MIN: ICD-10-PCS | Mod: S$GLB,,, | Performed by: INTERNAL MEDICINE

## 2022-04-21 PROCEDURE — 99999 PR PBB SHADOW E&M-EST. PATIENT-LVL IV: ICD-10-PCS | Mod: PBBFAC,,, | Performed by: INTERNAL MEDICINE

## 2022-04-21 PROCEDURE — 99214 OFFICE O/P EST MOD 30 MIN: CPT | Mod: PBBFAC,PO | Performed by: INTERNAL MEDICINE

## 2022-04-21 PROCEDURE — 99999 PR PBB SHADOW E&M-EST. PATIENT-LVL IV: CPT | Mod: PBBFAC,,, | Performed by: INTERNAL MEDICINE

## 2022-04-21 RX ORDER — FUROSEMIDE 40 MG/1
20 TABLET ORAL DAILY
Qty: 15 TABLET | Refills: 11 | Status: ON HOLD | OUTPATIENT
Start: 2022-04-21 | End: 2022-10-06 | Stop reason: SDUPTHER

## 2022-04-21 RX ORDER — SPIRONOLACTONE 50 MG/1
25 TABLET, FILM COATED ORAL DAILY
Qty: 15 TABLET | Refills: 11 | Status: ON HOLD | OUTPATIENT
Start: 2022-04-21 | End: 2022-10-06 | Stop reason: SDUPTHER

## 2022-04-21 NOTE — PATIENT INSTRUCTIONS
Recommendations:  1.  Get CBC, CMP, PT INR today, and every 2 weeks x 4, then every 4 weeks.   2.  Paracentesis (after checking with [facility] if witholding for 5 days is needed) u/s guided, with fluid for cell count and diff.  Limit 5 liters.   3.  HCC surveillance with AFP and US limited every 6 months.   4.  Start furosemide 20 mg/d and spironolactone 25 mg/d  -  Low salt in the diet, avoid canned, bottled and processed foods.  -  Continue current meds  -  Start spironolactone 25 mg daily  -  Start furosemide 20 mg daily.   -  Avoid high intake of Tylenol (more than 4 extra-strength pills in one day)  -  Call us if any bleeding, fevers, confusion, disorientation occur  -  Endoscopy: per GI at [facility] [place]  -  Transplant option discussed, will evaluate when MELD >15.  -  Return in 2 months.

## 2022-04-21 NOTE — Clinical Note
Recommendations: 1.  Get CBC, CMP, PT INR today, and every 2 weeks x 4, then every 4 weeks.  2.  Paracentesis (after checking with [facility] if witholding for 5 days is needed) u/s guided, with fluid for cell count and diff.  Limit 5 liters.  3.  HCC surveillance with AFP and US limited every 6 months.  4.  Start furosemide 20 mg/d and spironolactone 25 mg/d -  Low salt in the diet, avoid canned, bottled and processed foods. -  Continue current meds -  Start spironolactone 25 mg daily -  Start furosemide 20 mg daily.  -  Avoid high intake of Tylenol (more than 4 extra-strength pills in one day) -  Call us if any bleeding, fevers, confusion, disorientation occur -  Endoscopy: per GI at [facility] [place] -  Transplant option discussed, will evaluate when MELD >15. -  Return in 2 months.

## 2022-04-21 NOTE — TELEPHONE ENCOUNTER
Received message from Dr ANGELIQUE Toledo in the [facility] that the patient will need an appointment for a Paracentesis today if possible.    Call placed to [facility]. The 1st available appt is for Wed 4/27/22 at 1 pm.  Patient stated he will take the appt. Appt confirmed with Madelyn

## 2022-04-27 ENCOUNTER — HOSPITAL ENCOUNTER (OUTPATIENT)
Dept: RADIOLOGY | Facility: HOSPITAL | Age: 72
Discharge: HOME | End: 2022-04-27
Attending: INTERNAL MEDICINE
Payer: MEDICARE

## 2022-04-27 VITALS
SYSTOLIC BLOOD PRESSURE: 97 MMHG | OXYGEN SATURATION: 100 % | HEART RATE: 106 BPM | DIASTOLIC BLOOD PRESSURE: 57 MMHG | RESPIRATION RATE: 15 BRPM

## 2022-04-27 DIAGNOSIS — K74.60 DECOMPENSATED HEPATIC CIRRHOSIS: ICD-10-CM

## 2022-04-27 DIAGNOSIS — K72.90 DECOMPENSATED HEPATIC CIRRHOSIS: ICD-10-CM

## 2022-04-27 LAB
APPEARANCE FLD: CLEAR
BODY FLD TYPE: NORMAL
COLOR FLD: YELLOW
LYMPHOCYTES NFR FLD MANUAL: 35 %
MESOTHL CELL NFR FLD MANUAL: 22 %
MONOS+MACROS NFR FLD MANUAL: 36 %
NEUTROPHILS NFR FLD MANUAL: 7 %
WBC # FLD: 152 /CU MM

## 2022-04-27 PROCEDURE — C1729 CATH, DRAINAGE: HCPCS

## 2022-04-27 PROCEDURE — 49083 US GUIDED PARACENTESIS INC IMAGING: ICD-10-PCS | Mod: ,,, | Performed by: RADIOLOGY

## 2022-04-27 PROCEDURE — 63600175 PHARM REV CODE 636 W HCPCS: Performed by: RADIOLOGY

## 2022-04-27 PROCEDURE — 49083 ABD PARACENTESIS W/IMAGING: CPT | Mod: ,,, | Performed by: RADIOLOGY

## 2022-04-27 PROCEDURE — P9047 ALBUMIN (HUMAN), 25%, 50ML: HCPCS | Performed by: RADIOLOGY

## 2022-04-27 PROCEDURE — 49083 ABD PARACENTESIS W/IMAGING: CPT

## 2022-04-27 PROCEDURE — 89051 BODY FLUID CELL COUNT: CPT | Performed by: INTERNAL MEDICINE

## 2022-04-27 RX ORDER — ALBUMIN HUMAN 250 G/1000ML
25 SOLUTION INTRAVENOUS ONCE
Status: DISCONTINUED | OUTPATIENT
Start: 2022-04-27 | End: 2022-04-27

## 2022-04-27 RX ORDER — ALBUMIN HUMAN 250 G/1000ML
25 SOLUTION INTRAVENOUS
Status: DISCONTINUED | OUTPATIENT
Start: 2022-04-27 | End: 2022-04-28 | Stop reason: HOSPADM

## 2022-04-27 RX ADMIN — ALBUMIN (HUMAN) 12.5 G: 12.5 SOLUTION INTRAVENOUS at 02:04

## 2022-04-27 RX ADMIN — ALBUMIN (HUMAN) 12.5 G: 12.5 SOLUTION INTRAVENOUS at 03:04

## 2022-04-27 NOTE — NURSING
Ultrasound guided paracentesis performed by Dr Hayes. Procedure explained and consent obtained by Radiologist. Time out performed 7 L removed- Patient received  25 g of albumin IV- VS remained stable for duration of procedure. Specimens collected and sent to lab if ordered. Para and IV d/c'd, with tip intact. Access site cleaned with peroxide, derma bond and steri-strips applied, then covered with sterile Band-Aid. Pt discharge home in stable condition.

## 2022-04-28 ENCOUNTER — TELEPHONE (OUTPATIENT)
Dept: HEPATOLOGY | Facility: CLINIC | Age: 72
End: 2022-04-28
Payer: OTHER GOVERNMENT

## 2022-04-28 NOTE — TELEPHONE ENCOUNTER
Patient called the clinic to say I had a Para done yesterday 4/27/22. Will Dr Toledo tell me when I have to get it done again?  You will let us know how you are doing?  Dr Toledo has placed the Orders. If you start to develop swelling in your stomach, call us and we can call to set up an appt.  If you start with swelling in feet and legs. Please call to let Dr Toledo know.  If you develop swelling and SOB you must go to the ED.  We try to set up the appts for the draining of your stomach to keep you out of the ED to get this done.  Patient voiced understanding.

## 2022-04-29 ENCOUNTER — PATIENT MESSAGE (OUTPATIENT)
Dept: HEPATOLOGY | Facility: CLINIC | Age: 72
End: 2022-04-29
Payer: OTHER GOVERNMENT

## 2022-05-03 ENCOUNTER — TELEPHONE (OUTPATIENT)
Dept: HEPATOLOGY | Facility: CLINIC | Age: 72
End: 2022-05-03
Payer: OTHER GOVERNMENT

## 2022-05-03 NOTE — TELEPHONE ENCOUNTER
----- Message from Yennifer Teague sent at 5/3/2022  4:33 PM CDT -----  Regarding: advice  Contact: pt  Type: Needs Medical Advice    Who Called:  pt  Symptoms (please be specific):  n/a  How long has patient had these symptoms:  n/a  Pharmacy name and phone #:  n/a  Best Call Back Number: [phone]    Additional Information: please send message through portal, pt does not answer phone frequently

## 2022-05-09 ENCOUNTER — TELEPHONE (OUTPATIENT)
Dept: HEPATOLOGY | Facility: CLINIC | Age: 72
End: 2022-05-09
Payer: OTHER GOVERNMENT

## 2022-05-19 ENCOUNTER — LAB VISIT (OUTPATIENT)
Dept: LAB | Facility: HOSPITAL | Age: 72
End: 2022-05-19
Attending: INTERNAL MEDICINE
Payer: MEDICARE

## 2022-05-19 DIAGNOSIS — K72.90 DECOMPENSATED HEPATIC CIRRHOSIS: ICD-10-CM

## 2022-05-19 DIAGNOSIS — K74.60 DECOMPENSATED HEPATIC CIRRHOSIS: ICD-10-CM

## 2022-05-19 LAB
ALBUMIN SERPL BCP-MCNC: 2.9 G/DL (ref 3.5–5.2)
ALP SERPL-CCNC: 364 U/L (ref 55–135)
ALT SERPL W/O P-5'-P-CCNC: 44 U/L (ref 10–44)
ANION GAP SERPL CALC-SCNC: 7 MMOL/L (ref 8–16)
AST SERPL-CCNC: 67 U/L (ref 10–40)
BASOPHILS # BLD AUTO: 0.01 K/UL (ref 0–0.2)
BASOPHILS NFR BLD: 0.2 % (ref 0–1.9)
BILIRUB SERPL-MCNC: 1.4 MG/DL (ref 0.1–1)
BUN SERPL-MCNC: 10 MG/DL (ref 8–23)
CALCIUM SERPL-MCNC: 9.5 MG/DL (ref 8.7–10.5)
CHLORIDE SERPL-SCNC: 106 MMOL/L (ref 95–110)
CO2 SERPL-SCNC: 20 MMOL/L (ref 23–29)
CREAT SERPL-MCNC: 0.6 MG/DL (ref 0.5–1.4)
DIFFERENTIAL METHOD: ABNORMAL
EOSINOPHIL # BLD AUTO: 0.1 K/UL (ref 0–0.5)
EOSINOPHIL NFR BLD: 3.1 % (ref 0–8)
ERYTHROCYTE [DISTWIDTH] IN BLOOD BY AUTOMATED COUNT: 16.8 % (ref 11.5–14.5)
EST. GFR  (AFRICAN AMERICAN): >60 ML/MIN/1.73 M^2
EST. GFR  (NON AFRICAN AMERICAN): >60 ML/MIN/1.73 M^2
GLUCOSE SERPL-MCNC: 109 MG/DL (ref 70–110)
HCT VFR BLD AUTO: 35 % (ref 40–54)
HGB BLD-MCNC: 11.6 G/DL (ref 14–18)
IMM GRANULOCYTES # BLD AUTO: 0.01 K/UL (ref 0–0.04)
IMM GRANULOCYTES NFR BLD AUTO: 0.2 % (ref 0–0.5)
INR PPP: 1 (ref 0.8–1.2)
LYMPHOCYTES # BLD AUTO: 1 K/UL (ref 1–4.8)
LYMPHOCYTES NFR BLD: 21.3 % (ref 18–48)
MCH RBC QN AUTO: 30.1 PG (ref 27–31)
MCHC RBC AUTO-ENTMCNC: 33.1 G/DL (ref 32–36)
MCV RBC AUTO: 91 FL (ref 82–98)
MONOCYTES # BLD AUTO: 0.5 K/UL (ref 0.3–1)
MONOCYTES NFR BLD: 12.1 % (ref 4–15)
NEUTROPHILS # BLD AUTO: 2.8 K/UL (ref 1.8–7.7)
NEUTROPHILS NFR BLD: 63.1 % (ref 38–73)
NRBC BLD-RTO: 0 /100 WBC
PLATELET # BLD AUTO: 193 K/UL (ref 150–450)
PLATELET BLD QL SMEAR: ABNORMAL
PMV BLD AUTO: 8.7 FL (ref 9.2–12.9)
POTASSIUM SERPL-SCNC: 3.9 MMOL/L (ref 3.5–5.1)
PROT SERPL-MCNC: 8.3 G/DL (ref 6–8.4)
PROTHROMBIN TIME: 10.6 SEC (ref 9–12.5)
RBC # BLD AUTO: 3.85 M/UL (ref 4.6–6.2)
SODIUM SERPL-SCNC: 133 MMOL/L (ref 136–145)
WBC # BLD AUTO: 4.45 K/UL (ref 3.9–12.7)

## 2022-05-19 PROCEDURE — 85610 PROTHROMBIN TIME: CPT | Performed by: INTERNAL MEDICINE

## 2022-05-19 PROCEDURE — 80053 COMPREHEN METABOLIC PANEL: CPT | Performed by: INTERNAL MEDICINE

## 2022-05-19 PROCEDURE — 36415 COLL VENOUS BLD VENIPUNCTURE: CPT | Performed by: INTERNAL MEDICINE

## 2022-05-19 PROCEDURE — 82105 ALPHA-FETOPROTEIN SERUM: CPT | Performed by: INTERNAL MEDICINE

## 2022-05-19 PROCEDURE — 85025 COMPLETE CBC W/AUTO DIFF WBC: CPT | Performed by: INTERNAL MEDICINE

## 2022-05-20 ENCOUNTER — TELEPHONE (OUTPATIENT)
Dept: HEPATOLOGY | Facility: CLINIC | Age: 72
End: 2022-05-20
Payer: OTHER GOVERNMENT

## 2022-05-20 ENCOUNTER — PATIENT MESSAGE (OUTPATIENT)
Dept: HEPATOLOGY | Facility: CLINIC | Age: 72
End: 2022-05-20
Payer: OTHER GOVERNMENT

## 2022-05-20 DIAGNOSIS — K83.1 BILIARY STENOSIS: Primary | ICD-10-CM

## 2022-05-20 LAB — AFP SERPL-MCNC: 3.1 NG/ML (ref 0–8.4)

## 2022-05-20 RX ORDER — URSODIOL 300 MG/1
300 CAPSULE ORAL 2 TIMES DAILY
Qty: 60 CAPSULE | Refills: 11 | Status: ON HOLD | OUTPATIENT
Start: 2022-05-20 | End: 2022-10-06 | Stop reason: SDUPTHER

## 2022-05-20 NOTE — TELEPHONE ENCOUNTER
Call returned to the patient for clarification of the message about the other 2 pills if needed to be refilled.    Medicine list reviewed with the patient. He was asking if he should refill his furosemide and spironolactone?  Patient encouraged to refill and take the medication as prescribed by your Dr.  Spoke with patient about the Paracentesis that was done and how well he feels now.  Instructed on the need to continue with his fluid pills/diuretics.    Your recent labs results showed the elevation in your liver enzymes. You had the stent removed and we to monitor is this is a trend.  Patient spoke about his stent. Patient understands why he is taking the new medication.  He will go and pick it up.  Verbalized understanding.

## 2022-05-20 NOTE — TELEPHONE ENCOUNTER
---- Message from Dr ANGELIQUE Toledo ------  Although T bili improved (from 2.4 to 1.4), alk phos has increased (364 from 240)  and AST and ALT have also increased.  Last ERCP: stent removed.   Will start ursodiol 300 mg BID. Please have patient take this with meals.   ANGELIQUE Toledo MD     Call placed to the patient [phone]. No Answer. Left a VM message about elevated liver enzymes.  Prescription sent to the [address] for Ursodiol. Take I tablet twice a day with meals ie breakfast and dinner.  Please call us back with any questions or concerns.

## 2022-05-20 NOTE — TELEPHONE ENCOUNTER
Although T bili improved (from 2.4 to 1.4), alk phos has increased (364 from 240)  and AST and ALT have also increased.      Last ERCP: stent removed.     Will start ursodiol 300 mg BID. Please have patient take this with meals.    ANGELIQUE Toledo MD

## 2022-06-01 ENCOUNTER — HOSPITAL ENCOUNTER (INPATIENT)
Facility: HOSPITAL | Age: 72
LOS: 2 days | Discharge: HOME | DRG: 439 | End: 2022-06-04
Attending: EMERGENCY MEDICINE | Admitting: FAMILY MEDICINE
Payer: OTHER GOVERNMENT

## 2022-06-01 DIAGNOSIS — R18.8 ABDOMINAL FLUID COLLECTION: ICD-10-CM

## 2022-06-01 DIAGNOSIS — K86.9 PANCREATIC LESION: ICD-10-CM

## 2022-06-01 DIAGNOSIS — E87.1 HYPONATREMIA: Primary | ICD-10-CM

## 2022-06-01 DIAGNOSIS — R93.89 ABNORMAL CT SCAN: ICD-10-CM

## 2022-06-01 DIAGNOSIS — R07.9 CHEST PAIN: ICD-10-CM

## 2022-06-01 PROBLEM — R63.4 UNINTENTIONAL WEIGHT LOSS: Status: ACTIVE | Noted: 2022-06-01

## 2022-06-01 LAB
ALBUMIN SERPL BCP-MCNC: 3.2 G/DL (ref 3.5–5.2)
ALP SERPL-CCNC: 331 U/L (ref 55–135)
ALT SERPL W/O P-5'-P-CCNC: 40 U/L (ref 10–44)
ANION GAP SERPL CALC-SCNC: 9 MMOL/L (ref 8–16)
AST SERPL-CCNC: 58 U/L (ref 10–40)
BASOPHILS # BLD AUTO: 0.01 K/UL (ref 0–0.2)
BASOPHILS NFR BLD: 0.2 % (ref 0–1.9)
BILIRUB SERPL-MCNC: 1.2 MG/DL (ref 0.1–1)
BNP SERPL-MCNC: 65 PG/ML (ref 0–99)
BUN SERPL-MCNC: 6 MG/DL (ref 8–23)
CALCIUM SERPL-MCNC: 9 MG/DL (ref 8.7–10.5)
CHLORIDE SERPL-SCNC: 102 MMOL/L (ref 95–110)
CO2 SERPL-SCNC: 17 MMOL/L (ref 23–29)
CREAT SERPL-MCNC: 0.5 MG/DL (ref 0.5–1.4)
CRP SERPL-MCNC: 0.57 MG/DL
DIFFERENTIAL METHOD: ABNORMAL
EOSINOPHIL # BLD AUTO: 0.1 K/UL (ref 0–0.5)
EOSINOPHIL NFR BLD: 2.9 % (ref 0–8)
ERYTHROCYTE [DISTWIDTH] IN BLOOD BY AUTOMATED COUNT: 15 % (ref 11.5–14.5)
ERYTHROCYTE [SEDIMENTATION RATE] IN BLOOD BY WESTERGREN METHOD: 58 MM/HR (ref 0–10)
EST. GFR  (AFRICAN AMERICAN): >60 ML/MIN/1.73 M^2
EST. GFR  (NON AFRICAN AMERICAN): >60 ML/MIN/1.73 M^2
GLUCOSE SERPL-MCNC: 124 MG/DL (ref 70–110)
HCT VFR BLD AUTO: 33.5 % (ref 40–54)
HGB BLD-MCNC: 11.8 G/DL (ref 14–18)
IMM GRANULOCYTES # BLD AUTO: 0.02 K/UL (ref 0–0.04)
IMM GRANULOCYTES NFR BLD AUTO: 0.4 % (ref 0–0.5)
INR PPP: 1.2
LIPASE SERPL-CCNC: 38 U/L (ref 4–60)
LYMPHOCYTES # BLD AUTO: 0.8 K/UL (ref 1–4.8)
LYMPHOCYTES NFR BLD: 16.1 % (ref 18–48)
MCH RBC QN AUTO: 30.6 PG (ref 27–31)
MCHC RBC AUTO-ENTMCNC: 35.2 G/DL (ref 32–36)
MCV RBC AUTO: 87 FL (ref 82–98)
MONOCYTES # BLD AUTO: 0.5 K/UL (ref 0.3–1)
MONOCYTES NFR BLD: 10.7 % (ref 4–15)
NEUTROPHILS # BLD AUTO: 3.3 K/UL (ref 1.8–7.7)
NEUTROPHILS NFR BLD: 69.7 % (ref 38–73)
NRBC BLD-RTO: 0 /100 WBC
PLATELET # BLD AUTO: 176 K/UL (ref 150–450)
PMV BLD AUTO: 10.6 FL (ref 9.2–12.9)
POTASSIUM SERPL-SCNC: 4.3 MMOL/L (ref 3.5–5.1)
PROCALCITONIN SERPL IA-MCNC: 0.07 NG/ML (ref 0–0.5)
PROT SERPL-MCNC: 7.6 G/DL (ref 6–8.4)
PROTHROMBIN TIME: 14 SEC (ref 11.4–13.7)
RBC # BLD AUTO: 3.86 M/UL (ref 4.6–6.2)
SARS-COV-2 RDRP RESP QL NAA+PROBE: NEGATIVE
SODIUM SERPL-SCNC: 128 MMOL/L (ref 136–145)
TROPONIN I SERPL DL<=0.01 NG/ML-MCNC: <0.03 NG/ML
TROPONIN I SERPL DL<=0.01 NG/ML-MCNC: <0.03 NG/ML
WBC # BLD AUTO: 4.78 K/UL (ref 3.9–12.7)

## 2022-06-01 PROCEDURE — G0378 HOSPITAL OBSERVATION PER HR: HCPCS

## 2022-06-01 PROCEDURE — 85610 PROTHROMBIN TIME: CPT | Performed by: EMERGENCY MEDICINE

## 2022-06-01 PROCEDURE — 86140 C-REACTIVE PROTEIN: CPT | Performed by: EMERGENCY MEDICINE

## 2022-06-01 PROCEDURE — 63600175 PHARM REV CODE 636 W HCPCS: Performed by: NURSE PRACTITIONER

## 2022-06-01 PROCEDURE — 85025 COMPLETE CBC W/AUTO DIFF WBC: CPT | Performed by: EMERGENCY MEDICINE

## 2022-06-01 PROCEDURE — 83690 ASSAY OF LIPASE: CPT | Performed by: EMERGENCY MEDICINE

## 2022-06-01 PROCEDURE — 84145 PROCALCITONIN (PCT): CPT | Performed by: NURSE PRACTITIONER

## 2022-06-01 PROCEDURE — 36415 COLL VENOUS BLD VENIPUNCTURE: CPT | Performed by: EMERGENCY MEDICINE

## 2022-06-01 PROCEDURE — 25000003 PHARM REV CODE 250: Performed by: NURSE PRACTITIONER

## 2022-06-01 PROCEDURE — 93010 EKG 12-LEAD: ICD-10-PCS | Mod: ,,, | Performed by: GENERAL PRACTICE

## 2022-06-01 PROCEDURE — 63600175 PHARM REV CODE 636 W HCPCS: Performed by: EMERGENCY MEDICINE

## 2022-06-01 PROCEDURE — 93010 ELECTROCARDIOGRAM REPORT: CPT | Mod: ,,, | Performed by: GENERAL PRACTICE

## 2022-06-01 PROCEDURE — 96376 TX/PRO/DX INJ SAME DRUG ADON: CPT

## 2022-06-01 PROCEDURE — 25500020 PHARM REV CODE 255: Performed by: EMERGENCY MEDICINE

## 2022-06-01 PROCEDURE — 96366 THER/PROPH/DIAG IV INF ADDON: CPT

## 2022-06-01 PROCEDURE — 80053 COMPREHEN METABOLIC PANEL: CPT | Performed by: EMERGENCY MEDICINE

## 2022-06-01 PROCEDURE — 83880 ASSAY OF NATRIURETIC PEPTIDE: CPT | Performed by: EMERGENCY MEDICINE

## 2022-06-01 PROCEDURE — 84484 ASSAY OF TROPONIN QUANT: CPT | Performed by: EMERGENCY MEDICINE

## 2022-06-01 PROCEDURE — 99285 EMERGENCY DEPT VISIT HI MDM: CPT | Mod: 25

## 2022-06-01 PROCEDURE — U0002 COVID-19 LAB TEST NON-CDC: HCPCS | Performed by: EMERGENCY MEDICINE

## 2022-06-01 PROCEDURE — 96375 TX/PRO/DX INJ NEW DRUG ADDON: CPT

## 2022-06-01 PROCEDURE — 93005 ELECTROCARDIOGRAM TRACING: CPT | Performed by: GENERAL PRACTICE

## 2022-06-01 PROCEDURE — 85651 RBC SED RATE NONAUTOMATED: CPT | Performed by: NURSE PRACTITIONER

## 2022-06-01 PROCEDURE — 36415 COLL VENOUS BLD VENIPUNCTURE: CPT | Performed by: NURSE PRACTITIONER

## 2022-06-01 PROCEDURE — 84484 ASSAY OF TROPONIN QUANT: CPT | Mod: 91 | Performed by: NURSE PRACTITIONER

## 2022-06-01 PROCEDURE — 96365 THER/PROPH/DIAG IV INF INIT: CPT

## 2022-06-01 RX ORDER — SODIUM CHLORIDE 0.9 % (FLUSH) 0.9 %
10 SYRINGE (ML) INJECTION EVERY 12 HOURS PRN
Status: DISCONTINUED | OUTPATIENT
Start: 2022-06-01 | End: 2022-06-04 | Stop reason: HOSPADM

## 2022-06-01 RX ORDER — MORPHINE SULFATE 4 MG/ML
4 INJECTION, SOLUTION INTRAMUSCULAR; INTRAVENOUS EVERY 4 HOURS PRN
Status: DISCONTINUED | OUTPATIENT
Start: 2022-06-01 | End: 2022-06-04 | Stop reason: HOSPADM

## 2022-06-01 RX ORDER — THIAMINE HCL 100 MG
100 TABLET ORAL DAILY
Status: DISCONTINUED | OUTPATIENT
Start: 2022-06-02 | End: 2022-06-04 | Stop reason: HOSPADM

## 2022-06-01 RX ORDER — FOLIC ACID 1 MG/1
1 TABLET ORAL DAILY
Status: DISCONTINUED | OUTPATIENT
Start: 2022-06-02 | End: 2022-06-04 | Stop reason: HOSPADM

## 2022-06-01 RX ORDER — TALC
6 POWDER (GRAM) TOPICAL NIGHTLY PRN
Status: DISCONTINUED | OUTPATIENT
Start: 2022-06-01 | End: 2022-06-04 | Stop reason: HOSPADM

## 2022-06-01 RX ORDER — ONDANSETRON 2 MG/ML
4 INJECTION INTRAMUSCULAR; INTRAVENOUS EVERY 8 HOURS PRN
Status: DISCONTINUED | OUTPATIENT
Start: 2022-06-01 | End: 2022-06-04 | Stop reason: HOSPADM

## 2022-06-01 RX ORDER — URSODIOL 300 MG/1
300 CAPSULE ORAL 2 TIMES DAILY
Status: DISCONTINUED | OUTPATIENT
Start: 2022-06-01 | End: 2022-06-04 | Stop reason: HOSPADM

## 2022-06-01 RX ORDER — POLYETHYLENE GLYCOL 3350 17 G/17G
17 POWDER, FOR SOLUTION ORAL 2 TIMES DAILY PRN
Status: DISCONTINUED | OUTPATIENT
Start: 2022-06-01 | End: 2022-06-04 | Stop reason: HOSPADM

## 2022-06-01 RX ORDER — NALOXONE HCL 0.4 MG/ML
0.02 VIAL (ML) INJECTION
Status: DISCONTINUED | OUTPATIENT
Start: 2022-06-01 | End: 2022-06-04 | Stop reason: HOSPADM

## 2022-06-01 RX ORDER — OXYCODONE HYDROCHLORIDE 5 MG/1
5 TABLET ORAL EVERY 6 HOURS PRN
Status: DISCONTINUED | OUTPATIENT
Start: 2022-06-01 | End: 2022-06-04 | Stop reason: HOSPADM

## 2022-06-01 RX ORDER — POTASSIUM CHLORIDE 20 MEQ/1
40 TABLET, EXTENDED RELEASE ORAL
Status: DISCONTINUED | OUTPATIENT
Start: 2022-06-01 | End: 2022-06-04 | Stop reason: HOSPADM

## 2022-06-01 RX ORDER — MAGNESIUM SULFATE 1 G/100ML
1 INJECTION INTRAVENOUS
Status: DISCONTINUED | OUTPATIENT
Start: 2022-06-01 | End: 2022-06-04 | Stop reason: HOSPADM

## 2022-06-01 RX ORDER — MAGNESIUM SULFATE HEPTAHYDRATE 40 MG/ML
2 INJECTION, SOLUTION INTRAVENOUS
Status: DISCONTINUED | OUTPATIENT
Start: 2022-06-01 | End: 2022-06-04 | Stop reason: HOSPADM

## 2022-06-01 RX ORDER — MORPHINE SULFATE 2 MG/ML
2 INJECTION, SOLUTION INTRAMUSCULAR; INTRAVENOUS
Status: COMPLETED | OUTPATIENT
Start: 2022-06-01 | End: 2022-06-01

## 2022-06-01 RX ORDER — LANOLIN ALCOHOL/MO/W.PET/CERES
800 CREAM (GRAM) TOPICAL
Status: DISCONTINUED | OUTPATIENT
Start: 2022-06-01 | End: 2022-06-04 | Stop reason: HOSPADM

## 2022-06-01 RX ORDER — FERROUS SULFATE, DRIED 160(50) MG
1 TABLET, EXTENDED RELEASE ORAL 2 TIMES DAILY
Status: DISCONTINUED | OUTPATIENT
Start: 2022-06-01 | End: 2022-06-04 | Stop reason: HOSPADM

## 2022-06-01 RX ORDER — DOCUSATE SODIUM 100 MG/1
100 CAPSULE, LIQUID FILLED ORAL 2 TIMES DAILY
Status: DISCONTINUED | OUTPATIENT
Start: 2022-06-01 | End: 2022-06-04 | Stop reason: HOSPADM

## 2022-06-01 RX ORDER — SPIRONOLACTONE 25 MG/1
25 TABLET ORAL DAILY
Status: DISCONTINUED | OUTPATIENT
Start: 2022-06-02 | End: 2022-06-04 | Stop reason: HOSPADM

## 2022-06-01 RX ORDER — POTASSIUM CHLORIDE 20 MEQ/1
20 TABLET, EXTENDED RELEASE ORAL
Status: DISCONTINUED | OUTPATIENT
Start: 2022-06-01 | End: 2022-06-04 | Stop reason: HOSPADM

## 2022-06-01 RX ORDER — HYDROMORPHONE HYDROCHLORIDE 1 MG/ML
0.5 INJECTION, SOLUTION INTRAMUSCULAR; INTRAVENOUS; SUBCUTANEOUS
Status: COMPLETED | OUTPATIENT
Start: 2022-06-01 | End: 2022-06-01

## 2022-06-01 RX ORDER — MAGNESIUM SULFATE HEPTAHYDRATE 40 MG/ML
4 INJECTION, SOLUTION INTRAVENOUS
Status: DISCONTINUED | OUTPATIENT
Start: 2022-06-01 | End: 2022-06-04 | Stop reason: HOSPADM

## 2022-06-01 RX ORDER — FUROSEMIDE 20 MG/1
20 TABLET ORAL DAILY
Status: DISCONTINUED | OUTPATIENT
Start: 2022-06-02 | End: 2022-06-04 | Stop reason: HOSPADM

## 2022-06-01 RX ORDER — ACETAMINOPHEN 325 MG/1
650 TABLET ORAL EVERY 4 HOURS PRN
Status: DISCONTINUED | OUTPATIENT
Start: 2022-06-01 | End: 2022-06-04 | Stop reason: HOSPADM

## 2022-06-01 RX ORDER — LANOLIN ALCOHOL/MO/W.PET/CERES
1 CREAM (GRAM) TOPICAL DAILY
Status: DISCONTINUED | OUTPATIENT
Start: 2022-06-02 | End: 2022-06-04 | Stop reason: HOSPADM

## 2022-06-01 RX ADMIN — MORPHINE SULFATE 2 MG: 2 INJECTION, SOLUTION INTRAMUSCULAR; INTRAVENOUS at 04:06

## 2022-06-01 RX ADMIN — HYDROMORPHONE HYDROCHLORIDE 0.5 MG: 1 INJECTION, SOLUTION INTRAMUSCULAR; INTRAVENOUS; SUBCUTANEOUS at 05:06

## 2022-06-01 RX ADMIN — MORPHINE SULFATE 4 MG: 4 INJECTION, SOLUTION INTRAMUSCULAR; INTRAVENOUS at 08:06

## 2022-06-01 RX ADMIN — PIPERACILLIN SODIUM,TAZOBACTAM SODIUM 3.38 G: 3; .375 INJECTION, POWDER, FOR SOLUTION INTRAVENOUS at 09:06

## 2022-06-01 RX ADMIN — URSODIOL 300 MG: 300 CAPSULE ORAL at 11:06

## 2022-06-01 RX ADMIN — IOHEXOL 100 ML: 350 INJECTION, SOLUTION INTRAVENOUS at 06:06

## 2022-06-01 RX ADMIN — CALCIUM CARBONATE-VITAMIN D TAB 500 MG-200 UNIT 1 TABLET: 500-200 TAB at 09:06

## 2022-06-01 RX ADMIN — Medication 6 MG: at 11:06

## 2022-06-01 RX ADMIN — OXYCODONE HYDROCHLORIDE 5 MG: 5 TABLET ORAL at 07:06

## 2022-06-02 LAB
ALBUMIN SERPL BCP-MCNC: 3.1 G/DL (ref 3.5–5.2)
ALP SERPL-CCNC: 292 U/L (ref 55–135)
ALT SERPL W/O P-5'-P-CCNC: 36 U/L (ref 10–44)
ANION GAP SERPL CALC-SCNC: 9 MMOL/L (ref 8–16)
AST SERPL-CCNC: 46 U/L (ref 10–40)
BASOPHILS # BLD AUTO: 0.01 K/UL (ref 0–0.2)
BASOPHILS NFR BLD: 0.2 % (ref 0–1.9)
BILIRUB SERPL-MCNC: 1.4 MG/DL (ref 0.1–1)
BUN SERPL-MCNC: 6 MG/DL (ref 8–23)
CALCIUM SERPL-MCNC: 9.1 MG/DL (ref 8.7–10.5)
CHLORIDE SERPL-SCNC: 99 MMOL/L (ref 95–110)
CO2 SERPL-SCNC: 19 MMOL/L (ref 23–29)
CREAT SERPL-MCNC: 0.5 MG/DL (ref 0.5–1.4)
DIFFERENTIAL METHOD: ABNORMAL
EOSINOPHIL # BLD AUTO: 0.2 K/UL (ref 0–0.5)
EOSINOPHIL NFR BLD: 3.3 % (ref 0–8)
ERYTHROCYTE [DISTWIDTH] IN BLOOD BY AUTOMATED COUNT: 15 % (ref 11.5–14.5)
EST. GFR  (AFRICAN AMERICAN): >60 ML/MIN/1.73 M^2
EST. GFR  (NON AFRICAN AMERICAN): >60 ML/MIN/1.73 M^2
GLUCOSE SERPL-MCNC: 150 MG/DL (ref 70–110)
HCT VFR BLD AUTO: 35.9 % (ref 40–54)
HGB BLD-MCNC: 12.2 G/DL (ref 14–18)
IMM GRANULOCYTES # BLD AUTO: 0.01 K/UL (ref 0–0.04)
IMM GRANULOCYTES NFR BLD AUTO: 0.2 % (ref 0–0.5)
LYMPHOCYTES # BLD AUTO: 0.9 K/UL (ref 1–4.8)
LYMPHOCYTES NFR BLD: 13.6 % (ref 18–48)
MAGNESIUM SERPL-MCNC: 1.3 MG/DL (ref 1.6–2.6)
MCH RBC QN AUTO: 30 PG (ref 27–31)
MCHC RBC AUTO-ENTMCNC: 34 G/DL (ref 32–36)
MCV RBC AUTO: 88 FL (ref 82–98)
MONOCYTES # BLD AUTO: 0.8 K/UL (ref 0.3–1)
MONOCYTES NFR BLD: 11.8 % (ref 4–15)
NEUTROPHILS # BLD AUTO: 4.5 K/UL (ref 1.8–7.7)
NEUTROPHILS NFR BLD: 70.9 % (ref 38–73)
NRBC BLD-RTO: 0 /100 WBC
PLATELET # BLD AUTO: 143 K/UL (ref 150–450)
PMV BLD AUTO: 9 FL (ref 9.2–12.9)
POTASSIUM SERPL-SCNC: 3.5 MMOL/L (ref 3.5–5.1)
PROT SERPL-MCNC: 7.6 G/DL (ref 6–8.4)
RBC # BLD AUTO: 4.07 M/UL (ref 4.6–6.2)
SODIUM SERPL-SCNC: 127 MMOL/L (ref 136–145)
TROPONIN I SERPL DL<=0.01 NG/ML-MCNC: <0.03 NG/ML
WBC # BLD AUTO: 6.34 K/UL (ref 3.9–12.7)

## 2022-06-02 PROCEDURE — 25000003 PHARM REV CODE 250: Performed by: NURSE PRACTITIONER

## 2022-06-02 PROCEDURE — 12000002 HC ACUTE/MED SURGE SEMI-PRIVATE ROOM

## 2022-06-02 PROCEDURE — 80053 COMPREHEN METABOLIC PANEL: CPT | Performed by: NURSE PRACTITIONER

## 2022-06-02 PROCEDURE — 36415 COLL VENOUS BLD VENIPUNCTURE: CPT | Performed by: NURSE PRACTITIONER

## 2022-06-02 PROCEDURE — 96376 TX/PRO/DX INJ SAME DRUG ADON: CPT

## 2022-06-02 PROCEDURE — 25000003 PHARM REV CODE 250: Performed by: FAMILY MEDICINE

## 2022-06-02 PROCEDURE — 63600175 PHARM REV CODE 636 W HCPCS: Performed by: NURSE PRACTITIONER

## 2022-06-02 PROCEDURE — 83735 ASSAY OF MAGNESIUM: CPT | Performed by: NURSE PRACTITIONER

## 2022-06-02 PROCEDURE — 84484 ASSAY OF TROPONIN QUANT: CPT | Performed by: NURSE PRACTITIONER

## 2022-06-02 PROCEDURE — 96366 THER/PROPH/DIAG IV INF ADDON: CPT

## 2022-06-02 PROCEDURE — 85025 COMPLETE CBC W/AUTO DIFF WBC: CPT | Performed by: NURSE PRACTITIONER

## 2022-06-02 RX ORDER — LIDOCAINE HYDROCHLORIDE 20 MG/ML
10 SOLUTION OROPHARYNGEAL ONCE
Status: COMPLETED | OUTPATIENT
Start: 2022-06-02 | End: 2022-06-02

## 2022-06-02 RX ORDER — MAG HYDROX/ALUMINUM HYD/SIMETH 200-200-20
30 SUSPENSION, ORAL (FINAL DOSE FORM) ORAL ONCE
Status: COMPLETED | OUTPATIENT
Start: 2022-06-02 | End: 2022-06-02

## 2022-06-02 RX ADMIN — OXYCODONE HYDROCHLORIDE 5 MG: 5 TABLET ORAL at 06:06

## 2022-06-02 RX ADMIN — CALCIUM CARBONATE-VITAMIN D TAB 500 MG-200 UNIT 1 TABLET: 500-200 TAB at 02:06

## 2022-06-02 RX ADMIN — URSODIOL 300 MG: 300 CAPSULE ORAL at 08:06

## 2022-06-02 RX ADMIN — THIAMINE HCL TAB 100 MG 100 MG: 100 TAB at 02:06

## 2022-06-02 RX ADMIN — LIDOCAINE HYDROCHLORIDE 10 ML: 20 SOLUTION ORAL; TOPICAL at 08:06

## 2022-06-02 RX ADMIN — MORPHINE SULFATE 4 MG: 4 INJECTION, SOLUTION INTRAMUSCULAR; INTRAVENOUS at 10:06

## 2022-06-02 RX ADMIN — FERROUS SULFATE TAB 325 MG (65 MG ELEMENTAL FE) 1 EACH: 325 (65 FE) TAB at 02:06

## 2022-06-02 RX ADMIN — FOLIC ACID 1 MG: 1 TABLET ORAL at 02:06

## 2022-06-02 RX ADMIN — CALCIUM CARBONATE-VITAMIN D TAB 500 MG-200 UNIT 1 TABLET: 500-200 TAB at 08:06

## 2022-06-02 RX ADMIN — PIPERACILLIN SODIUM,TAZOBACTAM SODIUM 3.38 G: 3; .375 INJECTION, POWDER, FOR SOLUTION INTRAVENOUS at 01:06

## 2022-06-02 RX ADMIN — MORPHINE SULFATE 4 MG: 4 INJECTION, SOLUTION INTRAMUSCULAR; INTRAVENOUS at 09:06

## 2022-06-02 RX ADMIN — PIPERACILLIN SODIUM,TAZOBACTAM SODIUM 3.38 G: 3; .375 INJECTION, POWDER, FOR SOLUTION INTRAVENOUS at 08:06

## 2022-06-02 RX ADMIN — PIPERACILLIN SODIUM,TAZOBACTAM SODIUM 3.38 G: 3; .375 INJECTION, POWDER, FOR SOLUTION INTRAVENOUS at 04:06

## 2022-06-02 RX ADMIN — ALUMINA, MAGNESIA, AND SIMETHICONE ORAL SUSPENSION REGULAR STRENGTH 30 ML: 1200; 1200; 120 SUSPENSION ORAL at 08:06

## 2022-06-02 RX ADMIN — DOCUSATE SODIUM 100 MG: 100 CAPSULE, LIQUID FILLED ORAL at 08:06

## 2022-06-02 RX ADMIN — OXYCODONE HYDROCHLORIDE 5 MG: 5 TABLET ORAL at 04:06

## 2022-06-02 RX ADMIN — THERA TABS 1 TABLET: TAB at 02:06

## 2022-06-02 RX ADMIN — MORPHINE SULFATE 4 MG: 4 INJECTION, SOLUTION INTRAMUSCULAR; INTRAVENOUS at 01:06

## 2022-06-02 RX ADMIN — FUROSEMIDE 20 MG: 20 TABLET ORAL at 09:06

## 2022-06-02 RX ADMIN — URSODIOL 300 MG: 300 CAPSULE ORAL at 09:06

## 2022-06-02 RX ADMIN — SPIRONOLACTONE 25 MG: 25 TABLET ORAL at 09:06

## 2022-06-02 NOTE — SUBJECTIVE & OBJECTIVE
Past Medical History:   Diagnosis Date    Alcohol abuse 02/02/2022    Decompensated hepatic cirrhosis 02/02/2022    Encounter for blood transfusion     Hypertension     Lab test positive for detection of COVID-19 virus 09/2021       Past Surgical History:   Procedure Laterality Date    APPENDECTOMY      COLONOSCOPY      ENDOSCOPIC ULTRASOUND OF UPPER GASTROINTESTINAL TRACT  02/04/2022    Procedure: ULTRASOUND, UPPER GI TRACT, ENDOSCOPIC;  Surgeon: Chuy Bay MD;  Location: [address]);  Service: Endoscopy;;    ERCP N/A 02/04/2022    Procedure: ERCP (ENDOSCOPIC RETROGRADE CHOLANGIOPANCREATOGRAPHY);  Surgeon: Chuy Bay MD;  [facility]  Service: Endoscopy;  Laterality: N/A;    ERCP N/A 4/19/2022    Procedure: ERCP (ENDOSCOPIC RETROGRADE CHOLANGIOPANCREATOGRAPHY);  Surgeon: Chuy Bay MD;  [facility]  Service: Endoscopy;  Laterality: N/A;  4/1: fully vaccinated. labs prior. instructions mailed to sister and patient.-SC  4/12/22-Confirmed new arrival time of 10:45am with pt's sister and updated instructions emailed-DS    EYE SURGERY      JOINT REPLACEMENT      KNEE SURGERY      RECONSTRUCTION OF SHOULDER Right     TONSILLECTOMY         Review of patient's allergies indicates:  No Known Allergies    No current facility-administered medications on file prior to encounter.     Current Outpatient Medications on File Prior to Encounter   Medication Sig    aspirin (ECOTRIN) 81 MG EC tablet Take 81 mg by mouth once daily.    calcium-vitamin D (OSCAL) 250 (625)-125 mg-unit per tablet Take 1 tablet by mouth once daily.    docusate sodium (COLACE) 50 MG capsule Take by mouth 2 (two) times daily.    ferrous fumarate 324 mg (106 mg iron) Tab Take 1 tablet by mouth once daily at 6am.    folic acid (FOLVITE) 1 MG tablet Take 1 tablet (1 mg total) by mouth once daily.    furosemide (LASIX) 40 MG tablet Take 0.5 tablets (20 mg total) by mouth once daily.    pravastatin  (PRAVACHOL) 40 MG tablet Take 40 mg by mouth once daily.    spironolactone (ALDACTONE) 50 MG tablet Take 0.5 tablets (25 mg total) by mouth once daily.    thiamine 100 MG tablet Take 1 tablet (100 mg total) by mouth once daily.    ursodioL (ACTIGALL) 300 mg capsule Take 1 capsule (300 mg total) by mouth 2 (two) times daily.    hydrocodone-acetaminophen 7.5-325mg (NORCO) 7.5-325 mg per tablet Take 1 tablet by mouth every 6 (six) hours as needed for Pain.    multivitamin with folic acid (DAILY-ROHAN, WITH FOLIC ACID,) 400 mcg Tab Take 1 tablet by mouth once daily.     Family History    None       Tobacco Use    Smoking status: Current Every Day Smoker     Packs/day: 0.25     Types: Cigarettes    Smokeless tobacco: Never Used   Substance and Sexual Activity    Alcohol use: Yes     Alcohol/week: 4.0 standard drinks     Types: 4 Cans of beer per week    Drug use: Never    Sexual activity: Not Currently     Review of Systems   Constitutional:  Positive for unexpected weight change. Negative for chills, diaphoresis, fatigue and fever.   HENT:  Negative for congestion, ear pain, sore throat and trouble swallowing.    Eyes:  Negative for pain, discharge and visual disturbance.   Respiratory:  Negative for cough, chest tightness, shortness of breath and wheezing.    Cardiovascular:  Negative for chest pain, palpitations and leg swelling.   Gastrointestinal:  Positive for abdominal pain. Negative for abdominal distention, blood in stool, constipation, diarrhea, nausea and vomiting.   Endocrine: Negative for polydipsia, polyphagia and polyuria.   Genitourinary:  Negative for dysuria, flank pain, frequency and urgency.   Musculoskeletal:  Positive for back pain. Negative for joint swelling, neck pain and neck stiffness.   Skin:  Negative for rash and wound.   Allergic/Immunologic: Negative for immunocompromised state.   Neurological:  Negative for dizziness, syncope, speech difficulty, weakness, light-headedness, numbness and  headaches.   Hematological:  Negative for adenopathy.   Psychiatric/Behavioral:  Negative for confusion and suicidal ideas. The patient is not nervous/anxious.    All other systems reviewed and are negative.  Objective:     Vital Signs (Most Recent):  Temp: 98 °F (36.7 °C) (06/01/22 2028)  Pulse: 83 (06/01/22 2028)  Resp: 20 (06/01/22 2059)  BP: (!) 157/99 (06/01/22 2028)  SpO2: 100 % (06/01/22 2028)   Vital Signs (24h Range):  Temp:  [97.6 °F (36.4 °C)-98 °F (36.7 °C)] 98 °F (36.7 °C)  Pulse:  [78-83] 83  Resp:  [18-24] 20  SpO2:  [100 %] 100 %  BP: (156-157)/(84-99) 157/99     Weight: 70.3 kg (155 lb)  Body mass index is 21.62 kg/m².    Physical Exam  Vitals and nursing note reviewed.   Constitutional:       Appearance: He is well-developed.      Comments: Cachexia, bitemporal fat wasting   HENT:      Head: Normocephalic and atraumatic.   Eyes:      Conjunctiva/sclera: Conjunctivae normal.      Pupils: Pupils are equal, round, and reactive to light.   Cardiovascular:      Rate and Rhythm: Normal rate and regular rhythm.   Pulmonary:      Effort: Pulmonary effort is normal.      Breath sounds: Normal breath sounds.   Abdominal:      General: Bowel sounds are normal.      Palpations: Abdomen is soft. There is mass.      Tenderness: There is left CVA tenderness.      Comments: Left upper quad pulsatile mass    Musculoskeletal:         General: Normal range of motion.      Cervical back: Normal range of motion and neck supple.   Skin:     General: Skin is warm and dry.      Capillary Refill: Capillary refill takes less than 2 seconds.   Neurological:      Mental Status: He is alert and oriented to person, place, and time.   Psychiatric:         Behavior: Behavior normal.         Thought Content: Thought content normal.         Judgment: Judgment normal.         CRANIAL NERVES     CN III, IV, VI   Pupils are equal, round, and reactive to light.     Significant Labs: All pertinent labs within the past 24 hours have been  reviewed.  CBC:   Recent Labs   Lab 06/01/22  1610   WBC 4.78   HGB 11.8*   HCT 33.5*        CMP:   Recent Labs   Lab 06/01/22  1652   *   K 4.3      CO2 17*   *   BUN 6*   CREATININE 0.5   CALCIUM 9.0   PROT 7.6   ALBUMIN 3.2*   BILITOT 1.2*   ALKPHOS 331*   AST 58*   ALT 40   ANIONGAP 9   EGFRNONAA >60.0     Troponin:   Recent Labs   Lab 06/01/22  1652   TROPONINI <0.030       Significant Imaging: I have reviewed all pertinent imaging results/findings within the past 24 hours.  EKG: I have reviewed all pertinent results/findings within the past 24 hours and my personal findings are: NSR, no acute ischemic changes  CTA Chest Non-Coronary (PE Study)    Result Date: 6/1/2022  CMS MANDATED QUALITY DATA - CT RADIATION - 436 All CT scans at this facility utilize dose modulation, iterative reconstruction, and/or weight based dosing when appropriate to reduce radiation dose to as low as reasonably achievable. REASON: Pulmonary embolism (PE) suspected, high prob TECHNIQUE: CT angiography of thorax with 100 mL Omnipaque 350.   Maximum intensity projection coronal reformations were created at a separate workstation and stored in the patient's permanent medical record. COMPARISON: None FINDINGS: No pulmonary embolism identified. Heart size is normal. There is no mediastinal lymphadenopathy or mass. The central tracheobronchial tree is patent. Scattered interstitial lung opacities are noted in the bilateral peripheral lungs. No consolidation or pleural effusion. Visualized abdominal viscera. No acute osseous abnormality. IMPRESSION: 1.  No pulmonary embolism. 2.  Scattered interstitial lung opacities in the periphery of the bilateral lungs likely reflects chronic interstitial disease. Electronically signed by:  Filippo Lopes DO  6/1/2022 6:13 PM CDT Workstation: [id number]    CT Abdomen Pelvis With Contrast    Result Date: 6/1/2022  CMS MANDATED QUALITY DATA - CT RADIATION - 436 All CT scans at this  facility utilize dose modulation, iterative reconstruction, and/or weight based dosing when appropriate to reduce radiation dose to as low as reasonably achievable. Reason: Abdominal abscess/infection suspected; Pancreatitis, acute, severe; Abdominal pain, acute, nonlocalized TECHNIQUE: CT abdomen and pelvis with 100 mL Omnipaque 350. COMPARISON: CT abdomen pelvis January 31, 2022. FINDINGS: Subsegmental atelectasis of the lung bases and calcified granuloma noted. Heart size is normal. Liver is normal size. There is mild prominence of the intrahepatic bile ducts. No hepatic lesions observed. The gallbladder and common bile duct are unremarkable. The pancreas and spleen are unchanged. Degenerative glands are unchanged. The kidneys, ureters and bladder are unremarkable. Prostate gland and seminal vesicles are grossly unremarkable. Large and small bowel are normal caliber. There is no bowel wall thickening or inflammatory changes. The stomach is grossly unremarkable. There is a tubular fluid-filled structure along the margin of the superior greater curvature of the stomach measuring approximately 3.0 x 3.0 x 5.0 cm. There is faint capsular enhancement of this lesion. The abdominal aorta is normal caliber with atherosclerosis. There is no intra-abdominal lymphadenopathy. Ventral abdominal wall is no acute osseous abnormality. IMPRESSION: 3.0 x 3.0 x 5.0 cm tubular fluid-filled structure with faint capsular enhancement noted along the superior greater curvature of the stomach. Fluid was identified in this region in prior CT from January 31, 2022. Fluid-filled structure could potentially reflect an abscess however a cystic lesion is not entirely excluded. Please note the location of this lesion makes it unsusceptible to percutaneous drainage. Electronically signed by:  Filippo Lopes DO  6/1/2022 6:28 PM CDT Workstation: [id number]    X-Ray Chest AP Portable    Result Date: 6/1/2022  Reason: chest pain FINDINGS: PA and  lateral chest with comparison chest x-ray February 2, 2022 show normal cardiomediastinal silhouette. Lungs are clear. Pulmonary vasculature is normal. No acute osseous abnormality. IMPRESSION: No acute cardiopulmonary abnormality. Electronically signed by:  Filippo Lopes DO  6/1/2022 4:40 PM CDT Workstation: [id number]

## 2022-06-02 NOTE — PROGRESS NOTES
"[facility] Medicine Progress Note  Patient Name: Vic Reyez MRN: [id number]   Patient Class: OP- Observation  Length of Stay: 0   Admission Date: 6/1/2022  3:40 PM Attending Physician: Clarke Pacheco MD   Primary Care Provider: Primary Doctor No Face-to-Face encounter date: 06/02/2022   Chief Complaint: Chest Pain (Onset 1 hr ago, states it feels like pressure, does not radiate. ) and Abdominal Pain    Assessment & Plan:   Vic Reyez is a 71 y.o. male admitted for abdominal pain    Active Problems/Assessment & Plan  1. Abdominal fluid collection  GI consulted  Plan for EUS tomorrow  Okay for full liquid diet today with NPO after dinner.  Empiric Zosyn    2. Unintentional weight loss    3. Hyponatremia    4. Transaminitis  History of cirrhosis and sclerosing cholangitis    Core measures:  - Code status: full code   - Consultants:  GI  - Diet:  Full liquid diet.  NPO after dinner.  - DVT PPx: SCD  - lines: PIV    Hospital Course     06/02/2022          Discharge Planning:   No mobility needs. Patient will be discharged in 2-3 days         Subjective:    Interval History   Patient reports pain across upper abdomen has not improved for the most part.  He gets minimal relief from pain medication.  Denies chest pain, shortness of breath, palpitations, nausea/vomiting.   No concerns/issues overnight reported by the patient or the nursing staff.  Reviewed the labs and discussed the plan of care.   No family present at bedside.     Review of Systems   All other Review of Systems were found to be negative expect for that mentioned already in HPI.   Objective:   Physical Exam  /79 (BP Location: Right arm, Patient Position: Lying)   Pulse 80   Temp 97.9 °F (36.6 °C) (Oral)   Resp 16   Ht 5' 11" (1.803 m)   Wt 60.1 kg (132 lb 7.9 oz)   SpO2 100%   BMI 18.48 kg/m²   Vitals reviewed.    Constitutional: No distress.  temporal wasting  HENT: Atraumatic.   Cardiovascular: Normal rate, " regular rhythm and normal heart sounds.   Pulmonary/Chest: Effort normal. Clear to auscultation bilaterally. No wheezes.   Abdominal: Soft. Bowel sounds are normal.  Tender across upper abdomen.  No rebound.  Neurological: Alert.   Skin: Skin is warm and dry.     Following labs were Reviewed   Recent Labs   Lab 06/02/22  0154 06/02/22  0155   WBC 6.34  --    HGB 12.2*  --    HCT 35.9*  --    *  --    CALCIUM  --  9.1   ALBUMIN  --  3.1*   PROT  --  7.6   NA  --  127*   K  --  3.5   CO2  --  19*   CL  --  99   BUN  --  6*   CREATININE  --  0.5   ALKPHOS  --  292*   ALT  --  36   AST  --  46*   BILITOT  --  1.4*     No results found for: POCTGLUCOSE     BMP:   Recent Labs   Lab 06/01/22  1652 06/02/22  0155   * 150*   * 127*   K 4.3 3.5    99   CO2 17* 19*   BUN 6* 6*   CREATININE 0.5 0.5   CALCIUM 9.0 9.1   MG  --  1.3*   , CBC   Recent Labs   Lab 06/01/22  1610 06/02/22  0154   WBC 4.78 6.34   HGB 11.8* 12.2*   HCT 33.5* 35.9*    143*    and All labs within the past 24 hours have been reviewed  Microbiology Results (last 7 days)     ** No results found for the last 168 hours. **        CT Abdomen Pelvis With Contrast   Final Result      CTA Chest Non-Coronary (PE Study)   Final Result      X-Ray Chest AP Portable   Final Result          Inpatient medications  Scheduled Meds:   calcium-vitamin D3  1 tablet Oral BID    docusate sodium  100 mg Oral BID    ferrous sulfate  1 tablet Oral Daily    folic acid  1 mg Oral Daily    furosemide  20 mg Oral Daily    multivitamin  1 tablet Oral Daily    piperacillin-tazobactam (ZOSYN) IVPB  3.375 g Intravenous Q8H    spironolactone  25 mg Oral Daily    thiamine  100 mg Oral Daily    ursodioL  300 mg Oral BID     Continuous Infusions:  PRN Meds:.acetaminophen, calcium chloride IVPB, calcium chloride IVPB, calcium chloride IVPB, magnesium oxide, magnesium sulfate IVPB, magnesium sulfate IVPB, magnesium sulfate IVPB, magnesium sulfate IVPB,  melatonin, morphine, naloxone, ondansetron, oxyCODONE, polyethylene glycol, potassium chloride, potassium chloride, potassium chloride, potassium chloride, sodium chloride 0.9%, sodium phosphate IVPB, sodium phosphate IVPB, sodium phosphate IVPB, sodium phosphate IVPB, sodium phosphate IVPB    Above encounter included review of the medical records, interviewing and examining the patient face-to-face, discussion with family and other health care providers, ordering and interpreting lab/test results and formulating a plan of care.     Medical Decision Making:    [] Low Complexity  [x] Moderate Complexity  [] High Complexity    Clarke Pacheco  [facility] Hospitalist  06/02/2022

## 2022-06-02 NOTE — PLAN OF CARE
Problem: Oral Intake Inadequate  Goal: Improved Oral Intake  Outcome: Ongoing, Progressing  Intervention: Promote and Optimize Oral Intake  Flowsheets (Taken 6/2/2022 1346)  Oral Nutrition Promotion: (suplena TID) calorie-dense liquids provided

## 2022-06-02 NOTE — H&P
"[facility]  History & Physical    DOS: 06/01/2022  7:25 PM      Patient Name: Vic Reyez  MRN: [id number]  Patient Class: OP- Observation  Admission Date: 6/1/2022  Attending Physician: Dr. Meza  Primary Care Provider: Primary Doctor No         Patient information was obtained from patient, past medical records and ER records.     Subjective:     Principal Problem:Abdominal fluid collection    Chief Complaint:   Chief Complaint   Patient presents with    Chest Pain     Onset 1 hr ago, states it feels like pressure, does not radiate.     Abdominal Pain        HPI: Mr. Reyez is a 71 year old male with a history of cirrhosis, HTN, and alcohol use who presents today with complaints of abd pain. It is severe. Pain is on the left side, radiates into the left chest and into the back. He denies fever, chills, cough, N/V/D, dizziness, SOB, or LOC. He reports unintentional wt loss over the last 6 months. Per chart review it appears he's dropped from 173lbs 2/22 to 155 lbs today. He was seen at [facility] 2/22 with CBD dilation/ERCP/with stent placement and biopsy then stent removal. Biopsies returned with atypical cells/inflammatory changes, "unsatisfactory for diagnosis". He has been followed by Dr. Toledo and was prescribed lasix and spironolactone, but has not started this yet. He states he drinks a few beers here and there and a 6 pack of beer may last him a month. In the ED today, Ct abd/pelvis: "3.0 x 3.0 x 5.0 cm tubular fluid-filled structure with faint capsular enhancement noted along the superior greater curvature of the stomach. Fluid was identified in this region in prior CT from January 31, 2022. Fluid-filled structure could potentially reflect an abscess however a cystic lesion is not entirely excluded." ER MD discussed with radiologist who did not feel this was amendable to percutaneous drainage. ER MD also discussed with Gen Surgery who recommended GI consult. "       Past Medical History:   Diagnosis Date    Alcohol abuse 02/02/2022    Decompensated hepatic cirrhosis 02/02/2022    Encounter for blood transfusion     Hypertension     Lab test positive for detection of COVID-19 virus 09/2021       Past Surgical History:   Procedure Laterality Date    APPENDECTOMY      COLONOSCOPY      ENDOSCOPIC ULTRASOUND OF UPPER GASTROINTESTINAL TRACT  02/04/2022    Procedure: ULTRASOUND, UPPER GI TRACT, ENDOSCOPIC;  Surgeon: Chuy Bay MD;  Location: [address]);  Service: Endoscopy;;    ERCP N/A 02/04/2022    Procedure: ERCP (ENDOSCOPIC RETROGRADE CHOLANGIOPANCREATOGRAPHY);  Surgeon: Chuy Bay MD;  [facility]  Service: Endoscopy;  Laterality: N/A;    ERCP N/A 4/19/2022    Procedure: ERCP (ENDOSCOPIC RETROGRADE CHOLANGIOPANCREATOGRAPHY);  Surgeon: Chuy Bay MD;  [facility]  Service: Endoscopy;  Laterality: N/A;  4/1: fully vaccinated. labs prior. instructions mailed to sister and patient.-SC  4/12/22-Confirmed new arrival time of 10:45am with pt's sister and updated instructions emailed-DS    EYE SURGERY      JOINT REPLACEMENT      KNEE SURGERY      RECONSTRUCTION OF SHOULDER Right     TONSILLECTOMY         Review of patient's allergies indicates:  No Known Allergies    No current facility-administered medications on file prior to encounter.     Current Outpatient Medications on File Prior to Encounter   Medication Sig    aspirin (ECOTRIN) 81 MG EC tablet Take 81 mg by mouth once daily.    calcium-vitamin D (OSCAL) 250 (625)-125 mg-unit per tablet Take 1 tablet by mouth once daily.    docusate sodium (COLACE) 50 MG capsule Take by mouth 2 (two) times daily.    ferrous fumarate 324 mg (106 mg iron) Tab Take 1 tablet by mouth once daily at 6am.    folic acid (FOLVITE) 1 MG tablet Take 1 tablet (1 mg total) by mouth once daily.    furosemide (LASIX) 40 MG tablet Take 0.5 tablets (20 mg total) by mouth once  daily.    pravastatin (PRAVACHOL) 40 MG tablet Take 40 mg by mouth once daily.    spironolactone (ALDACTONE) 50 MG tablet Take 0.5 tablets (25 mg total) by mouth once daily.    thiamine 100 MG tablet Take 1 tablet (100 mg total) by mouth once daily.    ursodioL (ACTIGALL) 300 mg capsule Take 1 capsule (300 mg total) by mouth 2 (two) times daily.    hydrocodone-acetaminophen 7.5-325mg (NORCO) 7.5-325 mg per tablet Take 1 tablet by mouth every 6 (six) hours as needed for Pain.    multivitamin with folic acid (DAILY-ROHAN, WITH FOLIC ACID,) 400 mcg Tab Take 1 tablet by mouth once daily.     Family History    None       Tobacco Use    Smoking status: Current Every Day Smoker     Packs/day: 0.25     Types: Cigarettes    Smokeless tobacco: Never Used   Substance and Sexual Activity    Alcohol use: Yes     Alcohol/week: 4.0 standard drinks     Types: 4 Cans of beer per week    Drug use: Never    Sexual activity: Not Currently     Review of Systems   Constitutional:  Positive for unexpected weight change. Negative for chills, diaphoresis, fatigue and fever.   HENT:  Negative for congestion, ear pain, sore throat and trouble swallowing.    Eyes:  Negative for pain, discharge and visual disturbance.   Respiratory:  Negative for cough, chest tightness, shortness of breath and wheezing.    Cardiovascular:  Negative for chest pain, palpitations and leg swelling.   Gastrointestinal:  Positive for abdominal pain. Negative for abdominal distention, blood in stool, constipation, diarrhea, nausea and vomiting.   Endocrine: Negative for polydipsia, polyphagia and polyuria.   Genitourinary:  Negative for dysuria, flank pain, frequency and urgency.   Musculoskeletal:  Positive for back pain. Negative for joint swelling, neck pain and neck stiffness.   Skin:  Negative for rash and wound.   Allergic/Immunologic: Negative for immunocompromised state.   Neurological:  Negative for dizziness, syncope, speech difficulty, weakness,  light-headedness, numbness and headaches.   Hematological:  Negative for adenopathy.   Psychiatric/Behavioral:  Negative for confusion and suicidal ideas. The patient is not nervous/anxious.    All other systems reviewed and are negative.  Objective:     Vital Signs (Most Recent):  Temp: 98 °F (36.7 °C) (06/01/22 2028)  Pulse: 83 (06/01/22 2028)  Resp: 20 (06/01/22 2059)  BP: (!) 157/99 (06/01/22 2028)  SpO2: 100 % (06/01/22 2028)   Vital Signs (24h Range):  Temp:  [97.6 °F (36.4 °C)-98 °F (36.7 °C)] 98 °F (36.7 °C)  Pulse:  [78-83] 83  Resp:  [18-24] 20  SpO2:  [100 %] 100 %  BP: (156-157)/(84-99) 157/99     Weight: 70.3 kg (155 lb)  Body mass index is 21.62 kg/m².    Physical Exam  Vitals and nursing note reviewed.   Constitutional:       Appearance: He is well-developed.      Comments: Cachexia, bitemporal fat wasting   HENT:      Head: Normocephalic and atraumatic.   Eyes:      Conjunctiva/sclera: Conjunctivae normal.      Pupils: Pupils are equal, round, and reactive to light.   Cardiovascular:      Rate and Rhythm: Normal rate and regular rhythm.   Pulmonary:      Effort: Pulmonary effort is normal.      Breath sounds: Normal breath sounds.   Abdominal:      General: Bowel sounds are normal.      Palpations: Abdomen is soft. There is mass.      Tenderness: There is left CVA tenderness.      Comments: Left upper quad pulsatile mass    Musculoskeletal:         General: Normal range of motion.      Cervical back: Normal range of motion and neck supple.   Skin:     General: Skin is warm and dry.      Capillary Refill: Capillary refill takes less than 2 seconds.   Neurological:      Mental Status: He is alert and oriented to person, place, and time.   Psychiatric:         Behavior: Behavior normal.         Thought Content: Thought content normal.         Judgment: Judgment normal.         CRANIAL NERVES     CN III, IV, VI   Pupils are equal, round, and reactive to light.     Significant Labs: All pertinent labs  within the past 24 hours have been reviewed.  CBC:   Recent Labs   Lab 06/01/22  1610   WBC 4.78   HGB 11.8*   HCT 33.5*        CMP:   Recent Labs   Lab 06/01/22  1652   *   K 4.3      CO2 17*   *   BUN 6*   CREATININE 0.5   CALCIUM 9.0   PROT 7.6   ALBUMIN 3.2*   BILITOT 1.2*   ALKPHOS 331*   AST 58*   ALT 40   ANIONGAP 9   EGFRNONAA >60.0     Troponin:   Recent Labs   Lab 06/01/22  1652   TROPONINI <0.030       Significant Imaging: I have reviewed all pertinent imaging results/findings within the past 24 hours.  EKG: I have reviewed all pertinent results/findings within the past 24 hours and my personal findings are: NSR, no acute ischemic changes  CTA Chest Non-Coronary (PE Study)    Result Date: 6/1/2022  CMS MANDATED QUALITY DATA - CT RADIATION - 436 All CT scans at this facility utilize dose modulation, iterative reconstruction, and/or weight based dosing when appropriate to reduce radiation dose to as low as reasonably achievable. REASON: Pulmonary embolism (PE) suspected, high prob TECHNIQUE: CT angiography of thorax with 100 mL Omnipaque 350.   Maximum intensity projection coronal reformations were created at a separate workstation and stored in the patient's permanent medical record. COMPARISON: None FINDINGS: No pulmonary embolism identified. Heart size is normal. There is no mediastinal lymphadenopathy or mass. The central tracheobronchial tree is patent. Scattered interstitial lung opacities are noted in the bilateral peripheral lungs. No consolidation or pleural effusion. Visualized abdominal viscera. No acute osseous abnormality. IMPRESSION: 1.  No pulmonary embolism. 2.  Scattered interstitial lung opacities in the periphery of the bilateral lungs likely reflects chronic interstitial disease. Electronically signed by:  Filippo Lopes DO  6/1/2022 6:13 PM CDT Workstation: [id number]    CT Abdomen Pelvis With Contrast    Result Date: 6/1/2022  CMS MANDATED QUALITY DATA - CT  RADIATION - 436 All CT scans at this facility utilize dose modulation, iterative reconstruction, and/or weight based dosing when appropriate to reduce radiation dose to as low as reasonably achievable. Reason: Abdominal abscess/infection suspected; Pancreatitis, acute, severe; Abdominal pain, acute, nonlocalized TECHNIQUE: CT abdomen and pelvis with 100 mL Omnipaque 350. COMPARISON: CT abdomen pelvis January 31, 2022. FINDINGS: Subsegmental atelectasis of the lung bases and calcified granuloma noted. Heart size is normal. Liver is normal size. There is mild prominence of the intrahepatic bile ducts. No hepatic lesions observed. The gallbladder and common bile duct are unremarkable. The pancreas and spleen are unchanged. Degenerative glands are unchanged. The kidneys, ureters and bladder are unremarkable. Prostate gland and seminal vesicles are grossly unremarkable. Large and small bowel are normal caliber. There is no bowel wall thickening or inflammatory changes. The stomach is grossly unremarkable. There is a tubular fluid-filled structure along the margin of the superior greater curvature of the stomach measuring approximately 3.0 x 3.0 x 5.0 cm. There is faint capsular enhancement of this lesion. The abdominal aorta is normal caliber with atherosclerosis. There is no intra-abdominal lymphadenopathy. Ventral abdominal wall is no acute osseous abnormality. IMPRESSION: 3.0 x 3.0 x 5.0 cm tubular fluid-filled structure with faint capsular enhancement noted along the superior greater curvature of the stomach. Fluid was identified in this region in prior CT from January 31, 2022. Fluid-filled structure could potentially reflect an abscess however a cystic lesion is not entirely excluded. Please note the location of this lesion makes it unsusceptible to percutaneous drainage. Electronically signed by:  Filippo Lopes DO  6/1/2022 6:28 PM CDT Workstation: [id number]    X-Ray Chest AP Portable    Result Date:  6/1/2022  Reason: chest pain FINDINGS: PA and lateral chest with comparison chest x-ray February 2, 2022 show normal cardiomediastinal silhouette. Lungs are clear. Pulmonary vasculature is normal. No acute osseous abnormality. IMPRESSION: No acute cardiopulmonary abnormality. Electronically signed by:  Filippo Lopes DO  6/1/2022 4:40 PM CDT Workstation: [id number]       Assessment/Plan:     * Abdominal fluid collection  Admit to med/tele  Consult GI - called per ER MD - plan for EUS in AM   NPO after MN  Will hold ASA  Pain control  Concern for possible abscess? Will cover with zosyn  Check inflammatory markers and procal      Unintentional weight loss  Denies change in appetite or intake   Daily wt         Hyponatremia  Daily chemistries   Start spironolactone and lasix cautiously   Daily wt accurate I&O        VTE Risk Mitigation (From admission, onward)         Ordered     IP VTE LOW RISK PATIENT  Once         06/01/22 2027     Place sequential compression device  Until discontinued         06/01/22 2027                   Fiorella Richardson NP  Department of Hospital Medicine   [facility]

## 2022-06-02 NOTE — PLAN OF CARE
06/02/22 1437   PEREZ Message   Medicare Outpatient and Observation Notification regarding financial responsibility Given to patient/caregiver;Explained to patient/caregiver;Signed/date by patient/caregiver   Date PEREZ was signed 06/02/22   Time PEREZ was signed 1327

## 2022-06-02 NOTE — CONSULTS
GASTROENTEROLOGY INPATIENT CONSULT NOTE  Patient Name: Vic Reyez  Patient MRN: [id number]  Patient : 1950    Admit Date: 2022  Service date: 2022    Reason for Consult: fluid collection    PCP: Primary Doctor No    Chief Complaint   Patient presents with    Chest Pain     Onset 1 hr ago, states it feels like pressure, does not radiate.     Abdominal Pain       HPI: Patient is a 71 y.o. male with PMHx appy, cirrhosis, pancreatitis, EtOH / tobacco use, biliary stricture s/p recent ERCP presents for epig pain. Acute / chronic, intermittent, slight progression. Seen at [facility] w/ below w/u. Followed by advanced GI / hepatology services at [facility] in [facility].  No f/c, bleeding, jaundice. Feels better now and eating PO.     CHART REVIEW:   CT Abd  - 5 cm fluid collection near panc / greater curve; vascular dz; min intrahepatic dilation  ERCP  - resolution of biliary stricture s/p stent extraction  EUS  - pancreatitis changes s/p FNA; portal LAD s/p FNA; panc cyst; All bx negative  ERCP  - sphincterotomy, stent, brush of hepatic duct stricture. Atypical cells    Past Medical History:  Past Medical History:   Diagnosis Date    Alcohol abuse 2022    Decompensated hepatic cirrhosis 2022    Encounter for blood transfusion     Hypertension     Lab test positive for detection of COVID-19 virus 2021        Past Surgical History:  Past Surgical History:   Procedure Laterality Date    APPENDECTOMY      COLONOSCOPY      ENDOSCOPIC ULTRASOUND OF UPPER GASTROINTESTINAL TRACT  2022    Procedure: ULTRASOUND, UPPER GI TRACT, ENDOSCOPIC;  Surgeon: Chuy Bay MD;  Location: [address]);  Service: Endoscopy;;    ERCP N/A 2022    Procedure: ERCP (ENDOSCOPIC RETROGRADE CHOLANGIOPANCREATOGRAPHY);  Surgeon: Chuy Bay MD;  Location: [facility] ([address] [place] [address]);  Service: Endoscopy;  Laterality: N/A;    ERCP N/A 2022    Procedure: ERCP (ENDOSCOPIC  RETROGRADE CHOLANGIOPANCREATOGRAPHY);  Surgeon: Chuy Bay MD;  Location: [facility] ([address] [place] [address]);  Service: Endoscopy;  Laterality: N/A;  4/1: fully vaccinated. labs prior. instructions mailed to sister and patient.-SC  4/12/22-Confirmed new arrival time of 10:45am with pt's sister and updated instructions emailed-DS    EYE SURGERY      JOINT REPLACEMENT      KNEE SURGERY      RECONSTRUCTION OF SHOULDER Right     TONSILLECTOMY          Home Medications:  Medications Prior to Admission   Medication Sig Dispense Refill Last Dose    aspirin (ECOTRIN) 81 MG EC tablet Take 81 mg by mouth once daily.   6/1/2022 at 06:00    calcium-vitamin D (OSCAL) 250 (625)-125 mg-unit per tablet Take 1 tablet by mouth once daily.   6/1/2022 at 06:00    docusate sodium (COLACE) 50 MG capsule Take by mouth 2 (two) times daily.   6/1/2022 at 06:00    ferrous fumarate 324 mg (106 mg iron) Tab Take 1 tablet by mouth once daily at 6am.   6/1/2022 at 06:00    folic acid (FOLVITE) 1 MG tablet Take 1 tablet (1 mg total) by mouth once daily. 360 tablet 0 6/1/2022 at 06:00    furosemide (LASIX) 40 MG tablet Take 0.5 tablets (20 mg total) by mouth once daily. 15 tablet 11 6/1/2022 at 06:00    pravastatin (PRAVACHOL) 40 MG tablet Take 40 mg by mouth once daily.   6/1/2022 at 06:00    spironolactone (ALDACTONE) 50 MG tablet Take 0.5 tablets (25 mg total) by mouth once daily. 15 tablet 11 6/1/2022 at 06:00    thiamine 100 MG tablet Take 1 tablet (100 mg total) by mouth once daily. 360 tablet 0 6/1/2022 at 06:00    ursodioL (ACTIGALL) 300 mg capsule Take 1 capsule (300 mg total) by mouth 2 (two) times daily. 60 capsule 11 6/1/2022 at 06:00    hydrocodone-acetaminophen 7.5-325mg (NORCO) 7.5-325 mg per tablet Take 1 tablet by mouth every 6 (six) hours as needed for Pain.   Unknown at Unknown time    multivitamin with folic acid (DAILY-ROHAN, WITH FOLIC ACID,) 400 mcg Tab Take 1 tablet by mouth once daily. 30 tablet 0 Unknown at Unknown  time       Inpatient Medications:   calcium-vitamin D3  1 tablet Oral BID    docusate sodium  100 mg Oral BID    ferrous sulfate  1 tablet Oral Daily    folic acid  1 mg Oral Daily    furosemide  20 mg Oral Daily    multivitamin  1 tablet Oral Daily    piperacillin-tazobactam (ZOSYN) IVPB  3.375 g Intravenous Q8H    spironolactone  25 mg Oral Daily    thiamine  100 mg Oral Daily    ursodioL  300 mg Oral BID     acetaminophen, calcium chloride IVPB, calcium chloride IVPB, calcium chloride IVPB, magnesium oxide, magnesium sulfate IVPB, magnesium sulfate IVPB, magnesium sulfate IVPB, magnesium sulfate IVPB, melatonin, morphine, naloxone, ondansetron, oxyCODONE, polyethylene glycol, potassium chloride, potassium chloride, potassium chloride, potassium chloride, sodium chloride 0.9%, sodium phosphate IVPB, sodium phosphate IVPB, sodium phosphate IVPB, sodium phosphate IVPB, sodium phosphate IVPB    Review of patient's allergies indicates:  No Known Allergies    Social History:   Social History     Occupational History    Not on file   Tobacco Use    Smoking status: Current Every Day Smoker     Packs/day: 0.25     Types: Cigarettes    Smokeless tobacco: Never Used   Substance and Sexual Activity    Alcohol use: Yes     Alcohol/week: 4.0 standard drinks     Types: 4 Cans of beer per week    Drug use: Never    Sexual activity: Not Currently       Family History:   History reviewed. No pertinent family history.    Review of Systems:  A 10 point review of systems was performed and was normal, except as mentioned in the HPI, including constitutional, HEENT, heme, lymph, cardiovascular, respiratory, gastrointestinal, genitourinary, neurologic, endocrine, psychiatric and musculoskeletal.      OBJECTIVE:    Physical Exam:  24 Hour Vital Sign Ranges: Temp:  [97.6 °F (36.4 °C)-98.2 °F (36.8 °C)] 97.9 °F (36.6 °C)  Pulse:  [78-86] 80  Resp:  [15-24] 16  SpO2:  [100 %] 100 %  BP: (125-157)/(79-99) 125/79  Most recent  "vitals: /79 (BP Location: Right arm, Patient Position: Lying)   Pulse 80   Temp 97.9 °F (36.6 °C) (Oral)   Resp 16   Ht 5' 11" (1.803 m)   Wt 60.1 kg (132 lb 7.9 oz)   SpO2 100%   BMI 18.48 kg/m²    GEN: well-developed, well-nourished, awake and alert, non-toxic appearing adult  HEENT: PERRL, sclera anicteric, oral mucosa pink and moist without lesion  NECK: trachea midline; Good ROM  CV: regular rate and rhythm, no murmurs or gallops  RESP: clear to auscultation bilaterally, no wheezes, rhonci or rales  ABD: soft, non-tender, non-distended, increased normal bowel sounds  EXT: no swelling or edema, 2+ pulses distally  SKIN: no rashes or jaundice  PSYCH: normal affect    Labs:   Recent Labs     06/01/22  1610 06/02/22  0154   WBC 4.78 6.34   MCV 87 88    143*     Recent Labs     06/01/22  1652 06/02/22  0155   * 127*   K 4.3 3.5    99   CO2 17* 19*   BUN 6* 6*   * 150*     No results for input(s): ALB in the last 72 hours.    Invalid input(s): ALKP, SGOT, SGPT, TBIL, DBIL, TPRO  Recent Labs     06/01/22  1610   INR 1.2         Radiology Review:  CT Abdomen Pelvis With Contrast   Final Result      CTA Chest Non-Coronary (PE Study)   Final Result      X-Ray Chest AP Portable   Final Result            IMPRESSION / RECOMMENDATIONS:  71 y.o. male with PMHx appy, cirrhosis, pancreatitis, EtOH / tobacco use, biliary stricture s/p recent ERCP presents for epig pain. 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 Unclear if this is related to pain and no signs of overt infection.     -EGD / EUS +/- FNA tomorrow.     Thank you for this consult.    Roger Viveros III  6/2/2022  1:50 PM        "

## 2022-06-02 NOTE — PROGRESS NOTES
[facility]  Adult Nutrition   Consult Note (Initial Assessment)     SUMMARY     Recommendations/Interventions:  1. Advance to regular diet as medically able  2. Add Suplena TID once diet advances, provides (1260 kcal, 32 g pro) to assist in meeting increased energy needs when PO intake of meals is insufficient  Goals:   1. Pt diet to advance in 24-48 hours; EN support to be considered if NPO > 3 days  2. Pt to maintain or gain weight during hospitalization  Nutrition Goal Status: new    Dietitian Rounds Brief:    Pt admitted 2' abdominal pain and chest pain w/ fluid accumulation. Pt currently NPO for endoscopy scheduled for today, will advance diet when able.   Consulted for unintented wt loss: 18 lb ~11% UBW loss. Per MD notes 6/1: appears cachectic w/ bitemporal fat wasting. BMI 18.4: PEM Grade 1; fluid accumulation may be masking degree of wt loss/malnutrition.  Typical weight is 175-185 lbs.  Spoke to patient who states no changes in appetite or intake in past 6 months, both remain good but pt noticed pants becoming looser. Denies N/V/D/C, denies any difference in stools. Appears cachectic with visibly reduced fat stores in the bicep, clavicle, orbital and cheek areas.   LBM: 6/1    Malnutrition Assessment  Weight Loss (Malnutrition): greater than 10% in 6 months  Muscle Mass (Malnutrition): mild depletion  Fluid Accumulation (Malnutrition): moderate (abdominal)   Orbital Region (Subcutaneous Fat Loss): mild depletion  Upper Arm Region (Subcutaneous Fat Loss): mild depletion   Gnosticist Region (Muscle Loss): mild depletion  Clavicle Bone Region (Muscle Loss): mild depletion  Moderate Weight Loss (Malnutrition): 10% in 6 months   Nutrition Problem  Moderate malnutrition in the context of chronic illness     Related to (etiology):   Increased energy and protein needs    Signs and Symptoms (as evidenced by): \  Weight loss >10% in 6 months, abdominal ascites present, visible muscle wasting in the  "temporal and upper arm region, and visible fat loss of the orbital and upper arm regions.     Interventions/Recommendations (treatment strategy):  1. Suplena TID provides (1260 kcal, 32 g pro) to assist in meeting increased energy needs when PO intake of meals is insufficient    Nutrition Diagnosis Status:   New        Reason for Assessment  Reason For Assessment: consult (unexplained wt loss of 11% in 6 months)  Diagnosis: liver disease (abdominal fluid accumulation)  Relevant Medical History: Hyponatremia, unintended wt loss, alcohol use, cirrhosis, biliary stricture, sclerosing cholangitis, biliary stent, HTN,  current everyday smoker  Interdisciplinary Rounds: attended    Nutrition Risk Screen  Nutrition Risk Screen: no indicators present     MST Score: 4  Have you recently lost weight without trying?: Yes: 34 lbs or more  Weight loss score: 4  Have you been eating poorly because of a decreased appetite?: No  Appetite score: 0       Nutrition/Diet History  Food Allergies: NKFA  Factors Affecting Nutritional Intake: None identified at this time    Anthropometrics  Temp: 97.9 °F (36.6 °C)  Height Method: Stated  Height: 5' 11" (180.3 cm)  Height (inches): 71 in  Weight Method: Bed Scale  Weight: 60.1 kg (132 lb 7.9 oz)  Weight (lb): 132.5 lb  Ideal Body Weight (IBW), Male: 172 lb  % Ideal Body Weight, Male (lb): 77.03 %  BMI (Calculated): 18.5  BMI Grade: 17 - 18.4 protein-energy malnutrition grade I       Weight History:  Wt Readings from Last 10 Encounters:   06/01/22 60.1 kg (132 lb 7.9 oz)   04/21/22 73 kg (160 lb 15 oz)   04/19/22 77.1 kg (170 lb)   02/02/22 78.8 kg (173 lb 11.6 oz)   01/31/22 81.6 kg (180 lb)   02/01/22 81.6 kg (179 lb 14.3 oz)   09/15/21 72.6 kg (160 lb)   10/23/19 77.3 kg (170 lb 6.7 oz)   10/03/19 68 kg (149 lb 14.6 oz)   02/15/19 68 kg (149 lb 14.6 oz)   }  Lab/Procedures/Meds: Pertinent Labs Reviewed  Clinical Chemistry:  Recent Labs   Lab 06/01/22  1652 06/02/22  0155   * 127* "   K 4.3 3.5    99   CO2 17* 19*   * 150*   BUN 6* 6*   CREATININE 0.5 0.5   CALCIUM 9.0 9.1   PROT 7.6 7.6   ALBUMIN 3.2* 3.1*   BILITOT 1.2* 1.4*   ALKPHOS 331* 292*   AST 58* 46*   ALT 40 36   ANIONGAP 9 9   ESTGFRAFRICA >60.0 >60.0   EGFRNONAA >60.0 >60.0   MG  --  1.3*   LIPASE 38  --      CBC:   Recent Labs   Lab 06/02/22  0154   WBC 6.34   RBC 4.07*   HGB 12.2*   HCT 35.9*   *   MCV 88   MCH 30.0   MCHC 34.0     Cardiac Profile:  Recent Labs   Lab 06/01/22  1610 06/01/22  1652 06/01/22  2140 06/02/22  0155   BNP 65  --   --   --    TROPONINI  --  <0.030 <0.030 <0.030     Inflammatory Labs:  Recent Labs   Lab 06/01/22 1652   CRP 0.57     Medications: Pertinent Medications reviewed  Scheduled Meds:   calcium-vitamin D3  1 tablet Oral BID    docusate sodium  100 mg Oral BID    ferrous sulfate  1 tablet Oral Daily    folic acid  1 mg Oral Daily    furosemide  20 mg Oral Daily    multivitamin  1 tablet Oral Daily    piperacillin-tazobactam (ZOSYN) IVPB  3.375 g Intravenous Q8H    spironolactone  25 mg Oral Daily    thiamine  100 mg Oral Daily    ursodioL  300 mg Oral BID     Continuous Infusions:  PRN Meds:.acetaminophen, calcium chloride IVPB, calcium chloride IVPB, calcium chloride IVPB, magnesium oxide, magnesium sulfate IVPB, magnesium sulfate IVPB, magnesium sulfate IVPB, magnesium sulfate IVPB, melatonin, morphine, naloxone, ondansetron, oxyCODONE, polyethylene glycol, potassium chloride, potassium chloride, potassium chloride, potassium chloride, sodium chloride 0.9%, sodium phosphate IVPB, sodium phosphate IVPB, sodium phosphate IVPB, sodium phosphate IVPB, sodium phosphate IVPB    Antibiotics (From admission, onward)            Start     Stop Route Frequency Ordered    06/01/22 2115  piperacillin-tazobactam 3.375 g in dextrose 5 % 50 mL IVPB (ready to mix system)         -- IV Every 8 hours (non-standard times) 06/01/22 2005           Estimated/Assessed Needs  Weight Used For  Calorie Calculations: 60.1 kg (132 lb 7.9 oz)  Energy Calorie Requirements (kcal): 0188-3794 (35-40 kcal/kg)  Energy Need Method: Kcal/kg  Protein Requirements: 72-96 (1.2-1.6 g/kg)  Weight Used For Protein Calculations: 60.1 kg (132 lb 7.9 oz)  Fluid Requirements (mL): 1 ml/kcal or per MD  Estimated Fluid Requirement Method: RDA Method  RDA Method (mL): 2103     Nutrition Prescription Ordered    Current Diet Order: NPO    Evaluation of Received Nutrient/Fluid Intake    Energy Calories Required: not meeting needs  Protein Required: not meeting needs  Fluid Required: not meeting needs  Tolerance: tolerating  % Intake of Estimated Energy Needs: 0%  % Meal Intake: NPO    Intake/Output Summary (Last 24 hours) at 6/2/2022 1127  Last data filed at 6/2/2022 0900  Gross per 24 hour   Intake 640 ml   Output --   Net 640 ml      Nutrition Risk    Level of Risk/Frequency of Follow-up: high   Monitor and Evaluation    Food and Nutrient Intake: energy intake, food and beverage intake  Food and Nutrient Adminstration: diet order  Knowledge/Beliefs/Attitudes: food and nutrition knowledge/skill  Physical Activity and Function: nutrition-related ADLs and IADLs  Anthropometric Measurements: weight, weight change, body mass index  Biochemical Data, Medical Tests and Procedures: electrolyte and renal panel, gastrointestinal profile, glucose/endocrine profile, inflammatory profile, lipid profile  Nutrition-Focused Physical Findings: overall appearance     Nutrition Follow-Up    RD Follow-up?: Yes   Gabriella Barraza  06/02/2022  1:59 PM

## 2022-06-02 NOTE — ASSESSMENT & PLAN NOTE
Admit to med/tele  Consult GI - called per ER MD - plan for EUS in AM   NPO after MN  Will hold ASA  Pain control  Concern for possible abscess? Will cover with zosyn  Check inflammatory markers and procal

## 2022-06-02 NOTE — HPI
"Mr. Reyez is a 71 year old male with a history of cirrhosis, HTN, and alcohol use who presents today with complaints of abd pain. It is severe. Pain is on the left side, radiates into the left chest and into the back. He denies fever, chills, cough, N/V/D, dizziness, SOB, or LOC. He reports unintentional wt loss over the last 6 months. Per chart review it appears he's dropped from 173lbs 2/22 to 155 lbs today. He was seen at [facility] 2/22 with CBD dilation/ERCP/with stent placement and biopsy then stent removal. Biopsies returned with atypical cells/inflammatory changes, "unsatisfactory for diagnosis". He has been followed by Dr. Toledo and was prescribed lasix and spironolactone, but has not started this yet. He states he drinks a few beers here and there and a 6 pack of beer may last him a month. In the ED today, Ct abd/pelvis: "3.0 x 3.0 x 5.0 cm tubular fluid-filled structure with faint capsular enhancement noted along the superior greater curvature of the stomach. Fluid was identified in this region in prior CT from January 31, 2022. Fluid-filled structure could potentially reflect an abscess however a cystic lesion is not entirely excluded." ER MD discussed with radiologist who did not feel this was amendable to percutaneous drainage. ER MD also discussed with Gen Surgery who recommended GI consult.   "

## 2022-06-02 NOTE — PLAN OF CARE
[facility]  Initial Discharge Assessment       Primary Care Provider: Primary Doctor No    Admission Diagnosis: Abdominal fluid collection [R18.8]  Hyponatremia [E87.1]    Admission Date: 6/1/2022  Expected Discharge Date:     Discharge Barriers Identified: (P) None    Assessment completed at bedside with pt. Pt lives alone and plans to return home at discharge. Pt does not use any DME at home and is not expecting any for discharge. Pt states he is able to drive himself to dr's appointments but his sister Bhargavi will get him home when it is time for dc. POA not discussed at this time.     Payor: [employer] / Plan: [place] [employer] / Product Type: Government /     Extended Emergency Contact Information  Primary Emergency Contact: Bhargavi Mariee   [facility]  Home Phone: [phone]  Relation: Sister    Discharge Plan A: (P) Home  Discharge Plan B: (P) Home      [facility] - [place], [place] - [address]  [address]  [facility] [address]  Phone: [phone] Fax: [phone]    [facility] [address] [address] [address]  Phone: [phone] Fax: [phone]      Initial Assessment (most recent)       Adult Discharge Assessment - 06/02/22 1438          Discharge Assessment    Assessment Type Discharge Planning Assessment (P)      Confirmed/corrected address, phone number and insurance Yes (P)      Confirmed Demographics Correct on Facesheet (P)      Source of Information patient (P)      When was your last doctors appointment? -- (P)    3 weeks ago, Dr. Hayes    Communicated NAFISA with patient/caregiver Date not available/Unable to determine (P)      Reason For Admission chest pains (P)      Lives With alone (P)      Facility Arrived From: home (P)      Do you expect to return to your current living situation? Yes (P)      Do you have help at home or someone to help you manage your care at home? No (P)      Prior  to hospitilization cognitive status: Alert/Oriented (P)      Current cognitive status: Alert/Oriented (P)      Walking or Climbing Stairs Difficulty none (P)      Dressing/Bathing Difficulty none (P)      Equipment Currently Used at Home none (P)      Readmission within 30 days? No (P)      Patient currently being followed by outpatient case management? No (P)      Do you currently have service(s) that help you manage your care at home? No (P)      Do you take prescription medications? Yes (P)      Do you have prescription coverage? Yes (P)      Coverage [employer] and [employer] Medicare (P)      Do you have any problems affording any of your prescribed medications? No (P)      Is the patient taking medications as prescribed? yes (P)      Who is going to help you get home at discharge? Bhargavi Mariee, [phone], sister (P)      How do you get to doctors appointments? car, drives self (P)      Are you on dialysis? No (P)      Do you take coumadin? No (P)      Discharge Plan A Home (P)      Discharge Plan B Home (P)      DME Needed Upon Discharge  none (P)      Discharge Plan discussed with: Patient (P)      Discharge Barriers Identified None (P)

## 2022-06-02 NOTE — ED PROVIDER NOTES
Encounter Date: 6/1/2022       History     Chief Complaint   Patient presents with    Chest Pain     Onset 1 hr ago, states it feels like pressure, does not radiate.     Abdominal Pain     Patient presents complaining of abdominal pain and chest pain.  Patient describes of left upper quadrant discomfort radiates into his chest.  Patient has a history of cirrhosis and sclerosing cholangitis.  He is status post biliary stent earlier this year.  He denies any nausea or vomiting.  No hematemesis.        Review of patient's allergies indicates:  No Known Allergies  Past Medical History:   Diagnosis Date    Alcohol abuse 02/02/2022    Decompensated hepatic cirrhosis 02/02/2022    Encounter for blood transfusion     Hypertension     Lab test positive for detection of COVID-19 virus 09/2021     Past Surgical History:   Procedure Laterality Date    APPENDECTOMY      COLONOSCOPY      ENDOSCOPIC ULTRASOUND OF UPPER GASTROINTESTINAL TRACT  02/04/2022    Procedure: ULTRASOUND, UPPER GI TRACT, ENDOSCOPIC;  Surgeon: Chuy Bay MD;  Location: [address]);  Service: Endoscopy;;    ERCP N/A 02/04/2022    Procedure: ERCP (ENDOSCOPIC RETROGRADE CHOLANGIOPANCREATOGRAPHY);  Surgeon: Chuy Bay MD;  Location: [address]);  Service: Endoscopy;  Laterality: N/A;    ERCP N/A 4/19/2022    Procedure: ERCP (ENDOSCOPIC RETROGRADE CHOLANGIOPANCREATOGRAPHY);  Surgeon: Chuy Bay MD;  Location: [address]);  Service: Endoscopy;  Laterality: N/A;  4/1: fully vaccinated. labs prior. instructions mailed to sister and patient.-SC  4/12/22-Confirmed new arrival time of 10:45am with pt's sister and updated instructions emailed-DS    EYE SURGERY      JOINT REPLACEMENT      KNEE SURGERY      RECONSTRUCTION OF SHOULDER Right     TONSILLECTOMY       History reviewed. No pertinent family history.  Social History     Tobacco Use    Smoking status: Current Every Day Smoker     Packs/day: 0.25     Types:  Cigarettes    Smokeless tobacco: Never Used   Substance Use Topics    Alcohol use: Yes     Alcohol/week: 4.0 standard drinks     Types: 4 Cans of beer per week    Drug use: Never     Review of Systems   All other systems reviewed and are negative.      Physical Exam     Initial Vitals [06/01/22 1550]   BP Pulse Resp Temp SpO2   (!) 156/84 78 19 97.6 °F (36.4 °C) 100 %      MAP       --         Physical Exam    Nursing note and vitals reviewed.  Constitutional: He appears well-developed and well-nourished. He is not diaphoretic. He appears distressed.   Pleasant, polite.  Patient appears in pain   HENT:   Head: Normocephalic and atraumatic.   Mouth/Throat: Oropharynx is clear and moist.   Eyes: EOM are normal.   Neck: Neck supple.   Normal range of motion.  Cardiovascular: Normal rate, regular rhythm, normal heart sounds and intact distal pulses.   Pulmonary/Chest: Breath sounds normal. No respiratory distress.   Abdominal: Abdomen is soft. He exhibits no distension. There is no abdominal tenderness.   Musculoskeletal:         General: Normal range of motion.      Cervical back: Normal range of motion and neck supple.     Neurological: He is alert and oriented to person, place, and time. He has normal strength.   Skin: Skin is warm and dry.   Psychiatric: He has a normal mood and affect. His behavior is normal. Judgment and thought content normal.         ED Course   Procedures  Labs Reviewed   CBC W/ AUTO DIFFERENTIAL - Abnormal; Notable for the following components:       Result Value    RBC 3.86 (*)     Hemoglobin 11.8 (*)     Hematocrit 33.5 (*)     RDW 15.0 (*)     Lymph # 0.8 (*)     Lymph % 16.1 (*)     All other components within normal limits   COMPREHENSIVE METABOLIC PANEL - Abnormal; Notable for the following components:    Sodium 128 (*)     CO2 17 (*)     Glucose 124 (*)     BUN 6 (*)     Albumin 3.2 (*)     Total Bilirubin 1.2 (*)     Alkaline Phosphatase 331 (*)     AST 58 (*)     All other  components within normal limits    Narrative:     Recoll. [id number] by [email or web address] at 06/01/2022 16:50, reason: Specimen   hemolyzed   PROTIME-INR - Abnormal; Notable for the following components:    PT 14.0 (*)     All other components within normal limits   B-TYPE NATRIURETIC PEPTIDE   TROPONIN I    Narrative:     Recoll. [id number] by [email or web address] at 06/01/2022 16:50, reason: Specimen   hemolyzed   LIPASE   LIPASE   C-REACTIVE PROTEIN   SARS-COV-2 RNA AMPLIFICATION, QUAL   SEDIMENTATION RATE   PROCALCITONIN   C-REACTIVE PROTEIN        ECG Results          EKG 12-lead (In process)  Result time 06/01/22 15:58:29    In process by Interface, Lab In [facility] (06/01/22 15:58:29)                 Narrative:    Test Reason : R07.9,    Vent. Rate : 081 BPM     Atrial Rate : 081 BPM     P-R Int : 124 ms          QRS Dur : 102 ms      QT Int : 376 ms       P-R-T Axes : 045 041 050 degrees     QTc Int : 436 ms    Normal sinus rhythm  Normal ECG  When compared with ECG of 31-JAN-2022 18:42,  Sinus rhythm has replaced Atrial fibrillation  Nonspecific T wave abnormality no longer evident in Inferior leads  T wave amplitude has increased in Anterior leads    Referred By: [email or web address]   SELF           Confirmed By:                             Imaging Results          CT Abdomen Pelvis With Contrast (Final result)  Result time 06/01/22 18:28:08    Final result by Filippo Lopes MD (06/01/22 18:28:08)                 Narrative:    CMS MANDATED QUALITY DATA - CT RADIATION - 436    All CT scans at this facility utilize dose modulation, iterative reconstruction, and/or weight based dosing when appropriate to reduce radiation dose to as low as reasonably achievable.        Reason: Abdominal abscess/infection suspected; Pancreatitis, acute, severe; Abdominal pain, acute, nonlocalized    TECHNIQUE: CT abdomen and pelvis with 100 mL Omnipaque 350.    COMPARISON: CT abdomen pelvis January 31, 2022.    FINDINGS:    Subsegmental atelectasis of the lung bases  and calcified granuloma noted. Heart size is normal.    Liver is normal size. There is mild prominence of the intrahepatic bile ducts. No hepatic lesions observed. The gallbladder and common bile duct are unremarkable. The pancreas and spleen are unchanged. Degenerative glands are unchanged. The kidneys, ureters and bladder are unremarkable. Prostate gland and seminal vesicles are grossly unremarkable.    Large and small bowel are normal caliber. There is no bowel wall thickening or inflammatory changes. The stomach is grossly unremarkable. There is a tubular fluid-filled structure along the margin of the superior greater curvature of the stomach measuring approximately 3.0 x 3.0 x 5.0 cm. There is faint capsular enhancement of this lesion.    The abdominal aorta is normal caliber with atherosclerosis. There is no intra-abdominal lymphadenopathy. Ventral abdominal wall is no acute osseous abnormality.    IMPRESSION:    3.0 x 3.0 x 5.0 cm tubular fluid-filled structure with faint capsular enhancement noted along the superior greater curvature of the stomach. Fluid was identified in this region in prior CT from January 31, 2022. Fluid-filled structure could potentially reflect an abscess however a cystic lesion is not entirely excluded. Please note the location of this lesion makes it unsusceptible to percutaneous drainage.    Electronically signed by:  Filippo Lopes DO  6/1/2022 6:28 PM CDT Workstation: [id number]                             CTA Chest Non-Coronary (PE Study) (Final result)  Result time 06/01/22 18:13:23    Final result by Filippo Lopes MD (06/01/22 18:13:23)                 Narrative:    CMS MANDATED QUALITY DATA - CT RADIATION - 436    All CT scans at this facility utilize dose modulation, iterative reconstruction, and/or weight based dosing when appropriate to reduce radiation dose to as low as reasonably achievable.        REASON: Pulmonary embolism (PE) suspected, high prob    TECHNIQUE: CT  angiography of thorax with 100 mL Omnipaque 350.   Maximum intensity projection coronal reformations were created at a separate workstation and stored in the patient's permanent medical record.    COMPARISON: None    FINDINGS:    No pulmonary embolism identified. Heart size is normal. There is no mediastinal lymphadenopathy or mass.    The central tracheobronchial tree is patent. Scattered interstitial lung opacities are noted in the bilateral peripheral lungs. No consolidation or pleural effusion.    Visualized abdominal viscera. No acute osseous abnormality.    IMPRESSION:    1.  No pulmonary embolism.  2.  Scattered interstitial lung opacities in the periphery of the bilateral lungs likely reflects chronic interstitial disease.    Electronically signed by:  Filippo Lopes DO  6/1/2022 6:13 PM CDT Workstation: [id number]                             X-Ray Chest AP Portable (Final result)  Result time 06/01/22 16:40:34    Final result by Filippo Lopes MD (06/01/22 16:40:34)                 Narrative:    Reason: chest pain    FINDINGS:    PA and lateral chest with comparison chest x-ray February 2, 2022 show normal cardiomediastinal silhouette.  Lungs are clear. Pulmonary vasculature is normal. No acute osseous abnormality.    IMPRESSION:    No acute cardiopulmonary abnormality.    Electronically signed by:  Filippo Lopes DO  6/1/2022 4:40 PM CDT Workstation: [id number]                               Medications   morphine injection 4 mg (has no administration in time range)   oxyCODONE immediate release tablet 5 mg (5 mg Oral Given 6/1/22 1948)   morphine injection 2 mg (2 mg Intravenous Given 6/1/22 1606)   iohexoL (OMNIPAQUE 350) injection 100 mL (100 mLs Intravenous Given 6/1/22 1800)   HYDROmorphone injection 0.5 mg (0.5 mg Intravenous Given 6/1/22 1734)     Medical Decision Making:   Initial Assessment:   Patient in mild to moderate pain  Differential Diagnosis:   Considerations include ACS, pulmonary  embolism, abscess, colitis  Independently Interpreted Test(s):   I have ordered and independently interpreted EKG Reading(s) - see summary below       <> Summary of EKG Reading(s): EKG is regular rate and rhythm without ST elevation  Clinical Tests:   Lab Tests: Reviewed and Ordered  Radiological Study: Ordered and Reviewed  Medical Tests: Reviewed and Ordered  ED Management:  In the emergency department patient found to have tubular structure behind the stomach of uncertain significance.  I discussed this with Dr. Hendrix who will evaluate patient as well as Interventional Radiology an with  who will also evaluate patient.  Patient to be made NPO at midnight for endoscopy EUS tomorrow.  Patient remains clinically stable.  Other screening labs are negative.  Sodium is slightly low at 128 but this is not new per patient.  Additionally troponin negative.  There is no ST changes on EKG.  CTA of the chest shows no pulmonary embolism.             ED Course as of 06/01/22 1950 Wed Jun 01, 2022 1902 I discussed the case with Dr. Hendrix who will evaluate the patient [AP]      ED Course User Index  [AP] Judah Padgett MD             Clinical Impression:   Final diagnoses:  [R07.9] Chest pain  [E87.1] Hyponatremia (Primary)  [R93.89] Abnormal CT scan  [R18.8] Abdominal fluid collection          ED Disposition Condition    Observation               Judah Padgett MD  06/01/22 1950

## 2022-06-03 ENCOUNTER — ANESTHESIA (OUTPATIENT)
Dept: SURGERY | Facility: HOSPITAL | Age: 72
DRG: 439 | End: 2022-06-03
Payer: OTHER GOVERNMENT

## 2022-06-03 ENCOUNTER — ANESTHESIA EVENT (OUTPATIENT)
Dept: SURGERY | Facility: HOSPITAL | Age: 72
DRG: 439 | End: 2022-06-03
Payer: OTHER GOVERNMENT

## 2022-06-03 LAB
ALBUMIN SERPL BCP-MCNC: 2.9 G/DL (ref 3.5–5.2)
ALP SERPL-CCNC: 252 U/L (ref 55–135)
ALT SERPL W/O P-5'-P-CCNC: 29 U/L (ref 10–44)
AMYLASE, BODY FLUID: <5 U/L
ANION GAP SERPL CALC-SCNC: 5 MMOL/L (ref 8–16)
AST SERPL-CCNC: 33 U/L (ref 10–40)
BASOPHILS # BLD AUTO: 0.01 K/UL (ref 0–0.2)
BASOPHILS NFR BLD: 0.2 % (ref 0–1.9)
BILIRUB SERPL-MCNC: 1.1 MG/DL (ref 0.1–1)
BODY FLUID SOURCE AMYLASE: NORMAL
BUN SERPL-MCNC: 7 MG/DL (ref 8–23)
CALCIUM SERPL-MCNC: 8.9 MG/DL (ref 8.7–10.5)
CHLORIDE SERPL-SCNC: 99 MMOL/L (ref 95–110)
CO2 SERPL-SCNC: 24 MMOL/L (ref 23–29)
CREAT SERPL-MCNC: 0.6 MG/DL (ref 0.5–1.4)
DIFFERENTIAL METHOD: ABNORMAL
EOSINOPHIL # BLD AUTO: 0.3 K/UL (ref 0–0.5)
EOSINOPHIL NFR BLD: 5.8 % (ref 0–8)
ERYTHROCYTE [DISTWIDTH] IN BLOOD BY AUTOMATED COUNT: 15.4 % (ref 11.5–14.5)
EST. GFR  (AFRICAN AMERICAN): >60 ML/MIN/1.73 M^2
EST. GFR  (NON AFRICAN AMERICAN): >60 ML/MIN/1.73 M^2
GLUCOSE SERPL-MCNC: 111 MG/DL (ref 70–110)
HCT VFR BLD AUTO: 33.2 % (ref 40–54)
HGB BLD-MCNC: 11.2 G/DL (ref 14–18)
IMM GRANULOCYTES # BLD AUTO: 0.01 K/UL (ref 0–0.04)
IMM GRANULOCYTES NFR BLD AUTO: 0.2 % (ref 0–0.5)
LYMPHOCYTES # BLD AUTO: 1.2 K/UL (ref 1–4.8)
LYMPHOCYTES NFR BLD: 25.9 % (ref 18–48)
MAGNESIUM SERPL-MCNC: 1.5 MG/DL (ref 1.6–2.6)
MCH RBC QN AUTO: 30.3 PG (ref 27–31)
MCHC RBC AUTO-ENTMCNC: 33.7 G/DL (ref 32–36)
MCV RBC AUTO: 90 FL (ref 82–98)
MONOCYTES # BLD AUTO: 0.8 K/UL (ref 0.3–1)
MONOCYTES NFR BLD: 15.9 % (ref 4–15)
NEUTROPHILS # BLD AUTO: 2.5 K/UL (ref 1.8–7.7)
NEUTROPHILS NFR BLD: 52 % (ref 38–73)
NRBC BLD-RTO: 0 /100 WBC
PLATELET # BLD AUTO: 154 K/UL (ref 150–450)
PMV BLD AUTO: 9.7 FL (ref 9.2–12.9)
POTASSIUM SERPL-SCNC: 4.3 MMOL/L (ref 3.5–5.1)
PROT SERPL-MCNC: 6.9 G/DL (ref 6–8.4)
RBC # BLD AUTO: 3.7 M/UL (ref 4.6–6.2)
SODIUM SERPL-SCNC: 128 MMOL/L (ref 136–145)
WBC # BLD AUTO: 4.79 K/UL (ref 3.9–12.7)

## 2022-06-03 PROCEDURE — 63600175 PHARM REV CODE 636 W HCPCS: Performed by: FAMILY MEDICINE

## 2022-06-03 PROCEDURE — 27202053 HC NEEDLE BIOPSY EUS: Performed by: INTERNAL MEDICINE

## 2022-06-03 PROCEDURE — 43239 EGD BIOPSY SINGLE/MULTIPLE: CPT | Performed by: INTERNAL MEDICINE

## 2022-06-03 PROCEDURE — 37000009 HC ANESTHESIA EA ADD 15 MINS: Performed by: INTERNAL MEDICINE

## 2022-06-03 PROCEDURE — 36415 COLL VENOUS BLD VENIPUNCTURE: CPT | Performed by: NURSE PRACTITIONER

## 2022-06-03 PROCEDURE — 80053 COMPREHEN METABOLIC PANEL: CPT | Performed by: NURSE PRACTITIONER

## 2022-06-03 PROCEDURE — 87205 SMEAR GRAM STAIN: CPT | Performed by: INTERNAL MEDICINE

## 2022-06-03 PROCEDURE — 88305 TISSUE EXAM BY PATHOLOGIST: CPT | Mod: TC

## 2022-06-03 PROCEDURE — 12000002 HC ACUTE/MED SURGE SEMI-PRIVATE ROOM

## 2022-06-03 PROCEDURE — 87070 CULTURE OTHR SPECIMN AEROBIC: CPT | Performed by: INTERNAL MEDICINE

## 2022-06-03 PROCEDURE — 37000008 HC ANESTHESIA 1ST 15 MINUTES: Performed by: INTERNAL MEDICINE

## 2022-06-03 PROCEDURE — 27200043 HC FORCEPS, BIOPSY: Performed by: INTERNAL MEDICINE

## 2022-06-03 PROCEDURE — 83735 ASSAY OF MAGNESIUM: CPT | Performed by: NURSE PRACTITIONER

## 2022-06-03 PROCEDURE — 85025 COMPLETE CBC W/AUTO DIFF WBC: CPT | Performed by: NURSE PRACTITIONER

## 2022-06-03 PROCEDURE — 63600175 PHARM REV CODE 636 W HCPCS: Performed by: NURSE ANESTHETIST, CERTIFIED REGISTERED

## 2022-06-03 PROCEDURE — 43240 EGD W/TRANSMURAL DRAIN CYST: CPT | Performed by: INTERNAL MEDICINE

## 2022-06-03 PROCEDURE — 82150 ASSAY OF AMYLASE: CPT | Performed by: INTERNAL MEDICINE

## 2022-06-03 PROCEDURE — 25000003 PHARM REV CODE 250: Performed by: NURSE PRACTITIONER

## 2022-06-03 PROCEDURE — 25000003 PHARM REV CODE 250: Performed by: NURSE ANESTHETIST, CERTIFIED REGISTERED

## 2022-06-03 PROCEDURE — 87147 CULTURE TYPE IMMUNOLOGIC: CPT | Performed by: INTERNAL MEDICINE

## 2022-06-03 PROCEDURE — 63600175 PHARM REV CODE 636 W HCPCS: Performed by: NURSE PRACTITIONER

## 2022-06-03 RX ORDER — PHENYLEPHRINE HYDROCHLORIDE 10 MG/ML
INJECTION INTRAVENOUS
Status: DISCONTINUED | OUTPATIENT
Start: 2022-06-03 | End: 2022-06-03

## 2022-06-03 RX ORDER — PROPOFOL 10 MG/ML
VIAL (ML) INTRAVENOUS
Status: DISCONTINUED | OUTPATIENT
Start: 2022-06-03 | End: 2022-06-03

## 2022-06-03 RX ORDER — SODIUM CHLORIDE, SODIUM LACTATE, POTASSIUM CHLORIDE, CALCIUM CHLORIDE 600; 310; 30; 20 MG/100ML; MG/100ML; MG/100ML; MG/100ML
INJECTION, SOLUTION INTRAVENOUS CONTINUOUS
Status: DISCONTINUED | OUTPATIENT
Start: 2022-06-03 | End: 2022-06-04 | Stop reason: HOSPADM

## 2022-06-03 RX ADMIN — SODIUM CHLORIDE, SODIUM LACTATE, POTASSIUM CHLORIDE, AND CALCIUM CHLORIDE: .6; .31; .03; .02 INJECTION, SOLUTION INTRAVENOUS at 11:06

## 2022-06-03 RX ADMIN — MORPHINE SULFATE 4 MG: 4 INJECTION, SOLUTION INTRAMUSCULAR; INTRAVENOUS at 07:06

## 2022-06-03 RX ADMIN — DOCUSATE SODIUM 100 MG: 100 CAPSULE, LIQUID FILLED ORAL at 09:06

## 2022-06-03 RX ADMIN — OXYCODONE HYDROCHLORIDE 5 MG: 5 TABLET ORAL at 12:06

## 2022-06-03 RX ADMIN — URSODIOL 300 MG: 300 CAPSULE ORAL at 11:06

## 2022-06-03 RX ADMIN — PROPOFOL 20 MG: 10 INJECTION, EMULSION INTRAVENOUS at 10:06

## 2022-06-03 RX ADMIN — OXYCODONE HYDROCHLORIDE 5 MG: 5 TABLET ORAL at 02:06

## 2022-06-03 RX ADMIN — PROPOFOL 10 MG: 10 INJECTION, EMULSION INTRAVENOUS at 10:06

## 2022-06-03 RX ADMIN — PROPOFOL 50 MG: 10 INJECTION, EMULSION INTRAVENOUS at 10:06

## 2022-06-03 RX ADMIN — FOLIC ACID 1 MG: 1 TABLET ORAL at 11:06

## 2022-06-03 RX ADMIN — PIPERACILLIN SODIUM,TAZOBACTAM SODIUM 3.38 G: 3; .375 INJECTION, POWDER, FOR SOLUTION INTRAVENOUS at 09:06

## 2022-06-03 RX ADMIN — FERROUS SULFATE TAB 325 MG (65 MG ELEMENTAL FE) 1 EACH: 325 (65 FE) TAB at 11:06

## 2022-06-03 RX ADMIN — PIPERACILLIN SODIUM,TAZOBACTAM SODIUM 3.38 G: 3; .375 INJECTION, POWDER, FOR SOLUTION INTRAVENOUS at 05:06

## 2022-06-03 RX ADMIN — URSODIOL 300 MG: 300 CAPSULE ORAL at 09:06

## 2022-06-03 RX ADMIN — PHENYLEPHRINE HYDROCHLORIDE 100 MCG: 10 INJECTION INTRAVENOUS at 10:06

## 2022-06-03 RX ADMIN — SODIUM CHLORIDE: 900 INJECTION, SOLUTION INTRAVENOUS at 09:06

## 2022-06-03 RX ADMIN — THERA TABS 1 TABLET: TAB at 11:06

## 2022-06-03 RX ADMIN — PIPERACILLIN SODIUM,TAZOBACTAM SODIUM 3.38 G: 3; .375 INJECTION, POWDER, FOR SOLUTION INTRAVENOUS at 02:06

## 2022-06-03 RX ADMIN — CALCIUM CARBONATE-VITAMIN D TAB 500 MG-200 UNIT 1 TABLET: 500-200 TAB at 11:06

## 2022-06-03 RX ADMIN — THIAMINE HCL TAB 100 MG 100 MG: 100 TAB at 11:06

## 2022-06-03 RX ADMIN — CALCIUM CARBONATE-VITAMIN D TAB 500 MG-200 UNIT 1 TABLET: 500-200 TAB at 09:06

## 2022-06-03 NOTE — PROGRESS NOTES
"[facility] Medicine Progress Note  Patient Name: Vic Reyez MRN: [id number]   Patient Class: IP- Inpatient  Length of Stay: 1   Admission Date: 6/1/2022  3:40 PM Attending Physician: Clarke Pacheco MD   Primary Care Provider: Primary Doctor No Face-to-Face encounter date: 06/03/2022   Chief Complaint: Chest Pain (Onset 1 hr ago, states it feels like pressure, does not radiate. ) and Abdominal Pain    Assessment & Plan:   Vic Reyez is a 71 y.o. male admitted for abdominal pain    Active Problems/Assessment & Plan  1. Abdominal fluid collection  GI consulted  S/P EUS today with findings of:  Mild Gastritis  30 mL Opaque, brown, slightly viscous fluid removed and sent for cytology, cell count, cutures, CEA  GI recommending 5 days of cipro on D/C.  Empiric Zosyn  Regular diet.  Possible discharge is afternoon if doing well.    2. Unintentional weight loss    3. Hyponatremia  Persistent at 128    4. Transaminitis  History of cirrhosis and sclerosing cholangitis    Core measures:  - Code status: full code   - Consultants:  GI  - Diet:  Full liquid diet.  NPO after dinner.  - DVT PPx: SCD  - lines: PIV    Hospital Course     06/03/2022          Discharge Planning:   No mobility needs. Patient will be discharged in 0-1 days         Subjective:    Interval History   Patient reports no pain after procedure.  Denies chest pain, shortness of breath, palpitations, nausea/vomiting.   No concerns/issues overnight reported by the patient or the nursing staff.  Reviewed the labs and discussed the plan of care.   No family present at bedside.     Review of Systems   All other Review of Systems were found to be negative expect for that mentioned already in HPI.   Objective:   Physical Exam  BP 95/66   Pulse 78   Temp 97.6 °F (36.4 °C) (Oral)   Resp 18   Ht 5' 11" (1.803 m)   Wt 60.2 kg (132 lb 11.5 oz)   SpO2 100%   BMI 18.51 kg/m²   Vitals reviewed.    Constitutional: No distress.  temporal " wasting  HENT: Atraumatic.   Cardiovascular: Normal rate, regular rhythm and normal heart sounds.   Pulmonary/Chest: Effort normal. Clear to auscultation bilaterally. No wheezes.   Abdominal: Soft. Bowel sounds are normal.  Tender across upper abdomen.  No rebound.  Neurological: Alert.   Skin: Skin is warm and dry.     Following labs were Reviewed   Recent Labs   Lab 06/03/22  0604   WBC 4.79   HGB 11.2*   HCT 33.2*      CALCIUM 8.9   ALBUMIN 2.9*   PROT 6.9   *   K 4.3   CO2 24   CL 99   BUN 7*   CREATININE 0.6   ALKPHOS 252*   ALT 29   AST 33   BILITOT 1.1*     No results found for: POCTGLUCOSE     BMP:   Recent Labs   Lab 06/01/22  1652 06/02/22  0155 06/03/22  0604   * 150* 111*   * 127* 128*   K 4.3 3.5 4.3    99 99   CO2 17* 19* 24   BUN 6* 6* 7*   CREATININE 0.5 0.5 0.6   CALCIUM 9.0 9.1 8.9   MG  --  1.3* 1.5*   , CBC   Recent Labs   Lab 06/01/22  1610 06/02/22  0154 06/03/22  0604   WBC 4.78 6.34 4.79   HGB 11.8* 12.2* 11.2*   HCT 33.5* 35.9* 33.2*    143* 154    and All labs within the past 24 hours have been reviewed  Microbiology Results (last 7 days)     Procedure Component Value Units Date/Time    Culture, Body Fluid (Aerobic) w/ GS [[id number]] Collected: 06/03/22 1010    Order Status: Sent Specimen: Body Fluid from Pancreatic Fluid Updated: 06/03/22 1051        CT Abdomen Pelvis With Contrast   Final Result      CTA Chest Non-Coronary (PE Study)   Final Result      X-Ray Chest AP Portable   Final Result          Inpatient medications  Scheduled Meds:   calcium-vitamin D3  1 tablet Oral BID    docusate sodium  100 mg Oral BID    ferrous sulfate  1 tablet Oral Daily    folic acid  1 mg Oral Daily    furosemide  20 mg Oral Daily    multivitamin  1 tablet Oral Daily    piperacillin-tazobactam (ZOSYN) IVPB  3.375 g Intravenous Q8H    spironolactone  25 mg Oral Daily    thiamine  100 mg Oral Daily    ursodioL  300 mg Oral BID     Continuous Infusions:    lactated ringers       PRN Meds:.acetaminophen, calcium chloride IVPB, calcium chloride IVPB, calcium chloride IVPB, magnesium oxide, magnesium sulfate IVPB, magnesium sulfate IVPB, magnesium sulfate IVPB, magnesium sulfate IVPB, melatonin, morphine, naloxone, ondansetron, oxyCODONE, polyethylene glycol, potassium chloride, potassium chloride, potassium chloride, potassium chloride, sodium chloride 0.9%, sodium phosphate IVPB, sodium phosphate IVPB, sodium phosphate IVPB, sodium phosphate IVPB, sodium phosphate IVPB    Above encounter included review of the medical records, interviewing and examining the patient face-to-face, discussion with family and other health care providers, ordering and interpreting lab/test results and formulating a plan of care.     Medical Decision Making:    [] Low Complexity  [x] Moderate Complexity  [] High Complexity    Clarke Pacheco  [facility] Hospitalist  06/03/2022

## 2022-06-03 NOTE — PLAN OF CARE
Pt compliant with POC. RR even and unlabored. Procedure complete today > No signs of distress noted. Will continue to monitor.     Problem: Adult Inpatient Plan of Care  Goal: Plan of Care Review  Outcome: Ongoing, Progressing  Goal: Patient-Specific Goal (Individualized)  Outcome: Ongoing, Progressing  Goal: Absence of Hospital-Acquired Illness or Injury  Outcome: Ongoing, Progressing  Goal: Optimal Comfort and Wellbeing  Outcome: Ongoing, Progressing  Goal: Readiness for Transition of Care  Outcome: Ongoing, Progressing     Problem: Oral Intake Inadequate  Goal: Improved Oral Intake  Outcome: Ongoing, Progressing

## 2022-06-03 NOTE — [employer] PATIENT INSTRUCTIONS
Discharge Summary/Instructions after an Endoscopic Procedure  Patient Name: Vic Reyez  Patient MRN: [id number]  Patient YOB: 1950  Friday, Suni 3, 2022  Roger Viveros III, MD  RESTRICTIONS:  During your procedure today, you received medications for sedation.  These   medications may affect your judgment, balance and coordination.  Therefore,   for 24 hours, you have the following restrictions:   - DO NOT drive a car, operate machinery, make legal/financial decisions,   sign important papers or drink alcohol.    ACTIVITY:  Today: no heavy lifting, straining or running due to procedural   sedation/anesthesia.  The following day: return to full activity including work.  DIET:  Eat and drink normally unless instructed otherwise.     TREATMENT FOR COMMON SIDE EFFECTS:  - Mild abdominal pain, nausea, belching, bloating or excessive gas:  rest,   eat lightly and use a heating pad.  - Sore Throat: treat with throat lozenges and/or gargle with warm salt   water.  - Because air was used during the procedure, expelling large amounts of air   from your rectum or belching is normal.  - If a bowel prep was taken, you may not have a bowel movement for 1-3 days.    This is normal.  SYMPTOMS TO WATCH FOR AND REPORT TO YOUR PHYSICIAN:  1. Abdominal pain or bloating, other than gas cramps.  2. Chest pain.  3. Back pain.  4. Signs of infection such as: chills or fever occurring within 24 hours   after the procedure.  5. Rectal bleeding, which would show as bright red, maroon, or black stools.   (A tablespoon of blood from the rectum is not serious, especially if   hemorrhoids are present.)  6. Vomiting.  7. Weakness or dizziness.  GO DIRECTLY TO THE NEAREST EMERGENCY ROOM IF YOU HAVE ANY OF THE FOLLOWING:      Difficulty breathing              Chills and/or fever over 101 F   Persistent vomiting and/or vomiting blood   Severe abdominal pain   Severe chest pain   Black, tarry stools   Bleeding- more than one  tablespoon   Any other symptom or condition that you feel may need urgent attention  Your doctor recommends these additional instructions:  If any biopsies were taken, your doctors clinic will contact you in 1 to 2   weeks with any results.  - Patient has a contact number available for emergencies.  The signs and   symptoms of potential delayed complications were discussed with the   patient.  Return to normal activities tomorrow.  Written discharge   instructions were provided to the patient.   - Resume regular diet.   - Continue present medications.   - Cipro PO x 5 days on d/c  - Return patient to [facility] for ongoing care.  For questions, problems or results please call your physician - Roger Viveros III, MD at Work:  [phone].  [facility], EMERGENCY ROOM PHONE NUMBER: [phone]  IF A COMPLICATION OR EMERGENCY SITUATION ARISES AND YOU ARE UNABLE TO REACH   YOUR PHYSICIAN - GO DIRECTLY TO THE EMERGENCY ROOM.  Roger Viveros III, MD  6/3/2022 10:22:29 AM  This report has been verified and signed electronically.  Dear patient,  As a result of recent federal legislation (The Federal Cures Act), you may   receive lab or pathology results from your procedure in your [facility]   account before your physician is able to contact you. Your physician or   their representative will relay the results to you with their   recommendations at their soonest availability.  Thank you,  [employer]

## 2022-06-03 NOTE — ANESTHESIA PREPROCEDURE EVALUATION
06/03/2022  Vic Reyez is a 71 y.o., male.    Patient Active Problem List   Diagnosis    Decompensated hepatic cirrhosis    Biliary stricture    Alcohol use disorder, severe, dependence    Primary hypertension    Hypokalemia    Hyponatremia    Coagulopathy    Pancreatic lesion    Abdominal fluid collection    Unintentional weight loss       Past Surgical History:   Procedure Laterality Date    APPENDECTOMY      COLONOSCOPY      ENDOSCOPIC ULTRASOUND OF UPPER GASTROINTESTINAL TRACT  02/04/2022    Procedure: ULTRASOUND, UPPER GI TRACT, ENDOSCOPIC;  Surgeon: Chuy Bay MD;  Location: [address]);  Service: Endoscopy;;    ERCP N/A 02/04/2022    Procedure: ERCP (ENDOSCOPIC RETROGRADE CHOLANGIOPANCREATOGRAPHY);  Surgeon: Chuy Bay MD;  [facility]  Service: Endoscopy;  Laterality: N/A;    ERCP N/A 4/19/2022    Procedure: ERCP (ENDOSCOPIC RETROGRADE CHOLANGIOPANCREATOGRAPHY);  Surgeon: Chuy Bay MD;  Location: [address]);  Service: Endoscopy;  Laterality: N/A;  4/1: fully vaccinated. labs prior. instructions mailed to sister and patient.-SC  4/12/22-Confirmed new arrival time of 10:45am with pt's sister and updated instructions emailed-DS    EYE SURGERY      JOINT REPLACEMENT      KNEE SURGERY      RECONSTRUCTION OF SHOULDER Right     TONSILLECTOMY          Tobacco Use:  The patient  reports that he has been smoking cigarettes. He has been smoking about 0.25 packs per day. He has never used smokeless tobacco.     Results for orders placed or performed during the hospital encounter of 06/01/22   EKG 12-lead    Collection Time: 06/01/22  3:50 PM    Narrative    Test Reason : R07.9,    Vent. Rate : 081 BPM     Atrial Rate : 081 BPM     P-R Int : 124 ms          QRS Dur : 102 ms      QT Int : 376 ms       P-R-T Axes : 045 041 050 degrees     QTc  Int : 436 ms    Normal sinus rhythm  Normal ECG  When compared with ECG of 31-JAN-2022 18:42,  Sinus rhythm has replaced Atrial fibrillation  Nonspecific T wave abnormality no longer evident in Inferior leads  T wave amplitude has increased in Anterior leads  Confirmed by Radha GARCIA, Tonny WAKEFIELD ([id number]) on 6/2/2022 7:16:04 PM    Referred By: [email or web address]   SELF           Confirmed By:Tonny Garcia MD        Imaging Results          CT Abdomen Pelvis With Contrast (Final result)  Result time 06/01/22 18:28:08    Final result by Filippo Lopes MD (06/01/22 18:28:08)                 Narrative:    CMS MANDATED QUALITY DATA - CT RADIATION - 436    All CT scans at this facility utilize dose modulation, iterative reconstruction, and/or weight based dosing when appropriate to reduce radiation dose to as low as reasonably achievable.        Reason: Abdominal abscess/infection suspected; Pancreatitis, acute, severe; Abdominal pain, acute, nonlocalized    TECHNIQUE: CT abdomen and pelvis with 100 mL Omnipaque 350.    COMPARISON: CT abdomen pelvis January 31, 2022.    FINDINGS:    Subsegmental atelectasis of the lung bases and calcified granuloma noted. Heart size is normal.    Liver is normal size. There is mild prominence of the intrahepatic bile ducts. No hepatic lesions observed. The gallbladder and common bile duct are unremarkable. The pancreas and spleen are unchanged. Degenerative glands are unchanged. The kidneys, ureters and bladder are unremarkable. Prostate gland and seminal vesicles are grossly unremarkable.    Large and small bowel are normal caliber. There is no bowel wall thickening or inflammatory changes. The stomach is grossly unremarkable. There is a tubular fluid-filled structure along the margin of the superior greater curvature of the stomach measuring approximately 3.0 x 3.0 x 5.0 cm. There is faint capsular enhancement of this lesion.    The abdominal aorta is normal caliber with atherosclerosis. There is  no intra-abdominal lymphadenopathy. Ventral abdominal wall is no acute osseous abnormality.    IMPRESSION:    3.0 x 3.0 x 5.0 cm tubular fluid-filled structure with faint capsular enhancement noted along the superior greater curvature of the stomach. Fluid was identified in this region in prior CT from January 31, 2022. Fluid-filled structure could potentially reflect an abscess however a cystic lesion is not entirely excluded. Please note the location of this lesion makes it unsusceptible to percutaneous drainage.    Electronically signed by:  Filippo Lopes DO  6/1/2022 6:28 PM CDT Workstation: [id number]                             CTA Chest Non-Coronary (PE Study) (Final result)  Result time 06/01/22 18:13:23    Final result by Filippo Lopes MD (06/01/22 18:13:23)                 Narrative:    CMS MANDATED QUALITY DATA - CT RADIATION - 436    All CT scans at this facility utilize dose modulation, iterative reconstruction, and/or weight based dosing when appropriate to reduce radiation dose to as low as reasonably achievable.        REASON: Pulmonary embolism (PE) suspected, high prob    TECHNIQUE: CT angiography of thorax with 100 mL Omnipaque 350.   Maximum intensity projection coronal reformations were created at a separate workstation and stored in the patient's permanent medical record.    COMPARISON: None    FINDINGS:    No pulmonary embolism identified. Heart size is normal. There is no mediastinal lymphadenopathy or mass.    The central tracheobronchial tree is patent. Scattered interstitial lung opacities are noted in the bilateral peripheral lungs. No consolidation or pleural effusion.    Visualized abdominal viscera. No acute osseous abnormality.    IMPRESSION:    1.  No pulmonary embolism.  2.  Scattered interstitial lung opacities in the periphery of the bilateral lungs likely reflects chronic interstitial disease.    Electronically signed by:  Filippo Lopes DO  6/1/2022 6:13 PM CDT Workstation:  [id number]                             X-Ray Chest AP Portable (Final result)  Result time 06/01/22 16:40:34    Final result by Filippo Lopes MD (06/01/22 16:40:34)                 Narrative:    Reason: chest pain    FINDINGS:    PA and lateral chest with comparison chest x-ray February 2, 2022 show normal cardiomediastinal silhouette.  Lungs are clear. Pulmonary vasculature is normal. No acute osseous abnormality.    IMPRESSION:    No acute cardiopulmonary abnormality.    Electronically signed by:  Filippo Lopes DO  6/1/2022 4:40 PM CDT Workstation: [id number]                               Lab Results   Component Value Date    WBC 4.79 06/03/2022    HGB 11.2 (L) 06/03/2022    HCT 33.2 (L) 06/03/2022    MCV 90 06/03/2022     06/03/2022     BMP  Lab Results   Component Value Date     (L) 06/03/2022    K 4.3 06/03/2022    CL 99 06/03/2022    CO2 24 06/03/2022    BUN 7 (L) 06/03/2022    CREATININE 0.6 06/03/2022    CALCIUM 8.9 06/03/2022    ANIONGAP 5 (L) 06/03/2022     (H) 06/03/2022     (H) 06/02/2022     (H) 06/01/2022       No results found for this or any previous visit.            Pre-op Assessment    I have reviewed the Patient Summary Reports.     I have reviewed the Nursing Notes. I have reviewed the NPO Status.   I have reviewed the Medications.     Review of Systems  Anesthesia Hx:  No problems with previous Anesthesia  Denies Family Hx of Anesthesia complications.   Denies Personal Hx of Anesthesia complications.   Social:  Non-Smoker    Hematology/Oncology:         -- Anemia:   EENT/Dental:EENT/Dental Normal   Cardiovascular:   Hypertension Dysrhythmias (History AFib. Currently in sinus)     Pulmonary:  Pulmonary Normal    Renal/:  Renal/ Normal     Hepatic/GI:   Liver Disease, (cirrhosis)  Hepatic/GI Symptoms: (Fluid near the pancreas on CT.  Status post ERCP)    Musculoskeletal:  Musculoskeletal Normal    Neurological:  Neurology Normal    Endocrine:  Endocrine  Normal    Psych:   Psychiatric History (Alcohol abuse)          Physical Exam  General: Well nourished    Airway:  Mallampati: II   Mouth Opening: Normal  TM Distance: Normal  Tongue: Normal  Neck ROM: Normal ROM    Chest/Lungs:  Clear to auscultation, Normal Respiratory Rate    Heart:  Rate: Normal  Rhythm: Regular Rhythm        Anesthesia Plan  Type of Anesthesia, risks & benefits discussed:    Anesthesia Type: MAC  Intra-op Monitoring Plan: Standard ASA Monitors  Post Op Pain Control Plan: IV/PO Opioids PRN  (medical reason for not using multimodal pain management)  Informed Consent: Informed consent signed with the Patient and all parties understand the risks and agree with anesthesia plan.  All questions answered.   ASA Score: 3  Anesthesia Plan Notes: MAC with propofol    Ready For Surgery From Anesthesia Perspective.     .

## 2022-06-03 NOTE — ANESTHESIA POSTPROCEDURE EVALUATION
Anesthesia Post Evaluation    Patient: Vic Reyez    Procedure(s) Performed: Procedure(s) (LRB):  ULTRASOUND, UPPER GI TRACT, ENDOSCOPIC (N/A)    Final Anesthesia Type: MAC      Patient location during evaluation: GI PACU  Patient participation: Yes- Able to Participate  Level of consciousness: awake and alert  Post-procedure vital signs: reviewed and stable  Pain management: adequate  Airway patency: patent    PONV status at discharge: No PONV  Anesthetic complications: no      Cardiovascular status: blood pressure returned to baseline and stable  Respiratory status: unassisted and room air  Hydration status: euvolemic  Follow-up not needed.          Vitals Value Taken Time   BP 91/55 06/03/22 1043   Temp 36.7 °C (98 °F) 06/03/22 1032   Pulse 87 06/03/22 1043   Resp 17 06/03/22 1043   SpO2 98 % 06/03/22 1043         Event Time   Out of Recovery 06/03/2022 10:40:00         Pain/Minda Score: Pain Rating Prior to Med Admin: 7 (6/3/2022  7:16 AM)  Pain Rating Post Med Admin: 4 (6/3/2022  7:46 AM)  Minda Score: 10 (6/3/2022 10:46 AM)

## 2022-06-03 NOTE — TRANSFER OF CARE
"Anesthesia Transfer of Care Note    Patient: Vic Reyez    Procedure(s) Performed: Procedure(s) (LRB):  ULTRASOUND, UPPER GI TRACT, ENDOSCOPIC (N/A)    Patient location: GI    Anesthesia Type: MAC    Transport from OR: Transported from OR on room air with adequate spontaneous ventilation    Post pain: adequate analgesia    Post assessment: no apparent anesthetic complications    Post vital signs: stable    Level of consciousness: awake and alert    Nausea/Vomiting: no nausea/vomiting    Complications: none    Transfer of care protocol was followed      Last vitals:   Visit Vitals  /63   Pulse 78   Temp 37 °C (98.6 °F) (Skin)   Resp 18   Ht 5' 11" (1.803 m)   Wt 60.2 kg (132 lb 11.5 oz)   SpO2 97%   BMI 18.51 kg/m²     "

## 2022-06-03 NOTE — DISCHARGE INSTRUCTIONS
Please take ciprofloxacin 500 mg b.i.d. for 5 days after discharge.  A prescription for Augmentin was sent, but canceled.  If there to prescriptions waiting, please only fill the ciprofloxacin.

## 2022-06-04 VITALS
HEIGHT: 71 IN | RESPIRATION RATE: 15 BRPM | OXYGEN SATURATION: 99 % | HEART RATE: 87 BPM | WEIGHT: 137.13 LBS | SYSTOLIC BLOOD PRESSURE: 106 MMHG | BODY MASS INDEX: 19.2 KG/M2 | DIASTOLIC BLOOD PRESSURE: 70 MMHG | TEMPERATURE: 99 F

## 2022-06-04 LAB
ALBUMIN SERPL BCP-MCNC: 2.8 G/DL (ref 3.5–5.2)
ALP SERPL-CCNC: 232 U/L (ref 55–135)
ALT SERPL W/O P-5'-P-CCNC: 25 U/L (ref 10–44)
ANION GAP SERPL CALC-SCNC: 6 MMOL/L (ref 8–16)
AST SERPL-CCNC: 30 U/L (ref 10–40)
BASOPHILS # BLD AUTO: 0.01 K/UL (ref 0–0.2)
BASOPHILS NFR BLD: 0.2 % (ref 0–1.9)
BILIRUB SERPL-MCNC: 0.9 MG/DL (ref 0.1–1)
BUN SERPL-MCNC: 11 MG/DL (ref 8–23)
CALCIUM SERPL-MCNC: 8.7 MG/DL (ref 8.7–10.5)
CHLORIDE SERPL-SCNC: 99 MMOL/L (ref 95–110)
CO2 SERPL-SCNC: 24 MMOL/L (ref 23–29)
CREAT SERPL-MCNC: 0.6 MG/DL (ref 0.5–1.4)
DIFFERENTIAL METHOD: ABNORMAL
EOSINOPHIL # BLD AUTO: 0.2 K/UL (ref 0–0.5)
EOSINOPHIL NFR BLD: 4.9 % (ref 0–8)
ERYTHROCYTE [DISTWIDTH] IN BLOOD BY AUTOMATED COUNT: 15.1 % (ref 11.5–14.5)
EST. GFR  (AFRICAN AMERICAN): >60 ML/MIN/1.73 M^2
EST. GFR  (NON AFRICAN AMERICAN): >60 ML/MIN/1.73 M^2
GLUCOSE SERPL-MCNC: 120 MG/DL (ref 70–110)
HCT VFR BLD AUTO: 32.1 % (ref 40–54)
HGB BLD-MCNC: 10.9 G/DL (ref 14–18)
IMM GRANULOCYTES # BLD AUTO: 0.01 K/UL (ref 0–0.04)
IMM GRANULOCYTES NFR BLD AUTO: 0.2 % (ref 0–0.5)
LYMPHOCYTES # BLD AUTO: 1.1 K/UL (ref 1–4.8)
LYMPHOCYTES NFR BLD: 22 % (ref 18–48)
MAGNESIUM SERPL-MCNC: 1.4 MG/DL (ref 1.6–2.6)
MCH RBC QN AUTO: 30.7 PG (ref 27–31)
MCHC RBC AUTO-ENTMCNC: 34 G/DL (ref 32–36)
MCV RBC AUTO: 90 FL (ref 82–98)
MONOCYTES # BLD AUTO: 0.8 K/UL (ref 0.3–1)
MONOCYTES NFR BLD: 15.9 % (ref 4–15)
NEUTROPHILS # BLD AUTO: 2.8 K/UL (ref 1.8–7.7)
NEUTROPHILS NFR BLD: 56.8 % (ref 38–73)
NRBC BLD-RTO: 0 /100 WBC
PLATELET # BLD AUTO: 143 K/UL (ref 150–450)
PMV BLD AUTO: 9.8 FL (ref 9.2–12.9)
POTASSIUM SERPL-SCNC: 4.1 MMOL/L (ref 3.5–5.1)
PROT SERPL-MCNC: 6.8 G/DL (ref 6–8.4)
RBC # BLD AUTO: 3.55 M/UL (ref 4.6–6.2)
SODIUM SERPL-SCNC: 129 MMOL/L (ref 136–145)
WBC # BLD AUTO: 4.91 K/UL (ref 3.9–12.7)

## 2022-06-04 PROCEDURE — 25000003 PHARM REV CODE 250: Performed by: NURSE PRACTITIONER

## 2022-06-04 PROCEDURE — 63600175 PHARM REV CODE 636 W HCPCS: Performed by: NURSE PRACTITIONER

## 2022-06-04 PROCEDURE — 36415 COLL VENOUS BLD VENIPUNCTURE: CPT | Performed by: NURSE PRACTITIONER

## 2022-06-04 PROCEDURE — 85025 COMPLETE CBC W/AUTO DIFF WBC: CPT | Performed by: NURSE PRACTITIONER

## 2022-06-04 PROCEDURE — 80053 COMPREHEN METABOLIC PANEL: CPT | Performed by: NURSE PRACTITIONER

## 2022-06-04 PROCEDURE — 63600175 PHARM REV CODE 636 W HCPCS: Performed by: FAMILY MEDICINE

## 2022-06-04 PROCEDURE — 83735 ASSAY OF MAGNESIUM: CPT | Performed by: NURSE PRACTITIONER

## 2022-06-04 RX ORDER — AMOXICILLIN AND CLAVULANATE POTASSIUM 875; 125 MG/1; MG/1
1 TABLET, FILM COATED ORAL EVERY 12 HOURS
Status: DISCONTINUED | OUTPATIENT
Start: 2022-06-04 | End: 2022-06-04 | Stop reason: HOSPADM

## 2022-06-04 RX ORDER — CIPROFLOXACIN 500 MG/1
500 TABLET ORAL 2 TIMES DAILY
Qty: 10 TABLET | Refills: 0 | Status: SHIPPED | OUTPATIENT
Start: 2022-06-04 | End: 2022-06-09

## 2022-06-04 RX ADMIN — OXYCODONE HYDROCHLORIDE 5 MG: 5 TABLET ORAL at 12:06

## 2022-06-04 RX ADMIN — URSODIOL 300 MG: 300 CAPSULE ORAL at 08:06

## 2022-06-04 RX ADMIN — FOLIC ACID 1 MG: 1 TABLET ORAL at 08:06

## 2022-06-04 RX ADMIN — CALCIUM CARBONATE-VITAMIN D TAB 500 MG-200 UNIT 1 TABLET: 500-200 TAB at 08:06

## 2022-06-04 RX ADMIN — PIPERACILLIN SODIUM,TAZOBACTAM SODIUM 3.38 G: 3; .375 INJECTION, POWDER, FOR SOLUTION INTRAVENOUS at 05:06

## 2022-06-04 RX ADMIN — DOCUSATE SODIUM 100 MG: 100 CAPSULE, LIQUID FILLED ORAL at 08:06

## 2022-06-04 RX ADMIN — THERA TABS 1 TABLET: TAB at 08:06

## 2022-06-04 RX ADMIN — FERROUS SULFATE TAB 325 MG (65 MG ELEMENTAL FE) 1 EACH: 325 (65 FE) TAB at 08:06

## 2022-06-04 RX ADMIN — THIAMINE HCL TAB 100 MG 100 MG: 100 TAB at 08:06

## 2022-06-04 RX ADMIN — SPIRONOLACTONE 25 MG: 25 TABLET ORAL at 08:06

## 2022-06-04 RX ADMIN — OXYCODONE HYDROCHLORIDE 5 MG: 5 TABLET ORAL at 11:06

## 2022-06-04 RX ADMIN — MORPHINE SULFATE 4 MG: 4 INJECTION, SOLUTION INTRAMUSCULAR; INTRAVENOUS at 05:06

## 2022-06-04 RX ADMIN — MORPHINE SULFATE 4 MG: 4 INJECTION, SOLUTION INTRAMUSCULAR; INTRAVENOUS at 01:06

## 2022-06-04 RX ADMIN — FUROSEMIDE 20 MG: 20 TABLET ORAL at 08:06

## 2022-06-04 RX ADMIN — SODIUM CHLORIDE, SODIUM LACTATE, POTASSIUM CHLORIDE, AND CALCIUM CHLORIDE: .6; .31; .03; .02 INJECTION, SOLUTION INTRAVENOUS at 12:06

## 2022-06-04 NOTE — DISCHARGE SUMMARY
"[facility] Medicine  Discharge Summary      Patient Name: Vic Reyez  MRN: [id number]  Patient Class: IP- Inpatient  Admission Date: 6/1/2022  Hospital Length of Stay: 2 days  Discharge Date and Time:  06/04/2022 2:54 PM  Attending Physician: Clarke Pacheco MD   Discharging Provider: Clarke Pacheco MD  Primary Care Provider: Primary Doctor No      HPI:   Mr. Reyez is a 71 year old male with a history of cirrhosis, HTN, and alcohol use who presents today with complaints of abd pain. It is severe. Pain is on the left side, radiates into the left chest and into the back. He denies fever, chills, cough, N/V/D, dizziness, SOB, or LOC. He reports unintentional wt loss over the last 6 months. Per chart review it appears he's dropped from 173lbs 2/22 to 155 lbs today. He was seen at [facility] 2/22 with CBD dilation/ERCP/with stent placement and biopsy then stent removal. Biopsies returned with atypical cells/inflammatory changes, "unsatisfactory for diagnosis". He has been followed by Dr. Toledo and was prescribed lasix and spironolactone, but has not started this yet. He states he drinks a few beers here and there and a 6 pack of beer may last him a month. In the ED today, Ct abd/pelvis: "3.0 x 3.0 x 5.0 cm tubular fluid-filled structure with faint capsular enhancement noted along the superior greater curvature of the stomach. Fluid was identified in this region in prior CT from January 31, 2022. Fluid-filled structure could potentially reflect an abscess however a cystic lesion is not entirely excluded." ER MD discussed with radiologist who did not feel this was amendable to percutaneous drainage. ER MD also discussed with Gen Surgery who recommended GI consult.       Procedure(s) (LRB):  ULTRASOUND, UPPER GI TRACT, ENDOSCOPIC (N/A)      Hospital Course:   Patient admitted to the hospital and Gastroenterology consulted for EUS with aspiration of fluid-filled cyst.  Cyst felt to be pancreatic " pseudocyst based on history.    No overt signs of infection were detected, the patient was continued on Zosyn during his inpatient stay.  Patient transition to ciprofloxacin at time of discharge per GI recommendations.    Aspiration successfully performed and 30 mL of opaque, brown, slightly viscous fluid was removed.  Cultures and cytology sent.  No growth at 1 day.    Patient discharged home with prescription for antibiotics.  Reviewed signs and symptoms of infection and need for emergent evaluation should they occur.  Patient demonstrates understanding.    During hospitalization patient was hyponatremic with sodium of 129 at time of discharge.  Hyponatremia appears to be chronic in nature.    General:  Alert and oriented x4.  No acute distress.  Thin, frail  HEENT:  Normocephalic  Cardiovascular:  Regular rate and rhythm, no murmurs rubs or gallops.  No lower extremity edema.  Pulmonary:  Clear to auscultation bilaterally  Abdomen:  Soft, nontender, mildly distended.  No guarding.  No rebound.  Negative Curry's.  Positive bowel sounds.  Extremity:  Moves all extremities equally.  Dermatology:  No rashes appreciated on exposed skin  Psychiatric:  Normal affect                 Goals of Care Treatment Preferences:  Code Status: Full Code      Consults:   Consults (From admission, onward)        Status Ordering Provider     Inpatient consult to Registered Dietitian/Nutritionist  Once        Provider:  (Not yet assigned)    Completed RABIA HARVEY     Inpatient consult to Gastroenterology  Once        Provider:  Roger Viveros III, MD    Completed PEÑA SAHU          * Abdominal fluid collection        Unintentional weight loss        Hyponatremia          Final Active Diagnoses:    Diagnosis Date Noted POA    PRINCIPAL PROBLEM:  Abdominal fluid collection [R18.8] 06/01/2022 Yes    Unintentional weight loss [R63.4] 06/01/2022 Yes    Hyponatremia [E87.1] 02/02/2022 Yes      Problems Resolved During  this Admission:       Discharged Condition: fair    Disposition: Home or Self Care    Follow Up:   Follow-up Information     Roger Viveros III, MD Follow up in 1 week(s).    Specialty: Gastroenterology  Contact information:  [address] [facility] [address] [phone]                       Patient Instructions:   No discharge procedures on file.    Significant Diagnostic Studies: Labs:   BMP:   Recent Labs   Lab 06/03/22  0604 06/04/22  0425   * 120*   * 129*   K 4.3 4.1   CL 99 99   CO2 24 24   BUN 7* 11   CREATININE 0.6 0.6   CALCIUM 8.9 8.7   MG 1.5* 1.4*   , CBC   Recent Labs   Lab 06/03/22  0604 06/04/22  0425   WBC 4.79 4.91   HGB 11.2* 10.9*   HCT 33.2* 32.1*    143*   , Troponin   Recent Labs   Lab 06/01/22  1652 06/01/22  2140 06/02/22  0155   TROPONINI <0.030 <0.030 <0.030    and All labs within the past 24 hours have been reviewed    Pending Diagnostic Studies:     Procedure Component Value Units Date/Time    Cytology Specimen-Medical Cytology (Fluid/Wash/Brush) [[id number]] Collected: 06/03/22 1010    Order Status: Sent Lab Status: No result     Specimen: Cyst Fluid     Specimen to Pathology - Surgery [[id number]] Collected: 06/03/22 1010    Order Status: Sent Lab Status: No result     Specimen: Tissue          Medications:  Reconciled Home Medications:      Medication List      START taking these medications    ciprofloxacin HCl 500 MG tablet  Commonly known as: CIPRO  Take 1 tablet (500 mg total) by mouth 2 (two) times daily. for 5 days        CONTINUE taking these medications    aspirin 81 MG EC tablet  Commonly known as: ECOTRIN  Take 81 mg by mouth once daily.     calcium-vitamin D 250 (625)-125 mg-unit per tablet  Commonly known as: OSCAL  Take 1 tablet by mouth once daily.     DAILY-ROHAN (WITH FOLIC ACID) 400 mcg Tab  Generic drug: multivitamin with folic acid  Take 1 tablet by mouth once daily.     docusate sodium 50 MG capsule  Commonly known as: COLACE  Take by  mouth 2 (two) times daily.     ferrous fumarate 324 mg (106 mg iron) Tab  Take 1 tablet by mouth once daily at 6am.     folic acid 1 MG tablet  Commonly known as: FOLVITE  Take 1 tablet (1 mg total) by mouth once daily.     furosemide 40 MG tablet  Commonly known as: LASIX  Take 0.5 tablets (20 mg total) by mouth once daily.     HYDROcodone-acetaminophen 7.5-325 mg per tablet  Commonly known as: NORCO  Take 1 tablet by mouth every 6 (six) hours as needed for Pain.     pravastatin 40 MG tablet  Commonly known as: PRAVACHOL  Take 40 mg by mouth once daily.     spironolactone 50 MG tablet  Commonly known as: ALDACTONE  Take 0.5 tablets (25 mg total) by mouth once daily.     thiamine 100 MG tablet  Take 1 tablet (100 mg total) by mouth once daily.     ursodioL 300 mg capsule  Commonly known as: ACTIGALL  Take 1 capsule (300 mg total) by mouth 2 (two) times daily.            Indwelling Lines/Drains at time of discharge:   Lines/Drains/Airways     None                 Time spent on the discharge of patient: 35 minutes         Clarke Pacheco MD  Department of Hospital Medicine  [facility]

## 2022-06-04 NOTE — PLAN OF CARE
06/04/22 1525   Final Note   Assessment Type Final Discharge Note   Anticipated Discharge Disposition Home   What phone number can be called within the next 1-3 days to see how you are doing after discharge? [phone]   Post-Acute Status   Coverage [facility] CCN   Discharge Delays None known at this time           Discharge orders and chart reviewed with no further post-acute discharge needs identified at this time.  At this time, patient is cleared for discharge from Case Management standpoint.

## 2022-06-04 NOTE — HOSPITAL COURSE
Patient admitted to the hospital and Gastroenterology consulted for EUS with aspiration of fluid-filled cyst.  Cyst felt to be pancreatic pseudocyst based on history.    No overt signs of infection were detected, the patient was continued on Zosyn during his inpatient stay.  Patient transition to ciprofloxacin at time of discharge per GI recommendations.    Aspiration successfully performed and 30 mL of opaque, brown, slightly viscous fluid was removed.  Cultures and cytology sent.  No growth at 1 day.    Patient discharged home with prescription for antibiotics.  Reviewed signs and symptoms of infection and need for emergent evaluation should they occur.  Patient demonstrates understanding.    During hospitalization patient was hyponatremic with sodium of 129 at time of discharge.  Hyponatremia appears to be chronic in nature.    General:  Alert and oriented x4.  No acute distress.  Thin, frail  HEENT:  Normocephalic  Cardiovascular:  Regular rate and rhythm, no murmurs rubs or gallops.  No lower extremity edema.  Pulmonary:  Clear to auscultation bilaterally  Abdomen:  Soft, nontender, mildly distended.  No guarding.  No rebound.  Negative Curry's.  Positive bowel sounds.  Extremity:  Moves all extremities equally.  Dermatology:  No rashes appreciated on exposed skin  Psychiatric:  Normal affect

## 2022-06-05 LAB
BACTERIA FLD AEROBE CULT: ABNORMAL
GRAM STN SPEC: ABNORMAL
GRAM STN SPEC: ABNORMAL

## 2022-06-10 ENCOUNTER — TELEPHONE (OUTPATIENT)
Dept: HEPATOLOGY | Facility: CLINIC | Age: 72
End: 2022-06-10
Payer: OTHER GOVERNMENT

## 2022-06-10 NOTE — TELEPHONE ENCOUNTER
I returned patient's phone call.  No answer.  Left message.  I sent message to Dr Toledo.      ----- Message from Elmira Taylor MA sent at 6/10/2022  2:06 PM CDT -----  Regarding: FW: update    ----- Message -----  From: Ashly Wiggins  Sent: 6/10/2022  12:34 PM CDT  To: [facility] Staff  Subject: update                                           Pt wanted to inform the office that a tumor was found on his pancreas and that what was causing all of his issues.  Pt would like for Dr. Toledo to speak with Dr. Viveros about the findings.      Ho @  [phone]

## 2022-07-05 ENCOUNTER — TELEPHONE (OUTPATIENT)
Dept: HEPATOLOGY | Facility: CLINIC | Age: 72
End: 2022-07-05
Payer: OTHER GOVERNMENT

## 2022-07-05 NOTE — TELEPHONE ENCOUNTER
----- Message from Aisha Mcgrath sent at 7/5/2022  1:11 PM CDT -----  Regarding: speak to MA  Patient calling to speak to Linda MATA regarding appt on 7/8/2022. Patient wishes to reschedule appt to [facility] and also would like to know if Dr. Toledo received recent labs from [facility]. Requesting a call back.      Call: [phone] (Mobile    
YAZMIN. ADOLFO LEONARD  
No

## 2022-10-02 ENCOUNTER — HOSPITAL ENCOUNTER (INPATIENT)
Facility: HOSPITAL | Age: 72
LOS: 3 days | Discharge: HOME | DRG: 270 | End: 2022-10-06
Attending: EMERGENCY MEDICINE | Admitting: INTERNAL MEDICINE
Payer: MEDICARE

## 2022-10-02 DIAGNOSIS — R18.8 ABDOMINAL FLUID COLLECTION: ICD-10-CM

## 2022-10-02 DIAGNOSIS — K86.3 PANCREATIC PSEUDOCYST: ICD-10-CM

## 2022-10-02 DIAGNOSIS — R07.9 CHEST PAIN: ICD-10-CM

## 2022-10-02 DIAGNOSIS — K83.1 BILIARY STENOSIS: ICD-10-CM

## 2022-10-02 DIAGNOSIS — K85.20 ALCOHOL-INDUCED ACUTE PANCREATITIS, UNSPECIFIED COMPLICATION STATUS: Primary | ICD-10-CM

## 2022-10-02 DIAGNOSIS — K83.1 BILIARY STRICTURE: ICD-10-CM

## 2022-10-02 DIAGNOSIS — K85.90 ACUTE PANCREATITIS, UNSPECIFIED COMPLICATION STATUS, UNSPECIFIED PANCREATITIS TYPE: ICD-10-CM

## 2022-10-02 DIAGNOSIS — K74.60 DECOMPENSATED HEPATIC CIRRHOSIS: ICD-10-CM

## 2022-10-02 DIAGNOSIS — K72.90 DECOMPENSATED HEPATIC CIRRHOSIS: ICD-10-CM

## 2022-10-02 DIAGNOSIS — I72.8 SPLENIC ARTERY ANEURYSM: ICD-10-CM

## 2022-10-02 LAB
ALBUMIN SERPL BCP-MCNC: 2.4 G/DL (ref 3.5–5.2)
ALP SERPL-CCNC: 521 U/L (ref 55–135)
ALT SERPL W/O P-5'-P-CCNC: 72 U/L (ref 10–44)
AMMONIA PLAS-SCNC: 23 UMOL/L (ref 10–50)
AMPHET+METHAMPHET UR QL: NEGATIVE
ANION GAP SERPL CALC-SCNC: 9 MMOL/L (ref 8–16)
AST SERPL-CCNC: 119 U/L (ref 10–40)
BACTERIA #/AREA URNS HPF: NEGATIVE /HPF
BARBITURATES UR QL SCN>200 NG/ML: NEGATIVE
BASOPHILS # BLD AUTO: 0.02 K/UL (ref 0–0.2)
BASOPHILS NFR BLD: 0.4 % (ref 0–1.9)
BENZODIAZ UR QL SCN>200 NG/ML: NEGATIVE
BILIRUB SERPL-MCNC: 5.2 MG/DL (ref 0.1–1)
BILIRUB UR QL STRIP: NEGATIVE
BNP SERPL-MCNC: 68 PG/ML (ref 0–99)
BUN SERPL-MCNC: 6 MG/DL (ref 8–23)
BZE UR QL SCN: NEGATIVE
CALCIUM SERPL-MCNC: 7.7 MG/DL (ref 8.7–10.5)
CANNABINOIDS UR QL SCN: NEGATIVE
CHLORIDE SERPL-SCNC: 104 MMOL/L (ref 95–110)
CLARITY UR: CLEAR
CO2 SERPL-SCNC: 15 MMOL/L (ref 23–29)
COLOR UR: YELLOW
CREAT SERPL-MCNC: <0.3 MG/DL (ref 0.5–1.4)
CREAT UR-MCNC: 22 MG/DL (ref 23–375)
DIFFERENTIAL METHOD: ABNORMAL
EOSINOPHIL # BLD AUTO: 0.1 K/UL (ref 0–0.5)
EOSINOPHIL NFR BLD: 1.3 % (ref 0–8)
ERYTHROCYTE [DISTWIDTH] IN BLOOD BY AUTOMATED COUNT: 16.5 % (ref 11.5–14.5)
EST. GFR  (NO RACE VARIABLE): >60 ML/MIN/1.73 M^2
ETHANOL SERPL-MCNC: 82 MG/DL
GLUCOSE SERPL-MCNC: 128 MG/DL (ref 70–110)
GLUCOSE UR QL STRIP: NEGATIVE
HCT VFR BLD AUTO: 23.4 % (ref 40–54)
HGB BLD-MCNC: 8.1 G/DL (ref 14–18)
HGB UR QL STRIP: NEGATIVE
HYALINE CASTS #/AREA URNS LPF: 7 /LPF
IMM GRANULOCYTES # BLD AUTO: 0.01 K/UL (ref 0–0.04)
IMM GRANULOCYTES NFR BLD AUTO: 0.2 % (ref 0–0.5)
INR PPP: 1.3
KETONES UR QL STRIP: NEGATIVE
LACTATE SERPL-SCNC: 3.3 MMOL/L (ref 0.5–1.9)
LEUKOCYTE ESTERASE UR QL STRIP: ABNORMAL
LIPASE SERPL-CCNC: 57 U/L (ref 4–60)
LYMPHOCYTES # BLD AUTO: 0.7 K/UL (ref 1–4.8)
LYMPHOCYTES NFR BLD: 14.2 % (ref 18–48)
MAGNESIUM SERPL-MCNC: 1.2 MG/DL (ref 1.6–2.6)
MCH RBC QN AUTO: 31.5 PG (ref 27–31)
MCHC RBC AUTO-ENTMCNC: 34.6 G/DL (ref 32–36)
MCV RBC AUTO: 91 FL (ref 82–98)
MICROSCOPIC COMMENT: ABNORMAL
MONOCYTES # BLD AUTO: 0.5 K/UL (ref 0.3–1)
MONOCYTES NFR BLD: 10.9 % (ref 4–15)
NEUTROPHILS # BLD AUTO: 3.3 K/UL (ref 1.8–7.7)
NEUTROPHILS NFR BLD: 73 % (ref 38–73)
NITRITE UR QL STRIP: NEGATIVE
NRBC BLD-RTO: 0 /100 WBC
OPIATES UR QL SCN: NEGATIVE
PCP UR QL SCN>25 NG/ML: NEGATIVE
PH UR STRIP: 7 [PH] (ref 5–8)
PLATELET # BLD AUTO: 144 K/UL (ref 150–450)
PMV BLD AUTO: 9.5 FL (ref 9.2–12.9)
POTASSIUM SERPL-SCNC: 3 MMOL/L (ref 3.5–5.1)
PROT SERPL-MCNC: 6.3 G/DL (ref 6–8.4)
PROT UR QL STRIP: NEGATIVE
PROTHROMBIN TIME: 14.9 SEC (ref 11.4–13.7)
RBC # BLD AUTO: 2.57 M/UL (ref 4.6–6.2)
RBC #/AREA URNS HPF: 1 /HPF (ref 0–4)
SARS-COV-2 RDRP RESP QL NAA+PROBE: NEGATIVE
SODIUM SERPL-SCNC: 128 MMOL/L (ref 136–145)
SP GR UR STRIP: 1.01 (ref 1–1.03)
SQUAMOUS #/AREA URNS HPF: 2 /HPF
TOXICOLOGY INFORMATION: ABNORMAL
TROPONIN I SERPL DL<=0.01 NG/ML-MCNC: <0.03 NG/ML
TSH SERPL DL<=0.005 MIU/L-ACNC: 2.02 UIU/ML (ref 0.34–5.6)
URN SPEC COLLECT METH UR: ABNORMAL
UROBILINOGEN UR STRIP-ACNC: NEGATIVE EU/DL
WBC # BLD AUTO: 4.58 K/UL (ref 3.9–12.7)
WBC #/AREA URNS HPF: 17 /HPF (ref 0–5)

## 2022-10-02 PROCEDURE — 85610 PROTHROMBIN TIME: CPT | Performed by: EMERGENCY MEDICINE

## 2022-10-02 PROCEDURE — 81001 URINALYSIS AUTO W/SCOPE: CPT | Performed by: EMERGENCY MEDICINE

## 2022-10-02 PROCEDURE — 83690 ASSAY OF LIPASE: CPT | Performed by: EMERGENCY MEDICINE

## 2022-10-02 PROCEDURE — 82077 ASSAY SPEC XCP UR&BREATH IA: CPT | Performed by: EMERGENCY MEDICINE

## 2022-10-02 PROCEDURE — 25500020 PHARM REV CODE 255: Performed by: EMERGENCY MEDICINE

## 2022-10-02 PROCEDURE — 87077 CULTURE AEROBIC IDENTIFY: CPT | Performed by: EMERGENCY MEDICINE

## 2022-10-02 PROCEDURE — 84443 ASSAY THYROID STIM HORMONE: CPT | Performed by: EMERGENCY MEDICINE

## 2022-10-02 PROCEDURE — 96375 TX/PRO/DX INJ NEW DRUG ADDON: CPT

## 2022-10-02 PROCEDURE — 83036 HEMOGLOBIN GLYCOSYLATED A1C: CPT | Performed by: INTERNAL MEDICINE

## 2022-10-02 PROCEDURE — 83735 ASSAY OF MAGNESIUM: CPT | Performed by: EMERGENCY MEDICINE

## 2022-10-02 PROCEDURE — 25000003 PHARM REV CODE 250: Performed by: EMERGENCY MEDICINE

## 2022-10-02 PROCEDURE — 80053 COMPREHEN METABOLIC PANEL: CPT | Performed by: EMERGENCY MEDICINE

## 2022-10-02 PROCEDURE — 36415 COLL VENOUS BLD VENIPUNCTURE: CPT | Performed by: EMERGENCY MEDICINE

## 2022-10-02 PROCEDURE — 83605 ASSAY OF LACTIC ACID: CPT | Performed by: EMERGENCY MEDICINE

## 2022-10-02 PROCEDURE — 87186 SC STD MICRODIL/AGAR DIL: CPT | Performed by: EMERGENCY MEDICINE

## 2022-10-02 PROCEDURE — 93010 ELECTROCARDIOGRAM REPORT: CPT | Mod: ,,, | Performed by: GENERAL PRACTICE

## 2022-10-02 PROCEDURE — 30000890 LABCORP MISCELLANEOUS TEST: Performed by: INTERNAL MEDICINE

## 2022-10-02 PROCEDURE — 83880 ASSAY OF NATRIURETIC PEPTIDE: CPT | Performed by: EMERGENCY MEDICINE

## 2022-10-02 PROCEDURE — 93010 EKG 12-LEAD: ICD-10-PCS | Mod: ,,, | Performed by: GENERAL PRACTICE

## 2022-10-02 PROCEDURE — 96365 THER/PROPH/DIAG IV INF INIT: CPT

## 2022-10-02 PROCEDURE — 84484 ASSAY OF TROPONIN QUANT: CPT | Performed by: EMERGENCY MEDICINE

## 2022-10-02 PROCEDURE — 99285 EMERGENCY DEPT VISIT HI MDM: CPT | Mod: 25

## 2022-10-02 PROCEDURE — 87086 URINE CULTURE/COLONY COUNT: CPT | Performed by: EMERGENCY MEDICINE

## 2022-10-02 PROCEDURE — 82140 ASSAY OF AMMONIA: CPT | Performed by: EMERGENCY MEDICINE

## 2022-10-02 PROCEDURE — 87040 BLOOD CULTURE FOR BACTERIA: CPT | Performed by: EMERGENCY MEDICINE

## 2022-10-02 PROCEDURE — 63600175 PHARM REV CODE 636 W HCPCS: Performed by: EMERGENCY MEDICINE

## 2022-10-02 PROCEDURE — U0002 COVID-19 LAB TEST NON-CDC: HCPCS | Performed by: EMERGENCY MEDICINE

## 2022-10-02 PROCEDURE — 80307 DRUG TEST PRSMV CHEM ANLYZR: CPT | Performed by: EMERGENCY MEDICINE

## 2022-10-02 PROCEDURE — 93005 ELECTROCARDIOGRAM TRACING: CPT | Performed by: GENERAL PRACTICE

## 2022-10-02 PROCEDURE — 85025 COMPLETE CBC W/AUTO DIFF WBC: CPT | Performed by: EMERGENCY MEDICINE

## 2022-10-02 RX ORDER — HYDROMORPHONE HYDROCHLORIDE 1 MG/ML
0.5 INJECTION, SOLUTION INTRAMUSCULAR; INTRAVENOUS; SUBCUTANEOUS
Status: COMPLETED | OUTPATIENT
Start: 2022-10-02 | End: 2022-10-02

## 2022-10-02 RX ORDER — POTASSIUM CHLORIDE 7.45 MG/ML
10 INJECTION INTRAVENOUS ONCE
Status: COMPLETED | OUTPATIENT
Start: 2022-10-02 | End: 2022-10-02

## 2022-10-02 RX ORDER — ONDANSETRON 2 MG/ML
4 INJECTION INTRAMUSCULAR; INTRAVENOUS
Status: COMPLETED | OUTPATIENT
Start: 2022-10-02 | End: 2022-10-02

## 2022-10-02 RX ADMIN — SODIUM CHLORIDE 1000 ML: 0.9 INJECTION, SOLUTION INTRAVENOUS at 10:10

## 2022-10-02 RX ADMIN — ONDANSETRON 4 MG: 2 INJECTION INTRAMUSCULAR; INTRAVENOUS at 09:10

## 2022-10-02 RX ADMIN — HYDROMORPHONE HYDROCHLORIDE 0.5 MG: 0.5 INJECTION, SOLUTION INTRAMUSCULAR; INTRAVENOUS; SUBCUTANEOUS at 09:10

## 2022-10-02 RX ADMIN — IOHEXOL 100 ML: 350 INJECTION, SOLUTION INTRAVENOUS at 09:10

## 2022-10-02 RX ADMIN — POTASSIUM CHLORIDE 10 MEQ: 7.46 INJECTION, SOLUTION INTRAVENOUS at 10:10

## 2022-10-03 PROBLEM — K85.90 ACUTE PANCREATITIS: Status: ACTIVE | Noted: 2022-10-03

## 2022-10-03 PROBLEM — I72.8 SPLENIC ARTERY ANEURYSM: Status: ACTIVE | Noted: 2022-10-03

## 2022-10-03 LAB
ESTIMATED AVG GLUCOSE: NORMAL MG/DL (ref 68–131)
GLUCOSE SERPL-MCNC: 143 MG/DL (ref 70–110)
GLUCOSE SERPL-MCNC: 144 MG/DL (ref 70–110)
GLUCOSE SERPL-MCNC: 150 MG/DL (ref 70–110)
GLUCOSE SERPL-MCNC: 169 MG/DL (ref 70–110)
HBA1C MFR BLD: NORMAL % (ref 4.5–6.2)
LACTATE SERPL-SCNC: 1.7 MMOL/L (ref 0.5–1.9)

## 2022-10-03 PROCEDURE — 96375 TX/PRO/DX INJ NEW DRUG ADDON: CPT

## 2022-10-03 PROCEDURE — 21000000 HC CCU ICU ROOM CHARGE

## 2022-10-03 PROCEDURE — 63600175 PHARM REV CODE 636 W HCPCS: Performed by: INTERNAL MEDICINE

## 2022-10-03 PROCEDURE — 83605 ASSAY OF LACTIC ACID: CPT | Performed by: EMERGENCY MEDICINE

## 2022-10-03 PROCEDURE — 99900031 HC PATIENT EDUCATION (STAT)

## 2022-10-03 PROCEDURE — 25000003 PHARM REV CODE 250: Performed by: INTERNAL MEDICINE

## 2022-10-03 PROCEDURE — 96376 TX/PRO/DX INJ SAME DRUG ADON: CPT

## 2022-10-03 PROCEDURE — 99900035 HC TECH TIME PER 15 MIN (STAT)

## 2022-10-03 PROCEDURE — 63600175 PHARM REV CODE 636 W HCPCS: Performed by: EMERGENCY MEDICINE

## 2022-10-03 PROCEDURE — 94761 N-INVAS EAR/PLS OXIMETRY MLT: CPT

## 2022-10-03 PROCEDURE — 36415 COLL VENOUS BLD VENIPUNCTURE: CPT | Performed by: INTERNAL MEDICINE

## 2022-10-03 RX ORDER — SIMETHICONE 80 MG
1 TABLET,CHEWABLE ORAL 4 TIMES DAILY PRN
Status: DISCONTINUED | OUTPATIENT
Start: 2022-10-03 | End: 2022-10-06 | Stop reason: HOSPADM

## 2022-10-03 RX ORDER — IPRATROPIUM BROMIDE AND ALBUTEROL SULFATE 2.5; .5 MG/3ML; MG/3ML
3 SOLUTION RESPIRATORY (INHALATION) EVERY 4 HOURS PRN
Status: DISCONTINUED | OUTPATIENT
Start: 2022-10-03 | End: 2022-10-06 | Stop reason: HOSPADM

## 2022-10-03 RX ORDER — HYDROMORPHONE HYDROCHLORIDE 1 MG/ML
0.5 INJECTION, SOLUTION INTRAMUSCULAR; INTRAVENOUS; SUBCUTANEOUS
Status: COMPLETED | OUTPATIENT
Start: 2022-10-03 | End: 2022-10-03

## 2022-10-03 RX ORDER — LORAZEPAM 2 MG/ML
1 INJECTION INTRAMUSCULAR
Status: DISCONTINUED | OUTPATIENT
Start: 2022-10-03 | End: 2022-10-03

## 2022-10-03 RX ORDER — SODIUM CHLORIDE, SODIUM LACTATE, POTASSIUM CHLORIDE, CALCIUM CHLORIDE 600; 310; 30; 20 MG/100ML; MG/100ML; MG/100ML; MG/100ML
250 INJECTION, SOLUTION INTRAVENOUS CONTINUOUS
Status: DISCONTINUED | OUTPATIENT
Start: 2022-10-03 | End: 2022-10-03

## 2022-10-03 RX ORDER — HYDROMORPHONE HYDROCHLORIDE 1 MG/ML
0.5 INJECTION, SOLUTION INTRAMUSCULAR; INTRAVENOUS; SUBCUTANEOUS EVERY 6 HOURS PRN
Status: DISCONTINUED | OUTPATIENT
Start: 2022-10-03 | End: 2022-10-04

## 2022-10-03 RX ORDER — ONDANSETRON 2 MG/ML
4 INJECTION INTRAMUSCULAR; INTRAVENOUS EVERY 6 HOURS PRN
Status: DISCONTINUED | OUTPATIENT
Start: 2022-10-03 | End: 2022-10-06 | Stop reason: HOSPADM

## 2022-10-03 RX ORDER — AMOXICILLIN 250 MG
1 CAPSULE ORAL 2 TIMES DAILY PRN
Status: DISCONTINUED | OUTPATIENT
Start: 2022-10-03 | End: 2022-10-06 | Stop reason: HOSPADM

## 2022-10-03 RX ORDER — TALC
6 POWDER (GRAM) TOPICAL NIGHTLY PRN
Status: DISCONTINUED | OUTPATIENT
Start: 2022-10-03 | End: 2022-10-06 | Stop reason: HOSPADM

## 2022-10-03 RX ORDER — POLYETHYLENE GLYCOL 3350 17 G/17G
17 POWDER, FOR SOLUTION ORAL 2 TIMES DAILY PRN
Status: DISCONTINUED | OUTPATIENT
Start: 2022-10-03 | End: 2022-10-06 | Stop reason: HOSPADM

## 2022-10-03 RX ORDER — LORAZEPAM 2 MG/ML
1 INJECTION INTRAMUSCULAR
Status: DISCONTINUED | OUTPATIENT
Start: 2022-10-03 | End: 2022-10-04

## 2022-10-03 RX ORDER — MAG HYDROX/ALUMINUM HYD/SIMETH 200-200-20
30 SUSPENSION, ORAL (FINAL DOSE FORM) ORAL ONCE
Status: COMPLETED | OUTPATIENT
Start: 2022-10-03 | End: 2022-10-03

## 2022-10-03 RX ORDER — LIDOCAINE HYDROCHLORIDE 20 MG/ML
15 SOLUTION OROPHARYNGEAL ONCE
Status: COMPLETED | OUTPATIENT
Start: 2022-10-03 | End: 2022-10-03

## 2022-10-03 RX ORDER — NALOXONE HCL 0.4 MG/ML
0.02 VIAL (ML) INJECTION
Status: DISCONTINUED | OUTPATIENT
Start: 2022-10-03 | End: 2022-10-06 | Stop reason: HOSPADM

## 2022-10-03 RX ORDER — IBUPROFEN 200 MG
16 TABLET ORAL
Status: DISCONTINUED | OUTPATIENT
Start: 2022-10-03 | End: 2022-10-06 | Stop reason: HOSPADM

## 2022-10-03 RX ORDER — IBUPROFEN 200 MG
24 TABLET ORAL
Status: DISCONTINUED | OUTPATIENT
Start: 2022-10-03 | End: 2022-10-06 | Stop reason: HOSPADM

## 2022-10-03 RX ORDER — GLUCAGON 1 MG
1 KIT INJECTION
Status: DISCONTINUED | OUTPATIENT
Start: 2022-10-03 | End: 2022-10-06 | Stop reason: HOSPADM

## 2022-10-03 RX ADMIN — LIDOCAINE HYDROCHLORIDE 15 ML: 20 SOLUTION ORAL; TOPICAL at 02:10

## 2022-10-03 RX ADMIN — PIPERACILLIN SODIUM AND TAZOBACTAM SODIUM 3.38 G: 3; .375 INJECTION, POWDER, LYOPHILIZED, FOR SOLUTION INTRAVENOUS at 12:10

## 2022-10-03 RX ADMIN — PIPERACILLIN SODIUM AND TAZOBACTAM SODIUM 3.38 G: 3; .375 INJECTION, POWDER, LYOPHILIZED, FOR SOLUTION INTRAVENOUS at 08:10

## 2022-10-03 RX ADMIN — ASCORBIC ACID, VITAMIN A PALMITATE, CHOLECALCIFEROL, THIAMINE HYDROCHLORIDE, RIBOFLAVIN-5 PHOSPHATE SODIUM, PYRIDOXINE HYDROCHLORIDE, NIACINAMIDE, DEXPANTHENOL, ALPHA-TOCOPHEROL ACETATE, VITAMIN K1, FOLIC ACID, BIOTIN, CYANOCOBALAMIN: 200; 3300; 200; 6; 3.6; 6; 40; 15; 10; 150; 600; 60; 5 INJECTION, SOLUTION INTRAVENOUS at 08:10

## 2022-10-03 RX ADMIN — HYDROMORPHONE HYDROCHLORIDE 0.5 MG: 1 INJECTION, SOLUTION INTRAMUSCULAR; INTRAVENOUS; SUBCUTANEOUS at 11:10

## 2022-10-03 RX ADMIN — ALUMINUM HYDROXIDE, MAGNESIUM HYDROXIDE, AND SIMETHICONE 30 ML: 200; 200; 20 SUSPENSION ORAL at 02:10

## 2022-10-03 RX ADMIN — HYDROMORPHONE HYDROCHLORIDE 0.5 MG: 1 INJECTION, SOLUTION INTRAMUSCULAR; INTRAVENOUS; SUBCUTANEOUS at 04:10

## 2022-10-03 RX ADMIN — LORAZEPAM 1 MG: 2 INJECTION INTRAMUSCULAR; INTRAVENOUS at 07:10

## 2022-10-03 RX ADMIN — HYDROMORPHONE HYDROCHLORIDE 0.5 MG: 0.5 INJECTION, SOLUTION INTRAMUSCULAR; INTRAVENOUS; SUBCUTANEOUS at 12:10

## 2022-10-03 RX ADMIN — LORAZEPAM 1 MG: 2 INJECTION INTRAMUSCULAR; INTRAVENOUS at 09:10

## 2022-10-03 RX ADMIN — HYDROMORPHONE HYDROCHLORIDE 0.5 MG: 1 INJECTION, SOLUTION INTRAMUSCULAR; INTRAVENOUS; SUBCUTANEOUS at 05:10

## 2022-10-03 RX ADMIN — PIPERACILLIN SODIUM AND TAZOBACTAM SODIUM 3.38 G: 3; .375 INJECTION, POWDER, LYOPHILIZED, FOR SOLUTION INTRAVENOUS at 04:10

## 2022-10-03 RX ADMIN — SODIUM CHLORIDE, SODIUM LACTATE, POTASSIUM CHLORIDE, AND CALCIUM CHLORIDE 250 ML: .6; .31; .03; .02 INJECTION, SOLUTION INTRAVENOUS at 12:10

## 2022-10-03 NOTE — HPI
The patient is a 72-year-old male, who presents emergency room with complaint of abdominal pain radiating to his chest.  Patient has a long history of chronic pancreatitis to include most recent pseudocyst of the pancreas in June.  Patient is alcoholic and continues to drink.  His alcohol level today was 85.  His last cast was 3.3.  He also has splenic artery aneurysm of 3 x 3 discussed with Dr. Epstein.  Concerning for sepsis  however unable to provide extensive fluid rehydration  due to possibility of aneurysmal rupture vascular surgery is aware.  Current ing patient is on med Zosyn and cultures obtained.    Plan to admit patient to step-down unit PCU continue IV antibiotics pain control NPO consult GI and vascular surgery

## 2022-10-03 NOTE — PROGRESS NOTES
"[facility] Medicine Progress Note  Patient Name: Vic Reyez MRN: [id number]   Patient Class: IP- Inpatient  Length of Stay: 0   Admission Date: 10/2/2022  8:37 PM Attending Physician: Abel Jaime MD   Primary Care Provider: Primary Doctor No Face-to-Face encounter date: 10/03/2022   Chief Complaint: Chest Pain      Subjective:    Interval History   Patient is doing well.    Denies chest pain, shortness of breath, palpitations, abdominal pain, nausea/vomiting.   No concerns/issues overnight reported by the patient or the nursing staff.  Reviewed the labs and discussed the plan of care.   No family present at bedside.     Review of Systems   All other Review of Systems were found to be negative expect for that mentioned already in HPI.     Hospital Course     10/03/2022      Objective:   Physical Exam  /71   Pulse 83   Temp 98.5 °F (36.9 °C) (Oral)   Resp 16   Ht 5' 11" (1.803 m)   Wt 68 kg (150 lb)   SpO2 100%   BMI 20.92 kg/m²   Constitutional: No distress.   HENT: Atraumatic.   Cardiovascular: Normal rate, regular rhythm and normal heart sounds.   Pulmonary/Chest: Effort normal. Clear to auscultation bilaterally. No wheezes.   Abdominal: Soft. Bowel sounds are normal. Exhibits no distension and no mass. No tenderness  Neurological: Alert.   Skin: Skin is warm and dry.     Labs and Imaging    Significant Labs: All pertinent labs within the past 24 hours have been reviewed.    Significant Imaging: I have reviewed all pertinent imaging results/findings within the past 24 hours.    I have reviewed the Vitals, labs and imaging as above.     Assessment & Plan:   Vic Reyez is a 72 y.o. male admitted for acute pancreatitis and splenic artery aneurysm.  Pain control difficulty due to blood pressure issues and protection of the aneurysm to avoid rupture.  Have discussed this with the floor nurses.  Will control pain as appropriate.  Also concern for withdrawal will treat " appropriately      Core measures:  - Code status:  Full  - Diet: NPO  VTE Risk Mitigation (From admission, onward)      None            Discharge Planning:   Discharge Planning   NAFISA:  Greater than 4 days    Code Status: Full Code   Is the patient medically ready for discharge?:  No    Reason for patient still in hospital (select all that apply): Patient unstable  Discharge Plan A: Home with physical therapy        Above encounter included review of the medical records, interviewing and examining the patient face-to-face, discussion with family and other health care providers, ordering and interpreting lab/test results and formulating a plan of care.     Medical Decision Making:  [] Low Complexity  [x] Moderate Complexity  [] High Complexity    Abel Jaime MD  [facility] Hospitalist  10/03/2022

## 2022-10-03 NOTE — CONSULTS
"[facility]  Adult Nutrition   Consult Note (Nutrition Education)     SUMMARY     Recommendations  1) RD has added Ensuree High Protein to meal trays to assist in meeting nutritional needs.   2) Continue regular diet as tolerated and encourage intake.   3)  continue to obtain meal pref's daily.    Goals:   Pt to meet 100% or more of his EEN and EPN.    Nutrition Goal Status: goal met    Communication of RD Recs: reviewed with RN    Dietitian Rounds Brief  Consult for Diet Education: Pts nurse tells me that pt ate 100% of his first regular diet meal and we are going to try adding some Ensure as well. Will follow up tomorrow with PO intake, etc.    Diet order:   Current Diet Order: Regular      Evaluation of Received Nutrient/Fluid Intake  Energy Calories Required: meeting needs  Protein Required: meeting needs  Fluid Required: meeting needs  Tolerance: tolerating     % Intake of Estimated Energy Needs: 75 - 100 %  % Meal Intake: 75 - 100 %      Intake/Output Summary (Last 24 hours) at 10/3/2022 1827  Last data filed at 10/3/2022 1819  Gross per 24 hour   Intake 3502.8 ml   Output 1300 ml   Net 2202.8 ml        Anthropometrics  Temp: 98.7 °F (37.1 °C)  Height Method: Stated  Height: 5' 11" (180.3 cm)  Height (inches): 71 in  Weight Method: Bed Scale  Weight: 60 kg (132 lb 4.4 oz)  Weight (lb): 132.28 lb  Ideal Body Weight (IBW), Male: 172 lb  % Ideal Body Weight, Male (lb): 87.21 %  BMI (Calculated): 18.5  BMI Grade: 17 - 18.4 protein-energy malnutrition grade I       Estimated/Assessed Needs  Weight Used For Calorie Calculations: 60 kg (132 lb 4.4 oz)  Energy Calorie Requirements (kcal): 8712-0921 kcals/day (30-35 kcals/kg ABW)  Energy Need Method: Kcal/kg  Protein Requirements: 117-156 g/day (1.5-2 g/kg IBW)  Weight Used For Protein Calculations: 78 kg (171 lb 15.3 oz)     Estimated Fluid Requirement Method: RDA Method  RDA Method (mL): 1800       Reason for Assessment  Reason For Assessment: " consult  Diagnosis: other (see comments) (Splenic artery aneurysm)  Relevant Medical History: Hypertension, Alcohol abuse, Decompensated hepatic cirrhosis, Lab test positive for detection of COVID-19 virus, Encounter for blood transfusion, Pancreatic cyst  Interdisciplinary Rounds: did not attend    Nutrition/Diet History  Spiritual, Cultural Beliefs, Sabianist Practices, Values that Affect Care: no  Food Allergies: NKFA  Factors Affecting Nutritional Intake: None identified at this time    Nutrition Risk Screen  Nutrition Risk Screen: no indicators present     MST Score: 0  Have you recently lost weight without trying?: No  Weight loss score: 0  Have you been eating poorly because of a decreased appetite?: No  Appetite score: 0       Weight History:  Wt Readings from Last 5 Encounters:   10/03/22 60 kg (132 lb 4.4 oz)   06/04/22 62.2 kg (137 lb 2 oz)   04/21/22 73 kg (160 lb 15 oz)   04/19/22 77.1 kg (170 lb)   02/02/22 78.8 kg (173 lb 11.6 oz)        Lab/Procedures/Meds: Pertinent Labs/Meds Reviewed    Medications:Pertinent Medications Reviewed  Scheduled Meds:   piperacillin-tazobactam (ZOSYN) IVPB  3.375 g Intravenous Q8H    Banana bag   Intravenous Daily     Continuous Infusions:  PRN Meds:.albuterol-ipratropium, dextrose 10%, glucagon (human recombinant), glucose, glucose, HYDROmorphone, lorazepam, melatonin, naloxone, ondansetron, polyethylene glycol, senna-docusate 8.6-50 mg, simethicone    Labs: Pertinent Labs Reviewed  Clinical Chemistry:  Recent Labs   Lab 10/02/22  2052   *   K 3.0*      CO2 15*   *   BUN 6*   CREATININE <0.3*   CALCIUM 7.7*   PROT 6.3   ALBUMIN 2.4*   BILITOT 5.2*   ALKPHOS 521*   *   ALT 72*   ANIONGAP 9   MG 1.2*   LIPASE 57     CBC:   Recent Labs   Lab 10/02/22  2052   WBC 4.58   RBC 2.57*   HGB 8.1*   HCT 23.4*   *   MCV 91   MCH 31.5*   MCHC 34.6     Cardiac Profile:  Recent Labs   Lab 10/02/22  2052   BNP 68   TROPONINI <0.030     Diabetes:  Recent  Labs   Lab 10/02/22  2052   HGBA1C SEE COMMENT     Thyroid & Parathyroid:  Recent Labs   Lab 10/02/22  2052   TSH 2.020       Monitor and Evaluation  Food and Nutrient Intake: energy intake, food and beverage intake  Food and Nutrient Adminstration: diet order  Knowledge/Beliefs/Attitudes: food and nutrition knowledge/skill, beliefs and attitudes  Physical Activity and Function: nutrition-related ADLs and IADLs, factors affecting access to physical activity  Anthropometric Measurements: weight, weight change, body mass index  Biochemical Data, Medical Tests and Procedures: electrolyte and renal panel, gastrointestinal profile, glucose/endocrine profile, inflammatory profile, lipid profile  Nutrition-Focused Physical Findings: overall appearance     Nutrition Risk  Level of Risk/Frequency of Follow-up: high     Nutrition Follow-Up  RD Follow-up?: Yes      Telma Rodriguez RD 10/03/2022 6:27 PM

## 2022-10-03 NOTE — ED TRIAGE NOTES
PT VOICED HAVING PAIN THAT STARTED IN HIS STOMACH 3 HOURS AGO THAT THEN RADIATED INTO HIS CHEST AND IS NOW HAVING TINGLING IN HIS EXTREMITIES.

## 2022-10-03 NOTE — H&P
[facility] - Emergency Dept  Hospital Medicine  History & Physical    Patient Name: Vic Reyez  MRN: [id number]  Patient Class: IP- Inpatient  Admission Date: 10/2/2022  Attending Physician: Abel Jaime MD   Primary Care Provider: Primary Doctor No         Patient information was obtained from patient, past medical records and ER records.     Subjective:     Principal Problem:Splenic artery aneurysm    Chief Complaint:   Chief Complaint   Patient presents with    Chest Pain        HPI: The patient is a 72-year-old male, who presents emergency room with complaint of abdominal pain radiating to his chest.  Patient has a long history of chronic pancreatitis to include most recent pseudocyst of the pancreas in June.  Patient is alcoholic and continues to drink.  His alcohol level today was 85.  His last cast was 3.3.  He also has splenic artery aneurysm of 3 x 3 discussed with Dr. Epstein.  Concerning for sepsis  however unable to provide extensive fluid rehydration  due to possibility of aneurysmal rupture vascular surgery is aware.  Current ing patient is on med Zosyn and cultures obtained.    Plan to admit patient to step-down unit PCU continue IV antibiotics pain control NPO consult GI and vascular surgery      Past Medical History:   Diagnosis Date    Alcohol abuse 02/02/2022    Decompensated hepatic cirrhosis 02/02/2022    Encounter for blood transfusion     Hypertension     Lab test positive for detection of COVID-19 virus 09/2021    Pancreatic cyst 06/03/2022       Past Surgical History:   Procedure Laterality Date    APPENDECTOMY      COLONOSCOPY      ENDOSCOPIC ULTRASOUND OF UPPER GASTROINTESTINAL TRACT  02/04/2022    Procedure: ULTRASOUND, UPPER GI TRACT, ENDOSCOPIC;  Surgeon: Chuy Bay MD;  Location: [address];  Service: Endoscopy;;    ENDOSCOPIC ULTRASOUND OF UPPER GASTROINTESTINAL TRACT N/A 6/3/2022    Procedure: ULTRASOUND, UPPER GI TRACT, ENDOSCOPIC;  Surgeon:  Roger Viveros III, MD;  Location: [facility] ENDO;  Service: Endoscopy;  Laterality: N/A;    ERCP N/A 02/04/2022    Procedure: ERCP (ENDOSCOPIC RETROGRADE CHOLANGIOPANCREATOGRAPHY);  Surgeon: Chuy Bay MD;  Location: [facility] (2ND FLR);  Service: Endoscopy;  Laterality: N/A;    ERCP N/A 04/19/2022    Procedure: ERCP (ENDOSCOPIC RETROGRADE CHOLANGIOPANCREATOGRAPHY);  Surgeon: Chuy Bay MD;  Location: [facility] (2ND FLR);  Service: Endoscopy;  Laterality: N/A;  4/1: fully vaccinated. labs prior. instructions mailed to sister and patient.-SC  4/12/22-Confirmed new arrival time of 10:45am with pt's sister and updated instructions emailed-DS    EYE SURGERY      JOINT REPLACEMENT      KNEE SURGERY      RECONSTRUCTION OF SHOULDER Right     TONSILLECTOMY      UPPER ENDOSCOPIC ULTRASOUND W/ FNA  06/03/2022       Review of patient's allergies indicates:  No Known Allergies    No current facility-administered medications on file prior to encounter.     Current Outpatient Medications on File Prior to Encounter   Medication Sig    aspirin (ECOTRIN) 81 MG EC tablet Take 81 mg by mouth once daily.    furosemide (LASIX) 40 MG tablet Take 0.5 tablets (20 mg total) by mouth once daily.    calcium-vitamin D (OSCAL) 250 (625)-125 mg-unit per tablet Take 1 tablet by mouth once daily.    docusate sodium (COLACE) 50 MG capsule Take by mouth 2 (two) times daily.    ferrous fumarate 324 mg (106 mg iron) Tab Take 1 tablet by mouth once daily at 6am.    folic acid (FOLVITE) 1 MG tablet Take 1 tablet (1 mg total) by mouth once daily.    hydrocodone-acetaminophen 7.5-325mg (NORCO) 7.5-325 mg per tablet Take 1 tablet by mouth every 6 (six) hours as needed for Pain.    multivitamin with folic acid (DAILY-ROHAN, WITH FOLIC ACID,) 400 mcg Tab Take 1 tablet by mouth once daily.    pravastatin (PRAVACHOL) 40 MG tablet Take 40 mg by mouth once daily.    spironolactone (ALDACTONE) 50 MG tablet Take 0.5 tablets (25 mg total)  by mouth once daily.    thiamine 100 MG tablet Take 1 tablet (100 mg total) by mouth once daily.    ursodioL (ACTIGALL) 300 mg capsule Take 1 capsule (300 mg total) by mouth 2 (two) times daily.     Family History    None       Tobacco Use    Smoking status: Every Day     Packs/day: 0.25     Types: Cigarettes    Smokeless tobacco: Never   Substance and Sexual Activity    Alcohol use: Yes     Alcohol/week: 4.0 standard drinks     Types: 4 Cans of beer per week    Drug use: Never    Sexual activity: Not Currently     Review of Systems   Constitutional:  Positive for fatigue.   HENT: Negative.     Eyes: Negative.    Respiratory: Negative.     Cardiovascular: Negative.    Gastrointestinal:  Positive for abdominal pain.   Endocrine: Negative.    Genitourinary: Negative.    Musculoskeletal: Negative.    Allergic/Immunologic: Negative.    Neurological: Negative.    Hematological: Negative.    Objective:     Vital Signs (Most Recent):  Temp: 98.5 °F (36.9 °C) (10/02/22 2049)  Pulse: 83 (10/03/22 0015)  Resp: 16 (10/03/22 0049)  BP: 135/71 (10/03/22 0015)  SpO2: 100 % (10/03/22 0015) Vital Signs (24h Range):  Temp:  [98.5 °F (36.9 °C)] 98.5 °F (36.9 °C)  Pulse:  [71-88] 83  Resp:  [10-36] 16  SpO2:  [100 %] 100 %  BP: (129-149)/() 135/71     Weight: 68 kg (150 lb)  Body mass index is 20.92 kg/m².    Physical Exam  Vitals and nursing note reviewed. Exam conducted with a chaperone present.   Constitutional:       Comments: Jaundice appearing   HENT:      Head: Normocephalic and atraumatic.      Mouth/Throat:      Mouth: Mucous membranes are moist.   Eyes:      Pupils: Pupils are equal, round, and reactive to light.      Comments: Scleral icterus   Cardiovascular:      Rate and Rhythm: Normal rate and regular rhythm.   Pulmonary:      Effort: Pulmonary effort is normal.      Breath sounds: Normal breath sounds.   Abdominal:      General: Bowel sounds are normal.      Tenderness: There is abdominal tenderness.       Comments: Diffuse tenderness  No rebound tenderness  Due to tenderness, unable to fully evaluate edge of liver   Musculoskeletal:         General: Normal range of motion.   Skin:     General: Skin is warm.      Coloration: Skin is jaundiced.   Neurological:      General: No focal deficit present.      Mental Status: He is alert.         CRANIAL NERVES     CN III, IV, VI   Pupils are equal, round, and reactive to light.     Significant Labs: All pertinent labs within the past 24 hours have been reviewed.    Significant Imaging: I have reviewed all pertinent imaging results/findings within the past 24 hours.    Assessment/Plan:     * Splenic artery aneurysm  New finding  3 x 3  Probably require a coil per vascular surgery  Consult vascular surgery    Acute pancreatitis  Acute on chronic  CT of pancreas had a pseudocyst  Consult GI for further plans      Primary hypertension  Monitor and treat      Alcohol use disorder, severe, dependence  Encourage abstinence and cessation  Be aware of withdrawals      Biliary stricture  History of biliary stricture  Requiring external drain  Possible source infection      Decompensated hepatic cirrhosis  All of it transaminases  Total bilirubin 5.2    Current early pancreatitis takes precedence        VTE Risk Mitigation (From admission, onward)    None             Abel Jaime MD  Department of Hospital Medicine   [facility] - Emergency Dept

## 2022-10-03 NOTE — PLAN OF CARE
[facility]  Initial Discharge Assessment       Primary Care Provider: Primary Doctor No    Admission Diagnosis: Alcohol-induced acute pancreatitis, unspecified complication status [K85.20]    Admission Date: 10/2/2022  Expected Discharge Date: TBD    Patient lives alone, does not have transportation, but he is able to drive himself with a reliable vehicle.  / Bhargavi Mariee [phone] [facility] Dr Lacy Mccarthy. Does not rely on DME or HH currently.    Discharge Barriers Identified: None    Payor: HUMANA / Plan: HUMANA  PPO / Product Type: PPO /     Extended Emergency Contact Information  Primary Emergency Contact: Bhargavi Mariee   [facility]  Home Phone: [phone]  Relation: Sister    Discharge Plan A: Home  Discharge Plan B: Home      [facility], [place] - [address]  [address]  [place] [address]  Phone: [phone] Fax: [phone]      Initial Assessment (most recent)       Adult Discharge Assessment - 10/03/22 1241          Discharge Assessment    Assessment Type Discharge Planning Assessment     Confirmed/corrected address, phone number and insurance Yes     Confirmed Demographics Correct on Facesheet     Source of Information family     If unable to respond/provide information was family/caregiver contacted? Yes     Contact Name/Number sister Lakeisha Mariee [phone]     Communicated NAFISA with patient/caregiver Date not available/Unable to determine     Reason For Admission Splenic artery aneurysm     Lives With alone     Facility Arrived From: home     Do you expect to return to your current living situation? Yes     Do you have help at home or someone to help you manage your care at home? No     Prior to hospitilization cognitive status: Unable to Assess     Current cognitive status: Unable to Assess     Walking or Climbing Stairs Difficulty none     Dressing/Bathing Difficulty  none     Equipment Currently Used at Home none     Readmission within 30 days? No     Patient currently being followed by outpatient case management? No     Do you currently have service(s) that help you manage your care at home? No     Do you take prescription medications? Yes     Do you have prescription coverage? Yes     Do you have any problems affording any of your prescribed medications? No     Is the patient taking medications as prescribed? yes     Who is going to help you get home at discharge? sister / Bhargavi Mariee [phone]     How do you get to doctors appointments? car, drives self;family or friend will provide     Are you on dialysis? No     Do you take coumadin? No     Discharge Plan A Home     Discharge Plan B Home     DME Needed Upon Discharge  none     Discharge Plan discussed with: Sibling     Name(s) and Number(s) sister / Bhargavi Mariee [phone]     Discharge Barriers Identified None

## 2022-10-03 NOTE — SUBJECTIVE & OBJECTIVE
Past Medical History:   Diagnosis Date    Alcohol abuse 02/02/2022    Decompensated hepatic cirrhosis 02/02/2022    Encounter for blood transfusion     Hypertension     Lab test positive for detection of COVID-19 virus 09/2021    Pancreatic cyst 06/03/2022       Past Surgical History:   Procedure Laterality Date    APPENDECTOMY      COLONOSCOPY      ENDOSCOPIC ULTRASOUND OF UPPER GASTROINTESTINAL TRACT  02/04/2022    Procedure: ULTRASOUND, UPPER GI TRACT, ENDOSCOPIC;  Surgeon: Chuy Bay MD;  [facility]  Service: Endoscopy;;    ENDOSCOPIC ULTRASOUND OF UPPER GASTROINTESTINAL TRACT N/A 6/3/2022    Procedure: ULTRASOUND, UPPER GI TRACT, ENDOSCOPIC;  Surgeon: Roger Viveros III, MD;  Location: [facility] ENDO;  Service: Endoscopy;  Laterality: N/A;    ERCP N/A 02/04/2022    Procedure: ERCP (ENDOSCOPIC RETROGRADE CHOLANGIOPANCREATOGRAPHY);  Surgeon: Chuy Bay MD;  [facility]  Service: Endoscopy;  Laterality: N/A;    ERCP N/A 04/19/2022    Procedure: ERCP (ENDOSCOPIC RETROGRADE CHOLANGIOPANCREATOGRAPHY);  Surgeon: Chuy Bay MD;  [facility]  Service: Endoscopy;  Laterality: N/A;  4/1: fully vaccinated. labs prior. instructions mailed to sister and patient.-SC  4/12/22-Confirmed new arrival time of 10:45am with pt's sister and updated instructions emailed-DS    EYE SURGERY      JOINT REPLACEMENT      KNEE SURGERY      RECONSTRUCTION OF SHOULDER Right     TONSILLECTOMY      UPPER ENDOSCOPIC ULTRASOUND W/ FNA  06/03/2022       Review of patient's allergies indicates:  No Known Allergies    No current facility-administered medications on file prior to encounter.     Current Outpatient Medications on File Prior to Encounter   Medication Sig    aspirin (ECOTRIN) 81 MG EC tablet Take 81 mg by mouth once daily.    furosemide (LASIX) 40 MG tablet Take 0.5 tablets (20 mg total) by mouth once daily.    calcium-vitamin D (OSCAL) 250 (625)-125 mg-unit per tablet  Take 1 tablet by mouth once daily.    docusate sodium (COLACE) 50 MG capsule Take by mouth 2 (two) times daily.    ferrous fumarate 324 mg (106 mg iron) Tab Take 1 tablet by mouth once daily at 6am.    folic acid (FOLVITE) 1 MG tablet Take 1 tablet (1 mg total) by mouth once daily.    hydrocodone-acetaminophen 7.5-325mg (NORCO) 7.5-325 mg per tablet Take 1 tablet by mouth every 6 (six) hours as needed for Pain.    multivitamin with folic acid (DAILY-ROHAN, WITH FOLIC ACID,) 400 mcg Tab Take 1 tablet by mouth once daily.    pravastatin (PRAVACHOL) 40 MG tablet Take 40 mg by mouth once daily.    spironolactone (ALDACTONE) 50 MG tablet Take 0.5 tablets (25 mg total) by mouth once daily.    thiamine 100 MG tablet Take 1 tablet (100 mg total) by mouth once daily.    ursodioL (ACTIGALL) 300 mg capsule Take 1 capsule (300 mg total) by mouth 2 (two) times daily.     Family History    None       Tobacco Use    Smoking status: Every Day     Packs/day: 0.25     Types: Cigarettes    Smokeless tobacco: Never   Substance and Sexual Activity    Alcohol use: Yes     Alcohol/week: 4.0 standard drinks     Types: 4 Cans of beer per week    Drug use: Never    Sexual activity: Not Currently     Review of Systems   Constitutional:  Positive for fatigue.   HENT: Negative.     Eyes: Negative.    Respiratory: Negative.     Cardiovascular: Negative.    Gastrointestinal:  Positive for abdominal pain.   Endocrine: Negative.    Genitourinary: Negative.    Musculoskeletal: Negative.    Allergic/Immunologic: Negative.    Neurological: Negative.    Hematological: Negative.    Objective:     Vital Signs (Most Recent):  Temp: 98.5 °F (36.9 °C) (10/02/22 2049)  Pulse: 83 (10/03/22 0015)  Resp: 16 (10/03/22 0049)  BP: 135/71 (10/03/22 0015)  SpO2: 100 % (10/03/22 0015) Vital Signs (24h Range):  Temp:  [98.5 °F (36.9 °C)] 98.5 °F (36.9 °C)  Pulse:  [71-88] 83  Resp:  [10-36] 16  SpO2:  [100 %] 100 %  BP: (129-149)/() 135/71     Weight: 68 kg (150  lb)  Body mass index is 20.92 kg/m².    Physical Exam  Vitals and nursing note reviewed. Exam conducted with a chaperone present.   Constitutional:       Comments: Jaundice appearing   HENT:      Head: Normocephalic and atraumatic.      Mouth/Throat:      Mouth: Mucous membranes are moist.   Eyes:      Pupils: Pupils are equal, round, and reactive to light.      Comments: Scleral icterus   Cardiovascular:      Rate and Rhythm: Normal rate and regular rhythm.   Pulmonary:      Effort: Pulmonary effort is normal.      Breath sounds: Normal breath sounds.   Abdominal:      General: Bowel sounds are normal.      Tenderness: There is abdominal tenderness.      Comments: Diffuse tenderness  No rebound tenderness  Due to tenderness, unable to fully evaluate edge of liver   Musculoskeletal:         General: Normal range of motion.   Skin:     General: Skin is warm.      Coloration: Skin is jaundiced.   Neurological:      General: No focal deficit present.      Mental Status: He is alert.         CRANIAL NERVES     CN III, IV, VI   Pupils are equal, round, and reactive to light.     Significant Labs: All pertinent labs within the past 24 hours have been reviewed.    Significant Imaging: I have reviewed all pertinent imaging results/findings within the past 24 hours.

## 2022-10-03 NOTE — ED PROVIDER NOTES
Encounter Date: 10/2/2022       History     Chief Complaint   Patient presents with    Chest Pain     72-year-old male presents with abdominal discomfort and chest pain patient also noticed to be jaundiced, patient has a past medical history of alcohol abuse and recent drainage of a pancreatic pseudocyst patient reports severe chest and abdominal pain starting 2 days ago patient reports that he drinks daily and has had 2 beers today patient has a history of hepatic cirrhosis.  Patient has no other acute complaints at this time he reports substernal chest pain which he rates 10/10 patient reports he took an aspirin at home.    Review of patient's allergies indicates:  No Known Allergies  Past Medical History:   Diagnosis Date    Alcohol abuse 02/02/2022    Decompensated hepatic cirrhosis 02/02/2022    Encounter for blood transfusion     Hypertension     Lab test positive for detection of COVID-19 virus 09/2021    Pancreatic cyst 06/03/2022     Past Surgical History:   Procedure Laterality Date    APPENDECTOMY      COLONOSCOPY      ENDOSCOPIC ULTRASOUND OF UPPER GASTROINTESTINAL TRACT  02/04/2022    Procedure: ULTRASOUND, UPPER GI TRACT, ENDOSCOPIC;  Surgeon: Chuy Bay MD;  Location: [address]);  Service: Endoscopy;;    ENDOSCOPIC ULTRASOUND OF UPPER GASTROINTESTINAL TRACT N/A 6/3/2022    Procedure: ULTRASOUND, UPPER GI TRACT, ENDOSCOPIC;  Surgeon: Roger Viveros III, MD;  Location: [facility];  Service: Endoscopy;  Laterality: N/A;    ERCP N/A 02/04/2022    Procedure: ERCP (ENDOSCOPIC RETROGRADE CHOLANGIOPANCREATOGRAPHY);  Surgeon: Chuy Bay MD;  Location: [address]);  Service: Endoscopy;  Laterality: N/A;    ERCP N/A 04/19/2022    Procedure: ERCP (ENDOSCOPIC RETROGRADE CHOLANGIOPANCREATOGRAPHY);  Surgeon: Chuy Bay MD;  Location: [facility] ([address] [place] [address]);  Service: Endoscopy;  Laterality: N/A;  4/1: fully vaccinated. labs prior. instructions mailed to sister and  patient.-SC  4/12/22-Confirmed new arrival time of 10:45am with pt's sister and updated instructions emailed-DS    EYE SURGERY      JOINT REPLACEMENT      KNEE SURGERY      RECONSTRUCTION OF SHOULDER Right     TONSILLECTOMY      UPPER ENDOSCOPIC ULTRASOUND W/ FNA  06/03/2022     History reviewed. No pertinent family history.  Social History     Tobacco Use    Smoking status: Every Day     Packs/day: 0.25     Types: Cigarettes    Smokeless tobacco: Never   Substance Use Topics    Alcohol use: Yes     Alcohol/week: 4.0 standard drinks     Types: 4 Cans of beer per week    Drug use: Never     Review of Systems   Constitutional:  Negative for fever.   HENT:  Negative for congestion, rhinorrhea, sore throat and trouble swallowing.    Eyes:  Negative for visual disturbance.   Respiratory:  Negative for cough, chest tightness, shortness of breath and wheezing.    Cardiovascular:  Positive for chest pain. Negative for palpitations and leg swelling.   Gastrointestinal:  Positive for abdominal pain. Negative for abdominal distention, constipation, diarrhea, nausea and vomiting.   Genitourinary:  Negative for difficulty urinating, dysuria, flank pain and frequency.   Musculoskeletal:  Negative for arthralgias, back pain, joint swelling and neck pain.   Skin:  Positive for color change. Negative for rash.   Neurological:  Negative for dizziness, syncope, speech difficulty, weakness, numbness and headaches.   All other systems reviewed and are negative.    Physical Exam     Initial Vitals [10/02/22 2049]   BP Pulse Resp Temp SpO2   (!) 142/80 71 (!) 36 98.5 °F (36.9 °C) 100 %      MAP       --         Physical Exam    Nursing note and vitals reviewed.  Constitutional: He appears well-developed and well-nourished. He is not diaphoretic. No distress.   HENT:   Head: Normocephalic and atraumatic.   Right Ear: External ear normal.   Left Ear: External ear normal.   Nose: Nose normal.   Mouth/Throat: Oropharynx is clear and moist. No  oropharyngeal exudate.   Eyes: Conjunctivae and EOM are normal. Pupils are equal, round, and reactive to light. Right eye exhibits no discharge. Left eye exhibits no discharge. No scleral icterus.   Neck: Neck supple. No thyromegaly present. No tracheal deviation present. No JVD present.   Normal range of motion.  Cardiovascular:  Normal rate, regular rhythm, normal heart sounds and intact distal pulses.     Exam reveals no gallop and no friction rub.       No murmur heard.  Pulmonary/Chest: Breath sounds normal. No stridor. No respiratory distress. He has no wheezes. He has no rhonchi. He has no rales. He exhibits no tenderness.   Abdominal: Abdomen is soft. Bowel sounds are normal. He exhibits no distension and no mass. There is abdominal tenderness.   Tenderness to palpation of the epigastric region patient has promenant hepatomegaly There is no rebound and no guarding.   Musculoskeletal:         General: No tenderness or edema. Normal range of motion.      Cervical back: Normal range of motion and neck supple.     Lymphadenopathy:     He has no cervical adenopathy.   Neurological: He is alert and oriented to person, place, and time. He has normal strength. No cranial nerve deficit or sensory deficit.   Skin: Skin is warm and dry. No rash noted. No erythema.   Jaundice noted       ED Course   Procedures  Labs Reviewed   CBC W/ AUTO DIFFERENTIAL - Abnormal; Notable for the following components:       Result Value    RBC 2.57 (*)     Hemoglobin 8.1 (*)     Hematocrit 23.4 (*)     MCH 31.5 (*)     RDW 16.5 (*)     Platelets 144 (*)     Lymph # 0.7 (*)     Lymph % 14.2 (*)     All other components within normal limits   COMPREHENSIVE METABOLIC PANEL - Abnormal; Notable for the following components:    Sodium 128 (*)     Potassium 3.0 (*)     CO2 15 (*)     Glucose 128 (*)     BUN 6 (*)     Creatinine <0.3 (*)     Calcium 7.7 (*)     Albumin 2.4 (*)     Total Bilirubin 5.2 (*)     Alkaline Phosphatase 521 (*)     AST  119 (*)     ALT 72 (*)     All other components within normal limits   LACTIC ACID, PLASMA - Abnormal; Notable for the following components:    Lactate (Lactic Acid) 3.3 (*)     All other components within normal limits    Narrative:     LACTIC ACID    critical result(s) called and verbal readback obtained   from  KRISTIAN HILLMAN RN ER. by TC1 10/02/2022 23:10   URINALYSIS, REFLEX TO URINE CULTURE - Abnormal; Notable for the following components:    Leukocytes, UA 2+ (*)     All other components within normal limits    Narrative:     Specimen Source->Urine   DRUG SCREEN PANEL, URINE EMERGENCY - Abnormal; Notable for the following components:    Creatinine, Urine 22.0 (*)     All other components within normal limits    Narrative:     Specimen Source->Urine   MAGNESIUM - Abnormal; Notable for the following components:    Magnesium 1.2 (*)     All other components within normal limits   PROTIME-INR - Abnormal; Notable for the following components:    PT 14.9 (*)     All other components within normal limits   ALCOHOL,MEDICAL (ETHANOL) - Abnormal; Notable for the following components:    Alcohol, Serum 82 (*)     All other components within normal limits   URINALYSIS MICROSCOPIC - Abnormal; Notable for the following components:    WBC, UA 17 (*)     Hyaline Casts, UA 7 (*)     All other components within normal limits    Narrative:     Specimen Source->Urine   CULTURE, BLOOD   CULTURE, BLOOD   CULTURE, URINE   TROPONIN I   B-TYPE NATRIURETIC PEPTIDE   AMMONIA   SARS-COV-2 RNA AMPLIFICATION, QUAL   LIPASE   MAGNESIUM   PROTIME-INR   TSH   ALCOHOL,MEDICAL (ETHANOL)   LIPASE   TSH   LACTIC ACID, PLASMA   POCT CREATININE          Imaging Results              CT Abdomen Pelvis With Contrast (Final result)  Result time 10/02/22 23:41:15      Final result by Matilde Montero DO (10/02/22 23:41:15)                   Narrative:    EXAM:  CT Abdomen and Pelvis With Intravenous Contrast    FACILITY:  [facility]    REFERRING:  AAAREFERRAL SELF    CLINICAL HISTORY:  72 years, Male; Epigastric pain June 1, 2022    TECHNIQUE:  Axial computed tomography images of the abdomen and pelvis with intravenous contrast.  Sagittal and coronal reformatted images were created and reviewed.  This CT exam was performed using one or more of the following dose reduction techniques:  automated exposure control, adjustment of the mA and/or kV according to patient size, and/or use of iterative reconstruction technique.    CONTRAST:  100 mL Omni 350    COMPARISON:  June 1, 2022    FINDINGS:  Lung bases:  Right basilar atelectasis.    ABDOMEN:  Liver:  Hepatic steatosis. Findings of loculation of the liver suggesting cirrhosis.  Gallbladder and bile ducts:  There is intrahepatic ductal dilatation which was present on the prior study. Most marked in the left lobe similar to prior study. The proximal common bile duct is dilated up to 8.4 mm. The distal duct is tapered without evidence of dilatation. This is similar to the prior study. Consider MRCP for further evaluation if clinically indicated.  No cholelithiasis.  Pancreas:  A small amount of fluid is seen near the tail of the pancreas. This measures approximately 2 x 1.9 cm it is more well-defined and not than on the prior study most likely representing pseudocysts. Additional fluid and probable pseudocyst seen in the lesser sac as well as adjacent to the spleen. No evidence of acute pancreatitis is noted.  Spleen:  Mild splenomegaly. The spleen measures 12.7 cm craniocaudad. There are periportal and perisplenic as well as perigastric varices. Findings compatible with portal hypertension.  Suspect clips in the superior aspect of the spleen unchanged from prior study.  Adrenals:  No acute pathology.  No mass.  Kidneys and ureters:  No hydronephrosis or nephrolithiasis.  Stomach and bowel:  Diverticulosis without evidence of diverticulitis. Poor distention of the colon from the proximal  transverse colon through the rectum. I cannot exclude wall thickening. Correlate clinically for colitis.  No small bowel dilatation. Scattered air-fluid levels are noted. This can be seen with enteritis in the appropriate clinical setting.    PELVIS:  Appendix:  The appendix is not visualized but no secondary signs of acute appendicitis.  Bladder:  The bladder is distended. No wall thickening.  Reproductive:  Unremarkable as visualized.    ABDOMEN and PELVIS:  Intraperitoneal space:  Congestive changes of the mesentery with minimal ascites.  Bones/joints:  Grade 1 anterior spondylolisthesis L5 on S1 unchanged from prior study. Left-sided spondylolysis at this level. No acute osseous abnormality.  No dislocation.  Soft tissues:  No acute pathology.  Vasculature:  There is a saccular aneurysm that is seen extending from a branch of the splenic artery or involving the gastroepiploic arteries. This is seen on axial images 34 through 49 and measures approximately 2.7 cm AP by approximately 2.65 cm transverse. Is also seen on coronal images 30-39 and on sagittal image 39 series 6 measuring approximately 2.84 cm craniocaudad. Vascular consultation is recommended.  No aneurysm or dissection. Scattered calcific plaque.  Calcified plaque within the no aortic aneurysm.  Lymph nodes:  Probable calcified lymph node seen posterior to the left of the bladder unchanged from prior study. In addition there are scattered mesenteric lymph nodes as well as periportal and retroperitoneal adenopathy with aortocaval lymph node measuring approximately 1.2 cm.    IMPRESSION:  1.  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 This is seen on axial images 34 through 49 and measures approximately 2.7 cm AP by approximately 2.65 cm transverse. Is also seen on coronal images 30-39 and on sagittal image 39 series 6 measuring approximately 2.84 cm craniocaudad.  Vascular consultation is recommended.  2.  Hepatic steatosis. Findings of loculation of the liver suggesting cirrhosis.  3.  There is intrahepatic ductal dilatation which was present on the prior study. Most marked in the left lobe similar to prior study. The proximal common bile duct is dilated up to 8.4 mm. The distal duct is tapered without evidence of dilatation. This is similar to the prior study. Consider MRCP for further evaluation if clinically indicated.  4.  Mild splenomegaly. The spleen measures 12.7 cm craniocaudad. There are periportal and perisplenic as well as perigastric varices. Findings compatible with portal hypertension.  5.  A small amount of fluid is seen near the tail of the pancreas. This measures approximately 2 x 1.9 cm it is more well-defined and not than on the prior study most likely representing pseudocysts. Additional fluid and probable pseudocyst seen in the lesser sac as well as adjacent to the spleen. No evidence of acute pancreatitis is noted.  6.  The bladder is distended. No wall thickening.  7.  No hydronephrosis or nephrolithiasis.  8.  Diverticulosis without evidence of diverticulitis. Poor distention of the colon from the proximal transverse colon through the rectum. I cannot exclude wall thickening. Correlate clinically for colitis.  9.  Probable calcified lymph node seen posterior to the left of the bladder unchanged from prior study. In addition there are scattered mesenteric lymph nodes as well as periportal and retroperitoneal adenopathy with aortocaval lymph node measuring approximately 1.2 cm.    Electronically signed by:  Matilde Montero DO  10/2/2022 11:41 PM CDT Workstation: [id number]                                     X-Ray Chest AP Portable (In process)                      Medications   piperacillin-tazobactam 3.375 g in dextrose 5 % 50 mL IVPB (ready to mix system) (3.375 g Intravenous New Bag 10/3/22 0052)   lactated ringers infusion (250 mLs Intravenous  New Bag 10/3/22 0055)   albuterol-ipratropium 2.5 mg-0.5 mg/3 mL nebulizer solution 3 mL (has no administration in time range)   melatonin tablet 6 mg (has no administration in time range)   ondansetron injection 4 mg (has no administration in time range)   polyethylene glycol packet 17 g (has no administration in time range)   senna-docusate 8.6-50 mg per tablet 1 tablet (has no administration in time range)   simethicone chewable tablet 80 mg (has no administration in time range)   naloxone 0.4 mg/mL injection 0.02 mg (has no administration in time range)   glucose chewable tablet 16 g (has no administration in time range)   glucose chewable tablet 24 g (has no administration in time range)   glucagon (human recombinant) injection 1 mg (has no administration in time range)   dextrose 10% bolus 125 mL (has no administration in time range)   sodium chloride 0.9% bolus 1,000 mL (0 mLs Intravenous Stopped 10/2/22 2349)   HYDROmorphone injection 0.5 mg (0.5 mg Intravenous Given 10/2/22 2136)   ondansetron injection 4 mg (4 mg Intravenous Given 10/2/22 2136)   potassium chloride 10 mEq in 100 mL IVPB (0 mEq Intravenous Stopped 10/2/22 2349)   iohexoL (OMNIPAQUE 350) injection 100 mL (100 mLs Intravenous Given 10/2/22 2142)   HYDROmorphone injection 0.5 mg (0.5 mg Intravenous Given 10/3/22 0049)     Medical Decision Making:   History:   Old Medical Records: I decided to obtain old medical records.  Initial Assessment:   Emergent evaluation of a 72-year-old male presenting with abdominal pain jaundice and chest pain differential diagnosis includes hepatic failure, ACS, electrolyte abnormality, endocrine dysfunction, infection obstruction and perforation          Attending Attestation:             Attending ED Notes:   Patient found to have pancreatic pseudocyst as well as pancreatitis, patient consulted to Gastroenterology, patient also noted to have saccular aneurysm of the splenic artery patient consulted vascular  surgery and they will follow, patient consulted Internal Medicine for admission.                 Clinical Impression:   Final diagnoses:  [K85.20] Alcohol-induced acute pancreatitis, unspecified complication status (Primary)  [K86.3] Pancreatic pseudocyst  [I72.8] Splenic artery aneurysm      ED Disposition Condition    Admit Stable                Judah Garza MD  10/03/22 0230

## 2022-10-04 LAB
ALBUMIN SERPL BCP-MCNC: 2.4 G/DL (ref 3.5–5.2)
ALP SERPL-CCNC: 516 U/L (ref 55–135)
ALT SERPL W/O P-5'-P-CCNC: 68 U/L (ref 10–44)
ANION GAP SERPL CALC-SCNC: 5 MMOL/L (ref 8–16)
AST SERPL-CCNC: 109 U/L (ref 10–40)
BASOPHILS # BLD AUTO: 0.01 K/UL (ref 0–0.2)
BASOPHILS NFR BLD: 0.2 % (ref 0–1.9)
BILIRUB DIRECT SERPL-MCNC: 4 MG/DL (ref 0.1–0.3)
BILIRUB SERPL-MCNC: 6.9 MG/DL (ref 0.1–1)
BUN SERPL-MCNC: 7 MG/DL (ref 8–23)
CALCIUM SERPL-MCNC: 8.7 MG/DL (ref 8.7–10.5)
CHLORIDE SERPL-SCNC: 101 MMOL/L (ref 95–110)
CO2 SERPL-SCNC: 24 MMOL/L (ref 23–29)
CREAT SERPL-MCNC: 0.4 MG/DL (ref 0.5–1.4)
DIFFERENTIAL METHOD: ABNORMAL
EOSINOPHIL # BLD AUTO: 0.3 K/UL (ref 0–0.5)
EOSINOPHIL NFR BLD: 5.5 % (ref 0–8)
ERYTHROCYTE [DISTWIDTH] IN BLOOD BY AUTOMATED COUNT: 16.8 % (ref 11.5–14.5)
EST. GFR  (NO RACE VARIABLE): >60 ML/MIN/1.73 M^2
GLUCOSE SERPL-MCNC: 126 MG/DL (ref 70–110)
GLUCOSE SERPL-MCNC: 141 MG/DL (ref 70–110)
GLUCOSE SERPL-MCNC: 149 MG/DL (ref 70–110)
GLUCOSE SERPL-MCNC: 177 MG/DL (ref 70–110)
HCT VFR BLD AUTO: 26.7 % (ref 40–54)
HGB BLD-MCNC: 9.2 G/DL (ref 14–18)
IMM GRANULOCYTES # BLD AUTO: 0.02 K/UL (ref 0–0.04)
IMM GRANULOCYTES NFR BLD AUTO: 0.4 % (ref 0–0.5)
LYMPHOCYTES # BLD AUTO: 0.9 K/UL (ref 1–4.8)
LYMPHOCYTES NFR BLD: 16.3 % (ref 18–48)
MAGNESIUM SERPL-MCNC: 1.6 MG/DL (ref 1.6–2.6)
MCH RBC QN AUTO: 31.7 PG (ref 27–31)
MCHC RBC AUTO-ENTMCNC: 34.5 G/DL (ref 32–36)
MCV RBC AUTO: 92 FL (ref 82–98)
MONOCYTES # BLD AUTO: 0.6 K/UL (ref 0.3–1)
MONOCYTES NFR BLD: 11.6 % (ref 4–15)
NEUTROPHILS # BLD AUTO: 3.6 K/UL (ref 1.8–7.7)
NEUTROPHILS NFR BLD: 66 % (ref 38–73)
NRBC BLD-RTO: 0 /100 WBC
PLATELET # BLD AUTO: 172 K/UL (ref 150–450)
PMV BLD AUTO: 10.1 FL (ref 9.2–12.9)
POTASSIUM SERPL-SCNC: 4.1 MMOL/L (ref 3.5–5.1)
PROT SERPL-MCNC: 6.5 G/DL (ref 6–8.4)
RBC # BLD AUTO: 2.9 M/UL (ref 4.6–6.2)
SODIUM SERPL-SCNC: 130 MMOL/L (ref 136–145)
WBC # BLD AUTO: 5.45 K/UL (ref 3.9–12.7)

## 2022-10-04 PROCEDURE — 63600175 PHARM REV CODE 636 W HCPCS: Performed by: INTERNAL MEDICINE

## 2022-10-04 PROCEDURE — 99152 MOD SED SAME PHYS/QHP 5/>YRS: CPT | Performed by: SURGERY

## 2022-10-04 PROCEDURE — C1887 CATHETER, GUIDING: HCPCS | Performed by: SURGERY

## 2022-10-04 PROCEDURE — 99900035 HC TECH TIME PER 15 MIN (STAT)

## 2022-10-04 PROCEDURE — 36246 INS CATH ABD/L-EXT ART 2ND: CPT | Performed by: SURGERY

## 2022-10-04 PROCEDURE — 37242 VASC EMBOLIZE/OCCLUDE ARTERY: CPT | Performed by: SURGERY

## 2022-10-04 PROCEDURE — 80076 HEPATIC FUNCTION PANEL: CPT | Performed by: INTERNAL MEDICINE

## 2022-10-04 PROCEDURE — C1894 INTRO/SHEATH, NON-LASER: HCPCS | Performed by: SURGERY

## 2022-10-04 PROCEDURE — 25000003 PHARM REV CODE 250: Performed by: INTERNAL MEDICINE

## 2022-10-04 PROCEDURE — 80048 BASIC METABOLIC PNL TOTAL CA: CPT | Performed by: INTERNAL MEDICINE

## 2022-10-04 PROCEDURE — C1769 GUIDE WIRE: HCPCS | Performed by: SURGERY

## 2022-10-04 PROCEDURE — C1760 CLOSURE DEV, VASC: HCPCS | Performed by: SURGERY

## 2022-10-04 PROCEDURE — 85025 COMPLETE CBC W/AUTO DIFF WBC: CPT | Performed by: INTERNAL MEDICINE

## 2022-10-04 PROCEDURE — 25000003 PHARM REV CODE 250: Performed by: SURGERY

## 2022-10-04 PROCEDURE — 36415 COLL VENOUS BLD VENIPUNCTURE: CPT | Performed by: INTERNAL MEDICINE

## 2022-10-04 PROCEDURE — 21000000 HC CCU ICU ROOM CHARGE

## 2022-10-04 PROCEDURE — 27800903 OPTIME MED/SURG SUP & DEVICES OTHER IMPLANTS: Performed by: SURGERY

## 2022-10-04 PROCEDURE — 63600175 PHARM REV CODE 636 W HCPCS: Performed by: SURGERY

## 2022-10-04 PROCEDURE — 83735 ASSAY OF MAGNESIUM: CPT | Performed by: INTERNAL MEDICINE

## 2022-10-04 PROCEDURE — 99153 MOD SED SAME PHYS/QHP EA: CPT | Performed by: SURGERY

## 2022-10-04 PROCEDURE — 25500020 PHARM REV CODE 255: Performed by: INTERNAL MEDICINE

## 2022-10-04 PROCEDURE — 94761 N-INVAS EAR/PLS OXIMETRY MLT: CPT

## 2022-10-04 PROCEDURE — 99900031 HC PATIENT EDUCATION (STAT)

## 2022-10-04 DEVICE — IMPLANTABLE DEVICE: Type: IMPLANTABLE DEVICE | Site: ARTERIAL | Status: FUNCTIONAL

## 2022-10-04 RX ORDER — ACETAMINOPHEN 325 MG/1
650 TABLET ORAL EVERY 4 HOURS PRN
Status: DISCONTINUED | OUTPATIENT
Start: 2022-10-04 | End: 2022-10-06 | Stop reason: HOSPADM

## 2022-10-04 RX ORDER — ONDANSETRON 4 MG/1
8 TABLET, ORALLY DISINTEGRATING ORAL EVERY 8 HOURS PRN
Status: DISCONTINUED | OUTPATIENT
Start: 2022-10-04 | End: 2022-10-06 | Stop reason: HOSPADM

## 2022-10-04 RX ORDER — LIDOCAINE HYDROCHLORIDE 10 MG/ML
INJECTION INFILTRATION; PERINEURAL
Status: DISCONTINUED | OUTPATIENT
Start: 2022-10-04 | End: 2022-10-04 | Stop reason: HOSPADM

## 2022-10-04 RX ORDER — SODIUM,POTASSIUM PHOSPHATES 280-250MG
2 POWDER IN PACKET (EA) ORAL
Status: DISCONTINUED | OUTPATIENT
Start: 2022-10-04 | End: 2022-10-06 | Stop reason: HOSPADM

## 2022-10-04 RX ORDER — LANOLIN ALCOHOL/MO/W.PET/CERES
800 CREAM (GRAM) TOPICAL
Status: DISCONTINUED | OUTPATIENT
Start: 2022-10-04 | End: 2022-10-06 | Stop reason: HOSPADM

## 2022-10-04 RX ORDER — MIDAZOLAM HYDROCHLORIDE 1 MG/ML
INJECTION INTRAMUSCULAR; INTRAVENOUS
Status: DISCONTINUED | OUTPATIENT
Start: 2022-10-04 | End: 2022-10-04 | Stop reason: HOSPADM

## 2022-10-04 RX ORDER — LORAZEPAM 2 MG/ML
1 INJECTION INTRAMUSCULAR
Status: DISCONTINUED | OUTPATIENT
Start: 2022-10-04 | End: 2022-10-06 | Stop reason: HOSPADM

## 2022-10-04 RX ORDER — SODIUM CHLORIDE 9 MG/ML
INJECTION, SOLUTION INTRAVENOUS CONTINUOUS
Status: DISCONTINUED | OUTPATIENT
Start: 2022-10-04 | End: 2022-10-05

## 2022-10-04 RX ORDER — THIAMINE HCL 100 MG
100 TABLET ORAL DAILY
Status: CANCELLED | OUTPATIENT
Start: 2022-10-04

## 2022-10-04 RX ORDER — OXYCODONE AND ACETAMINOPHEN 5; 325 MG/1; MG/1
1 TABLET ORAL EVERY 4 HOURS PRN
Status: DISCONTINUED | OUTPATIENT
Start: 2022-10-04 | End: 2022-10-06

## 2022-10-04 RX ORDER — FENTANYL CITRATE 50 UG/ML
INJECTION, SOLUTION INTRAMUSCULAR; INTRAVENOUS
Status: DISCONTINUED | OUTPATIENT
Start: 2022-10-04 | End: 2022-10-04 | Stop reason: HOSPADM

## 2022-10-04 RX ORDER — FOLIC ACID 1 MG/1
1 TABLET ORAL DAILY
Status: CANCELLED | OUTPATIENT
Start: 2022-10-04

## 2022-10-04 RX ADMIN — OXYCODONE AND ACETAMINOPHEN 1 TABLET: 325; 5 TABLET ORAL at 08:10

## 2022-10-04 RX ADMIN — IOHEXOL 100 ML: 350 INJECTION, SOLUTION INTRAVENOUS at 08:10

## 2022-10-04 RX ADMIN — PIPERACILLIN SODIUM AND TAZOBACTAM SODIUM 3.38 G: 3; .375 INJECTION, POWDER, LYOPHILIZED, FOR SOLUTION INTRAVENOUS at 05:10

## 2022-10-04 RX ADMIN — ASCORBIC ACID, VITAMIN A PALMITATE, CHOLECALCIFEROL, THIAMINE HYDROCHLORIDE, RIBOFLAVIN-5 PHOSPHATE SODIUM, PYRIDOXINE HYDROCHLORIDE, NIACINAMIDE, DEXPANTHENOL, ALPHA-TOCOPHEROL ACETATE, VITAMIN K1, FOLIC ACID, BIOTIN, CYANOCOBALAMIN: 200; 3300; 200; 6; 3.6; 6; 40; 15; 10; 150; 600; 60; 5 INJECTION, SOLUTION INTRAVENOUS at 08:10

## 2022-10-04 RX ADMIN — Medication 800 MG: at 09:10

## 2022-10-04 RX ADMIN — SODIUM CHLORIDE: 0.9 INJECTION, SOLUTION INTRAVENOUS at 03:10

## 2022-10-04 RX ADMIN — PIPERACILLIN SODIUM AND TAZOBACTAM SODIUM 3.38 G: 3; .375 INJECTION, POWDER, LYOPHILIZED, FOR SOLUTION INTRAVENOUS at 01:10

## 2022-10-04 RX ADMIN — PIPERACILLIN SODIUM AND TAZOBACTAM SODIUM 3.38 G: 3; .375 INJECTION, POWDER, LYOPHILIZED, FOR SOLUTION INTRAVENOUS at 08:10

## 2022-10-04 RX ADMIN — HYDROMORPHONE HYDROCHLORIDE 0.5 MG: 1 INJECTION, SOLUTION INTRAMUSCULAR; INTRAVENOUS; SUBCUTANEOUS at 02:10

## 2022-10-04 RX ADMIN — OXYCODONE AND ACETAMINOPHEN 1 TABLET: 325; 5 TABLET ORAL at 10:10

## 2022-10-04 NOTE — CARE UPDATE
10/03/22 2021   PRE-TX-O2   O2 Device (Oxygen Therapy) room air   SpO2 100 %   Pulse Oximetry Type Continuous   $ Pulse Oximetry - Multiple Charge Pulse Oximetry - Multiple   Pulse 71   Resp 19   Aerosol Therapy   $ Aerosol Therapy Charges PRN treatment not required   Education   $ Education 15 min   Respiratory Evaluation   $ Care Plan Tech Time 15 min

## 2022-10-04 NOTE — H&P (VIEW-ONLY)
72-year-old gentleman with a history of pancreatitis and cirrhosis admitted for abdominal pain and flare-up of his pancreatitis.  He has some elevated LFTs and history of biliary strictures.  His most recent CT scan showed a large saccular aneurysm off the side of the splenic artery measuring about 3 cm review of CT scans going back to January and February of this year I did not see the lesion.  His INR is normal.  Plan will be for angiography with coil embolization of the aneurysm or the splenic artery origin of the aneurysm.

## 2022-10-04 NOTE — INTERVAL H&P NOTE
The patient has been examined and the H&P has been reviewed:    I concur with the findings and no changes have occurred since H&P was written.    Surgery risks, benefits and alternative options discussed and understood by patient/family.          Active Hospital Problems    Diagnosis  POA    *Splenic artery aneurysm [I72.8]  Yes    Acute pancreatitis [K85.90]  Yes    Decompensated hepatic cirrhosis [K72.90, K74.60]  Yes    Biliary stricture [K83.1]  Yes    Alcohol use disorder, severe, dependence [F10.20]  Yes     Chronic    Primary hypertension [I10]  Yes     Chronic      Resolved Hospital Problems   No resolved problems to display.

## 2022-10-04 NOTE — CONSULTS
GASTROENTEROLOGY INPATIENT CONSULT NOTE  Patient Name: Vic Reyez  Patient MRN: [id number]  Patient : 1950    Admit Date: 10/2/2022  Service date: 10/3/2022    Reason for Consult: abd pain, jaundice    PCP: Primary Doctor No    Chief Complaint   Patient presents with    Chest Pain       HPI: Patient is a 72 y.o. male with PMHx biliary stricture s/p stent placement and subsequent removal followed by eus  with sampling of panc cyst presenting with acute on chronic recurrent mild to moderate diffuse abdominal pain associated with jaundice without fever.  Admits to drinking but only 3 drinks per day.  Being treated with dt prophylaxis currently        Lab Results   Component Value Date    WBC 4.58 10/02/2022    HGB 8.1 (L) 10/02/2022    HCT 23.4 (L) 10/02/2022    MCV 91 10/02/2022     (L) 10/02/2022       Lab Results   Component Value Date    ALT 72 (H) 10/02/2022     (H) 10/02/2022    ALKPHOS 521 (H) 10/02/2022    BILITOT 5.2 (H) 10/02/2022     BMP  Lab Results   Component Value Date     (L) 10/02/2022    K 3.0 (L) 10/02/2022     10/02/2022    CO2 15 (L) 10/02/2022    BUN 6 (L) 10/02/2022    CREATININE <0.3 (L) 10/02/2022    CALCIUM 7.7 (L) 10/02/2022    ANIONGAP 9 10/02/2022    ESTGFRAFRICA >60.0 2022    EGFRNONAA >60.0 2022     Lab Results   Component Value Date    INR 1.3 10/02/2022    INR 1.2 2022    INR 1.0 2022           CT abd  MPRESSION:  1.  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 This is seen on axial images 34 through 49 and measures approximately 2.7 cm AP by approximately 2.65 cm transverse. Is also seen on coronal images 30-39 and on sagittal image 39 series 6 measuring approximately 2.84 cm craniocaudad. Vascular consultation is recommended.  2.  Hepatic steatosis. Findings of loculation of the liver suggesting cirrhosis.  3.  There is intrahepatic ductal  dilatation which was present on the prior study. Most marked in the left lobe similar to prior study. The proximal common bile duct is dilated up to 8.4 mm. The distal duct is tapered without evidence of dilatation. This is similar to the prior study. Consider MRCP for further evaluation if clinically indicated.  4.  Mild splenomegaly. The spleen measures 12.7 cm craniocaudad. There are periportal and perisplenic as well as perigastric varices. Findings compatible with portal hypertension.  5.  A small amount of fluid is seen near the tail of the pancreas. This measures approximately 2 x 1.9 cm it is more well-defined and not than on the prior study most likely representing pseudocysts. Additional fluid and probable pseudocyst seen in the lesser sac as well as adjacent to the spleen. No evidence of acute pancreatitis is noted.  6.  The bladder is distended. No wall thickening.  7.  No hydronephrosis or nephrolithiasis.  8.  Diverticulosis without evidence of diverticulitis. Poor distention of the colon from the proximal transverse colon through the rectum. I cannot exclude wall thickening. Correlate clinically for colitis.  9.  Probable calcified lymph node seen posterior to the left of the bladder unchanged from prior study. In addition there are scattered mesenteric lymph nodes as well as periportal and retroperitoneal adenopathy with aortocaval lymph node measuring approximately 1.2 cm.    Past Medical History:  Past Medical History:   Diagnosis Date    Alcohol abuse 02/02/2022    Decompensated hepatic cirrhosis 02/02/2022    Encounter for blood transfusion     Hypertension     Lab test positive for detection of COVID-19 virus 09/2021    Pancreatic cyst 06/03/2022        Past Surgical History:  Past Surgical History:   Procedure Laterality Date    APPENDECTOMY      COLONOSCOPY      ENDOSCOPIC ULTRASOUND OF UPPER GASTROINTESTINAL TRACT  02/04/2022    Procedure: ULTRASOUND, UPPER GI TRACT, ENDOSCOPIC;  Surgeon:  Chuy Bay MD;  Location: [facility] (2ND FLR);  Service: Endoscopy;;    ENDOSCOPIC ULTRASOUND OF UPPER GASTROINTESTINAL TRACT N/A 6/3/2022    Procedure: ULTRASOUND, UPPER GI TRACT, ENDOSCOPIC;  Surgeon: Roger Viveros III, MD;  Location: [facility] ENDO;  Service: Endoscopy;  Laterality: N/A;    ERCP N/A 02/04/2022    Procedure: ERCP (ENDOSCOPIC RETROGRADE CHOLANGIOPANCREATOGRAPHY);  Surgeon: Chuy Bay MD;  Location: [facility] (2ND FLR);  Service: Endoscopy;  Laterality: N/A;    ERCP N/A 04/19/2022    Procedure: ERCP (ENDOSCOPIC RETROGRADE CHOLANGIOPANCREATOGRAPHY);  Surgeon: Chuy Bay MD;  Location: [facility] (2ND FLR);  Service: Endoscopy;  Laterality: N/A;  4/1: fully vaccinated. labs prior. instructions mailed to sister and patient.-SC  4/12/22-Confirmed new arrival time of 10:45am with pt's sister and updated instructions emailed-DS    EYE SURGERY      JOINT REPLACEMENT      KNEE SURGERY      RECONSTRUCTION OF SHOULDER Right     TONSILLECTOMY      UPPER ENDOSCOPIC ULTRASOUND W/ FNA  06/03/2022        Home Medications:  Medications Prior to Admission   Medication Sig Dispense Refill Last Dose    aspirin (ECOTRIN) 81 MG EC tablet Take 81 mg by mouth once daily.   10/2/2022 at 16:00    multivitamin with folic acid (DAILY-ROHAN, WITH FOLIC ACID,) 400 mcg Tab Take 1 tablet by mouth once daily. 30 tablet 0 10/2/2022    spironolactone (ALDACTONE) 50 MG tablet Take 0.5 tablets (25 mg total) by mouth once daily. 15 tablet 11 10/2/2022    thiamine 100 MG tablet Take 1 tablet (100 mg total) by mouth once daily. 360 tablet 0 10/2/2022    ursodioL (ACTIGALL) 300 mg capsule Take 1 capsule (300 mg total) by mouth 2 (two) times daily. 60 capsule 11 10/2/2022    calcium-vitamin D (OSCAL) 250 (625)-125 mg-unit per tablet Take 1 tablet by mouth once daily.   Unknown    docusate sodium (COLACE) 50 MG capsule Take by mouth 2 (two) times daily.   Unknown    ferrous fumarate 324 mg (106 mg iron) Tab Take 1 tablet by mouth  "once daily at 6am.   Unknown    folic acid (FOLVITE) 1 MG tablet Take 1 tablet (1 mg total) by mouth once daily. 360 tablet 0 Unknown    furosemide (LASIX) 40 MG tablet Take 0.5 tablets (20 mg total) by mouth once daily. 15 tablet 11 Unknown at 20:00    hydrocodone-acetaminophen 7.5-325mg (NORCO) 7.5-325 mg per tablet Take 1 tablet by mouth every 6 (six) hours as needed for Pain.   Unknown    pravastatin (PRAVACHOL) 40 MG tablet Take 40 mg by mouth once daily.   Unknown       Inpatient Medications:   piperacillin-tazobactam (ZOSYN) IVPB  3.375 g Intravenous Q8H    Banana bag   Intravenous Daily     albuterol-ipratropium, dextrose 10%, glucagon (human recombinant), glucose, glucose, HYDROmorphone, lorazepam, melatonin, naloxone, ondansetron, polyethylene glycol, senna-docusate 8.6-50 mg, simethicone    Review of patient's allergies indicates:  No Known Allergies    Social History:   Social History     Occupational History    Not on file   Tobacco Use    Smoking status: Every Day     Packs/day: 0.25     Types: Cigarettes    Smokeless tobacco: Never   Substance and Sexual Activity    Alcohol use: Yes     Alcohol/week: 4.0 standard drinks     Types: 4 Cans of beer per week    Drug use: Never    Sexual activity: Not Currently       Family History:   History reviewed. No pertinent family history.    Review of Systems:  A 10 point review of systems was performed and was normal, except as mentioned in the HPI, including constitutional, HEENT, heme, lymph, cardiovascular, respiratory, gastrointestinal, genitourinary, neurologic, endocrine, psychiatric and musculoskeletal.      OBJECTIVE:    Physical Exam:  24 Hour Vital Sign Ranges: Temp:  [98.1 °F (36.7 °C)-98.7 °F (37.1 °C)] 98.5 °F (36.9 °C)  Pulse:  [71-88] 71  Resp:  [10-30] 19  SpO2:  [96 %-100 %] 100 %  BP: (102-147)/(56-85) 107/75  Most recent vitals: /75   Pulse 71   Temp 98.5 °F (36.9 °C) (Oral)   Resp 19   Ht 5' 11" (1.803 m)   Wt 60 kg (132 lb 4.4 oz)  "  SpO2 100%   BMI 18.45 kg/m²    GEN: well-developed, well-nourished, awake and alert, non-toxic appearing adult  HEENT: PERRL, sclera anicteric, oral mucosa pink and moist without lesion  NECK: trachea midline; Good ROM  CV: regular rate and rhythm, no murmurs or gallops  RESP: clear to auscultation bilaterally, no wheezes, rhonci or rales  ABD: soft, non-tender, non-distended, normal bowel sounds  EXT: no swelling or edema, 2+ pulses distally  SKIN: no rashes + jaundice  PSYCH: normal affect    Labs:   Recent Labs     10/02/22  2052   WBC 4.58   MCV 91   *     Recent Labs     10/02/22  2052   *   K 3.0*      CO2 15*   BUN 6*   *     No results for input(s): ALB in the last 72 hours.    Invalid input(s): ALKP, SGOT, SGPT, TBIL, DBIL, TPRO  Recent Labs     10/02/22  2052   INR 1.3         Radiology Review:  CT Abdomen Pelvis With Contrast   Final Result      X-Ray Chest AP Portable   Final Result      No acute cardiopulmonary abnormality.         Electronically signed by: Salo Anderson MD   Date:    10/03/2022   Time:    07:29      CTA Abdomen and Pelvis    (Results Pending)         IMPRESSION / RECOMMENDATIONS:  Jaundice   ETOH abuse  Question of biliary stricture  Splenic aneurysm  -cta pending  -trend lfts.  Might need repeat ERCP    Thank you for this consult.    Riki Espinoza  10/3/2022  10:11 PM

## 2022-10-04 NOTE — NURSING TRANSFER
Nursing Transfer Note      10/4/2022     Reason patient is being transferred: return to room    Transfer To: 2535    Transfer via stretcher    Transfer with cardiac monitoring    Transported by MECCA Carter RN    Medicines sent: none    Any special needs or follow-up needed: assess right groin for any bleeding or swelling    Chart send with patient: Yes    Notified: daughter    Patient reassessed at: 10/4/2022 1700    Upon arrival to floor: cardiac monitor applied, patient oriented to room, call bell in reach, and bed in lowest position

## 2022-10-04 NOTE — CARE UPDATE
10/04/22 1120   Patient Assessment/Suction   Level of Consciousness (AVPU) alert   All Lung Fields Breath Sounds clear;diminished   PRE-TX-O2   O2 Device (Oxygen Therapy) room air   Pulse Oximetry Type Continuous   $ Pulse Oximetry - Multiple Charge Pulse Oximetry - Multiple   Resp 16   Aerosol Therapy   $ Aerosol Therapy Charges PRN treatment not required   Education   $ Education Bronchodilator;15 min   Respiratory Evaluation   $ Care Plan Tech Time 15 min

## 2022-10-04 NOTE — PLAN OF CARE
Problem: Adult Inpatient Plan of Care  Goal: Plan of Care Review  Outcome: Ongoing, Progressing  Goal: Patient-Specific Goal (Individualized)  Outcome: Ongoing, Progressing  Goal: Absence of Hospital-Acquired Illness or Injury  Outcome: Ongoing, Progressing  Goal: Optimal Comfort and Wellbeing  Outcome: Ongoing, Progressing  Goal: Readiness for Transition of Care  Outcome: Ongoing, Progressing     Problem: Fall Injury Risk  Goal: Absence of Fall and Fall-Related Injury  Outcome: Ongoing, Progressing     Problem: Alcohol Withdrawal  Goal: Alcohol Withdrawal Symptom Control  Outcome: Ongoing, Progressing     Problem: Pain Acute  Goal: Acceptable Pain Control and Functional Ability  Outcome: Ongoing, Progressing

## 2022-10-04 NOTE — OP NOTE
[facility]  Surgery Department  Operative Note    SUMMARY     Date of Procedure: 10/4/2022     Procedure: Procedure(s) (LRB):  AORTOGRAM-ABDOMINAL (N/A)  Coil Embolization, Single Vessel  ARTERIOGRAM, MESENTERIC (N/A)     Surgeon(s) and Role:     * Felice Epstein MD - Primary    Assisting Surgeon: None    Pre-Operative Diagnosis: Splenic artery aneurysm [I72.8]    Post-Operative Diagnosis: Post-Op Diagnosis Codes:     * Splenic artery aneurysm [I72.8]    Anesthesia: RN IV Sedation    Operative Findings (including complications, if any):  Saccular mid splenic artery aneurysm    Description of Technical Procedures:  Splenic artery angiogram.  Coil embolization of splenic artery across the orifice of the saccular aneurysm.    Right femoral artery accessed over the femoral head under fluoroscopy and ultrasound guidance 6 Bulgarian sheath placed.  Guidewire and LIMA guide catheter advanced to the visceral segment selected the celiac trunk.  We then went AP and selected the splenic artery and advanced a glide catheter over the wire into the mid splenic artery angiogram demonstrated no obvious splenic artery aneurysm.  We then advanced the wire little more and the wire looped and appear to be in the aneurysm we advanced the catheter and angiogram was performed which confirmed we were within a large saccular aneurysm coming off the side of the splenic artery.  We then backed the catheter up and advanced the wire beyond the orifice of the aneurysm into the splenic artery distal.  We then placed a microcatheter and placed 14 x 20 coil which made contact with the sidewalls and was able to get the coil to seat within the splenic artery and not extend into the hilum.  We then packed 10 and 12 mm coils on this going proximal and then across the orifice of the splenic artery aneurysm and more proximally into the splenic artery with multiple 10 mm 8 mm and 6 mm coils visually we had good density of the coils to assure  thrombosis.  We then removed the catheter and wire and right femoral sheath angiogram was performed and Vasc 8 closure.    Significant Surgical Tasks Conducted by the Assistant(s), if Applicable:     Estimated Blood Loss (EBL): * No values recorded between 10/4/2022  2:08 PM and 10/4/2022  3:08 PM *           Implants:   Implant Name Type Inv. Item Serial No.  Lot No. LRB No. Used Action   14x30 linterlock coil      N/A 1 Implanted   12x20 interlock coil     [id number] N/A 1 Implanted   10x30 interlock coil     [id number] N/A 1 Implanted   8x20 interlock coil     [id number] N/A 1 Implanted   10*50 interlock     [id number] N/A 1 Implanted   8*20 interlock     [id number] N/A 1 Implanted   8*20 interlock coil     [id number] N/A 1 Implanted   8-20 interlock     [id number] N/A 1 Implanted   6*20 interlock     [id number] N/A 1 Implanted   DEVICE CLOSURE VASCADE 6/7FR - [id number] Closure Device Angio DEVICE CLOSURE VASCADE 6/7FR    N/A 1 Implanted       Specimens:   Specimen (24h ago, onward)      None                    Condition: Good    Disposition: PACU - hemodynamically stable.    Attestation: I was present and scrubbed for the entire procedure.

## 2022-10-05 ENCOUNTER — ANESTHESIA EVENT (OUTPATIENT)
Dept: SURGERY | Facility: HOSPITAL | Age: 72
DRG: 270 | End: 2022-10-05
Payer: MEDICARE

## 2022-10-05 ENCOUNTER — ANESTHESIA (OUTPATIENT)
Dept: SURGERY | Facility: HOSPITAL | Age: 72
DRG: 270 | End: 2022-10-05
Payer: MEDICARE

## 2022-10-05 LAB
ALBUMIN SERPL BCP-MCNC: 2.4 G/DL (ref 3.5–5.2)
ALP SERPL-CCNC: 528 U/L (ref 55–135)
ALT SERPL W/O P-5'-P-CCNC: 76 U/L (ref 10–44)
ANION GAP SERPL CALC-SCNC: 6 MMOL/L (ref 8–16)
AST SERPL-CCNC: 128 U/L (ref 10–40)
BACTERIA UR CULT: ABNORMAL
BASOPHILS # BLD AUTO: 0.01 K/UL (ref 0–0.2)
BASOPHILS NFR BLD: 0.2 % (ref 0–1.9)
BILIRUB SERPL-MCNC: 6.3 MG/DL (ref 0.1–1)
BUN SERPL-MCNC: 8 MG/DL (ref 8–23)
CALCIUM SERPL-MCNC: 8.4 MG/DL (ref 8.7–10.5)
CHLORIDE SERPL-SCNC: 98 MMOL/L (ref 95–110)
CO2 SERPL-SCNC: 22 MMOL/L (ref 23–29)
CREAT SERPL-MCNC: 0.4 MG/DL (ref 0.5–1.4)
DIFFERENTIAL METHOD: ABNORMAL
EOSINOPHIL # BLD AUTO: 0.3 K/UL (ref 0–0.5)
EOSINOPHIL NFR BLD: 5.3 % (ref 0–8)
ERYTHROCYTE [DISTWIDTH] IN BLOOD BY AUTOMATED COUNT: 16.5 % (ref 11.5–14.5)
EST. GFR  (NO RACE VARIABLE): >60 ML/MIN/1.73 M^2
GLUCOSE SERPL-MCNC: 131 MG/DL (ref 70–110)
GLUCOSE SERPL-MCNC: 145 MG/DL (ref 70–110)
GLUCOSE SERPL-MCNC: 164 MG/DL (ref 70–110)
HCT VFR BLD AUTO: 26.2 % (ref 40–54)
HGB BLD-MCNC: 9.1 G/DL (ref 14–18)
IMM GRANULOCYTES # BLD AUTO: 0.01 K/UL (ref 0–0.04)
IMM GRANULOCYTES NFR BLD AUTO: 0.2 % (ref 0–0.5)
LABCORP MISC TEST CODE: 1453
LABCORP MISC TEST NAME: NORMAL
LABCORP MISCELLANEOUS TEST: NORMAL
LYMPHOCYTES # BLD AUTO: 1 K/UL (ref 1–4.8)
LYMPHOCYTES NFR BLD: 14.9 % (ref 18–48)
MAGNESIUM SERPL-MCNC: 1.6 MG/DL (ref 1.6–2.6)
MCH RBC QN AUTO: 32.2 PG (ref 27–31)
MCHC RBC AUTO-ENTMCNC: 34.7 G/DL (ref 32–36)
MCV RBC AUTO: 93 FL (ref 82–98)
MONOCYTES # BLD AUTO: 0.9 K/UL (ref 0.3–1)
MONOCYTES NFR BLD: 13.2 % (ref 4–15)
NEUTROPHILS # BLD AUTO: 4.3 K/UL (ref 1.8–7.7)
NEUTROPHILS NFR BLD: 66.2 % (ref 38–73)
NRBC BLD-RTO: 0 /100 WBC
PLATELET # BLD AUTO: 177 K/UL (ref 150–450)
PMV BLD AUTO: 10.8 FL (ref 9.2–12.9)
POTASSIUM SERPL-SCNC: 3.8 MMOL/L (ref 3.5–5.1)
PROT SERPL-MCNC: 6.4 G/DL (ref 6–8.4)
RBC # BLD AUTO: 2.83 M/UL (ref 4.6–6.2)
SODIUM SERPL-SCNC: 126 MMOL/L (ref 136–145)
WBC # BLD AUTO: 6.44 K/UL (ref 3.9–12.7)

## 2022-10-05 PROCEDURE — 25500020 PHARM REV CODE 255: Performed by: INTERNAL MEDICINE

## 2022-10-05 PROCEDURE — 25000003 PHARM REV CODE 250: Performed by: SURGERY

## 2022-10-05 PROCEDURE — 63600175 PHARM REV CODE 636 W HCPCS: Performed by: SURGERY

## 2022-10-05 PROCEDURE — 27000284 HC CANNULA NASAL: Performed by: ANESTHESIOLOGY

## 2022-10-05 PROCEDURE — 25000003 PHARM REV CODE 250: Performed by: INTERNAL MEDICINE

## 2022-10-05 PROCEDURE — 27202107 HC XP QUATRO SENSOR: Performed by: ANESTHESIOLOGY

## 2022-10-05 PROCEDURE — 85025 COMPLETE CBC W/AUTO DIFF WBC: CPT | Performed by: SURGERY

## 2022-10-05 PROCEDURE — 83735 ASSAY OF MAGNESIUM: CPT | Performed by: SURGERY

## 2022-10-05 PROCEDURE — 27000654 HC CATH IV JELCO: Performed by: ANESTHESIOLOGY

## 2022-10-05 PROCEDURE — 27202103: Performed by: ANESTHESIOLOGY

## 2022-10-05 PROCEDURE — 43261 ENDO CHOLANGIOPANCREATOGRAPH: CPT | Performed by: INTERNAL MEDICINE

## 2022-10-05 PROCEDURE — 25000003 PHARM REV CODE 250: Performed by: NURSE ANESTHETIST, CERTIFIED REGISTERED

## 2022-10-05 PROCEDURE — 43274 ERCP DUCT STENT PLACEMENT: CPT | Performed by: INTERNAL MEDICINE

## 2022-10-05 PROCEDURE — 27000673 HC TUBING BLOOD Y: Performed by: ANESTHESIOLOGY

## 2022-10-05 PROCEDURE — 37000008 HC ANESTHESIA 1ST 15 MINUTES: Performed by: INTERNAL MEDICINE

## 2022-10-05 PROCEDURE — C2625 STENT, NON-COR, TEM W/DEL SY: HCPCS | Performed by: INTERNAL MEDICINE

## 2022-10-05 PROCEDURE — 12000002 HC ACUTE/MED SURGE SEMI-PRIVATE ROOM

## 2022-10-05 PROCEDURE — 27200043 HC FORCEPS, BIOPSY: Performed by: INTERNAL MEDICINE

## 2022-10-05 PROCEDURE — 27202125 HC BALLOON, EXTRACTION (ANY): Performed by: INTERNAL MEDICINE

## 2022-10-05 PROCEDURE — 27000671 HC TUBING MICROBORE EXT: Performed by: ANESTHESIOLOGY

## 2022-10-05 PROCEDURE — 27202095 HC BRUSH, CYTOLOGY ERCP: Performed by: INTERNAL MEDICINE

## 2022-10-05 PROCEDURE — 27201107 HC STYLET, STANDARD: Performed by: ANESTHESIOLOGY

## 2022-10-05 PROCEDURE — 27000656 HC EYE GOGGLES: Performed by: ANESTHESIOLOGY

## 2022-10-05 PROCEDURE — 88305 TISSUE EXAM BY PATHOLOGIST: CPT | Mod: TC

## 2022-10-05 PROCEDURE — C1769 GUIDE WIRE: HCPCS | Performed by: INTERNAL MEDICINE

## 2022-10-05 PROCEDURE — 37000009 HC ANESTHESIA EA ADD 15 MINS: Performed by: INTERNAL MEDICINE

## 2022-10-05 PROCEDURE — 43264 ERCP REMOVE DUCT CALCULI: CPT | Performed by: INTERNAL MEDICINE

## 2022-10-05 PROCEDURE — 80053 COMPREHEN METABOLIC PANEL: CPT | Performed by: SURGERY

## 2022-10-05 PROCEDURE — 94799 UNLISTED PULMONARY SVC/PX: CPT

## 2022-10-05 PROCEDURE — 63600175 PHARM REV CODE 636 W HCPCS: Performed by: NURSE ANESTHETIST, CERTIFIED REGISTERED

## 2022-10-05 PROCEDURE — 99900031 HC PATIENT EDUCATION (STAT)

## 2022-10-05 PROCEDURE — 94761 N-INVAS EAR/PLS OXIMETRY MLT: CPT

## 2022-10-05 PROCEDURE — 36415 COLL VENOUS BLD VENIPUNCTURE: CPT | Performed by: SURGERY

## 2022-10-05 PROCEDURE — 99900035 HC TECH TIME PER 15 MIN (STAT)

## 2022-10-05 RX ORDER — FUROSEMIDE 20 MG/1
20 TABLET ORAL DAILY
Status: DISCONTINUED | OUTPATIENT
Start: 2022-10-06 | End: 2022-10-06 | Stop reason: HOSPADM

## 2022-10-05 RX ORDER — PHENYLEPHRINE HYDROCHLORIDE 10 MG/ML
INJECTION INTRAVENOUS
Status: DISCONTINUED | OUTPATIENT
Start: 2022-10-05 | End: 2022-10-05

## 2022-10-05 RX ORDER — FAMOTIDINE 10 MG/ML
INJECTION INTRAVENOUS
Status: DISCONTINUED | OUTPATIENT
Start: 2022-10-05 | End: 2022-10-05

## 2022-10-05 RX ORDER — FENTANYL CITRATE 50 UG/ML
25 INJECTION, SOLUTION INTRAMUSCULAR; INTRAVENOUS EVERY 5 MIN PRN
Status: DISCONTINUED | OUTPATIENT
Start: 2022-10-05 | End: 2022-10-05 | Stop reason: HOSPADM

## 2022-10-05 RX ORDER — ONDANSETRON 2 MG/ML
4 INJECTION INTRAMUSCULAR; INTRAVENOUS DAILY PRN
Status: DISCONTINUED | OUTPATIENT
Start: 2022-10-05 | End: 2022-10-05 | Stop reason: HOSPADM

## 2022-10-05 RX ORDER — LIDOCAINE HYDROCHLORIDE 20 MG/ML
INJECTION, SOLUTION EPIDURAL; INFILTRATION; INTRACAUDAL; PERINEURAL
Status: DISCONTINUED | OUTPATIENT
Start: 2022-10-05 | End: 2022-10-05

## 2022-10-05 RX ORDER — LIDOCAINE HYDROCHLORIDE 20 MG/ML
JELLY TOPICAL
Status: DISCONTINUED | OUTPATIENT
Start: 2022-10-05 | End: 2022-10-05

## 2022-10-05 RX ORDER — SPIRONOLACTONE 25 MG/1
25 TABLET ORAL DAILY
Status: DISCONTINUED | OUTPATIENT
Start: 2022-10-05 | End: 2022-10-06 | Stop reason: HOSPADM

## 2022-10-05 RX ORDER — SODIUM CHLORIDE, SODIUM LACTATE, POTASSIUM CHLORIDE, CALCIUM CHLORIDE 600; 310; 30; 20 MG/100ML; MG/100ML; MG/100ML; MG/100ML
INJECTION, SOLUTION INTRAVENOUS CONTINUOUS PRN
Status: DISCONTINUED | OUTPATIENT
Start: 2022-10-05 | End: 2022-10-05

## 2022-10-05 RX ORDER — OXYCODONE HYDROCHLORIDE 5 MG/1
5 TABLET ORAL
Status: DISCONTINUED | OUTPATIENT
Start: 2022-10-05 | End: 2022-10-05 | Stop reason: HOSPADM

## 2022-10-05 RX ORDER — ONDANSETRON 2 MG/ML
INJECTION INTRAMUSCULAR; INTRAVENOUS
Status: DISCONTINUED | OUTPATIENT
Start: 2022-10-05 | End: 2022-10-05

## 2022-10-05 RX ORDER — NITROFURANTOIN 25; 75 MG/1; MG/1
100 CAPSULE ORAL EVERY 12 HOURS
Status: DISCONTINUED | OUTPATIENT
Start: 2022-10-05 | End: 2022-10-06 | Stop reason: HOSPADM

## 2022-10-05 RX ORDER — ROCURONIUM BROMIDE 10 MG/ML
INJECTION, SOLUTION INTRAVENOUS
Status: DISCONTINUED | OUTPATIENT
Start: 2022-10-05 | End: 2022-10-05

## 2022-10-05 RX ORDER — URSODIOL 300 MG/1
300 CAPSULE ORAL 2 TIMES DAILY
Status: DISCONTINUED | OUTPATIENT
Start: 2022-10-05 | End: 2022-10-06 | Stop reason: HOSPADM

## 2022-10-05 RX ORDER — SUCCINYLCHOLINE CHLORIDE 20 MG/ML
INJECTION INTRAMUSCULAR; INTRAVENOUS
Status: DISCONTINUED | OUTPATIENT
Start: 2022-10-05 | End: 2022-10-05

## 2022-10-05 RX ORDER — ASPIRIN 81 MG/1
81 TABLET ORAL DAILY
Status: DISCONTINUED | OUTPATIENT
Start: 2022-10-06 | End: 2022-10-06 | Stop reason: HOSPADM

## 2022-10-05 RX ORDER — FENTANYL CITRATE 50 UG/ML
INJECTION, SOLUTION INTRAMUSCULAR; INTRAVENOUS
Status: DISCONTINUED | OUTPATIENT
Start: 2022-10-05 | End: 2022-10-05

## 2022-10-05 RX ORDER — MEPERIDINE HYDROCHLORIDE 50 MG/ML
12.5 INJECTION INTRAMUSCULAR; INTRAVENOUS; SUBCUTANEOUS EVERY 10 MIN PRN
Status: DISCONTINUED | OUTPATIENT
Start: 2022-10-05 | End: 2022-10-05 | Stop reason: HOSPADM

## 2022-10-05 RX ORDER — DIPHENHYDRAMINE HYDROCHLORIDE 50 MG/ML
12.5 INJECTION INTRAMUSCULAR; INTRAVENOUS
Status: DISCONTINUED | OUTPATIENT
Start: 2022-10-05 | End: 2022-10-05 | Stop reason: HOSPADM

## 2022-10-05 RX ORDER — PROPOFOL 10 MG/ML
VIAL (ML) INTRAVENOUS
Status: DISCONTINUED | OUTPATIENT
Start: 2022-10-05 | End: 2022-10-05

## 2022-10-05 RX ADMIN — URSODIOL 300 MG: 300 CAPSULE ORAL at 10:10

## 2022-10-05 RX ADMIN — PIPERACILLIN SODIUM AND TAZOBACTAM SODIUM 3.38 G: 3; .375 INJECTION, POWDER, LYOPHILIZED, FOR SOLUTION INTRAVENOUS at 06:10

## 2022-10-05 RX ADMIN — ROCURONIUM BROMIDE 5 MG: 10 INJECTION, SOLUTION INTRAVENOUS at 04:10

## 2022-10-05 RX ADMIN — PHENYLEPHRINE HYDROCHLORIDE 100 MCG: 10 INJECTION INTRAVENOUS at 05:10

## 2022-10-05 RX ADMIN — LIDOCAINE HYDROCHLORIDE 2 ML: 20 JELLY TOPICAL at 04:10

## 2022-10-05 RX ADMIN — PIPERACILLIN SODIUM AND TAZOBACTAM SODIUM 3.38 G: 3; .375 INJECTION, POWDER, LYOPHILIZED, FOR SOLUTION INTRAVENOUS at 08:10

## 2022-10-05 RX ADMIN — PIPERACILLIN SODIUM AND TAZOBACTAM SODIUM 3.38 G: 3; .375 INJECTION, POWDER, LYOPHILIZED, FOR SOLUTION INTRAVENOUS at 02:10

## 2022-10-05 RX ADMIN — PHENYLEPHRINE HYDROCHLORIDE 200 MCG: 10 INJECTION INTRAVENOUS at 04:10

## 2022-10-05 RX ADMIN — FAMOTIDINE 20 MG: 10 INJECTION, SOLUTION INTRAVENOUS at 04:10

## 2022-10-05 RX ADMIN — ASCORBIC ACID, VITAMIN A PALMITATE, CHOLECALCIFEROL, THIAMINE HYDROCHLORIDE, RIBOFLAVIN-5 PHOSPHATE SODIUM, PYRIDOXINE HYDROCHLORIDE, NIACINAMIDE, DEXPANTHENOL, ALPHA-TOCOPHEROL ACETATE, VITAMIN K1, FOLIC ACID, BIOTIN, CYANOCOBALAMIN: 200; 3300; 200; 6; 3.6; 6; 40; 15; 10; 150; 600; 60; 5 INJECTION, SOLUTION INTRAVENOUS at 08:10

## 2022-10-05 RX ADMIN — OXYCODONE AND ACETAMINOPHEN 1 TABLET: 325; 5 TABLET ORAL at 01:10

## 2022-10-05 RX ADMIN — NITROFURANTOIN (MONOHYDRATE/MACROCRYSTALS) 100 MG: 75; 25 CAPSULE ORAL at 09:10

## 2022-10-05 RX ADMIN — LIDOCAINE HYDROCHLORIDE 70 MG: 20 INJECTION, SOLUTION INTRAVENOUS at 04:10

## 2022-10-05 RX ADMIN — OXYCODONE AND ACETAMINOPHEN 1 TABLET: 325; 5 TABLET ORAL at 06:10

## 2022-10-05 RX ADMIN — SODIUM CHLORIDE, SODIUM LACTATE, POTASSIUM CHLORIDE, AND CALCIUM CHLORIDE: .6; .31; .03; .02 INJECTION, SOLUTION INTRAVENOUS at 04:10

## 2022-10-05 RX ADMIN — OXYCODONE AND ACETAMINOPHEN 1 TABLET: 325; 5 TABLET ORAL at 09:10

## 2022-10-05 RX ADMIN — OXYCODONE AND ACETAMINOPHEN 1 TABLET: 325; 5 TABLET ORAL at 12:10

## 2022-10-05 RX ADMIN — PROPOFOL 150 MG: 10 INJECTION, EMULSION INTRAVENOUS at 04:10

## 2022-10-05 RX ADMIN — FENTANYL CITRATE 25 MCG: 50 INJECTION INTRAMUSCULAR; INTRAVENOUS at 04:10

## 2022-10-05 RX ADMIN — SUCCINYLCHOLINE CHLORIDE 100 MG: 20 INJECTION, SOLUTION INTRAMUSCULAR; INTRAVENOUS at 04:10

## 2022-10-05 RX ADMIN — SPIRONOLACTONE 25 MG: 25 TABLET ORAL at 09:10

## 2022-10-05 RX ADMIN — POTASSIUM BICARBONATE 50 MEQ: 977.5 TABLET, EFFERVESCENT ORAL at 06:10

## 2022-10-05 RX ADMIN — ONDANSETRON 4 MG: 2 INJECTION INTRAMUSCULAR; INTRAVENOUS at 04:10

## 2022-10-05 NOTE — CARE UPDATE
10/05/22 0858   Patient Assessment/Suction   Level of Consciousness (AVPU) alert   Respiratory Effort Normal;Unlabored   Expansion/Accessory Muscles/Retractions expansion symmetric   All Lung Fields Breath Sounds clear   Rhythm/Pattern, Respiratory unlabored   PRE-TX-O2   O2 Device (Oxygen Therapy) room air   SpO2 100 %   Pulse Oximetry Type Continuous   $ Pulse Oximetry - Multiple Charge Pulse Oximetry - Multiple   Pulse 79   Resp 11   Education   $ Education 15 min   Respiratory Evaluation   $ Care Plan Tech Time 15 min

## 2022-10-05 NOTE — ANESTHESIA PROCEDURE NOTES
Intubation    Date/Time: 10/5/2022 4:28 PM  Performed by: Jessy Durand CRNA  Authorized by: Kendall Trinidad MD     Intubation:     Induction:  Intravenous    Intubated:  Postinduction    Mask Ventilation:  Easy mask    Attempts:  1    Attempted By:  CRNA    Method of Intubation:  Direct    Blade:  Ying 2    Laryngeal View Grade: Grade IIA - cords partially seen      Difficult Airway Encountered?: No      Complications:  None    Airway Device:  Oral endotracheal tube    Airway Device Size:  7.5    Style/Cuff Inflation:  Cuffed    Tube secured:  24    Secured at:  The lips    Placement Verified By:  Capnometry    Complicating Factors:  Large/floppy epiglottis    Findings Post-Intubation:  BS equal bilateral and atraumatic/condition of teeth unchanged

## 2022-10-05 NOTE — TRANSFER OF CARE
"Anesthesia Transfer of Care Note    Patient: Vic Reyez    Procedure(s) Performed: Procedure(s) (LRB):  ERCP (ENDOSCOPIC RETROGRADE CHOLANGIOPANCREATOGRAPHY) (N/A)    Patient location: PACU    Anesthesia Type: general    Transport from OR: Transported from OR on room air with adequate spontaneous ventilation    Post pain: adequate analgesia    Post assessment: no apparent anesthetic complications and tolerated procedure well    Post vital signs: stable    Level of consciousness: awake, alert and oriented    Nausea/Vomiting: no nausea/vomiting    Complications: none    Transfer of care protocol was followed      Last vitals:   Visit Vitals  /73 (BP Location: Right arm, Patient Position: Lying)   Pulse 84   Temp 36.4 °C (97.5 °F) (Temporal)   Resp 16   Ht 5' 11" (1.803 m)   Wt 65.8 kg (145 lb)   SpO2 100%   BMI 20.22 kg/m²     "

## 2022-10-05 NOTE — ANESTHESIA PREPROCEDURE EVALUATION
10/05/2022  Vic Reyez is a 72 y.o., male.    Patient Active Problem List   Diagnosis    Decompensated hepatic cirrhosis    Biliary stricture    Alcohol use disorder, severe, dependence    Primary hypertension    Hypokalemia    Hyponatremia    Coagulopathy    Pancreatic lesion    Abdominal fluid collection    Unintentional weight loss    Acute pancreatitis    Splenic artery aneurysm       Past Surgical History:   Procedure Laterality Date    APPENDECTOMY      COLONOSCOPY      ENDOSCOPIC ULTRASOUND OF UPPER GASTROINTESTINAL TRACT  02/04/2022    Procedure: ULTRASOUND, UPPER GI TRACT, ENDOSCOPIC;  Surgeon: Chuy Bay MD;  Location: [facility] ENDO ([facility] F[facility]);  Service: Endoscopy;;    ENDOSCOPIC ULTRASOUND OF UPPER GASTROINTESTINAL TRACT N/A 6/3/2022    Procedure: ULTRASOUND, UPPER GI TRACT, ENDOSCOPIC;  Surgeon: Roger Viveros III, MD;  Location: [facility] ENDO;  Service: Endoscopy;  Laterality: N/A;    ERCP N/A 02/04/2022    Procedure: ERCP (ENDOSCOPIC RETROGRADE CHOLANGIOPANCREATOGRAPHY);  Surgeon: Chuy Bay MD;  Location: [facility] ENDO (2ND FLR);  Service: Endoscopy;  Laterality: N/A;    ERCP N/A 04/19/2022    Procedure: ERCP (ENDOSCOPIC RETROGRADE CHOLANGIOPANCREATOGRAPHY);  Surgeon: Chuy Bay MD;  [facility]  Service: Endoscopy;  Laterality: N/A;  4/1: fully vaccinated. labs prior. instructions mailed to sister and patient.-SC  4/12/22-Confirmed new arrival time of 10:45am with pt's sister and updated instructions emailed-DS    EYE SURGERY      JOINT REPLACEMENT      KNEE SURGERY      RECONSTRUCTION OF SHOULDER Right     TONSILLECTOMY      UPPER ENDOSCOPIC ULTRASOUND W/ FNA  06/03/2022        Tobacco Use:  The patient  reports that he has been smoking cigarettes. He has been smoking an average of .25 packs per day. He has never used smokeless tobacco.      Results for orders placed or performed during the hospital encounter of 10/02/22   EKG 12-lead    Collection Time: 10/02/22  8:38 PM    Narrative    Test Reason : R07.9,    Vent. Rate : 070 BPM     Atrial Rate : 070 BPM     P-R Int : 084 ms          QRS Dur : 104 ms      QT Int : 406 ms       P-R-T Axes : 070 066 040 degrees     QTc Int : 438 ms    Sinus rhythm with short NC  Otherwise normal ECG  When compared with ECG of 01-JUN-2022 15:50,  NC interval has decreased    Referred By: [email or web address]   SELF           Confirmed By:              Lab Results   Component Value Date    WBC 6.44 10/05/2022    HGB 9.1 (L) 10/05/2022    HCT 26.2 (L) 10/05/2022    MCV 93 10/05/2022     10/05/2022     BMP  Lab Results   Component Value Date     (L) 10/05/2022    K 3.8 10/05/2022    CL 98 10/05/2022    CO2 22 (L) 10/05/2022    BUN 8 10/05/2022    CREATININE 0.4 (L) 10/05/2022    CALCIUM 8.4 (L) 10/05/2022    ANIONGAP 6 (L) 10/05/2022     (H) 10/05/2022     (H) 10/04/2022     (H) 10/02/2022       No results found for this or any previous visit.            Pre-op Assessment    I have reviewed the Patient Summary Reports.     I have reviewed the Nursing Notes. I have reviewed the NPO Status.   I have reviewed the Medications.     Review of Systems  Anesthesia Hx:  Denies Family Hx of Anesthesia complications.   Denies Personal Hx of Anesthesia complications.   Social:  Smoker Current alcohol abuse.  No signs of withdrawal during this hospitalization.  Last alcohol 1 week ago.   Hematology/Oncology:     Oncology Normal    -- Anemia:   EENT/Dental:  EENT/Dental Normal Left upper central incisor broken.  Right upper lateral incisor broken at base.   Cardiovascular:   Hypertension Dysrhythmias (History AFib. Currently in sinus)  Splenic artery aneurysm coiled this hospitalization.     Pulmonary:   Bilateral interstitial lung disease per CT June 2022, but patient denies any lung disease.    Renal/:  Renal/ Normal     Hepatic/GI:   Liver Disease, (cirrhosis) Admitted for probable acute pancreatitis. No nausea or vomiting.  Abdominal pain has resolved.  Patient had 7 L of ascites drained 6 months ago.   Musculoskeletal:  Musculoskeletal Normal    Neurological:  Neurology Normal    Endocrine:  Endocrine Normal        Physical Exam  General: Well nourished, Cooperative, Alert and Oriented    Airway:  Mallampati: III / II  Mouth Opening: Normal  TM Distance: Normal  Tongue: Normal  Neck ROM: Normal ROM    Chest/Lungs:  Clear to auscultation, Normal Respiratory Rate    Heart:  Rate: Normal  Rhythm: Regular Rhythm  Sounds: Normal        Anesthesia Plan  Type of Anesthesia, risks & benefits discussed:    Anesthesia Type: Gen ETT  Intra-op Monitoring Plan: Standard ASA Monitors  Informed Consent: Informed consent signed with the Patient and all parties understand the risks and agree with anesthesia plan.  All questions answered.   ASA Score: 3  Anesthesia Plan Notes: GETA  Zofran, Pepcid, Decadron 4 mg    Ready For Surgery From Anesthesia Perspective.     .

## 2022-10-05 NOTE — PROGRESS NOTES
Patient Name: Vic Reyez  Patient MRN: [id number]  Patient : 1950    Admit Date: 10/2/2022  Service date: 10/5/2022    71 y.o. male with PMHx appy, cirrhosis, pancreatitis, EtOH / tobacco use, biliary stricture s/p recent ERCP presents for epig pain w/ rising LFTs. Underwent splenic artery aneurysm coil yesterday but bili / LFTs remain elevated. Unclear if this from recent EtOH use vs h/o benign hilar stricture. Will evaluate w/ ERCP today w/ interventions as warranted. RIsks, benefits, alternatives discussed in detail regarding upcoming procedures and sedation and possible complications. Some of the more common endoscopic complications include but not limited to immediate or delayed perforation, bleeding, infections, pain, inadvertent injury to surrounding tissue / organs and possible need for surgical evaluation. All questions answered and will proceed with procedure as planned.       Roger BERTRAND Dauterive III  10/5/2022  4:20 PM

## 2022-10-05 NOTE — CARE UPDATE
10/04/22 232   PRE-TX-O2   O2 Device (Oxygen Therapy) room air   SpO2 100 %   Pulse Oximetry Type Continuous   $ Pulse Oximetry - Multiple Charge Pulse Oximetry - Multiple   Pulse 81   Resp 15   Aerosol Therapy   $ Aerosol Therapy Charges PRN treatment not required   Education   $ Education 15 min   Respiratory Evaluation   $ Care Plan Tech Time 15 min

## 2022-10-05 NOTE — PROGRESS NOTES
[facility] Medicine  Progress Note    Patient name: Vic Reyez  MRN: [id number]  Admit Date: 10/2/2022   LOS: 2 days     SUBJECTIVE:     Principal problem: Splenic artery aneurysm    Interval History:  Patient presented to the ED with abdominal pain.  Hx of prior pancreatitis and pancreatic pseudocyst s/p EUS with diagnostic and therapeutic needle aspiration 6/3/22, biliary stricture s/p placement and subsequent removal of biliary stent.  He had a few beers at home per report.  Lipase was normal.  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 CO2 15 on admission.  ? ETOH ketoacidosis.  Unclear if only 2 beer consumption is accurate.  Received IVFs and resolved.  Vascular and GI consulted.  S/p Splenic artery angiogram.  Coil embolization of splenic artery 10/4. Trending transaminases to determine if needs repeat ERCP.  Hyponatremia improved with IVFs.  Hypokalemia improved with replacement. Hypomagnesium improved with replacement. UCx growing enterococcus.  On IV zsyn. Bilirubin elevated at 5.2 and he is jaundiced. LA 3.3 on admission.  Improved to 1.7.    Tolerating regular diet without worsening pain.  Pain better with medication but persists.  Not consistent with acute pancreatitis.     Hospital Course:    Scheduled Meds:   piperacillin-tazobactam (ZOSYN) IVPB  3.375 g Intravenous Q8H    Banana bag   Intravenous Daily     Continuous Infusions:   sodium chloride 0.9% 75 mL/hr at 10/04/22 1546     PRN Meds:acetaminophen, albuterol-ipratropium, dextrose 10%, glucagon (human recombinant), glucose, glucose, lorazepam, magnesium oxide, magnesium oxide, melatonin, naloxone, ondansetron, ondansetron, oxyCODONE-acetaminophen, pneumococcal vaccine, polyethylene glycol, potassium bicarbonate, potassium bicarbonate, potassium bicarbonate, potassium,  sodium phosphates, potassium, sodium phosphates, potassium, sodium phosphates, senna-docusate 8.6-50 mg, simethicone    Review of patient's allergies indicates:  No Known Allergies    Review of Systems: As per interval history    OBJECTIVE:     Vital Signs (Most Recent)  Temp: 98.4 °F (36.9 °C) (10/04/22 2319)  Pulse: 81 (10/04/22 2328)  Resp: 16 (10/05/22 0024)  BP: 119/69 (10/04/22 2319)  SpO2: 100 % (10/04/22 2328)    Vital Signs Range (Last 24H):  Temp:  [97.1 °F (36.2 °C)-98.4 °F (36.9 °C)]   Pulse:  [74-95]   Resp:  [15-39]   BP: (111-140)/(66-82)   SpO2:  [98 %-100 %]     I & O (Last 24H):  Intake/Output Summary (Last 24 hours) at 10/5/2022 0047  Last data filed at 10/4/2022 1921  Gross per 24 hour   Intake 890 ml   Output 800 ml   Net 90 ml       Physical Exam:    Vitals and nursing note reviewed.     Constitutional:       General: Not in acute distress.     Appearance: Well-developed.   HENT:      Head: Normocephalic and atraumatic.   Eyes:      Pupils: Pupils are equal, round, and reactive to light.   Cardiovascular:      Rate and Rhythm: Regular rhythm.   Pulmonary:      Effort: Pulmonary effort is normal.      Breath sounds: Normal breath sounds. No wheezing.   Abdominal:      General: There is no distension.      Palpations: Abdomen is soft.      Tenderness: TPP across upper abdomen. There is no guarding or rebound.   Musculoskeletal:         General: Normal range of motion.      Cervical back: Normal range of motion.   Skin:     Findings: No rash. Jaundiced.   Neurological:      Mental Status: Alert and oriented to person, place, and time.      Cranial Nerves: No cranial nerve deficit.      Sensory: No sensory deficit.     Laboratory:  I have reviewed all pertinent lab results within the past 24 hours.  CBC:   Recent Labs   Lab 10/04/22  0441   WBC 5.45   RBC 2.90*   HGB 9.2*   HCT 26.7*      MCV 92   MCH 31.7*   MCHC 34.5     CMP:   Recent Labs   Lab 10/04/22  0441   *   CALCIUM 8.7    ALBUMIN 2.4*   PROT 6.5   *   K 4.1   CO2 24      BUN 7*   CREATININE 0.4*   ALKPHOS 516*   ALT 68*   *   BILITOT 6.9*     LFTs:   Recent Labs   Lab 10/04/22  0441   ALT 68*   *   ALKPHOS 516*   BILITOT 6.9*   PROT 6.5   ALBUMIN 2.4*       Diagnostic Results:      ASSESSMENT/PLAN:         Active Hospital Problems    Diagnosis  POA    *Splenic artery aneurysm [I72.8]  Yes    Acute pancreatitis [K85.90]  Yes    Decompensated hepatic cirrhosis [K72.90, K74.60]  Yes    Biliary stricture [K83.1]  Yes    Alcohol use disorder, severe, dependence [F10.20]  Yes     Chronic    Primary hypertension [I10]  Yes     Chronic      Resolved Hospital Problems   No resolved problems to display.         Plan:     -s/p coiling of aneurysm with vascular surgery 10/5.  Routine post procedure care.  -Trending LFTs, Bilirubin. Will follow up with GI if needs repeat ERCP.  -Early ETOH ketoacidosis that resolved?  Unclear as I would not expect 2 beers to precipitate this. Monitoring and trending labs.  -IV abx.  UCx growing Enterococcus.   -Lipase normal.  Food does not exacerbate abdominal pain. This is not consistent with acute pancreatitis.  -CT suggests residual versus new pseudocyst  -Counseled ETOH can precipitate pancreatitis  -Electrolytes improving with IVFs and replacement  -LA trended and now normalized  -If continues on this trajectory, plan to restart home medication tomorrow.  Diuretics initially held while IVFs administered.  -Monitoring for signs of ETOH withdrawal.  None thus far. Prn ativan.  -Pain control with po narcotics.      VTE Risk Mitigation (From admission, onward)           Ordered     IP VTE HIGH RISK PATIENT  Once         10/03/22 0213     Place sequential compression device  Until discontinued         10/03/22 0213                      Department Hospital Medicine  [facility]  Padmini Kemp MD  Date of service: 10/05/2022

## 2022-10-05 NOTE — ANESTHESIA POSTPROCEDURE EVALUATION
Anesthesia Post Evaluation    Patient: Vic Reyez    Procedure(s) Performed: Procedure(s) (LRB):  ERCP (ENDOSCOPIC RETROGRADE CHOLANGIOPANCREATOGRAPHY) (N/A)    Final Anesthesia Type: general      Patient location during evaluation: PACU  Patient participation: Yes- Able to Participate  Level of consciousness: awake and alert  Post-procedure vital signs: reviewed and stable  Pain management: adequate  Airway patency: patent    PONV status at discharge: No PONV  Anesthetic complications: no      Cardiovascular status: stable  Respiratory status: unassisted and spontaneous ventilation  Hydration status: euvolemic  Follow-up not needed.          Vitals Value Taken Time   /81 10/05/22 1752   Temp 36.4 °C (97.5 °F) 10/05/22 1752   Pulse 82 10/05/22 1752   Resp 22 10/05/22 1752   SpO2 100 % 10/05/22 1752         Event Time   Out of Recovery 10/05/2022 17:53:06         Pain/Minda Score: Pain Rating Prior to Med Admin: 8 (10/5/2022  1:13 PM)  Pain Rating Post Med Admin: 4 (10/5/2022  1:24 AM)  Minda Score: 10 (10/5/2022  5:45 PM)

## 2022-10-05 NOTE — [employer] PATIENT INSTRUCTIONS
Discharge Summary/Instructions after an Endoscopic Procedure  Patient Name: Vic Reyez  Patient MRN: [id number]  Patient YOB: 1950  Wednesday, October 5, 2022  Roger Viveros III, MD  RESTRICTIONS:  During your procedure today, you received medications for sedation.  These   medications may affect your judgment, balance and coordination.  Therefore,   for 24 hours, you have the following restrictions:   - DO NOT drive a car, operate machinery, make legal/financial decisions,   sign important papers or drink alcohol.    ACTIVITY:  Today: no heavy lifting, straining or running due to procedural   sedation/anesthesia.  The following day: return to full activity including work.  DIET:  Eat and drink normally unless instructed otherwise.     TREATMENT FOR COMMON SIDE EFFECTS:  - Mild abdominal pain, nausea, belching, bloating or excessive gas:  rest,   eat lightly and use a heating pad.  - Sore Throat: treat with throat lozenges and/or gargle with warm salt   water.  - Because air was used during the procedure, expelling large amounts of air   from your rectum or belching is normal.  - If a bowel prep was taken, you may not have a bowel movement for 1-3 days.    This is normal.  SYMPTOMS TO WATCH FOR AND REPORT TO YOUR PHYSICIAN:  1. Abdominal pain or bloating, other than gas cramps.  2. Chest pain.  3. Back pain.  4. Signs of infection such as: chills or fever occurring within 24 hours   after the procedure.  5. Rectal bleeding, which would show as bright red, maroon, or black stools.   (A tablespoon of blood from the rectum is not serious, especially if   hemorrhoids are present.)  6. Vomiting.  7. Weakness or dizziness.  GO DIRECTLY TO THE NEAREST EMERGENCY ROOM IF YOU HAVE ANY OF THE FOLLOWING:      Difficulty breathing              Chills and/or fever over 101 F   Persistent vomiting and/or vomiting blood   Severe abdominal pain   Severe chest pain   Black, tarry stools   Bleeding- more than one  tablespoon   Any other symptom or condition that you feel may need urgent attention  Your doctor recommends these additional instructions:  If any biopsies were taken, your doctors clinic will contact you in 1 to 2   weeks with any results.  - Advance diet as tolerated.   - Continue present medications.   - F/u path to determine if this is stricture from PSC, issues w/ recent   splenic vascular issues vs possible malignancy. Consider EUS if path   negative.   - Return patient to [facility] for ongoing care.   - Resume ursodiol  For questions, problems or results please call your physician - Roger Viveros III, MD at Work:  [phone].  [facility], EMERGENCY ROOM PHONE NUMBER: [phone]  IF A COMPLICATION OR EMERGENCY SITUATION ARISES AND YOU ARE UNABLE TO REACH   YOUR PHYSICIAN - GO DIRECTLY TO THE EMERGENCY ROOM.  Roger Viveros III, MD  10/5/2022 5:26:25 PM  This report has been verified and signed electronically.  Dear patient,  As a result of recent federal legislation (The Federal Cures Act), you may   receive lab or pathology results from your procedure in your [facility]   account before your physician is able to contact you. Your physician or   their representative will relay the results to you with their   recommendations at their soonest availability.  Thank you,  [employer]OVATION

## 2022-10-06 VITALS
BODY MASS INDEX: 19.16 KG/M2 | OXYGEN SATURATION: 100 % | DIASTOLIC BLOOD PRESSURE: 60 MMHG | HEIGHT: 71 IN | HEART RATE: 74 BPM | RESPIRATION RATE: 18 BRPM | WEIGHT: 136.88 LBS | TEMPERATURE: 98 F | SYSTOLIC BLOOD PRESSURE: 107 MMHG

## 2022-10-06 PROBLEM — K85.90 ACUTE PANCREATITIS: Status: RESOLVED | Noted: 2022-10-03 | Resolved: 2022-10-06

## 2022-10-06 LAB
ALBUMIN SERPL BCP-MCNC: 2.7 G/DL (ref 3.5–5.2)
ALP SERPL-CCNC: 550 U/L (ref 55–135)
ALT SERPL W/O P-5'-P-CCNC: 72 U/L (ref 10–44)
ANION GAP SERPL CALC-SCNC: 7 MMOL/L (ref 8–16)
AST SERPL-CCNC: 104 U/L (ref 10–40)
BASOPHILS # BLD AUTO: 0.03 K/UL (ref 0–0.2)
BASOPHILS NFR BLD: 0.5 % (ref 0–1.9)
BILIRUB SERPL-MCNC: 8.1 MG/DL (ref 0.1–1)
BUN SERPL-MCNC: 6 MG/DL (ref 8–23)
CALCIUM SERPL-MCNC: 9.3 MG/DL (ref 8.7–10.5)
CHLORIDE SERPL-SCNC: 99 MMOL/L (ref 95–110)
CO2 SERPL-SCNC: 25 MMOL/L (ref 23–29)
CREAT SERPL-MCNC: 0.4 MG/DL (ref 0.5–1.4)
DIFFERENTIAL METHOD: ABNORMAL
EOSINOPHIL # BLD AUTO: 0.3 K/UL (ref 0–0.5)
EOSINOPHIL NFR BLD: 5.6 % (ref 0–8)
ERYTHROCYTE [DISTWIDTH] IN BLOOD BY AUTOMATED COUNT: 15.6 % (ref 11.5–14.5)
EST. GFR  (NO RACE VARIABLE): >60 ML/MIN/1.73 M^2
GLUCOSE SERPL-MCNC: 125 MG/DL (ref 70–110)
HCT VFR BLD AUTO: 27 % (ref 40–54)
HGB BLD-MCNC: 9.1 G/DL (ref 14–18)
IMM GRANULOCYTES # BLD AUTO: 0.02 K/UL (ref 0–0.04)
IMM GRANULOCYTES NFR BLD AUTO: 0.4 % (ref 0–0.5)
LYMPHOCYTES # BLD AUTO: 1.1 K/UL (ref 1–4.8)
LYMPHOCYTES NFR BLD: 20.3 % (ref 18–48)
MAGNESIUM SERPL-MCNC: 1.5 MG/DL (ref 1.6–2.6)
MCH RBC QN AUTO: 31.5 PG (ref 27–31)
MCHC RBC AUTO-ENTMCNC: 33.7 G/DL (ref 32–36)
MCV RBC AUTO: 93 FL (ref 82–98)
MONOCYTES # BLD AUTO: 0.8 K/UL (ref 0.3–1)
MONOCYTES NFR BLD: 13.6 % (ref 4–15)
NEUTROPHILS # BLD AUTO: 3.3 K/UL (ref 1.8–7.7)
NEUTROPHILS NFR BLD: 59.6 % (ref 38–73)
NRBC BLD-RTO: 0 /100 WBC
PLATELET # BLD AUTO: 175 K/UL (ref 150–450)
PMV BLD AUTO: 10.2 FL (ref 9.2–12.9)
POTASSIUM SERPL-SCNC: 4.2 MMOL/L (ref 3.5–5.1)
PROT SERPL-MCNC: 7.5 G/DL (ref 6–8.4)
RBC # BLD AUTO: 2.89 M/UL (ref 4.6–6.2)
SODIUM SERPL-SCNC: 131 MMOL/L (ref 136–145)
WBC # BLD AUTO: 5.58 K/UL (ref 3.9–12.7)

## 2022-10-06 PROCEDURE — 25000003 PHARM REV CODE 250: Performed by: INTERNAL MEDICINE

## 2022-10-06 PROCEDURE — 94761 N-INVAS EAR/PLS OXIMETRY MLT: CPT

## 2022-10-06 PROCEDURE — 36415 COLL VENOUS BLD VENIPUNCTURE: CPT | Performed by: SURGERY

## 2022-10-06 PROCEDURE — 97535 SELF CARE MNGMENT TRAINING: CPT

## 2022-10-06 PROCEDURE — 97165 OT EVAL LOW COMPLEX 30 MIN: CPT

## 2022-10-06 PROCEDURE — 83735 ASSAY OF MAGNESIUM: CPT | Performed by: SURGERY

## 2022-10-06 PROCEDURE — 97161 PT EVAL LOW COMPLEX 20 MIN: CPT

## 2022-10-06 PROCEDURE — 25000003 PHARM REV CODE 250: Performed by: SURGERY

## 2022-10-06 PROCEDURE — 80053 COMPREHEN METABOLIC PANEL: CPT | Performed by: INTERNAL MEDICINE

## 2022-10-06 PROCEDURE — 99900035 HC TECH TIME PER 15 MIN (STAT)

## 2022-10-06 PROCEDURE — 85025 COMPLETE CBC W/AUTO DIFF WBC: CPT | Performed by: SURGERY

## 2022-10-06 PROCEDURE — 36415 COLL VENOUS BLD VENIPUNCTURE: CPT | Performed by: INTERNAL MEDICINE

## 2022-10-06 RX ORDER — FUROSEMIDE 20 MG/1
20 TABLET ORAL DAILY
Qty: 30 TABLET | Refills: 1 | Status: SHIPPED | OUTPATIENT
Start: 2022-10-06 | End: 2022-11-18 | Stop reason: SDUPTHER

## 2022-10-06 RX ORDER — OXYCODONE AND ACETAMINOPHEN 7.5; 325 MG/1; MG/1
1 TABLET ORAL EVERY 4 HOURS PRN
Status: DISCONTINUED | OUTPATIENT
Start: 2022-10-06 | End: 2022-10-06 | Stop reason: HOSPADM

## 2022-10-06 RX ORDER — SPIRONOLACTONE 25 MG/1
25 TABLET ORAL DAILY
Qty: 30 TABLET | Refills: 1 | Status: SHIPPED | OUTPATIENT
Start: 2022-10-06 | End: 2022-11-18 | Stop reason: SDUPTHER

## 2022-10-06 RX ORDER — URSODIOL 300 MG/1
300 CAPSULE ORAL 2 TIMES DAILY
Qty: 60 CAPSULE | Refills: 1 | Status: SHIPPED | OUTPATIENT
Start: 2022-10-06 | End: 2022-11-18 | Stop reason: SDUPTHER

## 2022-10-06 RX ORDER — OXYCODONE AND ACETAMINOPHEN 7.5; 325 MG/1; MG/1
1 TABLET ORAL EVERY 6 HOURS PRN
Qty: 28 TABLET | Refills: 0 | Status: SHIPPED | OUTPATIENT
Start: 2022-10-06 | End: 2022-11-30

## 2022-10-06 RX ADMIN — URSODIOL 300 MG: 300 CAPSULE ORAL at 08:10

## 2022-10-06 RX ADMIN — FUROSEMIDE 20 MG: 20 TABLET ORAL at 08:10

## 2022-10-06 RX ADMIN — SPIRONOLACTONE 25 MG: 25 TABLET ORAL at 08:10

## 2022-10-06 RX ADMIN — Medication 800 MG: at 08:10

## 2022-10-06 RX ADMIN — OXYCODONE AND ACETAMINOPHEN 1 TABLET: 7.5; 325 TABLET ORAL at 12:10

## 2022-10-06 RX ADMIN — OXYCODONE AND ACETAMINOPHEN 1 TABLET: 325; 5 TABLET ORAL at 02:10

## 2022-10-06 RX ADMIN — NITROFURANTOIN (MONOHYDRATE/MACROCRYSTALS) 100 MG: 75; 25 CAPSULE ORAL at 08:10

## 2022-10-06 RX ADMIN — ASPIRIN 81 MG: 81 TABLET, COATED ORAL at 08:10

## 2022-10-06 RX ADMIN — OXYCODONE AND ACETAMINOPHEN 1 TABLET: 325; 5 TABLET ORAL at 08:10

## 2022-10-06 NOTE — CARE UPDATE
10/05/22 2266   Patient Assessment/Suction   Respiratory Effort Unlabored   All Lung Fields Breath Sounds clear   Rhythm/Pattern, Respiratory pattern regular   PRE-TX-O2   O2 Device (Oxygen Therapy) room air   SpO2 97 %   Pulse 76   Resp 16   Aerosol Therapy   $ Aerosol Therapy Charges PRN treatment not required   Respiratory Evaluation   $ Care Plan Tech Time 15 min   $ Eval/Re-eval Charges Re-evaluation   Evaluation For Re-Eval 3 day

## 2022-10-06 NOTE — CARE UPDATE
10/06/22 0850   Patient Assessment/Suction   Level of Consciousness (AVPU) alert   Respiratory Effort Unlabored   Expansion/Accessory Muscles/Retractions no use of accessory muscles   All Lung Fields Breath Sounds clear;equal bilaterally   PRE-TX-O2   O2 Device (Oxygen Therapy) room air   Pulse Oximetry Type Continuous   $ Pulse Oximetry - Multiple Charge Pulse Oximetry - Multiple   Aerosol Therapy   $ Aerosol Therapy Charges PRN treatment not required   Respiratory Evaluation   $ Care Plan Tech Time 15 min

## 2022-10-06 NOTE — PLAN OF CARE
Patient cleared for discharge from case management standpoint.    Patient will self schedule hospital follow up appointments around transportation provided by his sister.[email or web address]    Chart and discharge orders reviewed.  Patient discharged home with no further case management needs.       10/06/22 1646   Final Note   Assessment Type Final Discharge Note   Anticipated Discharge Disposition Home   What phone number can be called within the next 1-3 days to see how you are doing after discharge? [phone]   Hospital Resources/Appts/Education Provided Provided patient/caregiver with written discharge plan information   Post-Acute Status   Discharge Delays (!) Personal Transportation

## 2022-10-06 NOTE — PT/OT/SLP EVAL
Physical Therapy Evaluation and Discharge Note    Patient Name:  Vic Reyez   MRN:  [id number]    Recommendations:     Discharge Recommendations:   Home   Discharge Equipment Recommendations: none   Barriers to discharge: None    Assessment:     Vic Reyez is a 72 y.o. male admitted with a medical diagnosis of Splenic artery aneurysm. .  At this time, patient is functioning at his prior level of function and does not require further acute PT services.     Recent Surgery: Procedure(s) (LRB):  ERCP (ENDOSCOPIC RETROGRADE CHOLANGIOPANCREATOGRAPHY) (N/A) 1 Day Post-Op    Plan:     During this hospitalization, patient does not require further acute PT services.  Please re-consult if situation changes.      Subjective     Chief Complaint: nose bleed  Patient/Family Comments/goals: Return  home alone  Pain/Comfort:       Patients cultural, spiritual, Zoroastrianism conflicts given the current situation:      Living Environment:  Pt lives at home alone in mobile home with 4 step entry and B railing  Prior to admission, patients level of function was independent .  Equipment used at home: none.  DME owned (not currently used): none.  Upon discharge, patient will have assistance from no one.    Objective:     Communicated with nurse prior to session.  Patient found sitting edge of bed with Telemetry, pulse ox (continuous), peripheral IV upon PT entry to room.    General Precautions: Standard, fall   Orthopedic Precautions:    Braces:     Respiratory Status: Room air    Exams:  Cognitive Exam:  Patient is oriented to Person, Place, Time, and Situation  RLE ROM: WFL  RLE Strength: WFL  LLE ROM: WFL  LLE Strength: WFL    Functional Mobility:  Transfers:     Sit to Stand:  independence with no AD  Gait: 50 ft independent no AD    AM-PAC 6 CLICK MOBILITY  Total Score:        Therapeutic Activities and Exercises:  PT eval and  D/C at baseline level of independence    AM-PAC 6 CLICK MOBILITY  Total Score:      Patient left supine  with all lines intact, call button in reach, and bed alarm on.    GOALS:   Multidisciplinary Problems       Physical Therapy Goals       Not on file                    History:     Past Medical History:   Diagnosis Date    Alcohol abuse 02/02/2022    Decompensated hepatic cirrhosis 02/02/2022    Encounter for blood transfusion     Hypertension     Lab test positive for detection of COVID-19 virus 09/2021    Pancreatic cyst 06/03/2022       Past Surgical History:   Procedure Laterality Date    ABDOMINAL AORTOGRAPHY N/A 10/4/2022    Procedure: AORTOGRAM-ABDOMINAL;  Surgeon: Felice Epstein MD;  Location: [facility] CATH/EP LAB;  Service: Vascular;  Laterality: N/A;    APPENDECTOMY      ARTERIOGRAM, MESENTERIC N/A 10/4/2022    Procedure: ARTERIOGRAM, MESENTERIC;  Surgeon: Felice Epstein MD;  Location: [facility] CATH/EP LAB;  Service: Vascular;  Laterality: N/A;    COLONOSCOPY      ENDOSCOPIC ULTRASOUND OF UPPER GASTROINTESTINAL TRACT  02/04/2022    Procedure: ULTRASOUND, UPPER GI TRACT, ENDOSCOPIC;  Surgeon: Chuy Bay MD;  Location: [facility] ([address] [place] [address]);  Service: Endoscopy;;    ENDOSCOPIC ULTRASOUND OF UPPER GASTROINTESTINAL TRACT N/A 6/3/2022    Procedure: ULTRASOUND, UPPER GI TRACT, ENDOSCOPIC;  Surgeon: Roger Viveros III, MD;  Location: [facility] ENDO;  Service: Endoscopy;  Laterality: N/A;    ERCP N/A 02/04/2022    Procedure: ERCP (ENDOSCOPIC RETROGRADE CHOLANGIOPANCREATOGRAPHY);  Surgeon: Chuy Bay MD;  Location: [address]);  Service: Endoscopy;  Laterality: N/A;    ERCP N/A 04/19/2022    Procedure: ERCP (ENDOSCOPIC RETROGRADE CHOLANGIOPANCREATOGRAPHY);  Surgeon: Chuy Bay MD;  Location: [facility] ENDO (2ND FLR);  Service: Endoscopy;  Laterality: N/A;  4/1: fully vaccinated. labs prior. instructions mailed to sister and patient.-SC  4/12/22-Confirmed new arrival time of 10:45am with pt's sister and updated instructions emailed-DS    EYE SURGERY      JOINT REPLACEMENT      KNEE SURGERY       RECONSTRUCTION OF SHOULDER Right     TONSILLECTOMY      UPPER ENDOSCOPIC ULTRASOUND W/ FNA  06/03/2022       Time Tracking:     PT Received On: 10/06/22  PT Start Time: 1020     PT Stop Time: 1030  PT Total Time (min): 10 min     Billable Minutes: Evaluation 10 minutes      10/06/2022

## 2022-10-06 NOTE — PROGRESS NOTES
[facility]  Adult Nutrition   Progress Note (Follow-Up)    SUMMARY      Recommendations:   1. RD has added Ensure High Protein to meal trays per pt request.  2. Continue current Cardiac diet as tolerated.     Goals:   Goals: Pt to meet 100% or more of his EEN and EPN.    Dietitian Rounds Brief  F/U Nutrition Note: Pt is eating good but would still like some chocolate Ensure on his meal trays. He also tells me that it has been awhile since he has had a BM and the flowsheet says 10/2/22 (4 days ago). He has miralax and docusate-senna ordered prn so I will alert his nurse. Will continue to follow prn.    Diet order: Cardiac    % Intake of Estimated Energy Needs: 75 - 100 %  % Meal Intake: 75 - 100 %    Estimated/Assessed Needs  Weight Used For Calorie Calculations: 60 kg (132 lb 4.4 oz)  Energy Calorie Requirements (kcal): 0598-6808 kcals/day (30-35 kcals/kg ABW)  Energy Need Method: Kcal/kg  Protein Requirements: 117-156 g/day (1.5-2 g/kg IBW)  Weight Used For Protein Calculations: 78 kg (171 lb 15.3 oz)     Estimated Fluid Requirement Method: RDA Method  RDA Method (mL): 1800       Weight History:  Wt Readings from Last 5 Encounters:   10/06/22 62.1 kg (136 lb 14.5 oz)   06/04/22 62.2 kg (137 lb 2 oz)   04/21/22 73 kg (160 lb 15 oz)   04/19/22 77.1 kg (170 lb)   02/02/22 78.8 kg (173 lb 11.6 oz)        Reason for Assessment  Reason For Assessment: consult  Diagnosis: other (see comments) (Splenic artery aneurysm)  Relevant Medical History: Hypertension, Alcohol abuse, Decompensated hepatic cirrhosis, Lab test positive for detection of COVID-19 virus, Encounter for blood transfusion, Pancreatic cyst  Interdisciplinary Rounds: did not attend    Medications:Pertinent Medications Reviewed  Scheduled Meds:   aspirin  81 mg Oral Daily    furosemide  20 mg Oral Daily    nitrofurantoin (macrocrystal-monohydrate)  100 mg Oral Q12H    spironolactone  25 mg Oral Daily    ursodioL  300 mg Oral BID     Continuous  Infusions:  PRN Meds:.acetaminophen, albuterol-ipratropium, dextrose 10%, glucagon (human recombinant), glucose, glucose, lorazepam, magnesium oxide, magnesium oxide, melatonin, naloxone, ondansetron, ondansetron, oxyCODONE-acetaminophen, pneumococcal vaccine, polyethylene glycol, potassium bicarbonate, potassium bicarbonate, potassium bicarbonate, potassium, sodium phosphates, potassium, sodium phosphates, potassium, sodium phosphates, senna-docusate 8.6-50 mg, simethicone    Labs: Pertinent Labs Reviewed  Clinical Chemistry:  Recent Labs   Lab 10/02/22  2052 10/04/22  0441 10/06/22  0651 10/06/22  0812   *   < >  --  131*   K 3.0*   < >  --  4.2      < >  --  99   CO2 15*   < >  --  25   *   < >  --  125*   BUN 6*   < >  --  6*   CREATININE <0.3*   < >  --  0.4*   CALCIUM 7.7*   < >  --  9.3   PROT 6.3   < >  --  7.5   ALBUMIN 2.4*   < >  --  2.7*   BILITOT 5.2*   < >  --  8.1*   ALKPHOS 521*   < >  --  550*   *   < >  --  104*   ALT 72*   < >  --  72*   ANIONGAP 9   < >  --  7*   MG 1.2*   < > 1.5*  --    LIPASE 57  --   --   --     < > = values in this interval not displayed.     CBC:   Recent Labs   Lab 10/06/22  0651   WBC 5.58   RBC 2.89*   HGB 9.1*   HCT 27.0*      MCV 93   MCH 31.5*   MCHC 33.7     Cardiac Profile:  Recent Labs   Lab 10/02/22  2052   BNP 68   TROPONINI <0.030     Diabetes:  Recent Labs   Lab 10/02/22  2052   HGBA1C SEE COMMENT     Thyroid & Parathyroid:  Recent Labs   Lab 10/02/22  2052   TSH 2.020       Monitor and Evaluation  Food and Nutrient Intake: energy intake, food and beverage intake  Food and Nutrient Adminstration: diet order  Knowledge/Beliefs/Attitudes: food and nutrition knowledge/skill, beliefs and attitudes  Physical Activity and Function: nutrition-related ADLs and IADLs, factors affecting access to physical activity  Anthropometric Measurements: weight, weight change, body mass index  Biochemical Data, Medical Tests and Procedures:  electrolyte and renal panel, gastrointestinal profile, glucose/endocrine profile, inflammatory profile, lipid profile  Nutrition-Focused Physical Findings: overall appearance     Nutrition Risk  Level of Risk/Frequency of Follow-up: high     Nutrition Follow-Up  RD Follow-up?: Yes    Telma Rodriguez RD 10/06/2022 11:39 AM

## 2022-10-06 NOTE — PROGRESS NOTES
Pt wheeled off unit to personal vehicle. IV and tele removed. Patient had dc instructions in hand and verbalized understanding.

## 2022-10-06 NOTE — PT/OT/SLP EVAL
Occupational Therapy   Evaluation and Discharge Note    Name: Vic Reyez  MRN: [id number]  Admitting Diagnosis:  Splenic artery aneurysm   Recent Surgery: Procedure(s) (LRB):  ERCP (ENDOSCOPIC RETROGRADE CHOLANGIOPANCREATOGRAPHY) (N/A) 1 Day Post-Op    Recommendations:     Discharge Recommendations: home  Discharge Equipment Recommendations:  none  Barriers to discharge:  None    Assessment:     Vic Reyez is a 72 y.o. male with a medical diagnosis of Splenic artery aneurysm. At this time, patient is functioning at their prior level of function and does not require further acute OT services.     Plan:     During this hospitalization, patient does not require further acute OT services.  Please re-consult if situation changes.    Plan of Care Reviewed with: patient    Subjective     Chief Complaint: None reported  Patient/Family Comments/goals: Return home with PLOF    Occupational Profile:  Living Environment: Pt lives alone in a mobile home with 5 steps to enter. Pt uses a tub/shower combo and high toilet.   Previous level of function: independent with ADLs and mobility.  Roles and Routines: household chores, friend  Equipment Used at home:  none  Assistance upon Discharge: family and friends    Pain/Comfort:  Pain Rating 1: other (see comments) (not rated)  Location - Side 1: Right  Location - Orientation 1: lower  Location 1: abdomen  Pain Addressed 1: Distraction, Cessation of Activity  Pain Rating Post-Intervention 1: 0/10    Patients cultural, spiritual, Faith conflicts given the current situation: no    Objective:     Communicated with: nurse prior to session.  Patient found HOB elevated with telemetry, pulse ox (continuous), peripheral IV upon OT entry to room.    General Precautions: Standard, fall   Orthopedic Precautions:N/A   Braces: N/A  Respiratory Status: Room air     Occupational Performance:    Bed Mobility:    Patient completed Supine to Sit with supervision  Patient completed Sit to  Supine with supervision    Functional Mobility/Transfers:  Patient completed Sit <> Stand Transfer with supervision  with  no assistive device   Patient completed Toilet Transfer Step Transfer technique with supervision with  no AD  Functional Mobility: ambulated 50 feet without AD, no LOB.     Activities of Daily Living:  Lower Body Dressing: supervision donning socks and underwear  Toileting: supervision from toilet     Cognitive/Visual Perceptual:  Cognitive/Psychosocial Skills:     -       Oriented to: Person, Place, Time, and Situation   -       Safety awareness/insight to disability: impaired   -       Mood/Affect/Coping skills/emotional control: Pleasant    Physical Exam:  Balance:    -       good sitting and standing balance  BUE: WNL    AMPAC 6 Click ADL:  AMPAC Total Score: 24    Treatment & Education:  Role of OT, safety awareness    Patient left  sitting on toilet in bathroom  with all lines intact and PT present    GOALS:   Multidisciplinary Problems       Occupational Therapy Goals       Not on file                    History:     Past Medical History:   Diagnosis Date    Alcohol abuse 02/02/2022    Decompensated hepatic cirrhosis 02/02/2022    Encounter for blood transfusion     Hypertension     Lab test positive for detection of COVID-19 virus 09/2021    Pancreatic cyst 06/03/2022         Past Surgical History:   Procedure Laterality Date    APPENDECTOMY      COLONOSCOPY      ENDOSCOPIC ULTRASOUND OF UPPER GASTROINTESTINAL TRACT  02/04/2022    Procedure: ULTRASOUND, UPPER GI TRACT, ENDOSCOPIC;  Surgeon: Chuy Bay MD;  Location: [address]);  Service: Endoscopy;;    ENDOSCOPIC ULTRASOUND OF UPPER GASTROINTESTINAL TRACT N/A 6/3/2022    Procedure: ULTRASOUND, UPPER GI TRACT, ENDOSCOPIC;  Surgeon: Roger Viveros III, MD;  Location: [facility];  Service: Endoscopy;  Laterality: N/A;    ERCP N/A 02/04/2022    Procedure: ERCP (ENDOSCOPIC RETROGRADE CHOLANGIOPANCREATOGRAPHY);  Surgeon: Chuy  JACE Bay MD;  Location: [facility] ([address] [place] [address]);  Service: Endoscopy;  Laterality: N/A;    ERCP N/A 04/19/2022    Procedure: ERCP (ENDOSCOPIC RETROGRADE CHOLANGIOPANCREATOGRAPHY);  Surgeon: Chuy Bay MD;  Location: [facility] ([address] [place] [address]);  Service: Endoscopy;  Laterality: N/A;  4/1: fully vaccinated. labs prior. instructions mailed to sister and patient.-SC  4/12/22-Confirmed new arrival time of 10:45am with pt's sister and updated instructions emailed-DS    EYE SURGERY      JOINT REPLACEMENT      KNEE SURGERY      RECONSTRUCTION OF SHOULDER Right     TONSILLECTOMY      UPPER ENDOSCOPIC ULTRASOUND W/ FNA  06/03/2022       Time Tracking:     OT Date of Treatment: 10/06/22  OT Start Time: 0957  OT Stop Time: 1020  OT Total Time (min): 23 min    Billable Minutes:Evaluation 10  Self Care/Home Management 13    10/6/2022

## 2022-10-06 NOTE — PLAN OF CARE
Problem: Adult Inpatient Plan of Care  Goal: Plan of Care Review  Outcome: Ongoing, Progressing  Goal: Patient-Specific Goal (Individualized)  Outcome: Ongoing, Progressing  Goal: Absence of Hospital-Acquired Illness or Injury  Outcome: Ongoing, Progressing  Goal: Optimal Comfort and Wellbeing  Outcome: Ongoing, Progressing  Goal: Readiness for Transition of Care  Outcome: Ongoing, Progressing     Problem: Fall Injury Risk  Goal: Absence of Fall and Fall-Related Injury  Outcome: Ongoing, Progressing     Problem: Alcohol Withdrawal  Goal: Alcohol Withdrawal Symptom Control  Outcome: Ongoing, Progressing     Problem: Acute Neurologic Deterioration (Alcohol Withdrawal)  Goal: Optimal Neurologic Function  Outcome: Ongoing, Progressing     Problem: Substance Misuse (Alcohol Withdrawal)  Goal: Readiness for Change Identified  Outcome: Ongoing, Progressing     Problem: Pain Acute  Goal: Acceptable Pain Control and Functional Ability  Outcome: Ongoing, Progressing     Problem: Malnutrition  Goal: Improved Nutritional Intake  Outcome: Ongoing, Progressing

## 2022-10-06 NOTE — PROGRESS NOTES
[facility] Medicine  Progress Note    Patient name: Vic Reyez  MRN: [id number]  Admit Date: 10/2/2022   LOS: 2 days     SUBJECTIVE:     Principal problem: Splenic artery aneurysm    Interval History:  Abdominal pain controlled and he wants to go home.  Discussed his worsening biliary numbers and plan for ERCP with GI which he agreed. He really wants a razor to shave which we don't have, unfortunately.      Hospital Course:    Patient presented to the ED with abdominal pain.  Hx of prior pancreatitis and pancreatic pseudocyst s/p EUS with diagnostic and therapeutic needle aspiration 6/3/22, biliary stricture s/p placement and subsequent removal of biliary stent.  He had a few beers at home per report.  Lipase was normal.  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 CO2 15 on admission.  ? ETOH ketoacidosis.  Unclear if only 2 beer consumption is accurate.  Received IVFs and resolved.  Vascular and GI consulted.  S/p Splenic artery angiogram.  Coil embolization of splenic artery 10/4. Trending transaminases to determine if needs repeat ERCP.  Hyponatremia improved with IVFs.  Hypokalemia improved with replacement. Hypomagnesium improved with replacement.  On IV zosyn- changed to Macrobid. UCx came back as Enterococcus.  Bilirubin elevated at 5.2 and he is jaundiced. Worsening transaminases and bilirubin and thus underwent ERCP and had stent placed given stricture.  Biopsy performed of main duct. LA 3.3 on admission.  Improved to 1.7.    Scheduled Meds:   [START ON 10/6/2022] aspirin  81 mg Oral Daily    [START ON 10/6/2022] furosemide  20 mg Oral Daily    nitrofurantoin (macrocrystal-monohydrate)  100 mg Oral Q12H    spironolactone  25 mg Oral Daily    ursodioL  300 mg Oral BID     Continuous Infusions:      PRN Meds:acetaminophen,  albuterol-ipratropium, dextrose 10%, glucagon (human recombinant), glucose, glucose, lorazepam, magnesium oxide, magnesium oxide, melatonin, naloxone, ondansetron, ondansetron, oxyCODONE-acetaminophen, pneumococcal vaccine, polyethylene glycol, potassium bicarbonate, potassium bicarbonate, potassium bicarbonate, potassium, sodium phosphates, potassium, sodium phosphates, potassium, sodium phosphates, senna-docusate 8.6-50 mg, simethicone    Review of patient's allergies indicates:  No Known Allergies    Review of Systems: As per interval history    OBJECTIVE:     Vital Signs (Most Recent)  Temp: 97.5 °F (36.4 °C) (10/05/22 1752)  Pulse: 82 (10/05/22 1752)  Resp: (!) 22 (10/05/22 1752)  BP: (!) 142/81 (10/05/22 1752)  SpO2: 100 % (10/05/22 1752)    Vital Signs Range (Last 24H):  Temp:  [97.5 °F (36.4 °C)-98.5 °F (36.9 °C)]   Pulse:  [74-84]   Resp:  [11-39]   BP: (103-142)/(63-81)   SpO2:  [100 %]     I & O (Last 24H):  Intake/Output Summary (Last 24 hours) at 10/5/2022 1919  Last data filed at 10/5/2022 1747  Gross per 24 hour   Intake 2900 ml   Output 400 ml   Net 2500 ml         Physical Exam:    Vitals and nursing note reviewed.     Constitutional:       General: Not in acute distress.     Appearance: Well-developed.   HENT:      Head: Normocephalic and atraumatic.   Eyes:      Pupils: Pupils are equal, round, and reactive to light.   Cardiovascular:      Rate and Rhythm: Regular rhythm.   Pulmonary:      Effort: Pulmonary effort is normal.      Breath sounds: Normal breath sounds. No wheezing.   Abdominal:      General: There is no distension.      Palpations: Abdomen is soft.      Tenderness: TPP across upper abdomen. There is no guarding or rebound.   Musculoskeletal:         General: Normal range of motion.      Cervical back: Normal range of motion.   Skin:     Findings: No rash. Jaundiced.   Neurological:      Mental Status: Alert and oriented to person, place, and time.      Cranial Nerves: No cranial  nerve deficit.      Sensory: No sensory deficit.     Laboratory:  I have reviewed all pertinent lab results within the past 24 hours.  CBC:   Recent Labs   Lab 10/05/22  0424   WBC 6.44   RBC 2.83*   HGB 9.1*   HCT 26.2*      MCV 93   MCH 32.2*   MCHC 34.7       CMP:   Recent Labs   Lab 10/05/22  0425   *   CALCIUM 8.4*   ALBUMIN 2.4*   PROT 6.4   *   K 3.8   CO2 22*   CL 98   BUN 8   CREATININE 0.4*   ALKPHOS 528*   ALT 76*   *   BILITOT 6.3*       LFTs:   Recent Labs   Lab 10/05/22  0425   ALT 76*   *   ALKPHOS 528*   BILITOT 6.3*   PROT 6.4   ALBUMIN 2.4*         Diagnostic Results:      ASSESSMENT/PLAN:         Active Hospital Problems    Diagnosis  POA    *Splenic artery aneurysm [I72.8]  Yes    Acute pancreatitis [K85.90]  Yes    Decompensated hepatic cirrhosis [K72.90, K74.60]  Yes    Biliary stricture [K83.1]  Yes    Alcohol use disorder, severe, dependence [F10.20]  Yes     Chronic    Primary hypertension [I10]  Yes     Chronic      Resolved Hospital Problems   No resolved problems to display.         Plan:     -s/p coiling of aneurysm with vascular surgery 10/5.  Routine post procedure care.  -s/p ERCP with stent placed to CBD due to stricture.  Biopsy of CBD done.  -Early ETOH ketoacidosis that resolved?  Unclear as I would not expect 2 beers to precipitate this. Monitoring and trending labs.  -UCx growing Enterococcus. Abx changed to macrobid.  -Lipase normal.  Food does not exacerbate abdominal pain. This is not consistent with acute pancreatitis.  -CT suggests residual versus new pseudocyst  -Counseled ETOH can precipitate pancreatitis  -Electrolytes improved  -LA trended and now normalized  -I restarted lasix and aldactone  -Monitoring for signs of ETOH withdrawal.  None thus far. Prn ativan.  -Pain control with po narcotics.      VTE Risk Mitigation (From admission, onward)           Ordered     IP VTE HIGH RISK PATIENT  Once         10/03/22 0213     Place  sequential compression device  Until discontinued         10/03/22 0213                      Department Hospital Medicine  [facility]  Padmini Kemp MD  Date of service: 10/05/2022

## 2022-10-06 NOTE — PLAN OF CARE
Problem: Adult Inpatient Plan of Care  Goal: Plan of Care Review  10/6/2022 1636 by Radha Redd RN  Outcome: Met  10/6/2022 1613 by Radha Redd RN  Outcome: Ongoing, Progressing  Goal: Patient-Specific Goal (Individualized)  10/6/2022 1636 by Radha Redd RN  Outcome: Met  10/6/2022 1613 by Radha Redd RN  Outcome: Ongoing, Progressing  Goal: Absence of Hospital-Acquired Illness or Injury  10/6/2022 1636 by Radha Redd RN  Outcome: Met  10/6/2022 1613 by Radha Redd RN  Outcome: Ongoing, Progressing  Goal: Optimal Comfort and Wellbeing  10/6/2022 1636 by Radha Redd RN  Outcome: Met  10/6/2022 1613 by Radha Redd RN  Outcome: Ongoing, Progressing  Goal: Readiness for Transition of Care  10/6/2022 1636 by Radha Redd RN  Outcome: Met  10/6/2022 1613 by Radha Redd RN  Outcome: Ongoing, Progressing     Problem: Fall Injury Risk  Goal: Absence of Fall and Fall-Related Injury  10/6/2022 1636 by Radha Redd RN  Outcome: Met  10/6/2022 1613 by Radha Redd RN  Outcome: Ongoing, Progressing     Problem: Alcohol Withdrawal  Goal: Alcohol Withdrawal Symptom Control  10/6/2022 1636 by Radha Redd RN  Outcome: Met  10/6/2022 1613 by Radha Redd RN  Outcome: Ongoing, Progressing     Problem: Acute Neurologic Deterioration (Alcohol Withdrawal)  Goal: Optimal Neurologic Function  10/6/2022 1636 by Radha Redd RN  Outcome: Met  10/6/2022 1613 by Radha Redd RN  Outcome: Ongoing, Progressing     Problem: Substance Misuse (Alcohol Withdrawal)  Goal: Readiness for Change Identified  10/6/2022 1636 by Radha Redd RN  Outcome: Met  10/6/2022 1613 by Radha Redd RN  Outcome: Ongoing, Progressing     Problem: Pain Acute  Goal: Acceptable Pain Control and Functional Ability  10/6/2022 1636 by Radha Redd RN  Outcome: Met  10/6/2022 1613 by Radha Redd RN  Outcome: Ongoing, Progressing     Problem: Malnutrition  Goal: Improved Nutritional Intake  10/6/2022 1636 by Radha Redd RN  Outcome: Met  10/6/2022 1613 by Radha  Tramaine RN  Outcome: Ongoing, Progressing

## 2022-10-06 NOTE — PLAN OF CARE
Problem: Malnutrition  Goal: Improved Nutritional Intake  Outcome: Ongoing, Progressing  Intervention: Promote and Optimize Oral Intake  Flowsheets (Taken 10/6/2022 1138)  Oral Nutrition Promotion: calorie-dense liquids provided

## 2022-10-07 NOTE — DISCHARGE SUMMARY
[facility] Medicine  Discharge Summary      Patient Name: Vic Reyez  MRN: [id number]  Patient Class: IP- Inpatient  Admission Date: 10/2/2022  Hospital Length of Stay: 3 days  Discharge Date and Time: 10/6/2022  5:11 PM  Attending Physician: Nenita att. providers found   Discharging Provider: Padmini Kemp MD  Primary Care Provider: Primary Doctor Nenita      HPI:   The patient is a 72-year-old male, who presents emergency room with complaint of abdominal pain radiating to his chest.  Patient has a long history of chronic pancreatitis to include most recent pseudocyst of the pancreas in June.  Patient is alcoholic and continues to drink.  His alcohol level today was 85.  His last cast was 3.3.  He also has splenic artery aneurysm of 3 x 3 discussed with Dr. Epstein.  Concerning for sepsis  however unable to provide extensive fluid rehydration  due to possibility of aneurysmal rupture vascular surgery is aware.  Current ing patient is on med Zosyn and cultures obtained.    Plan to admit patient to step-down unit PCU continue IV antibiotics pain control NPO consult GI and vascular surgery      Procedure(s) (LRB):  ERCP (ENDOSCOPIC RETROGRADE CHOLANGIOPANCREATOGRAPHY) (N/A)      Hospital Course:   Patient presented to the ED with abdominal pain.  Hx of prior pancreatitis and pancreatic pseudocyst s/p EUS with diagnostic and therapeutic needle aspiration 6/3/22, biliary stricture s/p placement and subsequent removal of biliary stent.  He had a few beers at home per report.  Lipase was normal.  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 CO2 15 on admission.  ? ETOH ketoacidosis.  Unclear if only 2 beer consumption is accurate.  Received IVFs and resolved.  Vascular and GI consulted.  S/p Splenic artery angiogram.  Coil embolization of splenic artery  10/4. Trending transaminases to determine if needs repeat ERCP.  Hyponatremia improved with IVFs.  Hypokalemia improved with replacement. Hypomagnesium improved with replacement.  On IV zosyn- changed to Macrobid. UCx came back as Enterococcus.  Bilirubin elevated at 5.2 and he is jaundiced. Worsening transaminases and bilirubin and thus underwent ERCP and had stent placed given stricture.  Biopsy performed of main duct. LA 3.3 on admission.  Improved to 1.7.  Discussed with GI and cleared for discharge. Bilirubin has increased to 8, LFTs overall improved.  Lab slip given to have labs checked in the next several days and results to be sent to Dr. Viveros and Dr. Toledo. Rx for percocet given for his abdominal pain.  I also refilled his diuretics.  Examined on day of discharge and alert, NAD, abd non distended/ palpation does not make pain worse/ no rebound or guarding, scleral icterus present. Return precautions given.       Goals of Care Treatment Preferences:  Code Status: Full Code      Consults:   Consults (From admission, onward)        Status Ordering Provider     Inpatient consult to Registered Dietitian/Nutritionist  Once        Provider:  (Not yet assigned)    Completed OTIS QUEVEDO V.     Inpatient consult to Gastroenterology  Once        Provider:  Riki Espinoza MD    Completed OTIS QUEVEDO V.     Inpatient consult to Vascular Surgery  Once        Provider:  Felice Epstein MD    Completed OTIS QUEVEDO V.          No new Assessment & Plan notes have been filed under this hospital service since the last note was generated.  Service: Hospital Medicine    Final Active Diagnoses:    Diagnosis Date Noted POA    PRINCIPAL PROBLEM:  Splenic artery aneurysm [I72.8] 10/03/2022 Yes    Decompensated hepatic cirrhosis [K72.90, K74.60] 02/02/2022 Yes    Biliary stricture [K83.1] 02/02/2022 Yes    Alcohol use disorder, severe, dependence [F10.20] 02/02/2022 Yes     Chronic    Primary hypertension [I10] 02/02/2022  Yes     Chronic      Problems Resolved During this Admission:    Diagnosis Date Noted Date Resolved POA    Acute pancreatitis [K85.90] 10/03/2022 10/06/2022 Yes       Discharged Condition: good    Disposition: Home or Self Care    Follow Up:   Follow-up Information     Roger Viveros III, MD. Schedule an appointment as soon as possible for a visit in 2 week(s).    Specialty: Gastroenterology  Why: post hospital discharge follow up for biliary stenosis  Contact information:  [address] [facility] [address]  [phone]             Helen Toledo MD Follow up.    Specialties: Transplant, Hepatology, Gastroenterology  Why: Please keep scheduled appointment  Contact information:  [address]  [facility] [address]  [phone]             Lacy Mccarthy DO Follow up.    Specialty: Family Medicine  Why: Patient will self schedule appointments.  Contact information:  [address] [facility] [id number] [phone]                       Patient Instructions:      Ambulatory referral/consult to Outpatient Case Management   Referral Priority: Routine Referral Type: Consultation   Referral Reason: Specialty Services Required   Number of Visits Requested: 1     Diet Cardiac     Notify your health care provider if you experience any of the following:  severe uncontrolled pain     Notify your health care provider if you experience any of the following:  persistent nausea and vomiting or diarrhea     Activity as tolerated       Significant Diagnostic Studies: Labs:   CMP   Recent Labs   Lab 10/05/22  0425 10/06/22  0812   * 131*   K 3.8 4.2   CL 98 99   CO2 22* 25   * 125*   BUN 8 6*   CREATININE 0.4* 0.4*   CALCIUM 8.4* 9.3   PROT 6.4 7.5   ALBUMIN 2.4* 2.7*   BILITOT 6.3* 8.1*   ALKPHOS 528* 550*   * 104*   ALT 76* 72*   ANIONGAP 6* 7*    and CBC   Recent Labs   Lab 10/05/22  0424 10/06/22  0651   WBC 6.44 5.58   HGB 9.1* 9.1*   HCT 26.2* 27.0*    175       Pending  Diagnostic Studies:     Procedure Component Value Units Date/Time    Cytology Specimen-Medical Cytology (Fluid/Wash/Brush) [[id number]] Collected: 10/05/22 1753    Order Status: Sent Lab Status: No result     Specimen: Bile duct brush     Specimen to Pathology - Surgery [[id number]] Collected: 10/05/22 1750    Order Status: Sent Lab Status: No result     Specimen: Tissue          Medications:  Reconciled Home Medications:      Medication List      START taking these medications    oxyCODONE-acetaminophen 7.5-325 mg per tablet  Commonly known as: PERCOCET  Take 1 tablet by mouth every 6 (six) hours as needed for Pain.        CHANGE how you take these medications    furosemide 20 MG tablet  Commonly known as: LASIX  Take 1 tablet (20 mg total) by mouth once daily.  What changed: medication strength     spironolactone 25 MG tablet  Commonly known as: ALDACTONE  Take 1 tablet (25 mg total) by mouth once daily.  What changed: medication strength        CONTINUE taking these medications    aspirin 81 MG EC tablet  Commonly known as: ECOTRIN  Take 81 mg by mouth once daily.     calcium-vitamin D 250 (625)-125 mg-unit per tablet  Commonly known as: OSCAL  Take 1 tablet by mouth once daily.     DAILY-ROHAN (WITH FOLIC ACID) 400 mcg Tab  Generic drug: multivitamin with folic acid  Take 1 tablet by mouth once daily.     docusate sodium 50 MG capsule  Commonly known as: COLACE  Take by mouth 2 (two) times daily.     ferrous fumarate 324 mg (106 mg iron) Tab  Take 1 tablet by mouth once daily at 6am.     folic acid 1 MG tablet  Commonly known as: FOLVITE  Take 1 tablet (1 mg total) by mouth once daily.     pravastatin 40 MG tablet  Commonly known as: PRAVACHOL  Take 40 mg by mouth once daily.     thiamine 100 MG tablet  Take 1 tablet (100 mg total) by mouth once daily.     ursodioL 300 mg capsule  Commonly known as: ACTIGALL  Take 1 capsule (300 mg total) by mouth 2 (two) times daily.        STOP taking these medications     HYDROcodone-acetaminophen 7.5-325 mg per tablet  Commonly known as: NORCO            Indwelling Lines/Drains at time of discharge:   Lines/Drains/Airways     None                 Time spent on the discharge of patient: 35 minutes         Padmini Kemp MD  Department of Hospital Medicine  [facility]

## 2022-10-07 NOTE — HOSPITAL COURSE
Patient presented to the ED with abdominal pain.  Hx of prior pancreatitis and pancreatic pseudocyst s/p EUS with diagnostic and therapeutic needle aspiration 6/3/22, biliary stricture s/p placement and subsequent removal of biliary stent.  He had a few beers at home per report.  Lipase was normal.  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 CO2 15 on admission.  ? ETOH ketoacidosis.  Unclear if only 2 beer consumption is accurate.  Received IVFs and resolved.  Vascular and GI consulted.  S/p Splenic artery angiogram.  Coil embolization of splenic artery 10/4. Trending transaminases to determine if needs repeat ERCP.  Hyponatremia improved with IVFs.  Hypokalemia improved with replacement. Hypomagnesium improved with replacement.  On IV zosyn- changed to Macrobid. UCx came back as Enterococcus.  Bilirubin elevated at 5.2 and he is jaundiced. Worsening transaminases and bilirubin and thus underwent ERCP and had stent placed given stricture.  Biopsy performed of main duct. LA 3.3 on admission.  Improved to 1.7.  Discussed with GI and cleared for discharge. Bilirubin has increased to 8, LFTs overall improved.  Lab slip given to have labs checked in the next several days and results to be sent to Dr. Viveros and Dr. Toledo. Rx for percocet given for his abdominal pain.  I also refilled his diuretics.  Examined on day of discharge and alert, NAD, abd non distended/ palpation does not make pain worse/ no rebound or guarding, scleral icterus present. Return precautions given.

## 2022-10-08 LAB
BACTERIA BLD CULT: NORMAL
BACTERIA BLD CULT: NORMAL

## 2022-10-11 NOTE — PHYSICIAN QUERY
PT Name: Vic Reyez  MR #: [id number]     Documentation Clarification      CDS/: Sabrina Key               Contact information:    This form is a permanent document in the medical record.     Query Date: October 11, 2022    By submitting this query, we are merely seeking further clarification of documentation. Please utilize your independent clinical judgment when addressing the question(s) below.    The Medical Record reflects the following:    Supporting Clinical Findings Location in Medical Record   Splenic artery angiogram.  Coil embolization of splenic artery across the orifice of the saccular aneurysm. Op report   We then packed 10 and 12 mm coils on this going proximal and then across the orifice of the splenic artery aneurysm and more proximally into the splenic artery with multiple 10 mm 8 mm and 6 mm coils visually we had good density of the coils to assure thrombosis.   Op report                                                                            Provider, please provide clarification of embolization of the splenic artery.    [  x ] Coil embolization with COMPLETE occlusion of the splenic artery   [   ] Coil embolization with PARTIAL occlusion of the splenic artery

## 2022-10-14 ENCOUNTER — SSC ENCOUNTER (OUTPATIENT)
Dept: ADMINISTRATIVE | Facility: OTHER | Age: 72
End: 2022-10-14
Payer: MEDICARE

## 2022-10-14 NOTE — PROGRESS NOTES
Please note the following patient's information was forwarded to [employer] for case management and/or .    Please contact Ext. [phone] with any questions.    Thank you,    Chantelle Velasco, [facility] [place]  [place] [facility]  [phone]

## 2022-10-18 ENCOUNTER — HOSPITAL ENCOUNTER (OUTPATIENT)
Dept: RADIOLOGY | Facility: HOSPITAL | Age: 72
Discharge: HOME | End: 2022-10-18
Attending: INTERNAL MEDICINE
Payer: MEDICARE

## 2022-10-18 DIAGNOSIS — K74.60 DECOMPENSATED HEPATIC CIRRHOSIS: ICD-10-CM

## 2022-10-18 DIAGNOSIS — K72.90 DECOMPENSATED HEPATIC CIRRHOSIS: ICD-10-CM

## 2022-10-18 PROCEDURE — 76705 ECHO EXAM OF ABDOMEN: CPT | Mod: TC

## 2022-10-18 PROCEDURE — 76705 US ABDOMEN LIMITED: ICD-10-PCS | Mod: 26,,, | Performed by: RADIOLOGY

## 2022-10-18 PROCEDURE — 76705 ECHO EXAM OF ABDOMEN: CPT | Mod: 26,,, | Performed by: RADIOLOGY

## 2022-10-23 ENCOUNTER — TELEPHONE (OUTPATIENT)
Dept: HEPATOLOGY | Facility: CLINIC | Age: 72
End: 2022-10-23
Payer: MEDICARE

## 2022-10-23 DIAGNOSIS — K83.5 BILIARY CYST: Primary | ICD-10-CM

## 2022-10-25 ENCOUNTER — TELEPHONE (OUTPATIENT)
Dept: HEPATOLOGY | Facility: CLINIC | Age: 72
End: 2022-10-25
Payer: MEDICARE

## 2022-10-25 NOTE — TELEPHONE ENCOUNTER
----- Message from Helen Toledo MD sent at 10/21/2022  1:24 AM CDT -----  Please inform patient:  Overall improvement in liver chemistries except for one enzyme.  Continue ursodiol.

## 2022-10-25 NOTE — TELEPHONE ENCOUNTER
----- Message from Helen Toledo MD -----  Please inform patient:  Overall improvement in liver chemistries except for one enzyme.  Continue ursodiol.

## 2022-10-26 ENCOUNTER — TELEPHONE (OUTPATIENT)
Dept: HEPATOLOGY | Facility: CLINIC | Age: 72
End: 2022-10-26
Payer: MEDICARE

## 2022-10-26 NOTE — TELEPHONE ENCOUNTER
Per Dr Toledo, patient was told, ultrasound showed gallbladder sludge (thickened bile, some small stones may be hidden in the bile).  There jessi two cysts near the gallbladder, radiologist recommending MRI with MRCP to evaluate further. I scheduled MRI/MRCP on 11/8/22 per pt's request.

## 2022-11-06 ENCOUNTER — ANESTHESIA EVENT (OUTPATIENT)
Dept: SURGERY | Facility: HOSPITAL | Age: 72
End: 2022-11-06
Payer: MEDICARE

## 2022-11-06 NOTE — ANESTHESIA PREPROCEDURE EVALUATION
11/06/2022  Vic Reyez is a 72 y.o., male.       Tobacco Use:  The patient  reports that he has been smoking cigarettes. He has been smoking an average of .25 packs per day. He has never used smokeless tobacco.     Results for orders placed or performed during the hospital encounter of 10/02/22   EKG 12-lead    Collection Time: 10/02/22  8:38 PM    Narrative    Test Reason : R07.9,    Vent. Rate : 070 BPM     Atrial Rate : 070 BPM     P-R Int : 084 ms          QRS Dur : 104 ms      QT Int : 406 ms       P-R-T Axes : 070 066 040 degrees     QTc Int : 438 ms    Sinus rhythm with short AL  Otherwise normal ECG  When compared with ECG of 01-JUN-2022 15:50,  AL interval has decreased  Confirmed by Radha GARCIA, Tonny WAKEFIELD ([id number]) on 10/7/2022 6:50:30 PM    Referred By: [email or web address]   SELF           Confirmed By:Tonny Garcia MD             Lab Results   Component Value Date    WBC 5.58 10/06/2022    HGB 9.1 (L) 10/06/2022    HCT 27.0 (L) 10/06/2022    MCV 93 10/06/2022     10/06/2022     BMP  Lab Results   Component Value Date     (L) 10/18/2022    K 4.4 10/18/2022    CL 99 10/18/2022    CO2 18 (L) 10/18/2022    BUN 4 (L) 10/18/2022    CREATININE 0.6 10/18/2022    CALCIUM 8.8 10/18/2022    ANIONGAP 11 10/18/2022     (H) 10/18/2022     (H) 10/06/2022     (H) 10/05/2022       No results found for this or any previous visit.            Pre-op Assessment    I have reviewed the Patient Summary Reports.     I have reviewed the Nursing Notes. I have reviewed the NPO Status.   I have reviewed the Medications.     Review of Systems  Anesthesia Hx:  No problems with previous Anesthesia  Denies Family Hx of Anesthesia complications.   Denies Personal Hx of Anesthesia complications.   Social:  Smoker Current alcohol abuse.  No signs of withdrawal during this hospitalization.  Last alcohol 1  week ago.   Hematology/Oncology:     Oncology Normal    -- Anemia:   EENT/Dental:  EENT/Dental Normal Left upper central incisor broken.  Right upper lateral incisor broken at base.   Cardiovascular:   Hypertension, well controlled Dysrhythmias (History AFib. Currently in sinus)  ECG has been reviewed. 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     Pulmonary:   Bilateral interstitial lung disease per CT June 2022, but patient denies any lung disease.   Renal/:  Renal/ Normal     Hepatic/GI:   Liver Disease, (cirrhosis) History of admit for probable acute pancreatitis. No nausea or vomiting.  Abdominal pain has resolved.  Patient had 7 L of ascites drained 6 months ago.   Musculoskeletal:  Musculoskeletal Normal    Neurological:  Neurology Normal    Endocrine:  Endocrine Normal    Dermatological:  Skin Normal    Psych:  Psychiatric Normal           Physical Exam  General: Well nourished, Cooperative, Alert and Oriented    Airway:  Mallampati: III / II  Mouth Opening: Normal  TM Distance: Normal  Tongue: Normal  Neck ROM: Normal ROM    Dental:    Chest/Lungs:  Clear to auscultation, Normal Respiratory Rate    Heart:  Rate: Normal  Rhythm: Regular Rhythm  Sounds: Normal        Anesthesia Plan  Type of Anesthesia, risks & benefits discussed:    Anesthesia Type: MAC  Intra-op Monitoring Plan: Standard ASA Monitors  Informed Consent: Informed consent signed with the Patient and all parties understand the risks and agree with anesthesia plan.  All questions answered.   ASA Score: 3  Anesthesia Plan Notes:       MAC with Propofol  POM Mask    Ready For Surgery From Anesthesia Perspective.     .

## 2022-11-07 ENCOUNTER — ANESTHESIA (OUTPATIENT)
Dept: SURGERY | Facility: HOSPITAL | Age: 72
End: 2022-11-07
Payer: MEDICARE

## 2022-11-07 ENCOUNTER — HOSPITAL ENCOUNTER (OUTPATIENT)
Facility: HOSPITAL | Age: 72
Discharge: HOME | End: 2022-11-07
Attending: INTERNAL MEDICINE | Admitting: INTERNAL MEDICINE
Payer: MEDICARE

## 2022-11-07 VITALS
OXYGEN SATURATION: 100 % | DIASTOLIC BLOOD PRESSURE: 73 MMHG | HEART RATE: 73 BPM | SYSTOLIC BLOOD PRESSURE: 116 MMHG | RESPIRATION RATE: 18 BRPM

## 2022-11-07 DIAGNOSIS — R19.8 ABNORMAL FINDING OF BILIARY TRACT: ICD-10-CM

## 2022-11-07 PROCEDURE — 43237 ENDOSCOPIC US EXAM ESOPH: CPT | Performed by: INTERNAL MEDICINE

## 2022-11-07 PROCEDURE — 43238 EGD US FINE NEEDLE BX/ASPIR: CPT | Performed by: INTERNAL MEDICINE

## 2022-11-07 PROCEDURE — 27202053 HC NEEDLE BIOPSY EUS: Performed by: INTERNAL MEDICINE

## 2022-11-07 PROCEDURE — 37000008 HC ANESTHESIA 1ST 15 MINUTES: Performed by: INTERNAL MEDICINE

## 2022-11-07 PROCEDURE — 37000009 HC ANESTHESIA EA ADD 15 MINS: Performed by: INTERNAL MEDICINE

## 2022-11-07 PROCEDURE — 63600175 PHARM REV CODE 636 W HCPCS: Performed by: NURSE ANESTHETIST, CERTIFIED REGISTERED

## 2022-11-07 PROCEDURE — 25000003 PHARM REV CODE 250: Performed by: NURSE ANESTHETIST, CERTIFIED REGISTERED

## 2022-11-07 PROCEDURE — 88307 TISSUE EXAM BY PATHOLOGIST: CPT | Mod: TC

## 2022-11-07 RX ORDER — HYDROCODONE BITARTRATE AND ACETAMINOPHEN 7.5; 325 MG/1; MG/1
1 TABLET ORAL EVERY 6 HOURS PRN
Qty: 20 TABLET | Refills: 0
Start: 2022-11-07 | End: 2022-11-18 | Stop reason: SDUPTHER

## 2022-11-07 RX ORDER — LIDOCAINE HYDROCHLORIDE 20 MG/ML
INJECTION, SOLUTION EPIDURAL; INFILTRATION; INTRACAUDAL; PERINEURAL
Status: DISCONTINUED | OUTPATIENT
Start: 2022-11-07 | End: 2022-11-07

## 2022-11-07 RX ORDER — PROPOFOL 10 MG/ML
VIAL (ML) INTRAVENOUS
Status: DISCONTINUED | OUTPATIENT
Start: 2022-11-07 | End: 2022-11-07

## 2022-11-07 RX ADMIN — PROPOFOL 50 MG: 10 INJECTION, EMULSION INTRAVENOUS at 09:11

## 2022-11-07 RX ADMIN — PROPOFOL 20 MG: 10 INJECTION, EMULSION INTRAVENOUS at 09:11

## 2022-11-07 RX ADMIN — PROPOFOL 30 MG: 10 INJECTION, EMULSION INTRAVENOUS at 09:11

## 2022-11-07 RX ADMIN — SODIUM CHLORIDE: 0.9 INJECTION, SOLUTION INTRAVENOUS at 09:11

## 2022-11-07 RX ADMIN — PROPOFOL 25 MG: 10 INJECTION, EMULSION INTRAVENOUS at 09:11

## 2022-11-07 RX ADMIN — LIDOCAINE HYDROCHLORIDE 20 MG: 20 INJECTION, SOLUTION INTRAVENOUS at 09:11

## 2022-11-07 NOTE — [employer] PATIENT INSTRUCTIONS
Discharge Summary/Instructions after an Endoscopic Procedure  Patient Name: Vic Reyez  Patient MRN: [id number]  Patient YOB: 1950  Monday, November 7, 2022  Roger Viveros III, MD  RESTRICTIONS:  During your procedure today, you received medications for sedation.  These   medications may affect your judgment, balance and coordination.  Therefore,   for 24 hours, you have the following restrictions:   - DO NOT drive a car, operate machinery, make legal/financial decisions,   sign important papers or drink alcohol.    ACTIVITY:  Today: no heavy lifting, straining or running due to procedural   sedation/anesthesia.  The following day: return to full activity including work.  DIET:  Eat and drink normally unless instructed otherwise.     TREATMENT FOR COMMON SIDE EFFECTS:  - Mild abdominal pain, nausea, belching, bloating or excessive gas:  rest,   eat lightly and use a heating pad.  - Sore Throat: treat with throat lozenges and/or gargle with warm salt   water.  - Because air was used during the procedure, expelling large amounts of air   from your rectum or belching is normal.  - If a bowel prep was taken, you may not have a bowel movement for 1-3 days.    This is normal.  SYMPTOMS TO WATCH FOR AND REPORT TO YOUR PHYSICIAN:  1. Abdominal pain or bloating, other than gas cramps.  2. Chest pain.  3. Back pain.  4. Signs of infection such as: chills or fever occurring within 24 hours   after the procedure.  5. Rectal bleeding, which would show as bright red, maroon, or black stools.   (A tablespoon of blood from the rectum is not serious, especially if   hemorrhoids are present.)  6. Vomiting.  7. Weakness or dizziness.  GO DIRECTLY TO THE NEAREST EMERGENCY ROOM IF YOU HAVE ANY OF THE FOLLOWING:      Difficulty breathing              Chills and/or fever over 101 F   Persistent vomiting and/or vomiting blood   Severe abdominal pain   Severe chest pain   Black, tarry stools   Bleeding- more than one  tablespoon   Any other symptom or condition that you feel may need urgent attention  Your doctor recommends these additional instructions:  If any biopsies were taken, your doctors clinic will contact you in 1 to 2   weeks with any results.  - Discharge patient to home.   - Patient has a contact number available for emergencies.  The signs and   symptoms of potential delayed complications were discussed with the   patient.  Return to normal activities tomorrow.  Written discharge   instructions were provided to the patient.   - Resume regular diet.   - Continue present medications.   - Await path results.  For questions, problems or results please call your physician - Roger Viveros III, MD at Work:  [phone].  [facility], EMERGENCY ROOM PHONE NUMBER: [phone]  IF A COMPLICATION OR EMERGENCY SITUATION ARISES AND YOU ARE UNABLE TO REACH   YOUR PHYSICIAN - GO DIRECTLY TO THE EMERGENCY ROOM.  Roger Viveros III, MD  11/7/2022 9:46:10 AM  This report has been verified and signed electronically.  Dear patient,  As a result of recent federal legislation (The Federal Cures Act), you may   receive lab or pathology results from your procedure in your [facility]   account before your physician is able to contact you. Your physician or   their representative will relay the results to you with their   recommendations at their soonest availability.  Thank you,  [employer]

## 2022-11-07 NOTE — ANESTHESIA POSTPROCEDURE EVALUATION
Anesthesia Post Evaluation    Patient: Vic Reyez    Procedure(s) Performed: Procedure(s) (LRB):  ULTRASOUND, UPPER GI TRACT, ENDOSCOPIC (N/A)    Final Anesthesia Type: general      Patient location during evaluation: GI PACU  Patient participation: Yes- Able to Participate  Level of consciousness: awake and alert and oriented  Post-procedure vital signs: reviewed and stable  Pain management: adequate  Airway patency: patent    PONV status at discharge: No PONV  Anesthetic complications: no      Cardiovascular status: blood pressure returned to baseline, hemodynamically stable and stable  Respiratory status: unassisted, spontaneous ventilation and room air  Hydration status: euvolemic  Follow-up not needed.          Vitals Value Taken Time   /73 11/07/22 1015   Temp 36.6 C 11/07/22 1020   Pulse 68 11/07/22 1018   Resp 18 11/07/22 0938   SpO2 100 % 11/07/22 1018   Vitals shown include unvalidated device data.      No case tracking events are documented in the log.      Pain/Minda Score: No data recorded

## 2022-11-07 NOTE — H&P
GASTROENTEROLOGY PRE-PROCEDURE H&P NOTE  Patient Name: Vic Reyez  Patient MRN: [id number]  Patient : 1950    Service date: 2022    PCP: Primary Doctor No    No chief complaint on file.      HPI: Patient is a 72 y.o. male with PMHx as below here for evaluation of biliary stricture seen on recent ERCP.     Past Medical History:  Past Medical History:   Diagnosis Date    Alcohol abuse 2022    Decompensated hepatic cirrhosis 2022    Encounter for blood transfusion     Hypertension     Lab test positive for detection of COVID-19 virus 2021    Pancreatic cyst 2022        Past Surgical History:  Past Surgical History:   Procedure Laterality Date    ABDOMINAL AORTOGRAPHY N/A 10/4/2022    Procedure: AORTOGRAM-ABDOMINAL;  Surgeon: Felice Epstein MD;  Location: [facility] CATH/EP LAB;  Service: Vascular;  Laterality: N/A;    APPENDECTOMY      ARTERIOGRAM, MESENTERIC N/A 10/4/2022    Procedure: ARTERIOGRAM, MESENTERIC;  Surgeon: Felice Epstein MD;  Location: [facility] CATH/EP LAB;  Service: Vascular;  Laterality: N/A;    COLONOSCOPY      ENDOSCOPIC ULTRASOUND OF UPPER GASTROINTESTINAL TRACT  2022    Procedure: ULTRASOUND, UPPER GI TRACT, ENDOSCOPIC;  Surgeon: Chuy Bay MD;  Location: [facility] ([address] [place] [address]);  Service: Endoscopy;;    ENDOSCOPIC ULTRASOUND OF UPPER GASTROINTESTINAL TRACT N/A 6/3/2022    Procedure: ULTRASOUND, UPPER GI TRACT, ENDOSCOPIC;  Surgeon: Roger Viveros III, MD;  Location: [facility];  Service: Endoscopy;  Laterality: N/A;    ERCP N/A 2022    Procedure: ERCP (ENDOSCOPIC RETROGRADE CHOLANGIOPANCREATOGRAPHY);  Surgeon: Chuy Bay MD;  Location: [facility] ENDO ([address] [place] [address]);  Service: Endoscopy;  Laterality: N/A;    ERCP N/A 2022    Procedure: ERCP (ENDOSCOPIC RETROGRADE CHOLANGIOPANCREATOGRAPHY);  Surgeon: Chuy Bay MD;  Location: [facility] ENDO ([address] [place] [address]);  Service: Endoscopy;  Laterality: N/A;  : fully vaccinated. labs prior. instructions mailed to sister  and patient.-SC  4/12/22-Confirmed new arrival time of 10:45am with pt's sister and updated instructions emailed-DS    ERCP N/A 10/5/2022    Procedure: ERCP (ENDOSCOPIC RETROGRADE CHOLANGIOPANCREATOGRAPHY);  Surgeon: Roger Viveros III, MD;  Location: [facility];  Service: Endoscopy;  Laterality: N/A;    EYE SURGERY      JOINT REPLACEMENT      KNEE SURGERY      RECONSTRUCTION OF SHOULDER Right     TONSILLECTOMY      UPPER ENDOSCOPIC ULTRASOUND W/ FNA  06/03/2022        Home Medications:  Medications Prior to Admission   Medication Sig Dispense Refill Last Dose    aspirin (ECOTRIN) 81 MG EC tablet Take 81 mg by mouth once daily.       calcium-vitamin D (OSCAL) 250 (625)-125 mg-unit per tablet Take 1 tablet by mouth once daily.       docusate sodium (COLACE) 50 MG capsule Take by mouth 2 (two) times daily.       ferrous fumarate 324 mg (106 mg iron) Tab Take 1 tablet by mouth once daily at 6am.       folic acid (FOLVITE) 1 MG tablet Take 1 tablet (1 mg total) by mouth once daily. 360 tablet 0     furosemide (LASIX) 20 MG tablet Take 1 tablet (20 mg total) by mouth once daily. 30 tablet 1     multivitamin with folic acid (DAILY-ROHAN, WITH FOLIC ACID,) 400 mcg Tab Take 1 tablet by mouth once daily. 30 tablet 0     oxyCODONE-acetaminophen (PERCOCET) 7.5-325 mg per tablet Take 1 tablet by mouth every 6 (six) hours as needed for Pain. 28 tablet 0     pravastatin (PRAVACHOL) 40 MG tablet Take 40 mg by mouth once daily.       spironolactone (ALDACTONE) 25 MG tablet Take 1 tablet (25 mg total) by mouth once daily. 30 tablet 1     thiamine 100 MG tablet Take 1 tablet (100 mg total) by mouth once daily. 360 tablet 0     ursodioL (ACTIGALL) 300 mg capsule Take 1 capsule (300 mg total) by mouth 2 (two) times daily. 60 capsule 1        Inpatient Medications:        Review of patient's allergies indicates:  No Known Allergies    Social History:   Social History     Occupational History    Not on file   Tobacco Use    Smoking  status: Every Day     Packs/day: 0.25     Types: Cigarettes    Smokeless tobacco: Never   Substance and Sexual Activity    Alcohol use: Yes     Alcohol/week: 4.0 standard drinks     Types: 4 Cans of beer per week    Drug use: Never    Sexual activity: Not Currently       Family History:   History reviewed. No pertinent family history.    Review of Systems:  A 10 point review of systems was performed and was normal, except as mentioned in the HPI, including constitutional, HEENT, heme, lymph, cardiovascular, respiratory, gastrointestinal, genitourinary, neurologic, endocrine, psychiatric and musculoskeletal.      OBJECTIVE:    Physical Exam:  24 Hour Vital Sign Ranges:    Most recent vitals: There were no vitals taken for this visit.   GEN: well-developed, well-nourished, awake and alert, non-toxic appearing adult  HEENT: PERRL, sclera anicteric, oral mucosa pink and moist without lesion  NECK: trachea midline; Good ROM  CV: regular rate and rhythm, no murmurs or gallops  RESP: clear to auscultation bilaterally, no wheezes, rhonci or rales  ABD: soft, non-tender, non-distended, normal bowel sounds  EXT: no swelling or edema, 2+ pulses distally  SKIN: no rashes or jaundice  PSYCH: normal affect    Labs:   No results for input(s): WBC, MCV, PLT in the last 72 hours.    Invalid input(s): HGBAU  No results for input(s): NA, K, CL, CO2, BUN, GLU in the last 72 hours.    Invalid input(s): CREA  No results for input(s): ALB in the last 72 hours.    Invalid input(s): ALKP, SGOT, SGPT, TBIL, DBIL, TPRO  No results for input(s): PT, INR, PTT in the last 72 hours.      IMPRESSION / RECOMMENDATIONS:  EUS / FNA with interventions as warranted.   RIsks, benefits, alternatives discussed in detail regarding upcoming procedures and sedation. Some of the more common endoscopic complications include but not limited to immediate or delayed perforation, bleeding, infections, pain, inadvertent injury to surrounding tissue / organs and  possible need for surgical evaluation. Patient expressed understanding, all questions answered and will proceed with procedure as planned.     Roger Viveros III  11/7/2022  9:02 AM

## 2022-11-07 NOTE — TRANSFER OF CARE
Anesthesia Transfer of Care Note    Patient: Vic Reyez    Procedure(s) Performed: Procedure(s) (LRB):  ULTRASOUND, UPPER GI TRACT, ENDOSCOPIC (N/A)    Patient location: GI    Anesthesia Type: MAC    Transport from OR: Transported from OR on room air with adequate spontaneous ventilation    Post pain: adequate analgesia    Post assessment: no apparent anesthetic complications    Post vital signs: stable    Level of consciousness: awake and oriented    Nausea/Vomiting: no nausea/vomiting    Complications: none    Transfer of care protocol was followed      Last vitals: There were no vitals taken for this visit.

## 2022-11-08 ENCOUNTER — TELEPHONE (OUTPATIENT)
Dept: TRANSPLANT | Facility: CLINIC | Age: 72
End: 2022-11-08
Payer: MEDICARE

## 2022-11-08 ENCOUNTER — HOSPITAL ENCOUNTER (OUTPATIENT)
Dept: RADIOLOGY | Facility: HOSPITAL | Age: 72
Discharge: HOME | End: 2022-11-08
Attending: INTERNAL MEDICINE
Payer: OTHER GOVERNMENT

## 2022-11-08 DIAGNOSIS — K83.5 BILIARY CYST: ICD-10-CM

## 2022-11-08 PROCEDURE — 76376 MRI ABDOMEN WITHOUT CONTRAST MRCP: ICD-10-PCS | Mod: 26,,, | Performed by: RADIOLOGY

## 2022-11-08 PROCEDURE — 76376 3D RENDER W/INTRP POSTPROCES: CPT | Mod: 26,,, | Performed by: RADIOLOGY

## 2022-11-08 PROCEDURE — 76376 3D RENDER W/INTRP POSTPROCES: CPT | Mod: TC

## 2022-11-08 PROCEDURE — 74181 MRI ABDOMEN WITHOUT CONTRAST MRCP: ICD-10-PCS | Mod: 26,,, | Performed by: RADIOLOGY

## 2022-11-08 PROCEDURE — 74181 MRI ABDOMEN W/O CONTRAST: CPT | Mod: 26,,, | Performed by: RADIOLOGY

## 2022-11-09 ENCOUNTER — PATIENT MESSAGE (OUTPATIENT)
Dept: HEPATOLOGY | Facility: CLINIC | Age: 72
End: 2022-11-09
Payer: MEDICARE

## 2022-11-09 ENCOUNTER — TELEPHONE (OUTPATIENT)
Dept: HEPATOLOGY | Facility: CLINIC | Age: 72
End: 2022-11-09
Payer: MEDICARE

## 2022-11-09 NOTE — TELEPHONE ENCOUNTER
Please inform patient:   MRI showed two cysts and they may be connected to the bile ducts. Please see Dr Haynes in [place] for follow up of this duct.

## 2022-11-09 NOTE — TELEPHONE ENCOUNTER
Patient: Vic Reyez       MRN: [id number]      : 1950     Age: 72 y.o.  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  2. There is mild diffuse biliary dilation to the level of the supra pancreatic common bile duct.  Possible stricture in this region.  3. No choledocholithiasis.  4. Splenic artery aneurysm better characterized at CT 8.  5. Peripancreatic fluid collection along the tail is slightly enlarged.  This was previously felt to represent a pseudocyst.    Imaging to be reviewed: MRI 22    HCC Treatment History: none    ABO:     Platelets:   Lab Results   Component Value Date/Time     10/06/2022 06:51 AM     Creatinine:   Lab Results   Component Value Date/Time    CREATININE 0.6 10/18/2022 10:58 AM     Bilirubin:   Lab Results   Component Value Date/Time    BILITOT 4.4 (H) 10/18/2022 10:58 AM     AFP Last 3 each if available:   Lab Results   Component Value Date/Time    AFP 3.1 2022 12:29 PM    AFP <2.0 2022 05:04 AM       MELD: MELD-Na score: 9 at 6/3/2022  6:04 AM  MELD score: 9 at 6/3/2022  6:04 AM  Calculated from:  Serum Creatinine: 0.6 mg/dL (Using min of 1 mg/dL) at 6/3/2022  6:04 AM  Serum Sodium: 128 mmol/L at 6/3/2022  6:04 AM  Total Bilirubin: 1.1 mg/dL at 6/3/2022  6:04 AM  INR(ratio): 1.2 at 2022  4:10 PM  Age: 71 years    Plan:     Follow-up Provider:

## 2022-11-09 NOTE — TELEPHONE ENCOUNTER
"----- Message from Roger Viveros III, MD sent at 11/9/2022  7:09 AM CST -----  He had been seen and had ERCP w/ Dr. Bay not long ago. Showed up here w/ recurrent jaundice also w/ new stricture in distal CBD. I put a stent, and did EUS of this area w/ FNA. Seems like his disease is progressing a little quick to be PSC. 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 Special stain for the identification of IgG4(+) plasma cells will be obtained. Addendum report will follow.". I also am sending a serum IgG4 to see if >100 which is more typical for this disease. Think bringing him up at [facility] w[facility] is worth it. Path and my recent cholangiograms are in chart.  ----- Message -----  From: Helen Toledo MD  Sent: 11/9/2022   6:36 AM CST  To: Bertha Sorenson RN, #    Pl inform patient I will present his case to IR anna when I return on December 6th, shows two cysts in the liver connected to bile ducts and an aneurysm of the splenic artery.   Want to get any recommendation from the conference.    "

## 2022-11-11 ENCOUNTER — TELEPHONE (OUTPATIENT)
Dept: HEPATOLOGY | Facility: HOSPITAL | Age: 72
End: 2022-11-11
Payer: MEDICARE

## 2022-11-12 NOTE — TELEPHONE ENCOUNTER
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    Lab Results  WBC (K/uL)   Date Value   10/06/2022 5.58   10/05/2022 6.44   10/04/2022 5.45     PLT (K/uL)   Date Value   10/06/2022 175   10/05/2022 177   10/04/2022 172     INR (no units)   Date Value   10/02/2022 1.3   06/01/2022 1.2   05/19/2022 1.0   04/27/2022 1.1   04/19/2022 1.1     AST (U/L)   Date Value   10/18/2022 131 (H)     ALT (U/L)   Date Value   10/18/2022 66 (H)   10/06/2022 72 (H)   10/05/2022 76 (H)     BILITOT (mg/dL)   Date Value   10/18/2022 4.4 (H)   10/06/2022 8.1 (H)   10/05/2022 6.3 (H)     ALKPHOS (U/L)   Date Value   10/18/2022 733 (H)   10/06/2022 550 (H)   10/05/2022 528 (H)     CREATININE (mg/dL)   Date Value   10/18/2022 0.6   10/06/2022 0.4 (L)   10/05/2022 0.4 (L)     AFP (ng/mL)   Date Value   05/19/2022 3.1   02/05/2022 <2.0

## 2022-11-18 ENCOUNTER — ANESTHESIA (OUTPATIENT)
Dept: EMERGENCY MEDICINE | Facility: HOSPITAL | Age: 72
End: 2022-11-18

## 2022-11-18 ENCOUNTER — HOSPITAL ENCOUNTER (EMERGENCY)
Facility: HOSPITAL | Age: 72
Discharge: HOME | End: 2022-11-18
Attending: EMERGENCY MEDICINE
Payer: OTHER GOVERNMENT

## 2022-11-18 ENCOUNTER — ANESTHESIA EVENT (OUTPATIENT)
Dept: EMERGENCY MEDICINE | Facility: HOSPITAL | Age: 72
End: 2022-11-18

## 2022-11-18 VITALS
TEMPERATURE: 98 F | WEIGHT: 130 LBS | HEART RATE: 79 BPM | SYSTOLIC BLOOD PRESSURE: 111 MMHG | BODY MASS INDEX: 18.13 KG/M2 | OXYGEN SATURATION: 99 % | DIASTOLIC BLOOD PRESSURE: 76 MMHG | RESPIRATION RATE: 18 BRPM

## 2022-11-18 DIAGNOSIS — K72.90 DECOMPENSATED HEPATIC CIRRHOSIS: ICD-10-CM

## 2022-11-18 DIAGNOSIS — K74.60 DECOMPENSATED HEPATIC CIRRHOSIS: ICD-10-CM

## 2022-11-18 DIAGNOSIS — R10.12 LEFT UPPER QUADRANT ABDOMINAL PAIN: Primary | ICD-10-CM

## 2022-11-18 DIAGNOSIS — K86.0 ALCOHOL-INDUCED CHRONIC PANCREATITIS: ICD-10-CM

## 2022-11-18 DIAGNOSIS — K83.1 BILIARY STENOSIS: ICD-10-CM

## 2022-11-18 LAB
ALBUMIN SERPL BCP-MCNC: 3.1 G/DL (ref 3.5–5.2)
ALP SERPL-CCNC: 575 U/L (ref 55–135)
ALT SERPL W/O P-5'-P-CCNC: 44 U/L (ref 10–44)
ANION GAP SERPL CALC-SCNC: 7 MMOL/L (ref 8–16)
APTT PPP: 35.9 SEC (ref 23.3–35.1)
AST SERPL-CCNC: 74 U/L (ref 10–40)
BASOPHILS # BLD AUTO: 0.02 K/UL (ref 0–0.2)
BASOPHILS NFR BLD: 0.6 % (ref 0–1.9)
BILIRUB SERPL-MCNC: 1.4 MG/DL (ref 0.1–1)
BUN SERPL-MCNC: 7 MG/DL (ref 8–23)
CALCIUM SERPL-MCNC: 8.9 MG/DL (ref 8.7–10.5)
CHLORIDE SERPL-SCNC: 103 MMOL/L (ref 95–110)
CO2 SERPL-SCNC: 22 MMOL/L (ref 23–29)
CREAT SERPL-MCNC: 0.6 MG/DL (ref 0.5–1.4)
DIFFERENTIAL METHOD: ABNORMAL
EOSINOPHIL # BLD AUTO: 0.1 K/UL (ref 0–0.5)
EOSINOPHIL NFR BLD: 2.2 % (ref 0–8)
ERYTHROCYTE [DISTWIDTH] IN BLOOD BY AUTOMATED COUNT: 12.2 % (ref 11.5–14.5)
EST. GFR  (NO RACE VARIABLE): >60 ML/MIN/1.73 M^2
GLUCOSE SERPL-MCNC: 183 MG/DL (ref 70–110)
HCT VFR BLD AUTO: 34.7 % (ref 40–54)
HGB BLD-MCNC: 12 G/DL (ref 14–18)
IMM GRANULOCYTES # BLD AUTO: 0.01 K/UL (ref 0–0.04)
IMM GRANULOCYTES NFR BLD AUTO: 0.3 % (ref 0–0.5)
INR PPP: 1.2
LYMPHOCYTES # BLD AUTO: 0.9 K/UL (ref 1–4.8)
LYMPHOCYTES NFR BLD: 24 % (ref 18–48)
MCH RBC QN AUTO: 30.8 PG (ref 27–31)
MCHC RBC AUTO-ENTMCNC: 34.6 G/DL (ref 32–36)
MCV RBC AUTO: 89 FL (ref 82–98)
MONOCYTES # BLD AUTO: 0.4 K/UL (ref 0.3–1)
MONOCYTES NFR BLD: 12.3 % (ref 4–15)
NEUTROPHILS # BLD AUTO: 2.2 K/UL (ref 1.8–7.7)
NEUTROPHILS NFR BLD: 60.6 % (ref 38–73)
NRBC BLD-RTO: 0 /100 WBC
PLATELET # BLD AUTO: 142 K/UL (ref 150–450)
PMV BLD AUTO: 10.1 FL (ref 9.2–12.9)
POTASSIUM SERPL-SCNC: 3.8 MMOL/L (ref 3.5–5.1)
PROT SERPL-MCNC: 7.7 G/DL (ref 6–8.4)
PROTHROMBIN TIME: 14.4 SEC (ref 11.4–13.7)
RBC # BLD AUTO: 3.89 M/UL (ref 4.6–6.2)
SODIUM SERPL-SCNC: 132 MMOL/L (ref 136–145)
WBC # BLD AUTO: 3.59 K/UL (ref 3.9–12.7)

## 2022-11-18 PROCEDURE — 25000003 PHARM REV CODE 250: Performed by: NURSE PRACTITIONER

## 2022-11-18 PROCEDURE — 25500020 PHARM REV CODE 255: Performed by: EMERGENCY MEDICINE

## 2022-11-18 PROCEDURE — 96375 TX/PRO/DX INJ NEW DRUG ADDON: CPT | Mod: 59

## 2022-11-18 PROCEDURE — 85730 THROMBOPLASTIN TIME PARTIAL: CPT | Performed by: NURSE PRACTITIONER

## 2022-11-18 PROCEDURE — 85610 PROTHROMBIN TIME: CPT | Performed by: NURSE PRACTITIONER

## 2022-11-18 PROCEDURE — 99285 EMERGENCY DEPT VISIT HI MDM: CPT | Mod: 25

## 2022-11-18 PROCEDURE — 63600175 PHARM REV CODE 636 W HCPCS: Performed by: NURSE PRACTITIONER

## 2022-11-18 PROCEDURE — 96374 THER/PROPH/DIAG INJ IV PUSH: CPT

## 2022-11-18 PROCEDURE — 80053 COMPREHEN METABOLIC PANEL: CPT | Performed by: NURSE PRACTITIONER

## 2022-11-18 PROCEDURE — 85025 COMPLETE CBC W/AUTO DIFF WBC: CPT | Performed by: NURSE PRACTITIONER

## 2022-11-18 RX ORDER — HYDROCODONE BITARTRATE AND ACETAMINOPHEN 7.5; 325 MG/1; MG/1
1 TABLET ORAL EVERY 4 HOURS PRN
Qty: 18 TABLET | Refills: 0 | Status: SHIPPED | OUTPATIENT
Start: 2022-11-18 | End: 2022-11-21

## 2022-11-18 RX ORDER — URSODIOL 300 MG/1
300 CAPSULE ORAL 2 TIMES DAILY
Qty: 60 CAPSULE | Refills: 0 | Status: SHIPPED | OUTPATIENT
Start: 2022-11-18 | End: 2023-02-06 | Stop reason: SDUPTHER

## 2022-11-18 RX ORDER — MORPHINE SULFATE 4 MG/ML
4 INJECTION, SOLUTION INTRAMUSCULAR; INTRAVENOUS
Status: COMPLETED | OUTPATIENT
Start: 2022-11-18 | End: 2022-11-18

## 2022-11-18 RX ORDER — ONDANSETRON 2 MG/ML
4 INJECTION INTRAMUSCULAR; INTRAVENOUS
Status: COMPLETED | OUTPATIENT
Start: 2022-11-18 | End: 2022-11-18

## 2022-11-18 RX ORDER — SPIRONOLACTONE 25 MG/1
25 TABLET ORAL DAILY
Qty: 30 TABLET | Refills: 0 | Status: SHIPPED | OUTPATIENT
Start: 2022-11-18 | End: 2023-01-19

## 2022-11-18 RX ORDER — FUROSEMIDE 20 MG/1
20 TABLET ORAL DAILY
Qty: 30 TABLET | Refills: 0 | Status: SHIPPED | OUTPATIENT
Start: 2022-11-18 | End: 2023-01-19

## 2022-11-18 RX ADMIN — MORPHINE SULFATE 4 MG: 4 INJECTION, SOLUTION INTRAMUSCULAR; INTRAVENOUS at 04:11

## 2022-11-18 RX ADMIN — ONDANSETRON 4 MG: 2 INJECTION INTRAMUSCULAR; INTRAVENOUS at 04:11

## 2022-11-18 RX ADMIN — IOHEXOL 100 ML: 350 INJECTION, SOLUTION INTRAVENOUS at 05:11

## 2022-11-18 RX ADMIN — SODIUM CHLORIDE 1000 ML: 0.9 INJECTION, SOLUTION INTRAVENOUS at 04:11

## 2022-11-18 NOTE — ED PROVIDER NOTES
Source of History:  Patient, chart    Chief complaint:  Abdominal Pain (Patient reports 8/10 pain to L side of abdomen down to groin area. Hx AAA 3 weeks ago. States he ran out of pain medications a few days ago and would like a refill. //Reports taking 2 aspirins today for pain. )      HPI:  Vic Reyez is a 72 y.o. male with medical history of alcohol abuse, HTN, pancreatitis, splenic aneurysm presenting with left-sided abdominal pain, worse over the past day.  Patient states he has been taking aspirin for pain with no relief.  Patient states pain worse this morning.  Patient states he did have 2 watery bowel movements this morning.  No nausea or vomiting.    This is the extent to the patients complaints today here in the emergency department.    ROS: As per HPI and below:  Constitutional: No fever.  No chills.  Eyes: No visual changes.  ENT: No sore throat. No ear pain    Cardiovascular: No chest pain.  Respiratory: No shortness of breath.  GI:  Positive for abdominal pain.  No nausea.  No vomiting.  Genitourinary: No abnormal urination.  Neurologic: No headache. No focal weakness.  No numbness.  MSK: no back pain.  Integument: No rashes or lesions.  Hematologic: No easy bruising.  Endocrine: No excessive thirst or urination.    Review of patient's allergies indicates:  No Known Allergies    PMH:  As per HPI and below:  Past Medical History:   Diagnosis Date    Alcohol abuse 02/02/2022    Decompensated hepatic cirrhosis 02/02/2022    Encounter for blood transfusion     Hypertension     Lab test positive for detection of COVID-19 virus 09/2021    Pancreatic cyst 06/03/2022     Past Surgical History:   Procedure Laterality Date    ABDOMINAL AORTOGRAPHY N/A 10/4/2022    Procedure: AORTOGRAM-ABDOMINAL;  Surgeon: Felice Epstein MD;  Location: [facility] CATH/EP LAB;  Service: Vascular;  Laterality: N/A;    APPENDECTOMY      ARTERIOGRAM, MESENTERIC N/A 10/4/2022    Procedure: ARTERIOGRAM, MESENTERIC;  Surgeon: Felice WING  MD Lucian;  Location: [facility] CATH/EP LAB;  Service: Vascular;  Laterality: N/A;    COLONOSCOPY      ENDOSCOPIC ULTRASOUND OF UPPER GASTROINTESTINAL TRACT  02/04/2022    Procedure: ULTRASOUND, UPPER GI TRACT, ENDOSCOPIC;  Surgeon: Chuy Bay MD;  Location: [facility] ENDO (2ND FLR);  Service: Endoscopy;;    ENDOSCOPIC ULTRASOUND OF UPPER GASTROINTESTINAL TRACT N/A 6/3/2022    Procedure: ULTRASOUND, UPPER GI TRACT, ENDOSCOPIC;  Surgeon: Roger Viveros III, MD;  Location: [facility] ENDO;  Service: Endoscopy;  Laterality: N/A;    ENDOSCOPIC ULTRASOUND OF UPPER GASTROINTESTINAL TRACT N/A 11/7/2022    Procedure: ULTRASOUND, UPPER GI TRACT, ENDOSCOPIC;  Surgeon: Roger Viveros III, MD;  Location: [facility] ENDO;  Service: Endoscopy;  Laterality: N/A;    ERCP N/A 02/04/2022    Procedure: ERCP (ENDOSCOPIC RETROGRADE CHOLANGIOPANCREATOGRAPHY);  Surgeon: Chuy Bay MD;  Location: [facility] ENDO (2ND FLR);  Service: Endoscopy;  Laterality: N/A;    ERCP N/A 04/19/2022    Procedure: ERCP (ENDOSCOPIC RETROGRADE CHOLANGIOPANCREATOGRAPHY);  Surgeon: Chuy Bay MD;  Location: [facility] (2ND FLR);  Service: Endoscopy;  Laterality: N/A;  4/1: fully vaccinated. labs prior. instructions mailed to sister and patient.-SC  4/12/22-Confirmed new arrival time of 10:45am with pt's sister and updated instructions emailed-DS    ERCP N/A 10/5/2022    Procedure: ERCP (ENDOSCOPIC RETROGRADE CHOLANGIOPANCREATOGRAPHY);  Surgeon: Roger Viveros III, MD;  Location: [facility];  Service: Endoscopy;  Laterality: N/A;    EYE SURGERY      JOINT REPLACEMENT      KNEE SURGERY      RECONSTRUCTION OF SHOULDER Right     TONSILLECTOMY      UPPER ENDOSCOPIC ULTRASOUND W/ FNA  06/03/2022       Social History     Tobacco Use    Smoking status: Every Day     Packs/day: 0.25     Types: Cigarettes    Smokeless tobacco: Never   Substance Use Topics    Alcohol use: Yes     Alcohol/week: 4.0 standard drinks     Types: 4 Cans of beer per week    Drug use: Never        Physical Exam:    /76   Pulse 79   Temp 97.7 °F (36.5 °C)   Resp 18   Wt 59 kg (130 lb)   SpO2 99%   BMI 18.13 kg/m²   Nursing note and vital signs reviewed.  Constitutional: No acute distress.  Nontoxic  Eyes: No conjunctival injection.  Extraocular muscles are intact.  ENT: Oropharynx clear.  Normal phonation.  Cardiovascular: Regular rate and rhythm.  No murmurs. No gallops. No rubs  Respiratory: Clear to auscultation bilaterally.  Good air movement.  No wheezes.  No rhonchi. No rales. No accessory muscle use.  Abdomen:  Abdomen soft with generalized tenderness to palpation.  No rebound but voluntary guarding noted on exam.  Bowel sounds within normal limits.  Non peritoneal.  Musculoskeletal: Good range of motion all joints.  No deformities.  Neck supple.  No meningismus.  Skin: No rashes seen.  Good turgor.  No abrasions.  No ecchymoses.  Neuro: alert and oriented x3,  no focal neurological deficits.  Psych: Appropriate, conversant    Labs/Imaging that have been ordered have been independently reviewed and interpreted by myself.    Labs Reviewed   CBC W/ AUTO DIFFERENTIAL - Abnormal; Notable for the following components:       Result Value    WBC 3.59 (*)     RBC 3.89 (*)     Hemoglobin 12.0 (*)     Hematocrit 34.7 (*)     Platelets 142 (*)     Lymph # 0.9 (*)     All other components within normal limits   COMPREHENSIVE METABOLIC PANEL - Abnormal; Notable for the following components:    Sodium 132 (*)     CO2 22 (*)     Glucose 183 (*)     BUN 7 (*)     Albumin 3.1 (*)     Total Bilirubin 1.4 (*)     Alkaline Phosphatase 575 (*)     AST 74 (*)     Anion Gap 7 (*)     All other components within normal limits   APTT - Abnormal; Notable for the following components:    aPTT 35.9 (*)     All other components within normal limits   PROTIME-INR - Abnormal; Notable for the following components:    PT 14.4 (*)     All other components within normal limits   POCT CREATININE     Imaging Results               CTA Chest Abdomen Pelvis (Final result)  Result time 11/18/22 18:32:54      Final result by Leland Frey MD (11/18/22 18:32:54)                   Narrative:    CMS MANDATED QUALITY DATA-CT RADIATION DOSE-436  All CT scans at this facility use dose modulation, iterative reconstruction, and or weight-based dosing when appropriate to reduce radiation dose to as low as reasonably achievable.    HISTORY: Aortic aneurysm, known or suspected    FINDINGS: Thin axial imaging through the chest, abdomen and pelvis was performed with 100 mL Omnipaque 350 IV contrast, with sagittal and coronal reformatted images and MIP reconstructions performed, and images stored in the patient's permanent electronic medical record.    Scattered calcified and soft plaque involves normal caliber thoracic and abdominal aorta, with no aortic dissection or evidence of aortic intramural hematoma.  The distal abdominal aorta is mildly ectatic measuring 23 mm in diameter. The aortic bifurcation is normal.    The central pulmonary arteries enhance normally, with no filling defects. The heart is enlarged, with scattered coronary arterial calcifications. No pericardial effusion. There are calcified mediastinal and right hilar lymph nodes.    The lungs are normally expanded, with multifocal centrilobular nodular opacities and reticular densities in both lungs, most pronounced in the right upper lobe, reflecting localized bronchiolitis and subsegmental atelectasis. No pleural effusion or pneumothorax.    The celiac artery and its branches are widely patent, with numerous vascular coils in region of the splenic artery and previously identified splenic artery aneurysm. There is no recurrent aneurysm. The superior mesenteric artery and proximal branches are widely patent, with patent bilateral single main renal arteries. The inferior mesenteric artery is patent. Calcified and soft plaque involves patent common iliac, external iliac and common  femoral arteries. The proximal superficial femoral arteries are patent.    The liver, spleen, pancreas, adrenal glands and kidneys have normal arterial phase enhancement, with presence of an internal biliary stent. There is no bowel obstruction, ascites, or intraperitoneal free air. The rectum and urinary bladder are unremarkable, with no pelvic free fluid. There are no acute fractures or destructive osseous lesions, with intervertebral disc space narrowing and facet arthropathy in the lumbar spine.    IMPRESSION:  1. Negative for aortic aneurysm or dissection, with mild ectasia of the distal abdominal aorta.  2. Endovascular coiling of the splenic artery and previous splenic artery aneurysm, with no recurrent aneurysm or other significant vascular abnormality in the abdomen or pelvis.  3. Mild scattered nonspecific bronchiolitis in both lungs.  4. Additional observations as described.    Electronically signed by:  Leland Frey MD  11/18/2022 6:32 PM CST Workstation: [id number]                                  I decided to obtain the patient's medical records.      MDM/ Differential Dx:   Emergent evaluation of a 73 yo male presenting for generalized left-sided abdominal pain.  Patient states pain began last night but worsened today.  Patient denies any associated nausea or vomiting.  Patient states last bowel movement this morning was watery.  On exam pt is A&Ox3. VSS. Nonfebrile and nontoxic appearing.  Mucous membranes pink and moist. Breath sounds clear bilaterally.  Abdomen soft with generalized tenderness to palpation.  No rebound but voluntary guarding noted on exam.  Bowel sounds within normal limits.  Non peritoneal.  Pt speaking in full sentences.  Steady gait appreciated. Cap refill < 3 seconds.      Differential diagnoses include but are not limited to gastritis, gastroenteritis, pancreatitis, peptic ulcer disease, colitis, diverticulitis, musculoskeletal pain, others..      I will get labs, imaging,  hydrate, medicate and reassess.  I discussed this case with my supervising physician.    ED Course as of 11/18/22 1919 Fri Nov 18, 2022   1644 CBC unremarkable.  No leukocytosis noted.  H&H stable at 12 and 34.7.  APTT slightly elevated at 35.9.  Pt elevated 14.4 with an INR of 1.2.  Waiting CMP and imaging. [RZ]   1707 CMP shows a BUN of 7 with a creatinine of 0.6.  Total bili elevated 1.4.  Alk phos of 575 with an AST of 74.  Patient is known alcoholic and this is baseline.   [RZ]   1910 CT shows no acute abnormalities.  Patient reassessed.  Patient is up and ambulatory in the ED.  Tolerating p.o..  Patient updated on results.  Advised this is likely due to his chronic pancreatitis.  Advised that we will discharge home with pain medication.  Advised to follow-up with PCP and specialist as needed.  Strict return to ED precautions discussed.  Patient verbalized understanding of this plan of care.  All questions and concerns addressed. [RZ]   1919 Patient is hemodynamically stable, vital signs are normal. Discharge instructions given. Return to ED precautions discussed. Follow up as directed. Pt verbalized understanding of this plan.  Pt is stable for discharge.  [RZ]      ED Course User Index  [RZ] Juany Morgan NP               Diagnostic Impression:    1. Left upper quadrant abdominal pain    2. Alcohol-induced chronic pancreatitis    3. Biliary stenosis    4. Decompensated hepatic cirrhosis         ED Disposition Condition    Discharge Stable            ED Prescriptions       Medication Sig Dispense Start Date End Date Auth. Provider    HYDROcodone-acetaminophen (NORCO) 7.5-325 mg per tablet Take 1 tablet by mouth every 4 (four) hours as needed for Pain. 18 tablet 11/18/2022 11/21/2022 Juany Morgan NP    ursodioL (ACTIGALL) 300 mg capsule Take 1 capsule (300 mg total) by mouth 2 (two) times daily. 60 capsule 11/18/2022 12/18/2022 Juany Morgan NP    furosemide (LASIX) 20 MG tablet Take 1 tablet (20 mg  total) by mouth once daily. 30 tablet 11/18/2022 12/18/2022 Juany Morgan NP    spironolactone (ALDACTONE) 25 MG tablet Take 1 tablet (25 mg total) by mouth once daily. 30 tablet 11/18/2022 12/18/2022 Juany Morgan NP          Follow-up Information       Follow up With Specialties Details Why Contact Info    PCP  Schedule an appointment as soon as possible for a visit  As needed                Juany Morgan NP  11/18/22 1919

## 2022-11-19 NOTE — DISCHARGE INSTRUCTIONS
You were seen and evaluated in the ER today.  Your workup while you were here is reassuring for no acute causes of your symptoms.  Your pain is likely due to your chronic pancreatitis.  We have prescribed you medication to help with your pain.   Please follow-up with your PCP and specialist as needed.  Please return to the ED for any worsening symptoms such as chest pain, shortness of breath, fever not controlled with Tylenol or ibuprofen or uncontrolled pain.      Our goal in the emergency department is to always give you outstanding care and exceptional service. You may receive a survey by mail or e-mail in the next week regarding your experience in our ED. We would greatly appreciate your completing and returning the survey. Your feedback provides us with a way to recognize our staff who give very good care and it helps us learn how to improve when your experience was below our aspiration of excellence.

## 2022-11-22 ENCOUNTER — CONFERENCE (OUTPATIENT)
Dept: TRANSPLANT | Facility: CLINIC | Age: 72
End: 2022-11-22
Payer: MEDICARE

## 2022-11-22 NOTE — TELEPHONE ENCOUNTER
Patient: Vic Reyez       MRN: [id number]      : 1950     Age: 72 y.o.  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  2. There is mild diffuse biliary dilation to the level of the supra pancreatic common bile duct.  Possible stricture in this region.  3. No choledocholithiasis.  4. Splenic artery aneurysm better characterized at CT 8.  5. Peripancreatic fluid collection along the tail is slightly enlarged.  This was previously felt to represent a pseudocyst.    Imaging to be reviewed: MRI 22    HCC Treatment History: none    ABO:     Platelets:   Lab Results   Component Value Date/Time     (L) 2022 03:16 PM     Creatinine:   Lab Results   Component Value Date/Time    CREATININE 0.6 2022 03:16 PM     Bilirubin:   Lab Results   Component Value Date/Time    BILITOT 1.4 (H) 2022 03:16 PM     AFP Last 3 each if available:   Lab Results   Component Value Date/Time    AFP 3.1 2022 12:29 PM    AFP <2.0 2022 05:04 AM       MELD: MELD-Na score: 10 at 2022  3:16 PM  MELD score: 10 at 2022  3:16 PM  Calculated from:  Serum Creatinine: 0.6 mg/dL (Using min of 1 mg/dL) at 2022  3:16 PM  Serum Sodium: 132 mmol/L at 2022  3:16 PM  Total Bilirubin: 1.4 mg/dL at 2022  3:16 PM  INR(ratio): 1.2 at 2022  3:16 PM  Age: 72 years    CTA 22  -Splenic artery aneurysm successfully coiled  -Residual fluid collection along pancreas likely thrombosed aneurysm. 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   - Mild biliary dilation with apparent mid CBD stricture s/p endoscopic stent placement     Discussion/Plan from Dr CHAYO Díaz: No imaging follow-up required    Committee Discussion/Plan:  Splenic artery aneurysm. Mild mid CBD. Bilirubin has decreased. No follow up required.      Follow-up Provider: Dr Toledo

## 2022-11-29 ENCOUNTER — HOSPITAL ENCOUNTER (OUTPATIENT)
Facility: HOSPITAL | Age: 72
Discharge: HOME | End: 2022-12-01
Attending: EMERGENCY MEDICINE | Admitting: FAMILY MEDICINE
Payer: MEDICARE

## 2022-11-29 DIAGNOSIS — R10.9 ABDOMINAL PAIN: ICD-10-CM

## 2022-11-29 DIAGNOSIS — R07.9 CHEST PAIN: ICD-10-CM

## 2022-11-29 DIAGNOSIS — K85.20 ALCOHOL-INDUCED ACUTE PANCREATITIS WITHOUT INFECTION OR NECROSIS: Primary | ICD-10-CM

## 2022-11-29 DIAGNOSIS — E87.29 ALCOHOLIC KETOACIDOSIS: ICD-10-CM

## 2022-11-29 LAB
ALBUMIN SERPL BCP-MCNC: 3.5 G/DL (ref 3.5–5.2)
ALP SERPL-CCNC: 621 U/L (ref 55–135)
ALT SERPL W/O P-5'-P-CCNC: 39 U/L (ref 10–44)
ANION GAP SERPL CALC-SCNC: 12 MMOL/L (ref 8–16)
APTT PPP: 40.1 SEC (ref 23.3–35.1)
AST SERPL-CCNC: 66 U/L (ref 10–40)
BASOPHILS # BLD AUTO: 0.03 K/UL (ref 0–0.2)
BASOPHILS NFR BLD: 0.5 % (ref 0–1.9)
BILIRUB SERPL-MCNC: 1.4 MG/DL (ref 0.1–1)
BNP SERPL-MCNC: 95 PG/ML (ref 0–99)
BUN SERPL-MCNC: 9 MG/DL (ref 8–23)
CALCIUM SERPL-MCNC: 9 MG/DL (ref 8.7–10.5)
CHLORIDE SERPL-SCNC: 102 MMOL/L (ref 95–110)
CO2 SERPL-SCNC: 17 MMOL/L (ref 23–29)
CREAT SERPL-MCNC: 0.4 MG/DL (ref 0.5–1.4)
CREAT SERPL-MCNC: 0.5 MG/DL (ref 0.5–1.4)
DIFFERENTIAL METHOD: ABNORMAL
EOSINOPHIL # BLD AUTO: 0.3 K/UL (ref 0–0.5)
EOSINOPHIL NFR BLD: 4.1 % (ref 0–8)
ERYTHROCYTE [DISTWIDTH] IN BLOOD BY AUTOMATED COUNT: 12.9 % (ref 11.5–14.5)
EST. GFR  (NO RACE VARIABLE): >60 ML/MIN/1.73 M^2
GLUCOSE SERPL-MCNC: 137 MG/DL (ref 70–110)
HCT VFR BLD AUTO: 37.7 % (ref 40–54)
HGB BLD-MCNC: 12.5 G/DL (ref 14–18)
IMM GRANULOCYTES # BLD AUTO: 0.02 K/UL (ref 0–0.04)
IMM GRANULOCYTES NFR BLD AUTO: 0.3 % (ref 0–0.5)
INR PPP: 1.2
LIPASE SERPL-CCNC: 78 U/L (ref 4–60)
LYMPHOCYTES # BLD AUTO: 1.6 K/UL (ref 1–4.8)
LYMPHOCYTES NFR BLD: 25.3 % (ref 18–48)
MAGNESIUM SERPL-MCNC: 1.5 MG/DL (ref 1.6–2.6)
MCH RBC QN AUTO: 29.8 PG (ref 27–31)
MCHC RBC AUTO-ENTMCNC: 33.2 G/DL (ref 32–36)
MCV RBC AUTO: 90 FL (ref 82–98)
MONOCYTES # BLD AUTO: 0.7 K/UL (ref 0.3–1)
MONOCYTES NFR BLD: 11.8 % (ref 4–15)
NEUTROPHILS # BLD AUTO: 3.7 K/UL (ref 1.8–7.7)
NEUTROPHILS NFR BLD: 58 % (ref 38–73)
NRBC BLD-RTO: 0 /100 WBC
PLATELET # BLD AUTO: 188 K/UL (ref 150–450)
PMV BLD AUTO: 10.3 FL (ref 9.2–12.9)
POTASSIUM SERPL-SCNC: 3.2 MMOL/L (ref 3.5–5.1)
PROT SERPL-MCNC: 8.3 G/DL (ref 6–8.4)
PROTHROMBIN TIME: 14.7 SEC (ref 11.4–13.7)
RBC # BLD AUTO: 4.19 M/UL (ref 4.6–6.2)
SAMPLE: ABNORMAL
SODIUM SERPL-SCNC: 131 MMOL/L (ref 136–145)
TROPONIN I SERPL HS-MCNC: 4.1 PG/ML (ref 0–14.9)
WBC # BLD AUTO: 6.29 K/UL (ref 3.9–12.7)

## 2022-11-29 PROCEDURE — 25000003 PHARM REV CODE 250: Performed by: EMERGENCY MEDICINE

## 2022-11-29 PROCEDURE — 63600175 PHARM REV CODE 636 W HCPCS: Performed by: EMERGENCY MEDICINE

## 2022-11-29 PROCEDURE — 86901 BLOOD TYPING SEROLOGIC RH(D): CPT | Performed by: EMERGENCY MEDICINE

## 2022-11-29 PROCEDURE — 85025 COMPLETE CBC W/AUTO DIFF WBC: CPT | Performed by: EMERGENCY MEDICINE

## 2022-11-29 PROCEDURE — 96375 TX/PRO/DX INJ NEW DRUG ADDON: CPT | Mod: 59

## 2022-11-29 PROCEDURE — 96361 HYDRATE IV INFUSION ADD-ON: CPT

## 2022-11-29 PROCEDURE — 83690 ASSAY OF LIPASE: CPT | Performed by: EMERGENCY MEDICINE

## 2022-11-29 PROCEDURE — 85610 PROTHROMBIN TIME: CPT | Performed by: EMERGENCY MEDICINE

## 2022-11-29 PROCEDURE — 99285 EMERGENCY DEPT VISIT HI MDM: CPT | Mod: 25

## 2022-11-29 PROCEDURE — 83880 ASSAY OF NATRIURETIC PEPTIDE: CPT | Performed by: EMERGENCY MEDICINE

## 2022-11-29 PROCEDURE — 84484 ASSAY OF TROPONIN QUANT: CPT | Performed by: EMERGENCY MEDICINE

## 2022-11-29 PROCEDURE — 85730 THROMBOPLASTIN TIME PARTIAL: CPT | Performed by: EMERGENCY MEDICINE

## 2022-11-29 PROCEDURE — 83735 ASSAY OF MAGNESIUM: CPT | Performed by: EMERGENCY MEDICINE

## 2022-11-29 PROCEDURE — 25500020 PHARM REV CODE 255: Performed by: EMERGENCY MEDICINE

## 2022-11-29 PROCEDURE — 80053 COMPREHEN METABOLIC PANEL: CPT | Performed by: EMERGENCY MEDICINE

## 2022-11-29 RX ORDER — ONDANSETRON 2 MG/ML
4 INJECTION INTRAMUSCULAR; INTRAVENOUS
Status: COMPLETED | OUTPATIENT
Start: 2022-11-29 | End: 2022-11-29

## 2022-11-29 RX ORDER — MORPHINE SULFATE 4 MG/ML
4 INJECTION, SOLUTION INTRAMUSCULAR; INTRAVENOUS
Status: COMPLETED | OUTPATIENT
Start: 2022-11-29 | End: 2022-11-29

## 2022-11-29 RX ORDER — SODIUM CHLORIDE 9 MG/ML
1000 INJECTION, SOLUTION INTRAVENOUS
Status: COMPLETED | OUTPATIENT
Start: 2022-11-29 | End: 2022-11-30

## 2022-11-29 RX ADMIN — IOHEXOL 100 ML: 350 INJECTION, SOLUTION INTRAVENOUS at 11:11

## 2022-11-29 RX ADMIN — ONDANSETRON 4 MG: 2 INJECTION INTRAMUSCULAR; INTRAVENOUS at 11:11

## 2022-11-29 RX ADMIN — SODIUM CHLORIDE 1000 ML: 0.9 INJECTION, SOLUTION INTRAVENOUS at 11:11

## 2022-11-29 RX ADMIN — MORPHINE SULFATE 4 MG: 4 INJECTION, SOLUTION INTRAMUSCULAR; INTRAVENOUS at 11:11

## 2022-11-29 NOTE — Clinical Note
Diagnosis: Alcoholic ketoacidosis [246146]   Future Attending Provider: RABIA HARVEY [[id number]]   Admitting Provider:: RABIA HARVEY [[id number]]

## 2022-11-30 PROBLEM — K80.51 CHOLEDOCHOLITHIASIS WITH OBSTRUCTION: Status: ACTIVE | Noted: 2022-11-30

## 2022-11-30 PROBLEM — K86.1 CHRONIC PANCREATITIS: Status: ACTIVE | Noted: 2022-11-30

## 2022-11-30 PROBLEM — E87.29 ALCOHOLIC KETOACIDOSIS: Status: ACTIVE | Noted: 2022-11-30

## 2022-11-30 LAB
ABO + RH BLD: NORMAL
ALLENS TEST: ABNORMAL
AMPHET+METHAMPHET UR QL: NEGATIVE
B-OH-BUTYR BLD STRIP-SCNC: 0.2 MMOL/L (ref 0–0.5)
BACTERIA #/AREA URNS HPF: NEGATIVE /HPF
BARBITURATES UR QL SCN>200 NG/ML: NEGATIVE
BENZODIAZ UR QL SCN>200 NG/ML: NEGATIVE
BILIRUB UR QL STRIP: NEGATIVE
BLD GP AB SCN CELLS X3 SERPL QL: NORMAL
BZE UR QL SCN: NEGATIVE
CANNABINOIDS UR QL SCN: NEGATIVE
CLARITY UR: CLEAR
COLOR UR: YELLOW
CREAT UR-MCNC: 25 MG/DL (ref 23–375)
DELSYS: ABNORMAL
ETHANOL SERPL-MCNC: <5 MG/DL
GLUCOSE UR QL STRIP: NEGATIVE
HCO3 UR-SCNC: 23.7 MMOL/L (ref 24–28)
HGB UR QL STRIP: NEGATIVE
HYALINE CASTS #/AREA URNS LPF: 10 /LPF
KETONES UR QL STRIP: ABNORMAL
LACTATE SERPL-SCNC: 1.4 MMOL/L (ref 0.5–1.9)
LEUKOCYTE ESTERASE UR QL STRIP: ABNORMAL
MAGNESIUM SERPL-MCNC: 2.2 MG/DL (ref 1.6–2.6)
MICROSCOPIC COMMENT: ABNORMAL
MODE: ABNORMAL
NITRITE UR QL STRIP: NEGATIVE
OPIATES UR QL SCN: ABNORMAL
PCO2 BLDA: 40 MMHG (ref 35–45)
PCP UR QL SCN>25 NG/ML: NEGATIVE
PH SMN: 7.38 [PH] (ref 7.35–7.45)
PH UR STRIP: 7 [PH] (ref 5–8)
PO2 BLDA: 42 MMHG (ref 40–60)
POC BE: -1 MMOL/L
POC SATURATED O2: 76 % (ref 95–100)
POC TCO2: 25 MMOL/L (ref 24–29)
PROT UR QL STRIP: NEGATIVE
RBC #/AREA URNS HPF: 4 /HPF (ref 0–4)
SAMPLE: ABNORMAL
SITE: ABNORMAL
SP GR UR STRIP: 1.02 (ref 1–1.03)
SQUAMOUS #/AREA URNS HPF: 0 /HPF
TOXICOLOGY INFORMATION: ABNORMAL
URN SPEC COLLECT METH UR: ABNORMAL
UROBILINOGEN UR STRIP-ACNC: NEGATIVE EU/DL
WBC #/AREA URNS HPF: 56 /HPF (ref 0–5)

## 2022-11-30 PROCEDURE — 36415 COLL VENOUS BLD VENIPUNCTURE: CPT | Performed by: EMERGENCY MEDICINE

## 2022-11-30 PROCEDURE — 25000003 PHARM REV CODE 250: Performed by: FAMILY MEDICINE

## 2022-11-30 PROCEDURE — 96367 TX/PROPH/DG ADDL SEQ IV INF: CPT

## 2022-11-30 PROCEDURE — G0378 HOSPITAL OBSERVATION PER HR: HCPCS

## 2022-11-30 PROCEDURE — 93010 ELECTROCARDIOGRAM REPORT: CPT | Mod: ,,, | Performed by: INTERNAL MEDICINE

## 2022-11-30 PROCEDURE — S5010 5% DEXTROSE AND 0.45% SALINE: HCPCS | Performed by: FAMILY MEDICINE

## 2022-11-30 PROCEDURE — 87077 CULTURE AEROBIC IDENTIFY: CPT | Performed by: EMERGENCY MEDICINE

## 2022-11-30 PROCEDURE — 36415 COLL VENOUS BLD VENIPUNCTURE: CPT | Performed by: INTERNAL MEDICINE

## 2022-11-30 PROCEDURE — 83605 ASSAY OF LACTIC ACID: CPT | Performed by: FAMILY MEDICINE

## 2022-11-30 PROCEDURE — 81001 URINALYSIS AUTO W/SCOPE: CPT | Mod: 59 | Performed by: EMERGENCY MEDICINE

## 2022-11-30 PROCEDURE — 87186 SC STD MICRODIL/AGAR DIL: CPT | Performed by: EMERGENCY MEDICINE

## 2022-11-30 PROCEDURE — 96365 THER/PROPH/DIAG IV INF INIT: CPT

## 2022-11-30 PROCEDURE — 96361 HYDRATE IV INFUSION ADD-ON: CPT

## 2022-11-30 PROCEDURE — 96376 TX/PRO/DX INJ SAME DRUG ADON: CPT

## 2022-11-30 PROCEDURE — 87040 BLOOD CULTURE FOR BACTERIA: CPT | Mod: 59 | Performed by: FAMILY MEDICINE

## 2022-11-30 PROCEDURE — 25000003 PHARM REV CODE 250: Performed by: INTERNAL MEDICINE

## 2022-11-30 PROCEDURE — 63600175 PHARM REV CODE 636 W HCPCS: Performed by: EMERGENCY MEDICINE

## 2022-11-30 PROCEDURE — 82787 IGG 1 2 3 OR 4 EACH: CPT | Performed by: INTERNAL MEDICINE

## 2022-11-30 PROCEDURE — 96368 THER/DIAG CONCURRENT INF: CPT

## 2022-11-30 PROCEDURE — 87086 URINE CULTURE/COLONY COUNT: CPT | Performed by: EMERGENCY MEDICINE

## 2022-11-30 PROCEDURE — 63600175 PHARM REV CODE 636 W HCPCS: Performed by: FAMILY MEDICINE

## 2022-11-30 PROCEDURE — 99900035 HC TECH TIME PER 15 MIN (STAT)

## 2022-11-30 PROCEDURE — 25000003 PHARM REV CODE 250: Performed by: EMERGENCY MEDICINE

## 2022-11-30 PROCEDURE — 80307 DRUG TEST PRSMV CHEM ANLYZR: CPT | Performed by: EMERGENCY MEDICINE

## 2022-11-30 PROCEDURE — 82803 BLOOD GASES ANY COMBINATION: CPT

## 2022-11-30 PROCEDURE — 82077 ASSAY SPEC XCP UR&BREATH IA: CPT | Performed by: FAMILY MEDICINE

## 2022-11-30 PROCEDURE — 96366 THER/PROPH/DIAG IV INF ADDON: CPT

## 2022-11-30 PROCEDURE — 93010 EKG 12-LEAD: ICD-10-PCS | Mod: ,,, | Performed by: INTERNAL MEDICINE

## 2022-11-30 PROCEDURE — 83735 ASSAY OF MAGNESIUM: CPT | Performed by: FAMILY MEDICINE

## 2022-11-30 PROCEDURE — 82010 KETONE BODYS QUAN: CPT | Performed by: FAMILY MEDICINE

## 2022-11-30 PROCEDURE — 93005 ELECTROCARDIOGRAM TRACING: CPT | Performed by: INTERNAL MEDICINE

## 2022-11-30 RX ORDER — HYDRALAZINE HYDROCHLORIDE 20 MG/ML
10 INJECTION INTRAMUSCULAR; INTRAVENOUS EVERY 4 HOURS PRN
Status: DISCONTINUED | OUTPATIENT
Start: 2022-11-30 | End: 2022-12-01 | Stop reason: HOSPADM

## 2022-11-30 RX ORDER — MAGNESIUM SULFATE HEPTAHYDRATE 40 MG/ML
2 INJECTION, SOLUTION INTRAVENOUS ONCE
Status: COMPLETED | OUTPATIENT
Start: 2022-11-30 | End: 2022-11-30

## 2022-11-30 RX ORDER — SODIUM CHLORIDE 0.9 % (FLUSH) 0.9 %
10 SYRINGE (ML) INJECTION
Status: DISCONTINUED | OUTPATIENT
Start: 2022-11-30 | End: 2022-12-01 | Stop reason: HOSPADM

## 2022-11-30 RX ORDER — SIMETHICONE 80 MG
1 TABLET,CHEWABLE ORAL 4 TIMES DAILY PRN
Status: DISCONTINUED | OUTPATIENT
Start: 2022-11-30 | End: 2022-12-01 | Stop reason: HOSPADM

## 2022-11-30 RX ORDER — POTASSIUM CHLORIDE 7.45 MG/ML
10 INJECTION INTRAVENOUS ONCE
Status: COMPLETED | OUTPATIENT
Start: 2022-11-30 | End: 2022-11-30

## 2022-11-30 RX ORDER — IBUPROFEN 200 MG
24 TABLET ORAL
Status: DISCONTINUED | OUTPATIENT
Start: 2022-11-30 | End: 2022-12-01 | Stop reason: HOSPADM

## 2022-11-30 RX ORDER — DEXTROSE MONOHYDRATE AND SODIUM CHLORIDE 5; .45 G/100ML; G/100ML
INJECTION, SOLUTION INTRAVENOUS CONTINUOUS
Status: DISCONTINUED | OUTPATIENT
Start: 2022-11-30 | End: 2022-11-30

## 2022-11-30 RX ORDER — GLUCAGON 1 MG
1 KIT INJECTION
Status: DISCONTINUED | OUTPATIENT
Start: 2022-11-30 | End: 2022-12-01 | Stop reason: HOSPADM

## 2022-11-30 RX ORDER — HEPARIN SODIUM 5000 [USP'U]/ML
5000 INJECTION, SOLUTION INTRAVENOUS; SUBCUTANEOUS EVERY 8 HOURS
Status: CANCELLED | OUTPATIENT
Start: 2022-11-30

## 2022-11-30 RX ORDER — IBUPROFEN 200 MG
16 TABLET ORAL
Status: DISCONTINUED | OUTPATIENT
Start: 2022-11-30 | End: 2022-12-01 | Stop reason: HOSPADM

## 2022-11-30 RX ORDER — IPRATROPIUM BROMIDE AND ALBUTEROL SULFATE 2.5; .5 MG/3ML; MG/3ML
3 SOLUTION RESPIRATORY (INHALATION) EVERY 4 HOURS PRN
Status: DISCONTINUED | OUTPATIENT
Start: 2022-11-30 | End: 2022-11-30

## 2022-11-30 RX ORDER — SODIUM CHLORIDE 9 MG/ML
1000 INJECTION, SOLUTION INTRAVENOUS
Status: COMPLETED | OUTPATIENT
Start: 2022-11-30 | End: 2022-11-30

## 2022-11-30 RX ORDER — OXYCODONE AND ACETAMINOPHEN 5; 325 MG/1; MG/1
1 TABLET ORAL EVERY 4 HOURS PRN
Status: DISCONTINUED | OUTPATIENT
Start: 2022-11-30 | End: 2022-12-01 | Stop reason: HOSPADM

## 2022-11-30 RX ORDER — NALOXONE HCL 0.4 MG/ML
0.02 VIAL (ML) INJECTION
Status: DISCONTINUED | OUTPATIENT
Start: 2022-11-30 | End: 2022-12-01 | Stop reason: HOSPADM

## 2022-11-30 RX ORDER — POLYETHYLENE GLYCOL 3350 17 G/17G
17 POWDER, FOR SOLUTION ORAL 2 TIMES DAILY PRN
Status: DISCONTINUED | OUTPATIENT
Start: 2022-11-30 | End: 2022-12-01 | Stop reason: HOSPADM

## 2022-11-30 RX ORDER — ACETAMINOPHEN 325 MG/1
650 TABLET ORAL
Status: COMPLETED | OUTPATIENT
Start: 2022-11-30 | End: 2022-11-30

## 2022-11-30 RX ORDER — AMOXICILLIN 250 MG
1 CAPSULE ORAL 2 TIMES DAILY PRN
Status: DISCONTINUED | OUTPATIENT
Start: 2022-11-30 | End: 2022-12-01 | Stop reason: HOSPADM

## 2022-11-30 RX ORDER — HYDRALAZINE HYDROCHLORIDE 20 MG/ML
5 INJECTION INTRAMUSCULAR; INTRAVENOUS EVERY 4 HOURS PRN
Status: DISCONTINUED | OUTPATIENT
Start: 2022-11-30 | End: 2022-12-01 | Stop reason: HOSPADM

## 2022-11-30 RX ORDER — MORPHINE SULFATE 4 MG/ML
4 INJECTION, SOLUTION INTRAMUSCULAR; INTRAVENOUS
Status: COMPLETED | OUTPATIENT
Start: 2022-11-30 | End: 2022-11-30

## 2022-11-30 RX ORDER — MORPHINE SULFATE 4 MG/ML
4 INJECTION, SOLUTION INTRAMUSCULAR; INTRAVENOUS EVERY 4 HOURS PRN
Status: DISCONTINUED | OUTPATIENT
Start: 2022-11-30 | End: 2022-11-30

## 2022-11-30 RX ORDER — TALC
6 POWDER (GRAM) TOPICAL NIGHTLY PRN
Status: DISCONTINUED | OUTPATIENT
Start: 2022-11-30 | End: 2022-12-01 | Stop reason: HOSPADM

## 2022-11-30 RX ORDER — ONDANSETRON 2 MG/ML
4 INJECTION INTRAMUSCULAR; INTRAVENOUS EVERY 6 HOURS PRN
Status: DISCONTINUED | OUTPATIENT
Start: 2022-11-30 | End: 2022-12-01 | Stop reason: HOSPADM

## 2022-11-30 RX ORDER — URSODIOL 300 MG/1
300 CAPSULE ORAL 2 TIMES DAILY
Status: DISCONTINUED | OUTPATIENT
Start: 2022-11-30 | End: 2022-12-01 | Stop reason: HOSPADM

## 2022-11-30 RX ORDER — ACETAMINOPHEN 325 MG/1
650 TABLET ORAL EVERY 8 HOURS PRN
Status: CANCELLED | OUTPATIENT
Start: 2022-11-30

## 2022-11-30 RX ORDER — HYDROCODONE BITARTRATE AND ACETAMINOPHEN 5; 325 MG/1; MG/1
1 TABLET ORAL EVERY 4 HOURS PRN
Status: CANCELLED | OUTPATIENT
Start: 2022-11-30

## 2022-11-30 RX ORDER — SODIUM CHLORIDE 9 MG/ML
INJECTION, SOLUTION INTRAVENOUS CONTINUOUS
Status: DISCONTINUED | OUTPATIENT
Start: 2022-11-30 | End: 2022-11-30

## 2022-11-30 RX ORDER — DEXTROSE MONOHYDRATE AND SODIUM CHLORIDE 5; .45 G/100ML; G/100ML
INJECTION, SOLUTION INTRAVENOUS ONCE
Status: DISCONTINUED | OUTPATIENT
Start: 2022-11-30 | End: 2022-11-30

## 2022-11-30 RX ADMIN — MORPHINE SULFATE 4 MG: 4 INJECTION, SOLUTION INTRAMUSCULAR; INTRAVENOUS at 02:11

## 2022-11-30 RX ADMIN — POTASSIUM CHLORIDE 10 MEQ: 7.46 INJECTION, SOLUTION INTRAVENOUS at 01:11

## 2022-11-30 RX ADMIN — MORPHINE SULFATE 4 MG: 4 INJECTION, SOLUTION INTRAMUSCULAR; INTRAVENOUS at 09:11

## 2022-11-30 RX ADMIN — MORPHINE SULFATE 4 MG: 4 INJECTION, SOLUTION INTRAMUSCULAR; INTRAVENOUS at 05:11

## 2022-11-30 RX ADMIN — THIAMINE HYDROCHLORIDE 100 MG: 100 INJECTION, SOLUTION INTRAMUSCULAR; INTRAVENOUS at 08:11

## 2022-11-30 RX ADMIN — PIPERACILLIN SODIUM AND TAZOBACTAM SODIUM 3.38 G: 3; .375 INJECTION, POWDER, LYOPHILIZED, FOR SOLUTION INTRAVENOUS at 05:11

## 2022-11-30 RX ADMIN — THIAMINE HYDROCHLORIDE 100 MG: 100 INJECTION, SOLUTION INTRAMUSCULAR; INTRAVENOUS at 05:11

## 2022-11-30 RX ADMIN — MAGNESIUM SULFATE HEPTAHYDRATE 2 G: 40 INJECTION, SOLUTION INTRAVENOUS at 01:11

## 2022-11-30 RX ADMIN — DEXTROSE AND SODIUM CHLORIDE: 5; .45 INJECTION, SOLUTION INTRAVENOUS at 06:11

## 2022-11-30 RX ADMIN — MORPHINE SULFATE 4 MG: 4 INJECTION, SOLUTION INTRAMUSCULAR; INTRAVENOUS at 01:11

## 2022-11-30 RX ADMIN — URSODIOL 300 MG: 300 CAPSULE ORAL at 11:11

## 2022-11-30 RX ADMIN — SODIUM CHLORIDE 1000 ML: 0.9 INJECTION, SOLUTION INTRAVENOUS at 12:11

## 2022-11-30 RX ADMIN — POTASSIUM BICARBONATE 50 MEQ: 977.5 TABLET, EFFERVESCENT ORAL at 05:11

## 2022-11-30 RX ADMIN — ACETAMINOPHEN 650 MG: 325 TABLET ORAL at 08:11

## 2022-11-30 RX ADMIN — SODIUM CHLORIDE: 0.9 INJECTION, SOLUTION INTRAVENOUS at 12:11

## 2022-11-30 RX ADMIN — OXYCODONE AND ACETAMINOPHEN 1 TABLET: 5; 325 TABLET ORAL at 07:11

## 2022-11-30 RX ADMIN — OXYCODONE AND ACETAMINOPHEN 1 TABLET: 5; 325 TABLET ORAL at 11:11

## 2022-11-30 RX ADMIN — URSODIOL 300 MG: 300 CAPSULE ORAL at 10:11

## 2022-11-30 NOTE — PLAN OF CARE
[facility] - Emergency Dept  Initial Discharge Assessment       Primary Care Provider: Primary Doctor No    Admission Diagnosis: Alcoholic ketoacidosis [E87.29]    Admission Date: 11/29/2022  Expected Discharge Date:      met with Pt at bedside to complete discharge assessment. Pt AAOx4s. Demographics and insurance verified. Pt has no PCP. No home health. No dialysis. Pt reports ability to complete ADLs with the assistance of: grab bars and a raised toilet. Pt verbalized plan to discharge home via family transport. Pt has no other needs to be addressed at this time.    Discharge Barriers Identified: None    Payor: HU[employer] MANAGED MEDICARE / Plan: HU[employer]NA MEDICARE PPO / Product Type: Medicare Advantage /     Extended Emergency Contact Information  Primary Emergency Contact: Bhargavi Mariee   [facility]  Home Phone: [phone]  Mobile Phone: [phone]  Relation: Sister    Discharge Plan A: Home  Discharge Plan B: Home      [facility] [address] [facility], [place] - [address]  [address]  [facility] [address]  Phone: [phone] Fax: [phone]      Initial Assessment (most recent)       Adult Discharge Assessment - 11/30/22 1215          Discharge Assessment    Assessment Type Discharge Planning Assessment     Confirmed/corrected address, phone number and insurance Yes     Confirmed Demographics Correct on Facesheet     Source of Information patient     Does patient/caregiver understand observation status Yes     Reason For Admission Alcoholic ketoacidosis     Lives With alone     Facility Arrived From: [facility]     Do you expect to return to your current living situation? Yes     Do you have help at home or someone to help you manage your care at home? Yes     Who are your caregiver(s) and their phone number(s)? Bhargavi Mariee (Sister)   [phone] (Mobile)     Prior to hospitilization cognitive status: Alert/Oriented     Current cognitive status: Alert/Oriented      Walking or Climbing Stairs Difficulty none     Dressing/Bathing Difficulty bathing difficulty, requires equipment     Dressing/Bathing Management Grab bars, raised toilet     Home Accessibility wheelchair accessible     Home Layout Able to live on 1st floor     Equipment Currently Used at Home grab bar;raised toilet     Readmission within 30 days? No     Patient currently being followed by outpatient case management? No     Do you currently have service(s) that help you manage your care at home? No     Do you take prescription medications? Yes     Do you have prescription coverage? Yes     Coverage Payor:  HU[employer] MANAGED MEDICARE - [employer] MEDICARE PPO/ [place] [employer]     Do you have any problems affording any of your prescribed medications? No     Is the patient taking medications as prescribed? yes     Who is going to help you get home at discharge? MarieeBhargavi (Sister)   [phone] (Mobile)     How do you get to doctors appointments? family or friend will provide     Are you on dialysis? No     Do you take coumadin? No     Discharge Plan A Home     Discharge Plan B Home     DME Needed Upon Discharge  none     Discharge Plan discussed with: Patient     Discharge Barriers Identified None

## 2022-11-30 NOTE — CONSULTS
GASTROENTEROLOGY INPATIENT CONSULT NOTE  Patient Name: Vic Reyez  Patient MRN: [id number]  Patient : 1950    Admit Date: 2022  Service date: 2022    Reason for Consult: abd pain    PCP: Primary Doctor No    Chief Complaint   Patient presents with    Abdominal Pain     Upper abd pain radiating to his back x 1 hr.  AAA repair 1 month ago       HPI: Patient is a 72 y.o. 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 Acute / subacute, intermittent, progressive on admission. No bleeding and states stopped EtOH last month.   Followed by advanced GI / hepatology services at [facility] in [facility] as well.  .    CHART REVIEW:   CT  - CAD/PVD; splenic artery embolization; biliary stent in place; stable TOP cyst; tics  EUS  - acute / chronic panc w/ focal irregularity in HOP s/p FNA. ?IgG4?  MRI  - cysts in L intrahepatics; panc tail cysts  ERCP 10/'22 - distal CBD stricture w/ beading L intrahepatics w/p 10x5 stent  CT Abd  - 5 cm fluid collection near panc / greater curve; vascular dz; min intrahepatic dilation  ERCP  - resolution of biliary stricture s/p stent extraction  EUS  - pancreatitis changes s/p FNA; portal LAD s/p FNA; panc cyst; All bx negative  ERCP  - sphincterotomy, stent, brush of hepatic duct stricture. Atypical cells.     Past Medical History:  Past Medical History:   Diagnosis Date    Alcohol abuse 2022    Decompensated hepatic cirrhosis 2022    Encounter for blood transfusion     Hypertension     Lab test positive for detection of COVID-19 virus 2021    Pancreatic cyst 2022        Past Surgical History:  Past Surgical History:   Procedure Laterality Date    ABDOMINAL AORTOGRAPHY N/A 10/4/2022    Procedure: AORTOGRAM-ABDOMINAL;  Surgeon: Felice Epstein MD;  Location: [facility] CATH/EP LAB;   Service: Vascular;  Laterality: N/A;    APPENDECTOMY      ARTERIOGRAM, MESENTERIC N/A 10/4/2022    Procedure: ARTERIOGRAM, MESENTERIC;  Surgeon: Felice Epstein MD;  Location: [facility] CATH/EP LAB;  Service: Vascular;  Laterality: N/A;    COLONOSCOPY      ENDOSCOPIC ULTRASOUND OF UPPER GASTROINTESTINAL TRACT  02/04/2022    Procedure: ULTRASOUND, UPPER GI TRACT, ENDOSCOPIC;  Surgeon: Chuy Bay MD;  Location: [facility] ENDO (2ND FLR);  Service: Endoscopy;;    ENDOSCOPIC ULTRASOUND OF UPPER GASTROINTESTINAL TRACT N/A 6/3/2022    Procedure: ULTRASOUND, UPPER GI TRACT, ENDOSCOPIC;  Surgeon: Roger Viveros III, MD;  Location: [facility] ENDO;  Service: Endoscopy;  Laterality: N/A;    ENDOSCOPIC ULTRASOUND OF UPPER GASTROINTESTINAL TRACT N/A 11/7/2022    Procedure: ULTRASOUND, UPPER GI TRACT, ENDOSCOPIC;  Surgeon: Roger Viveros III, MD;  Location: [facility] ENDO;  Service: Endoscopy;  Laterality: N/A;    ERCP N/A 02/04/2022    Procedure: ERCP (ENDOSCOPIC RETROGRADE CHOLANGIOPANCREATOGRAPHY);  Surgeon: Chuy Bay MD;  Location: [facility] ENDO (2ND FLR);  Service: Endoscopy;  Laterality: N/A;    ERCP N/A 04/19/2022    Procedure: ERCP (ENDOSCOPIC RETROGRADE CHOLANGIOPANCREATOGRAPHY);  Surgeon: Chuy Bay MD;  Location: [facility] (2ND FLR);  Service: Endoscopy;  Laterality: N/A;  4/1: fully vaccinated. labs prior. instructions mailed to sister and patient.-SC  4/12/22-Confirmed new arrival time of 10:45am with pt's sister and updated instructions emailed-DS    ERCP N/A 10/5/2022    Procedure: ERCP (ENDOSCOPIC RETROGRADE CHOLANGIOPANCREATOGRAPHY);  Surgeon: Roger Viveros III, MD;  Location: [facility] ENDO;  Service: Endoscopy;  Laterality: N/A;    EYE SURGERY      JOINT REPLACEMENT      KNEE SURGERY      RECONSTRUCTION OF SHOULDER Right     TONSILLECTOMY      UPPER ENDOSCOPIC ULTRASOUND W/ FNA  06/03/2022        Home Medications:  (Not in a hospital admission)      Inpatient Medications:   dextrose 5 % and 0.45 % NaCl    "Intravenous Once    piperacillin-tazobactam (ZOSYN) IVPB  3.375 g Intravenous Q8H    thiamine (VITAMIN B1) IVPB  100 mg Intravenous TID     hydrALAZINE, hydrALAZINE, morphine    Review of patient's allergies indicates:  No Known Allergies    Social History:   Social History     Occupational History    Not on file   Tobacco Use    Smoking status: Every Day     Packs/day: 0.25     Types: Cigarettes    Smokeless tobacco: Never   Substance and Sexual Activity    Alcohol use: Yes     Alcohol/week: 4.0 standard drinks     Types: 4 Cans of beer per week    Drug use: Never    Sexual activity: Not Currently       Family History:   No family history on file.    Review of Systems:  A 10 point review of systems was performed and was normal, except as mentioned in the HPI, including constitutional, HEENT, heme, lymph, cardiovascular, respiratory, gastrointestinal, genitourinary, neurologic, endocrine, psychiatric and musculoskeletal.      OBJECTIVE:    Physical Exam:  24 Hour Vital Sign Ranges: Temp:  [97.1 °F (36.2 °C)-98.5 °F (36.9 °C)] 98.5 °F (36.9 °C)  Pulse:  [81-92] 82  Resp:  [16-26] 18  SpO2:  [98 %-100 %] 98 %  BP: (141-180)/() 141/87  Most recent vitals: BP (!) 141/87   Pulse 82   Temp 98.5 °F (36.9 °C) (Oral)   Resp 18   Ht 5' 11" (1.803 m)   Wt 59 kg (130 lb)   SpO2 98%   BMI 18.13 kg/m²    GEN: well-developed, well-nourished, awake and alert, non-toxic appearing adult  HEENT: PERRL, sclera anicteric, oral mucosa pink and moist without lesion  NECK: trachea midline; Good ROM  CV: regular rate and rhythm, no murmurs or gallops  RESP: clear to auscultation bilaterally, no wheezes, rhonci or rales  ABD: soft, non-tender, non-distended, normal bowel sounds  EXT: no swelling or edema, 2+ pulses distally  SKIN: no rashes or jaundice  PSYCH: normal affect    Labs:   Recent Labs     11/29/22  2248   WBC 6.29   MCV 90        Recent Labs     11/29/22  2248   *   K 3.2*      CO2 17*   BUN 9 "   *     No results for input(s): ALB in the last 72 hours.    Invalid input(s): ALKP, SGOT, SGPT, TBIL, DBIL, TPRO  Recent Labs     11/29/22  2248   INR 1.2         Radiology Review:  US Abdomen Limited (Gallbladder)   Final Result      CTA Chest Abdomen Pelvis   Final Result      X-Ray Chest AP Portable   Final Result            IMPRESSION / RECOMMENDATIONS:  72 y.o. 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 Suspect patient w/ EtOH pancreatic disease in combination w/ IgG4 pancreatic / biliary disease.     -Send IgG4  / elastase  -EtOH cessation  -Consider steroids / imuran if IgG4 elevated and then ERCP in a few weeks w/ stent removal and re-evaluation to see if resolution     Thank you for this consult.    Roger Viveros III  11/30/2022  7:09 AM

## 2022-11-30 NOTE — H&P
[facility] Medicine   History & Physical     Patient Name: Vic Reyez  MRN: [id number]  Admission Date: 11/29/2022 10:32 PM  Attending Physician: Beverly Meza MD  Face-to-Face encounter date: 11/30/2022 4:33 AM    Patient information was obtained from patient, past medical records, ER physician, and ER records.     HISTORY OF PRESENT ILLNESS:     Vic Reyez is a 72 y.o. White male   With PMH of alcoholic liver cirrhosis,  chronic pancreatitis with pseudocyst,  biliary stricture s/p stent replacement,  recently coiled splenic artery aneurysm,   who presents with abdominal pain.    Onset today  Located epigastric region  Radiating to his back  Progressively worsening  Alleviated with pain medication  Discharged earlier this month for similar presentation  Was treated for conditions noted above  +nausea with vomiting  No blood in vomitus   No diarrhea  No fever or chills  +reports blood in stool  Pt is a known alcoholic  Today he reports he has stopped drinking  No CP  No SOB    REVIEW OF SYSTEMS:     All systems reviewed and are negative except as noted per above.    PAST MEDICAL HISTORY:     Past Medical History:   Diagnosis Date    Alcohol abuse 02/02/2022    Decompensated hepatic cirrhosis 02/02/2022    Encounter for blood transfusion     Hypertension     Lab test positive for detection of COVID-19 virus 09/2021    Pancreatic cyst 06/03/2022       PAST SURGICAL HISTORY:     Past Surgical History:   Procedure Laterality Date    ABDOMINAL AORTOGRAPHY N/A 10/4/2022    Procedure: AORTOGRAM-ABDOMINAL;  Surgeon: Felice Epstein MD;  Location: [facility] CATH/EP LAB;  Service: Vascular;  Laterality: N/A;    APPENDECTOMY      ARTERIOGRAM, MESENTERIC N/A 10/4/2022    Procedure: ARTERIOGRAM, MESENTERIC;  Surgeon: Felice Epstein MD;  Location: [facility] CATH/EP LAB;  Service: Vascular;  Laterality: N/A;    COLONOSCOPY      ENDOSCOPIC ULTRASOUND OF UPPER GASTROINTESTINAL TRACT  02/04/2022    Procedure:  ULTRASOUND, UPPER GI TRACT, ENDOSCOPIC;  Surgeon: Chuy Bay MD;  Location: [facility] ENDO (2ND FLR);  Service: Endoscopy;;    ENDOSCOPIC ULTRASOUND OF UPPER GASTROINTESTINAL TRACT N/A 6/3/2022    Procedure: ULTRASOUND, UPPER GI TRACT, ENDOSCOPIC;  Surgeon: Roger Viveros III, MD;  Location: [facility] ENDO;  Service: Endoscopy;  Laterality: N/A;    ENDOSCOPIC ULTRASOUND OF UPPER GASTROINTESTINAL TRACT N/A 11/7/2022    Procedure: ULTRASOUND, UPPER GI TRACT, ENDOSCOPIC;  Surgeon: Roger Viveros III, MD;  Location: [facility] ENDO;  Service: Endoscopy;  Laterality: N/A;    ERCP N/A 02/04/2022    Procedure: ERCP (ENDOSCOPIC RETROGRADE CHOLANGIOPANCREATOGRAPHY);  Surgeon: Chuy Bay MD;  Location: [facility] ENDO (2ND FLR);  Service: Endoscopy;  Laterality: N/A;    ERCP N/A 04/19/2022    Procedure: ERCP (ENDOSCOPIC RETROGRADE CHOLANGIOPANCREATOGRAPHY);  Surgeon: Chuy Bay MD;  Location: [facility] (2ND FLR);  Service: Endoscopy;  Laterality: N/A;  4/1: fully vaccinated. labs prior. instructions mailed to sister and patient.-SC  4/12/22-Confirmed new arrival time of 10:45am with pt's sister and updated instructions emailed-DS    ERCP N/A 10/5/2022    Procedure: ERCP (ENDOSCOPIC RETROGRADE CHOLANGIOPANCREATOGRAPHY);  Surgeon: Roger Viveros III, MD;  Location: [facility];  Service: Endoscopy;  Laterality: N/A;    EYE SURGERY      JOINT REPLACEMENT      KNEE SURGERY      RECONSTRUCTION OF SHOULDER Right     TONSILLECTOMY      UPPER ENDOSCOPIC ULTRASOUND W/ FNA  06/03/2022       ALLERGIES:   Patient has no known allergies.    FAMILY HISTORY:   HTN    SOCIAL HISTORY:     Social History     Tobacco Use    Smoking status: Every Day     Packs/day: 0.25     Types: Cigarettes    Smokeless tobacco: Never   Substance Use Topics    Alcohol use: Yes     Alcohol/week: 4.0 standard drinks     Types: 4 Cans of beer per week        Social History     Substance and Sexual Activity   Sexual Activity Not Currently        HOME  "MEDICATIONS:     Prior to Admission medications    Medication Sig Start Date End Date Taking? Authorizing Provider   furosemide (LASIX) 20 MG tablet Take 1 tablet (20 mg total) by mouth once daily. 11/18/22 12/18/22 Yes Juany Morgan NP   spironolactone (ALDACTONE) 25 MG tablet Take 1 tablet (25 mg total) by mouth once daily. 11/18/22 12/18/22 Yes Juany Morgan NP   thiamine 100 MG tablet Take 1 tablet (100 mg total) by mouth once daily. 2/4/22 1/30/23 Yes Mayte Bai MD   ursodioL (ACTIGALL) 300 mg capsule Take 1 capsule (300 mg total) by mouth 2 (two) times daily. 11/18/22 12/18/22 Yes Juany Morgan NP   aspirin (ECOTRIN) 81 MG EC tablet Take 81 mg by mouth once daily.  11/30/22  Historical Provider   calcium-vitamin D (OSCAL) 250 (625)-125 mg-unit per tablet Take 1 tablet by mouth once daily.  11/30/22  Historical Provider   docusate sodium (COLACE) 50 MG capsule Take by mouth 2 (two) times daily.  11/30/22  Historical Provider   ferrous fumarate 324 mg (106 mg iron) Tab Take 1 tablet by mouth once daily at 6am.  11/30/22  Historical Provider   folic acid (FOLVITE) 1 MG tablet Take 1 tablet (1 mg total) by mouth once daily. 2/4/22 11/30/22  Mayte Bai MD   multivitamin with folic acid (DAILY-ROHAN, WITH FOLIC ACID,) 400 mcg Tab Take 1 tablet by mouth once daily. 2/4/22 11/30/22  Mayte Bai MD   oxyCODONE-acetaminophen (PERCOCET) 7.5-325 mg per tablet Take 1 tablet by mouth every 6 (six) hours as needed for Pain. 10/6/22 11/30/22  Padmini Kemp MD   pravastatin (PRAVACHOL) 40 MG tablet Take 40 mg by mouth once daily.  11/30/22  Historical Provider       PHYSICAL EXAM:     BP (!) 169/96   Pulse 85   Temp 97.1 °F (36.2 °C) (Axillary)   Resp 20   Ht 5' 11" (1.803 m)   Wt 59 kg (130 lb)   SpO2 100%   BMI 18.13 kg/m²     Gen: alert, responsive  HEENT:  Eyes - no pallor  External ears with no lesions  Nares patent  Mouth - lips chapped  CV: RRR  Lungs: CTA B/L  Abd: +BS, soft, +generalized " TTP without rebound or guarding, ND  Ext: no atrophy or edema  Skin: warm, dry  Neuro: alert  Psych: calm    LABS AND IMAGING:     Labs Reviewed   APTT - Abnormal; Notable for the following components:       Result Value    aPTT 40.1 (*)     All other components within normal limits   CBC W/ AUTO DIFFERENTIAL - Abnormal; Notable for the following components:    RBC 4.19 (*)     Hemoglobin 12.5 (*)     Hematocrit 37.7 (*)     All other components within normal limits   COMPREHENSIVE METABOLIC PANEL - Abnormal; Notable for the following components:    Sodium 131 (*)     Potassium 3.2 (*)     CO2 17 (*)     Glucose 137 (*)     Total Bilirubin 1.4 (*)     Alkaline Phosphatase 621 (*)     AST 66 (*)     All other components within normal limits   MAGNESIUM - Abnormal; Notable for the following components:    Magnesium 1.5 (*)     All other components within normal limits   PROTIME-INR - Abnormal; Notable for the following components:    PT 14.7 (*)     All other components within normal limits   LIPASE - Abnormal; Notable for the following components:    Lipase 78 (*)     All other components within normal limits   URINALYSIS, REFLEX TO URINE CULTURE - Abnormal; Notable for the following components:    Ketones, UA Trace (*)     Leukocytes, UA 2+ (*)     All other components within normal limits    Narrative:     Specimen Source->Urine   URINALYSIS MICROSCOPIC - Abnormal; Notable for the following components:    WBC, UA 56 (*)     Hyaline Casts, UA 10 (*)     All other components within normal limits    Narrative:     Specimen Source->Urine   ISTAT CREATININE - Abnormal; Notable for the following components:    POC Creatinine 0.4 (*)     All other components within normal limits   CULTURE, URINE   CULTURE, BLOOD   CULTURE, BLOOD   TROPONIN I HIGH SENSITIVITY   B-TYPE NATRIURETIC PEPTIDE   DRUG SCREEN PANEL, URINE EMERGENCY   ALCOHOL,MEDICAL (ETHANOL)   BETA - HYDROXYBUTYRATE, SERUM   LACTIC ACID, PLASMA   DRUG SCREEN  PANEL, URINE EMERGENCY   MAGNESIUM   TYPE & SCREEN       Imaging Results              US Abdomen Limited (Gallbladder) (Final result)  Result time 11/30/22 02:37:52      Final result by Chuy Cobos MD (11/30/22 02:37:52)                   Narrative:    Ultrasound right upper quadrant on 11/30/2022    Clinical indication: Generalized abdominal pain    COMPARISON: CT from 11/29/2022    FINDINGS: Multiple sonographic images are obtained throughout the right upper quadrant, both transverse and sagittal images are obtained. Visualized pancreas is unremarkable. Common duct measures 9 mm which is mildly dilated. The patient has a known common duct stent in place. The portal vein is patent and with a normal directional flow. Small amount of sludge is noted in the gallbladder. Gallbladder is mildly distended. No shadowing gallstones are noted. Visualized liver is mildly heterogeneous but without focal liver lesion. Right kidney shows no hydronephrosis.    IMPRESSION:  1.  Mildly distended gallbladder with small amount of sludge in the gallbladder. If there is high clinical concern for gallbladder disease consider correlation with hepatobiliary scan.  2.  Mildly dilated common duct with known common duct stent in place.    Electronically signed by:  Kyrie Cboos  11/30/2022 2:37 AM CST Workstation: [id number]                                     CTA Chest Abdomen Pelvis (Final result)  Result time 11/30/22 00:40:17      Final result by Chuy Cobos MD (11/30/22 00:40:17)                   Narrative:      CT angiogram chest, abdomen and pelvis with contrast on 11/29/2022    CLINICAL INDICATION: Aortic aneurysm, chest pain    TECHNIQUE: Multiple axial images are obtained throughout the chest, abdomen and pelvis following the administration of IV contrast.  Computer generated 3D reconstructions/MIPS were performed. This exam was performed according to our departmental dose-optimization program, which includes  automated exposure control, adjustment of the mA and/or kV according to patient size and/or use of iterative reconstruction technique.  Total DLP is 405 mGycm.    COMPARISON: 11/18/2022    FINDINGS:    CHEST: Mild coronary artery calcifications and other vascular calcifications are noted. There is evidence of calcified granulomatous disease in the chest. There is no thoracic aortic aneurysm or dissection. There are three great vessels originating off the arch without stenosis of the proximal great vessels. There is no pleural or pericardial effusion. There is no thoracic adenopathy. There are no filling defects within the pulmonary arteries to suggest pulmonary embolus. There is minimal peripheral atelectasis or scarring bilaterally. Mild paraseptal emphysema is noted. Mild bronchial wall thickening suggesting mild bronchitis. Degenerative changes are noted in the spine. No acute bony abnormality of the thorax is noted.    ABDOMEN: There is again noted evidence of prior splenic artery coil embolization procedure. Stable wedge-shaped low-density areas are noted in the upper spleen consistent with prior splenic infarcts. There is no abdominal aortic aneurysm or dissection. The celiac, SMA and HITESH are patent. There is calcified plaque at the origin of the SMA producing an approximate 50% short segment stenosis. There are single bilateral renal arteries without significant renal artery stenosis. There is stable cystic lesion along the pancreatic tail likely represents an old pancreatic pseudocyst. No definite CT evidence of acute pancreatitis is noted. The solid abdominal organs are otherwise unremarkable. There is no abdominal adenopathy. There is no free air in the abdomen. A very small amount of ascites is noted in the abdomen. The abdominal portion of the GI tract is unremarkable.    Pelvis: No significant iliac or proximal femoral arterial disease is noted. The prostate is enlarged, please correlate with physical  exam and PSA levels. There is diverticulosis. The pelvic portion of the GI tract is otherwise unremarkable. No free fluid is noted in the pelvis. There is no pelvic adenopathy. Degenerative changes are noted in the spine.    IMPRESSION:  1.  No evidence of thoracic or abdominal aortic aneurysm or dissection and no evidence of pulmonary embolus.  2.  Mild paraseptal emphysema with findings suggesting mild bronchitis as well.  3.  Prostate enlargement.  4.  Very small amount of ascites in the abdomen.  5.  Diverticulosis.    Electronically signed by:  Kyrie Cobos  11/30/2022 12:40 AM [facility] Workstation: [id number]                                     X-Ray Chest AP Portable (In process)                     ASSESSMENT & PLAN:   Vic Reyez is a 72 y.o. male admitted for    Active Hospital Problems    Diagnosis  POA    *Alcoholic ketoacidosis [E87.29]  Yes    Chronic pancreatitis [K86.1]  Yes    CONCERN FOR Choledocholithiasis with obstruction [K80.51]  Yes    Alcohol use disorder, severe, dependence [F10.20]  Yes     Chronic    Primary hypertension [I10]  Yes     Chronic    Hypokalemia [E87.6]  Yes      Resolved Hospital Problems   No resolved problems to display.        Plan    Chronic pancreatitis  Concern for alcoholic ketoacidosis  Concern for choledocholithiasis   ?Blood in stool  Alcohol Use disorder  - EtOH, UDS, lactic acid, Bhb  - thiamine  - D5 1/2NS  - restart home diuretics when appropriate  - trending LFTs, INR  - FOBT, trending CBC  - pain control  - infection workup in progress  - empiric zosyn, de-escalate prn  - GI consulted, thank you    Chronic conditions as noted above/below; home medications reviewed personally by me and restarted as appropriate  Electrolyte derangement:  Trending BMP; Mg; replacement prn  DVT ppx: SCDs; lovenox held due to report of blood in stool, FOBT pending    Beverly Meza MD  [facility] Hospitalist  11/30/2022

## 2022-11-30 NOTE — PLAN OF CARE
met with Pt at bedside to complete MOON. Pt was explained PEREZ. Pt verbalized understanding of PEREZ and signed. PEREZ scanned to .       11/30/22 1208   PEREZ Message   Medicare Outpatient and Observation Notification regarding financial responsibility Given to patient/caregiver;Explained to patient/caregiver;Signed/date by patient/caregiver   Date PEREZ was signed 11/30/22   Time PEREZ was signed 1206

## 2022-11-30 NOTE — ED PROVIDER NOTES
Encounter Date: 11/29/2022       History     Chief Complaint   Patient presents with    Abdominal Pain     Upper abd pain radiating to his back x 1 hr.  AAA repair 1 month ago     Patient presents emergency department with reported abdominal pain that radiates into his back onset approximately an hour ago patient has a history of a splenic artery aneurysm which was coiled by Dr. Epstein in October patient does have a history of pancreatitis urinated alcohol abuse he has history of hepatic cirrhosis he states that he has stopped drinking he denies any fever chills he denies any diarrhea no noted blood in his stool he does report some nausea vomiting associated with the pain no noted blood when he vomited      Review of patient's allergies indicates:  No Known Allergies  Past Medical History:   Diagnosis Date    Alcohol abuse 02/02/2022    Decompensated hepatic cirrhosis 02/02/2022    Encounter for blood transfusion     Hypertension     Lab test positive for detection of COVID-19 virus 09/2021    Pancreatic cyst 06/03/2022     Past Surgical History:   Procedure Laterality Date    ABDOMINAL AORTOGRAPHY N/A 10/4/2022    Procedure: AORTOGRAM-ABDOMINAL;  Surgeon: Felice Epstein MD;  Location: [facility] CATH/EP LAB;  Service: Vascular;  Laterality: N/A;    APPENDECTOMY      ARTERIOGRAM, MESENTERIC N/A 10/4/2022    Procedure: ARTERIOGRAM, MESENTERIC;  Surgeon: Felice Epstein MD;  Location: [facility] CATH/EP LAB;  Service: Vascular;  Laterality: N/A;    COLONOSCOPY      ENDOSCOPIC ULTRASOUND OF UPPER GASTROINTESTINAL TRACT  02/04/2022    Procedure: ULTRASOUND, UPPER GI TRACT, ENDOSCOPIC;  Surgeon: Chuy Bay MD;  Location: [facility] ([address] [place] [address]);  Service: Endoscopy;;    ENDOSCOPIC ULTRASOUND OF UPPER GASTROINTESTINAL TRACT N/A 6/3/2022    Procedure: ULTRASOUND, UPPER GI TRACT, ENDOSCOPIC;  Surgeon: Roger Viveros III, MD;  Location: [facility] ENDO;  Service: Endoscopy;  Laterality: N/A;    ENDOSCOPIC ULTRASOUND OF UPPER  GASTROINTESTINAL TRACT N/A 11/7/2022    Procedure: ULTRASOUND, UPPER GI TRACT, ENDOSCOPIC;  Surgeon: Roger Viveros III, MD;  Location: [facility] ENDO;  Service: Endoscopy;  Laterality: N/A;    ERCP N/A 02/04/2022    Procedure: ERCP (ENDOSCOPIC RETROGRADE CHOLANGIOPANCREATOGRAPHY);  Surgeon: Chuy Bay MD;  Location: [facility] ENDO (2ND FLR);  Service: Endoscopy;  Laterality: N/A;    ERCP N/A 04/19/2022    Procedure: ERCP (ENDOSCOPIC RETROGRADE CHOLANGIOPANCREATOGRAPHY);  Surgeon: Chuy Bay MD;  Location: [facility] (2ND FLR);  Service: Endoscopy;  Laterality: N/A;  4/1: fully vaccinated. labs prior. instructions mailed to sister and patient.-SC  4/12/22-Confirmed new arrival time of 10:45am with pt's sister and updated instructions emailed-DS    ERCP N/A 10/5/2022    Procedure: ERCP (ENDOSCOPIC RETROGRADE CHOLANGIOPANCREATOGRAPHY);  Surgeon: Roger Viveros III, MD;  Location: [facility];  Service: Endoscopy;  Laterality: N/A;    EYE SURGERY      JOINT REPLACEMENT      KNEE SURGERY      RECONSTRUCTION OF SHOULDER Right     TONSILLECTOMY      UPPER ENDOSCOPIC ULTRASOUND W/ FNA  06/03/2022     No family history on file.  Social History     Tobacco Use    Smoking status: Every Day     Packs/day: 0.25     Types: Cigarettes    Smokeless tobacco: Never   Substance Use Topics    Alcohol use: Yes     Alcohol/week: 4.0 standard drinks     Types: 4 Cans of beer per week    Drug use: Never     Review of Systems   Constitutional:  Negative for chills and fever.   HENT:  Negative for congestion.    Respiratory:  Negative for cough and shortness of breath.    Cardiovascular:  Negative for leg swelling.   Gastrointestinal:  Positive for abdominal pain, nausea and vomiting. Negative for blood in stool and diarrhea.   Genitourinary:  Negative for dysuria and flank pain.   Skin:  Negative for rash.   All other systems reviewed and are negative.    Physical Exam     Initial Vitals [11/29/22 2235]   BP Pulse Resp Temp SpO2   (!)  162/91 86 (!) 26 97.1 °F (36.2 °C) 100 %      MAP       --         Physical Exam    Constitutional: He appears well-developed and well-nourished. He appears distressed.   HENT:   Head: Normocephalic and atraumatic.   Right Ear: External ear normal.   Left Ear: External ear normal.   Mouth/Throat: Oropharynx is clear and moist.   Eyes: Pupils are equal, round, and reactive to light.   Neck: Neck supple.   Normal range of motion.  Cardiovascular:  Normal rate, regular rhythm, S1 normal, S2 normal, normal heart sounds and intact distal pulses.           Pulmonary/Chest: Breath sounds normal.   Abdominal: Abdomen is soft. Bowel sounds are normal. He exhibits distension. There is abdominal tenderness.   Musculoskeletal:         General: Normal range of motion.      Cervical back: Normal range of motion and neck supple.     Neurological: He is alert and oriented to person, place, and time. He has normal strength. GCS score is 15. GCS eye subscore is 4. GCS verbal subscore is 5. GCS motor subscore is 6.   Skin: Skin is warm and dry. Capillary refill takes less than 2 seconds. No rash noted.   Psychiatric: He has a normal mood and affect. His behavior is normal.       ED Course   Procedures  Labs Reviewed   APTT - Abnormal; Notable for the following components:       Result Value    aPTT 40.1 (*)     All other components within normal limits   CBC W/ AUTO DIFFERENTIAL - Abnormal; Notable for the following components:    RBC 4.19 (*)     Hemoglobin 12.5 (*)     Hematocrit 37.7 (*)     All other components within normal limits   COMPREHENSIVE METABOLIC PANEL - Abnormal; Notable for the following components:    Sodium 131 (*)     Potassium 3.2 (*)     CO2 17 (*)     Glucose 137 (*)     Total Bilirubin 1.4 (*)     Alkaline Phosphatase 621 (*)     AST 66 (*)     All other components within normal limits   MAGNESIUM - Abnormal; Notable for the following components:    Magnesium 1.5 (*)     All other components within normal  limits   PROTIME-INR - Abnormal; Notable for the following components:    PT 14.7 (*)     All other components within normal limits   LIPASE - Abnormal; Notable for the following components:    Lipase 78 (*)     All other components within normal limits   URINALYSIS, REFLEX TO URINE CULTURE - Abnormal; Notable for the following components:    Ketones, UA Trace (*)     Leukocytes, UA 2+ (*)     All other components within normal limits    Narrative:     Specimen Source->Urine   URINALYSIS MICROSCOPIC - Abnormal; Notable for the following components:    WBC, UA 56 (*)     Hyaline Casts, UA 10 (*)     All other components within normal limits    Narrative:     Specimen Source->Urine   ISTAT CREATININE - Abnormal; Notable for the following components:    POC Creatinine 0.4 (*)     All other components within normal limits   CULTURE, URINE   CULTURE, BLOOD   CULTURE, BLOOD   TROPONIN I HIGH SENSITIVITY   B-TYPE NATRIURETIC PEPTIDE   DRUG SCREEN PANEL, URINE EMERGENCY   ALCOHOL,MEDICAL (ETHANOL)   BETA - HYDROXYBUTYRATE, SERUM   LACTIC ACID, PLASMA   DRUG SCREEN PANEL, URINE EMERGENCY   MAGNESIUM   TYPE & SCREEN          Imaging Results              US Abdomen Limited (Gallbladder) (Final result)  Result time 11/30/22 02:37:52      Final result by Chuy Cobos MD (11/30/22 02:37:52)                   Narrative:    Ultrasound right upper quadrant on 11/30/2022    Clinical indication: Generalized abdominal pain    COMPARISON: CT from 11/29/2022    FINDINGS: Multiple sonographic images are obtained throughout the right upper quadrant, both transverse and sagittal images are obtained. Visualized pancreas is unremarkable. Common duct measures 9 mm which is mildly dilated. The patient has a known common duct stent in place. The portal vein is patent and with a normal directional flow. Small amount of sludge is noted in the gallbladder. Gallbladder is mildly distended. No shadowing gallstones are noted. Visualized liver is  mildly heterogeneous but without focal liver lesion. Right kidney shows no hydronephrosis.    IMPRESSION:  1.  Mildly distended gallbladder with small amount of sludge in the gallbladder. If there is high clinical concern for gallbladder disease consider correlation with hepatobiliary scan.  2.  Mildly dilated common duct with known common duct stent in place.    Electronically signed by:  Kyrie Cobos  11/30/2022 2:37 AM CST Workstation: [id number]                                     CTA Chest Abdomen Pelvis (Final result)  Result time 11/30/22 00:40:17      Final result by Chuy Cobos MD (11/30/22 00:40:17)                   Narrative:      CT angiogram chest, abdomen and pelvis with contrast on 11/29/2022    CLINICAL INDICATION: Aortic aneurysm, chest pain    TECHNIQUE: Multiple axial images are obtained throughout the chest, abdomen and pelvis following the administration of IV contrast.  Computer generated 3D reconstructions/MIPS were performed. This exam was performed according to our departmental dose-optimization program, which includes automated exposure control, adjustment of the mA and/or kV according to patient size and/or use of iterative reconstruction technique.  Total DLP is 405 mGycm.    COMPARISON: 11/18/2022    FINDINGS:    CHEST: Mild coronary artery calcifications and other vascular calcifications are noted. There is evidence of calcified granulomatous disease in the chest. There is no thoracic aortic aneurysm or dissection. There are three great vessels originating off the arch without stenosis of the proximal great vessels. There is no pleural or pericardial effusion. There is no thoracic adenopathy. There are no filling defects within the pulmonary arteries to suggest pulmonary embolus. There is minimal peripheral atelectasis or scarring bilaterally. Mild paraseptal emphysema is noted. Mild bronchial wall thickening suggesting mild bronchitis. Degenerative changes are noted in the  spine. No acute bony abnormality of the thorax is noted.    ABDOMEN: There is again noted evidence of prior splenic artery coil embolization procedure. Stable wedge-shaped low-density areas are noted in the upper spleen consistent with prior splenic infarcts. There is no abdominal aortic aneurysm or dissection. The celiac, SMA and HITESH are patent. There is calcified plaque at the origin of the SMA producing an approximate 50% short segment stenosis. There are single bilateral renal arteries without significant renal artery stenosis. There is stable cystic lesion along the pancreatic tail likely represents an old pancreatic pseudocyst. No definite CT evidence of acute pancreatitis is noted. The solid abdominal organs are otherwise unremarkable. There is no abdominal adenopathy. There is no free air in the abdomen. A very small amount of ascites is noted in the abdomen. The abdominal portion of the GI tract is unremarkable.    Pelvis: No significant iliac or proximal femoral arterial disease is noted. The prostate is enlarged, please correlate with physical exam and PSA levels. There is diverticulosis. The pelvic portion of the GI tract is otherwise unremarkable. No free fluid is noted in the pelvis. There is no pelvic adenopathy. Degenerative changes are noted in the spine.    IMPRESSION:  1.  No evidence of thoracic or abdominal aortic aneurysm or dissection and no evidence of pulmonary embolus.  2.  Mild paraseptal emphysema with findings suggesting mild bronchitis as well.  3.  Prostate enlargement.  4.  Very small amount of ascites in the abdomen.  5.  Diverticulosis.    Electronically signed by:  Kyrie Cobos  11/30/2022 12:40 AM CST Workstation: [id number]                                     X-Ray Chest AP Portable (In process)                      Medications   thiamine (B-1) 100 mg in dextrose 5 % 100 mL IVPB (has no administration in time range)   dextrose 5 % and 0.45 % NaCl infusion (has no administration  in time range)   hydrALAZINE injection 10 mg (has no administration in time range)   hydrALAZINE injection 5 mg (has no administration in time range)   potassium bicarbonate disintegrating tablet 50 mEq (has no administration in time range)   piperacillin-tazobactam 3.375 g in dextrose 5 % 50 mL IVPB (ready to mix system) (has no administration in time range)   0.9%  NaCl infusion (0 mLs Intravenous Stopped 11/30/22 0056)   morphine injection 4 mg (4 mg Intravenous Given 11/29/22 2301)   ondansetron injection 4 mg (4 mg Intravenous Given 11/29/22 2302)   iohexoL (OMNIPAQUE 350) injection 100 mL (100 mLs Intravenous Given 11/29/22 2322)   magnesium sulfate 2g in water 50mL IVPB (premix) (2 g Intravenous New Bag 11/30/22 0150)   potassium chloride 10 mEq in 100 mL IVPB (10 mEq Intravenous New Bag 11/30/22 0156)   0.9%  NaCl infusion (1,000 mLs Intravenous New Bag 11/30/22 0059)   morphine injection 4 mg (4 mg Intravenous Given 11/30/22 0101)     Medical Decision Making:   ED Management:  Laboratory evaluation reveals evidence of pancreatitis there is some elevation of patient's liver function testing as well CT scan showed no significant abnormalities no evidence of aortic disease or splenic artery aneurysm rupture the ultrasound showed patent biliary stent some gallbladder sludging I have discussed patient's findings with Hospital Medicine who will evaluate patient in the ER                        Clinical Impression:   Final diagnoses:  [R07.9] Chest pain  [R10.9] Abdominal pain  [K85.20] Alcohol-induced acute pancreatitis without infection or necrosis (Primary)        ED Disposition Condition    Admit Stable                Martín Smith MD  11/30/22 3853

## 2022-12-01 VITALS
BODY MASS INDEX: 16.11 KG/M2 | WEIGHT: 115.06 LBS | TEMPERATURE: 97 F | RESPIRATION RATE: 18 BRPM | HEART RATE: 79 BPM | SYSTOLIC BLOOD PRESSURE: 101 MMHG | OXYGEN SATURATION: 99 % | HEIGHT: 71 IN | DIASTOLIC BLOOD PRESSURE: 67 MMHG

## 2022-12-01 PROBLEM — E87.29 ALCOHOLIC KETOACIDOSIS: Status: RESOLVED | Noted: 2022-11-30 | Resolved: 2022-12-01

## 2022-12-01 PROBLEM — E87.6 HYPOKALEMIA: Status: RESOLVED | Noted: 2022-02-02 | Resolved: 2022-12-01

## 2022-12-01 LAB
ALBUMIN SERPL BCP-MCNC: 2.9 G/DL (ref 3.5–5.2)
ALP SERPL-CCNC: 450 U/L (ref 55–135)
ALT SERPL W/O P-5'-P-CCNC: 30 U/L (ref 10–44)
ANION GAP SERPL CALC-SCNC: 5 MMOL/L (ref 8–16)
AST SERPL-CCNC: 38 U/L (ref 10–40)
BASOPHILS # BLD AUTO: 0.02 K/UL (ref 0–0.2)
BASOPHILS NFR BLD: 0.4 % (ref 0–1.9)
BILIRUB SERPL-MCNC: 1.2 MG/DL (ref 0.1–1)
BUN SERPL-MCNC: 8 MG/DL (ref 8–23)
CALCIUM SERPL-MCNC: 8.6 MG/DL (ref 8.7–10.5)
CHLORIDE SERPL-SCNC: 101 MMOL/L (ref 95–110)
CO2 SERPL-SCNC: 23 MMOL/L (ref 23–29)
CREAT SERPL-MCNC: 0.4 MG/DL (ref 0.5–1.4)
DIFFERENTIAL METHOD: ABNORMAL
EOSINOPHIL # BLD AUTO: 0.3 K/UL (ref 0–0.5)
EOSINOPHIL NFR BLD: 7.3 % (ref 0–8)
ERYTHROCYTE [DISTWIDTH] IN BLOOD BY AUTOMATED COUNT: 13.5 % (ref 11.5–14.5)
EST. GFR  (NO RACE VARIABLE): >60 ML/MIN/1.73 M^2
GLUCOSE SERPL-MCNC: 110 MG/DL (ref 70–110)
HCT VFR BLD AUTO: 33.9 % (ref 40–54)
HGB BLD-MCNC: 11.1 G/DL (ref 14–18)
IMM GRANULOCYTES # BLD AUTO: 0.02 K/UL (ref 0–0.04)
IMM GRANULOCYTES NFR BLD AUTO: 0.4 % (ref 0–0.5)
INR PPP: 1.3
LYMPHOCYTES # BLD AUTO: 1.1 K/UL (ref 1–4.8)
LYMPHOCYTES NFR BLD: 24.6 % (ref 18–48)
MCH RBC QN AUTO: 30.2 PG (ref 27–31)
MCHC RBC AUTO-ENTMCNC: 32.7 G/DL (ref 32–36)
MCV RBC AUTO: 92 FL (ref 82–98)
MONOCYTES # BLD AUTO: 0.5 K/UL (ref 0.3–1)
MONOCYTES NFR BLD: 11.2 % (ref 4–15)
NEUTROPHILS # BLD AUTO: 2.6 K/UL (ref 1.8–7.7)
NEUTROPHILS NFR BLD: 56.1 % (ref 38–73)
NRBC BLD-RTO: 0 /100 WBC
PLATELET # BLD AUTO: 157 K/UL (ref 150–450)
PMV BLD AUTO: 10 FL (ref 9.2–12.9)
POTASSIUM SERPL-SCNC: 4 MMOL/L (ref 3.5–5.1)
PROT SERPL-MCNC: 7 G/DL (ref 6–8.4)
PROTHROMBIN TIME: 15 SEC (ref 11.4–13.7)
RBC # BLD AUTO: 3.67 M/UL (ref 4.6–6.2)
SODIUM SERPL-SCNC: 129 MMOL/L (ref 136–145)
WBC # BLD AUTO: 4.63 K/UL (ref 3.9–12.7)

## 2022-12-01 PROCEDURE — 25000003 PHARM REV CODE 250: Performed by: FAMILY MEDICINE

## 2022-12-01 PROCEDURE — 80053 COMPREHEN METABOLIC PANEL: CPT | Performed by: FAMILY MEDICINE

## 2022-12-01 PROCEDURE — G0378 HOSPITAL OBSERVATION PER HR: HCPCS

## 2022-12-01 PROCEDURE — 85025 COMPLETE CBC W/AUTO DIFF WBC: CPT | Performed by: FAMILY MEDICINE

## 2022-12-01 PROCEDURE — 36415 COLL VENOUS BLD VENIPUNCTURE: CPT | Performed by: FAMILY MEDICINE

## 2022-12-01 PROCEDURE — 25000003 PHARM REV CODE 250: Performed by: INTERNAL MEDICINE

## 2022-12-01 PROCEDURE — 85610 PROTHROMBIN TIME: CPT | Performed by: FAMILY MEDICINE

## 2022-12-01 RX ORDER — NITROFURANTOIN 25; 75 MG/1; MG/1
100 CAPSULE ORAL EVERY 12 HOURS
Qty: 10 CAPSULE | Refills: 0 | Status: SHIPPED | OUTPATIENT
Start: 2022-12-01 | End: 2022-12-07

## 2022-12-01 RX ORDER — NITROFURANTOIN 25; 75 MG/1; MG/1
100 CAPSULE ORAL EVERY 12 HOURS
Status: DISCONTINUED | OUTPATIENT
Start: 2022-12-01 | End: 2022-12-01 | Stop reason: HOSPADM

## 2022-12-01 RX ORDER — OXYCODONE AND ACETAMINOPHEN 5; 325 MG/1; MG/1
1 TABLET ORAL EVERY 12 HOURS PRN
Qty: 10 TABLET | Refills: 0 | Status: SHIPPED | OUTPATIENT
Start: 2022-12-01 | End: 2022-12-01 | Stop reason: HOSPADM

## 2022-12-01 RX ORDER — HYDROCODONE BITARTRATE AND ACETAMINOPHEN 5; 325 MG/1; MG/1
1 TABLET ORAL EVERY 8 HOURS PRN
Qty: 12 TABLET | Refills: 0 | Status: SHIPPED | OUTPATIENT
Start: 2022-12-01 | End: 2023-06-06

## 2022-12-01 RX ADMIN — URSODIOL 300 MG: 300 CAPSULE ORAL at 09:12

## 2022-12-01 RX ADMIN — OXYCODONE AND ACETAMINOPHEN 1 TABLET: 5; 325 TABLET ORAL at 05:12

## 2022-12-01 RX ADMIN — NITROFURANTOIN (MONOHYDRATE/MACROCRYSTALS) 100 MG: 75; 25 CAPSULE ORAL at 12:12

## 2022-12-01 NOTE — ASSESSMENT & PLAN NOTE
Biliary stricture s/p recent ERCP (suspected IgG4 related)  Check IgG4 levels  If IgG4 elevated , need  ERCP in a few weeks w/ stent removal   Home within 24 hrs

## 2022-12-01 NOTE — SUBJECTIVE & OBJECTIVE
Interval History:     Review of Systems   Constitutional:  Negative for activity change and appetite change.   HENT:  Negative for congestion and dental problem.    Eyes:  Negative for discharge and itching.   Respiratory:  Negative for shortness of breath.    Cardiovascular:  Negative for chest pain.   Gastrointestinal:  Negative for abdominal distention and abdominal pain.   Endocrine: Negative for cold intolerance.   Genitourinary:  Negative for difficulty urinating and dysuria.   Musculoskeletal:  Negative for arthralgias and back pain.   Skin:  Negative for color change.   Neurological:  Negative for dizziness and facial asymmetry.   Hematological:  Negative for adenopathy.   Psychiatric/Behavioral:  Negative for agitation and behavioral problems.    Objective:     Vital Signs (Most Recent):  Temp: 98 °F (36.7 °C) (11/30/22 1908)  Pulse: 80 (11/30/22 1908)  Resp: 18 (11/30/22 1936)  BP: 115/73 (11/30/22 1908)  SpO2: 100 % (11/30/22 1908) Vital Signs (24h Range):  Temp:  [97.1 °F (36.2 °C)-98.5 °F (36.9 °C)] 98 °F (36.7 °C)  Pulse:  [80-92] 80  Resp:  [15-26] 18  SpO2:  [98 %-100 %] 100 %  BP: (115-180)/() 115/73     Weight: 52.2 kg (115 lb 1.3 oz)  Body mass index is 16.05 kg/m².    Intake/Output Summary (Last 24 hours) at 11/30/2022 2012  Last data filed at 11/30/2022 0625  Gross per 24 hour   Intake 1250 ml   Output --   Net 1250 ml      Physical Exam  Vitals and nursing note reviewed.   Constitutional:       Appearance: He is well-developed.   HENT:      Head: Atraumatic.      Right Ear: External ear normal.      Left Ear: External ear normal.      Nose: Nose normal.      Mouth/Throat:      Mouth: Mucous membranes are moist.   Eyes:      General: No scleral icterus.  Cardiovascular:      Rate and Rhythm: Normal rate.   Pulmonary:      Effort: Pulmonary effort is normal.   Musculoskeletal:         General: Normal range of motion.      Cervical back: Full passive range of motion without pain and normal  range of motion.   Skin:     General: Skin is warm.   Neurological:      Mental Status: He is alert and oriented to person, place, and time.   Psychiatric:         Behavior: Behavior normal.       Significant Labs: All pertinent labs within the past 24 hours have been reviewed.  CBC:   Recent Labs   Lab 11/29/22 2248   WBC 6.29   HGB 12.5*   HCT 37.7*        CMP:   Recent Labs   Lab 11/29/22 2248   *   K 3.2*      CO2 17*   *   BUN 9   CREATININE 0.5   CALCIUM 9.0   PROT 8.3   ALBUMIN 3.5   BILITOT 1.4*   ALKPHOS 621*   AST 66*   ALT 39   ANIONGAP 12       Significant Imaging: I have reviewed all pertinent imaging results/findings within the past 24 hours.

## 2022-12-01 NOTE — PROGRESS NOTES
[facility]  Progress Note    Patient Name: Vic Reyez  MRN: [id number]  Patient Class: OP- Observation   Admission Date: 11/29/2022  Length of Stay: 0 days  Attending Physician: Joel Chaparro MD  Primary Care Provider: Primary Doctor No        Subjective:     Principal Problem:Alcoholic ketoacidosis        HPI:  No notes on file    Overview/Hospital Course:  11/30  No acute issues  Ate regular food in ER        Interval History:     Review of Systems   Constitutional:  Negative for activity change and appetite change.   HENT:  Negative for congestion and dental problem.    Eyes:  Negative for discharge and itching.   Respiratory:  Negative for shortness of breath.    Cardiovascular:  Negative for chest pain.   Gastrointestinal:  Negative for abdominal distention and abdominal pain.   Endocrine: Negative for cold intolerance.   Genitourinary:  Negative for difficulty urinating and dysuria.   Musculoskeletal:  Negative for arthralgias and back pain.   Skin:  Negative for color change.   Neurological:  Negative for dizziness and facial asymmetry.   Hematological:  Negative for adenopathy.   Psychiatric/Behavioral:  Negative for agitation and behavioral problems.    Objective:     Vital Signs (Most Recent):  Temp: 98 °F (36.7 °C) (11/30/22 1908)  Pulse: 80 (11/30/22 1908)  Resp: 18 (11/30/22 1936)  BP: 115/73 (11/30/22 1908)  SpO2: 100 % (11/30/22 1908) Vital Signs (24h Range):  Temp:  [97.1 °F (36.2 °C)-98.5 °F (36.9 °C)] 98 °F (36.7 °C)  Pulse:  [80-92] 80  Resp:  [15-26] 18  SpO2:  [98 %-100 %] 100 %  BP: (115-180)/() 115/73     Weight: 52.2 kg (115 lb 1.3 oz)  Body mass index is 16.05 kg/m².    Intake/Output Summary (Last 24 hours) at 11/30/2022 2012  Last data filed at 11/30/2022 0625  Gross per 24 hour   Intake 1250 ml   Output --   Net 1250 ml      Physical Exam  Vitals and nursing note reviewed.   Constitutional:       Appearance: He is well-developed.   HENT:      Head:  Atraumatic.      Right Ear: External ear normal.      Left Ear: External ear normal.      Nose: Nose normal.      Mouth/Throat:      Mouth: Mucous membranes are moist.   Eyes:      General: No scleral icterus.  Cardiovascular:      Rate and Rhythm: Normal rate.   Pulmonary:      Effort: Pulmonary effort is normal.   Musculoskeletal:         General: Normal range of motion.      Cervical back: Full passive range of motion without pain and normal range of motion.   Skin:     General: Skin is warm.   Neurological:      Mental Status: He is alert and oriented to person, place, and time.   Psychiatric:         Behavior: Behavior normal.       Significant Labs: All pertinent labs within the past 24 hours have been reviewed.  CBC:   Recent Labs   Lab 11/29/22 2248   WBC 6.29   HGB 12.5*   HCT 37.7*        CMP:   Recent Labs   Lab 11/29/22 2248   *   K 3.2*      CO2 17*   *   BUN 9   CREATININE 0.5   CALCIUM 9.0   PROT 8.3   ALBUMIN 3.5   BILITOT 1.4*   ALKPHOS 621*   AST 66*   ALT 39   ANIONGAP 12       Significant Imaging: I have reviewed all pertinent imaging results/findings within the past 24 hours.      Assessment/Plan:      * Alcoholic ketoacidosis  Resolved       Chronic pancreatitis  Ongoing issue with chronic abdominal pain       Biliary stricture  Biliary stricture s/p recent ERCP (suspected IgG4 related)  Check IgG4 levels  If IgG4 elevated , need  ERCP in a few weeks w/ stent removal   Home within 24 hrs      Splenic artery aneurysm  Aware       Hypokalemia  Monitor and replete       Primary hypertension  Stable       Alcohol use disorder, severe, dependence  Said he stopped taking alcohol recently       VTE Risk Mitigation (From admission, onward)         Ordered     IP VTE HIGH RISK PATIENT  Once         11/30/22 1130     Place sequential compression device  Until discontinued         11/30/22 1130                Discharge Planning   NAFISA:      Code Status: Full Code   Is the  patient medically ready for discharge?:     Reason for patient still in hospital (select all that apply): Treatment  Discharge Plan A: Home                  Joel Chaparro MD  Department of Hospital Medicine   [facility]

## 2022-12-01 NOTE — HOSPITAL COURSE
Patient with H/o Alcohol abuse/Biliary stricture with recent ERCP got admitted with Chronic abdominal pain due to Chronic pancreatitis and UTI  UC Grew Enterococcus  Pt was managed conservatively and with antibiotics  Pt was evaluated by GI team here and Ig G1,2,3,4 was ordered   (Labs still pending and will take days for final results , per Lab)  Pt also see Dr Toledo in [facility]   Pt was later discharged back to Home  Educated pt that he should Follow up with Dr Viveros within 1 week  If IgG4 elevated , need  ERCP in a few weeks w/ stent removal

## 2022-12-01 NOTE — PLAN OF CARE
Patient discharged home with no needs. Clear for discharge from case management.       12/01/22 1243   Final Note   Assessment Type Final Discharge Note   Anticipated Discharge Disposition Home   What phone number can be called within the next 1-3 days to see how you are doing after discharge? [phone]   Post-Acute Status   Discharge Delays None known at this time

## 2022-12-01 NOTE — DISCHARGE SUMMARY
[facility] Medicine  Discharge Summary      Patient Name: Vic Reyez  MRN: [id number]  DEVAUGHN: [id number]  Patient Class: OP- Observation  Admission Date: 11/29/2022  Hospital Length of Stay: 0 days  Discharge Date and Time:  12/01/2022 1:11 PM  Attending Physician: Joel Chaparro MD   Discharging Provider: Joel Chaparro MD  Primary Care Provider: Primary Doctor Nenita    Primary Care Team: Networked reference to record PCT     HPI:   No notes on file    * No surgery found *      Hospital Course:   Patient with H/o Alcohol abuse/Biliary stricture with recent ERCP got admitted with Chronic abdominal pain due to Chronic pancreatitis and UTI  UC Grew Enterococcus  Pt was managed conservatively and with antibiotics  Pt was evaluated by GI team here and Ig G1,2,3,4 was ordered   (Labs still pending and will take days for final results , per Lab)  Pt also see Dr Toledo in [facility]   Pt was later discharged back to Home  Educated pt that he should Follow up with Dr Viveros within 1 week  If IgG4 elevated , need  ERCP in a few weeks w/ stent removal         Goals of Care Treatment Preferences:  Code Status: Full Code      Consults:   Consults (From admission, onward)        Status Ordering Provider     Inpatient consult to Gastroenterology  Once        Provider:  Roger Viveros III, MD    Completed BEVERLY MEZA     Inpatient consult to Hospitalist  Once        Provider:  Beverly Meza MD    Acknowledged BEVERLY MEZA          No new Assessment & Plan notes have been filed under this hospital service since the last note was generated.  Service: Hospital Medicine    Final Active Diagnoses:    Diagnosis Date Noted POA    Chronic pancreatitis [K86.1] 11/30/2022 Yes    Biliary stricture [K83.1] 02/02/2022 Yes    Splenic artery aneurysm [I72.8] 10/03/2022 Yes    Alcohol use disorder, severe, dependence [F10.20] 02/02/2022 Yes     Chronic    Primary hypertension [I10] 02/02/2022 Yes     Chronic       Problems Resolved During this Admission:    Diagnosis Date Noted Date Resolved POA    PRINCIPAL PROBLEM:  Alcoholic ketoacidosis [E87.29] 11/30/2022 12/01/2022 Yes    Hypokalemia [E87.6] 02/02/2022 12/01/2022 Yes       Discharged Condition: good    Disposition: Home or Self Care    Follow Up:   Follow-up Information     Roger Viveros III, MD Follow up in 1 week(s).    Specialty: Gastroenterology  Contact information:  [address] [facility] [address] [phone]                       Patient Instructions:   No discharge procedures on file.    Significant Diagnostic Studies: Labs:   CMP   Recent Labs   Lab 11/29/22 2248 12/01/22  0535   * 129*   K 3.2* 4.0    101   CO2 17* 23   * 110   BUN 9 8   CREATININE 0.5 0.4*   CALCIUM 9.0 8.6*   PROT 8.3 7.0   ALBUMIN 3.5 2.9*   BILITOT 1.4* 1.2*   ALKPHOS 621* 450*   AST 66* 38   ALT 39 30   ANIONGAP 12 5*    and CBC   Recent Labs   Lab 11/29/22 2248 12/01/22  0536   WBC 6.29 4.63   HGB 12.5* 11.1*   HCT 37.7* 33.9*    157       Pending Diagnostic Studies:     Procedure Component Value Units Date/Time    IgG 1, 2, 3, and 4 [[id number]] Collected: 11/30/22 1528    Order Status: Sent Lab Status: In process Updated: 11/30/22 1621    Specimen: Blood          Medications:  Reconciled Home Medications:      Medication List      START taking these medications    HYDROcodone-acetaminophen 5-325 mg per tablet  Commonly known as: NORCO  Take 1 tablet by mouth every 8 (eight) hours as needed for Pain.     nitrofurantoin (macrocrystal-monohydrate) 100 MG capsule  Commonly known as: MACROBID  Take 1 capsule (100 mg total) by mouth every 12 (twelve) hours. for 5 days        CONTINUE taking these medications    furosemide 20 MG tablet  Commonly known as: LASIX  Take 1 tablet (20 mg total) by mouth once daily.     spironolactone 25 MG tablet  Commonly known as: ALDACTONE  Take 1 tablet (25 mg total) by mouth once daily.     thiamine 100 MG  tablet  Take 1 tablet (100 mg total) by mouth once daily.     ursodioL 300 mg capsule  Commonly known as: ACTIGALL  Take 1 capsule (300 mg total) by mouth 2 (two) times daily.            Indwelling Lines/Drains at time of discharge:   Lines/Drains/Airways     None               Physical Exam   Constitutional: He is oriented to person, place, and time.   Cardiovascular: Normal rate.   Neurological: He is alert and oriented to person, place, and time.     Time spent on the discharge of patient: 23  minutes         Joel Chaparro MD  Department of Hospital Medicine  [facility]

## 2022-12-02 LAB — BACTERIA UR CULT: ABNORMAL

## 2022-12-04 LAB
IGG SERPL-MCNC: 1532 MG/DL (ref 603–1613)
IGG1 SER-MCNC: 867 MG/DL (ref 248–810)
IGG2 SER-MCNC: 477 MG/DL (ref 130–555)
IGG3 SER-MCNC: 70 MG/DL (ref 15–102)
IGG4 SER-MCNC: 41 MG/DL (ref 2–96)

## 2022-12-05 LAB
BACTERIA BLD CULT: NORMAL
BACTERIA BLD CULT: NORMAL

## 2022-12-08 ENCOUNTER — CONFERENCE (OUTPATIENT)
Dept: TRANSPLANT | Facility: CLINIC | Age: 72
End: 2022-12-08
Payer: MEDICARE

## 2022-12-08 NOTE — TELEPHONE ENCOUNTER
Pathology Conference 12/8/22    FNA pancreas reviewed   No plasma cells  Fibrosis with inflammation  Inconclusive     Plan:  Request outside case to review

## 2022-12-28 ENCOUNTER — TELEPHONE (OUTPATIENT)
Dept: HEPATOLOGY | Facility: CLINIC | Age: 72
End: 2022-12-28
Payer: MEDICARE

## 2022-12-28 NOTE — TELEPHONE ENCOUNTER
Spoke with patient.  He would like update about his case.  Message sent to Dr Toledo.    ----- Message from Elmira Taylor MA sent at 12/28/2022  1:14 PM CST -----  Regarding: FW: update status    ----- Message -----  From: Yessenia Alston  Sent: 12/28/2022  12:56 PM CST  To: [facility] Staff  Subject: update status                                     Vic Reyez MRN [id number] calling to ask about the committee meeting on his transplant  Requesting updated status    Patient can be contacted @# [phone]

## 2022-12-29 ENCOUNTER — TELEPHONE (OUTPATIENT)
Dept: HEPATOLOGY | Facility: CLINIC | Age: 72
End: 2022-12-29
Payer: MEDICARE

## 2022-12-29 NOTE — TELEPHONE ENCOUNTER
"Patient had biliary duct stricture which was stented and then stent removed.  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  The slides were reviewed in path conf in 12/8/2022,  "IgG4 - not definite, there was fibrotic tissue, not much tissue, get outside slides".   GI doctors will follow with this.        Imaging reviewed in IR conf 11/22/22 as below:  CTA 11/18/22  -Splenic artery aneurysm successfully coiled  -Residual fluid collection along pancreas likely thrombosed aneurysm. 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   - Mild biliary dilation with apparent mid CBD stricture s/p endoscopic stent placement     Discussion/Plan from Dr CHAYO Díaz: No imaging follow-up required     Committee Discussion/Plan:  Splenic artery aneurysm (coiled). Mild mid CBD. Bilirubin has decreased. No follow up required.    Response to patient's request:  Patient called wanting to know if he will have liver transplant, and if he should continue to take lasix, spironolactone and ursodiol. STOP the lasix and spironolactone, due to hyponatremia,.  Please get a CMP a week after stopping diuretics, to make sure Na has corrected.     Patient's plt ct was normal, CT scan showed some nodularity, could be cirrhosis.      MELD-Na score: 10 at 12/1/2022  5:35 AM  MELD score: 10 at 12/1/2022  5:35 AM  Calculated from:  Serum Creatinine: 0.4 mg/dL (Using min of 1 mg/dL) at 12/1/2022  5:35 AM  Serum Sodium: 129 mmol/L at 12/1/2022  5:35 AM  Total Bilirubin: 1.2 mg/dL at 12/1/2022  5:35 AM  INR(ratio): 1.3 at 12/1/2022  5:35 AM  Age: 72 years    MELD too low, no transplant eval needed at " this time,     Helen Toledo MD

## 2022-12-30 NOTE — TELEPHONE ENCOUNTER
Per Dr Toledo,  Patient was told to continue taking Ursodiol.  Instructed to stop taking Lasix and Aldactone due to hyponatremia.  CMP scheduled on 1/5/23, per patient's request.   Medical Necessity Statement: Based on my medical judgement, Mohs surgery is the most appropriate treatment for this cancer compared to other treatments.

## 2023-01-19 ENCOUNTER — HOSPITAL ENCOUNTER (OUTPATIENT)
Dept: PREADMISSION TESTING | Facility: HOSPITAL | Age: 73
Discharge: HOME | End: 2023-01-19
Attending: INTERNAL MEDICINE
Payer: MEDICARE

## 2023-01-19 VITALS
BODY MASS INDEX: 16.28 KG/M2 | OXYGEN SATURATION: 99 % | HEART RATE: 67 BPM | SYSTOLIC BLOOD PRESSURE: 104 MMHG | RESPIRATION RATE: 16 BRPM | DIASTOLIC BLOOD PRESSURE: 66 MMHG | HEIGHT: 71 IN | TEMPERATURE: 98 F

## 2023-01-19 DIAGNOSIS — Z01.818 PREOP TESTING: Primary | ICD-10-CM

## 2023-01-19 RX ORDER — ASPIRIN 325 MG
325 TABLET ORAL DAILY
COMMUNITY

## 2023-01-19 RX ORDER — PREDNISONE 10 MG/1
40 TABLET ORAL
Status: ON HOLD | COMMUNITY
Start: 2022-12-14 | End: 2023-12-28 | Stop reason: HOSPADM

## 2023-01-19 NOTE — DISCHARGE INSTRUCTIONS
To confirm, Your doctor has instructed you that surgery is scheduled for: Tuesday 1/24/23    Endoscopy  will call the afternoon prior to surgery with the final arrival time.  Monday 1/23/23    Please report to Outpatient Registration the morning of surgery.     Do not eat or drink anything after midnight the night before your surgery - THIS INCLUDES  WATER, GUM, MINTS AND CANDY. Follow the preop given by the Doctor    YOU MAY BRUSH YOUR TEETH BUT DO NOT SWALLOW     TAKE ONLY THESE MEDICATIONS WITH A SMALL SIP OF WATER THE MORNING OF YOUR PROCEDURE: see medication list    PLEASE NOTE:  The surgery schedule has many variables which may affect the time of your surgery case.  Family members should be available if your surgery time changes.  Plan to be here the day of your procedure between 2-3 hours.    DO NOT TAKE THESE MEDICATIONS 5-7 DAYS PRIOR to your procedure or per your surgeon's request: ASPIRIN, ALEVE, ADVIL, IBUPROFEN,  SOHLA SELTZER, BC , FISH OIL , VITAMIN E, HERBALS  (May take Tylenol)    ONLY if you are prescribed any types of blood thinners such as:  Aspirin, Coumadin, Plavix, Pradaxa, Xarelto, Aggrenox, Effient, Eliquis, Savasya, Brilinta, or any other, ask your surgeon whether you should stop taking them and how long before surgery you should stop.  You may also need to verify with the prescribing physician if it is ok to stop your medication.                                                       IMPORTANT INSTRUCTIONS    Do not smoke, vape or drink alcoholic beverages 24 hours prior to your procedure.  Shower the night before with  Dial antibacterial soap from the neck down.   You may use your own shampoo and face wash. This helps your skin to be as bacteria free as possible.    If you wear contact lenses, dentures, hearing aids or glasses, bring a container to put them in during surgery and give to a family member for safe keeping.    Please leave all jewelry, piercing's and valuables at home.   Make  arrangements in advance for transportation home by a responsible adult.  You must make arrangements for transportation, TAXI'S, UBER'S OR LYFTS ARE NOT ALLOWED.        If you have any questions about these instructions, call Pre-Op Admit  Nursing at [phone] or the Endoscopy Department at [phone]

## 2023-01-24 ENCOUNTER — ANESTHESIA EVENT (OUTPATIENT)
Dept: SURGERY | Facility: HOSPITAL | Age: 73
End: 2023-01-24
Payer: MEDICARE

## 2023-01-24 ENCOUNTER — ANESTHESIA (OUTPATIENT)
Dept: SURGERY | Facility: HOSPITAL | Age: 73
End: 2023-01-24
Payer: MEDICARE

## 2023-01-24 ENCOUNTER — HOSPITAL ENCOUNTER (OUTPATIENT)
Dept: RADIOLOGY | Facility: HOSPITAL | Age: 73
Discharge: HOME | End: 2023-01-24
Attending: INTERNAL MEDICINE | Admitting: INTERNAL MEDICINE
Payer: MEDICARE

## 2023-01-24 ENCOUNTER — HOSPITAL ENCOUNTER (OUTPATIENT)
Facility: HOSPITAL | Age: 73
Discharge: HOME | End: 2023-01-24
Attending: INTERNAL MEDICINE | Admitting: INTERNAL MEDICINE
Payer: MEDICARE

## 2023-01-24 VITALS
HEART RATE: 70 BPM | HEIGHT: 71 IN | OXYGEN SATURATION: 100 % | BODY MASS INDEX: 16.8 KG/M2 | TEMPERATURE: 97 F | DIASTOLIC BLOOD PRESSURE: 76 MMHG | WEIGHT: 120 LBS | SYSTOLIC BLOOD PRESSURE: 125 MMHG | RESPIRATION RATE: 16 BRPM

## 2023-01-24 DIAGNOSIS — R10.9 ABDOMINAL PAIN: ICD-10-CM

## 2023-01-24 DIAGNOSIS — R19.8 ABNORMAL FINDING OF BILIARY TRACT: ICD-10-CM

## 2023-01-24 PROCEDURE — D9220A PRA ANESTHESIA: ICD-10-PCS | Mod: ANES,,, | Performed by: STUDENT IN AN ORGANIZED HEALTH CARE EDUCATION/TRAINING PROGRAM

## 2023-01-24 PROCEDURE — C1769 GUIDE WIRE: HCPCS | Performed by: INTERNAL MEDICINE

## 2023-01-24 PROCEDURE — 43275 ERCP REMOVE FORGN BODY DUCT: CPT | Performed by: INTERNAL MEDICINE

## 2023-01-24 PROCEDURE — 25000003 PHARM REV CODE 250: Performed by: INTERNAL MEDICINE

## 2023-01-24 PROCEDURE — 25000003 PHARM REV CODE 250: Performed by: NURSE ANESTHETIST, CERTIFIED REGISTERED

## 2023-01-24 PROCEDURE — D9220A PRA ANESTHESIA: Mod: CRNA,,, | Performed by: NURSE ANESTHETIST, CERTIFIED REGISTERED

## 2023-01-24 PROCEDURE — 74330 X-RAY BILE/PANC ENDOSCOPY: CPT | Mod: TC

## 2023-01-24 PROCEDURE — D9220A PRA ANESTHESIA: Mod: ANES,,, | Performed by: STUDENT IN AN ORGANIZED HEALTH CARE EDUCATION/TRAINING PROGRAM

## 2023-01-24 PROCEDURE — 37000009 HC ANESTHESIA EA ADD 15 MINS: Performed by: INTERNAL MEDICINE

## 2023-01-24 PROCEDURE — 63600175 PHARM REV CODE 636 W HCPCS: Performed by: NURSE ANESTHETIST, CERTIFIED REGISTERED

## 2023-01-24 PROCEDURE — D9220A PRA ANESTHESIA: ICD-10-PCS | Mod: CRNA,,, | Performed by: NURSE ANESTHETIST, CERTIFIED REGISTERED

## 2023-01-24 PROCEDURE — 27201089 HC SNARE, DISP (ANY): Performed by: INTERNAL MEDICINE

## 2023-01-24 PROCEDURE — 37000008 HC ANESTHESIA 1ST 15 MINUTES: Performed by: INTERNAL MEDICINE

## 2023-01-24 PROCEDURE — 43264 ERCP REMOVE DUCT CALCULI: CPT | Performed by: INTERNAL MEDICINE

## 2023-01-24 PROCEDURE — 27202125 HC BALLOON, EXTRACTION (ANY): Performed by: INTERNAL MEDICINE

## 2023-01-24 RX ORDER — LIDOCAINE HYDROCHLORIDE 20 MG/ML
INJECTION, SOLUTION EPIDURAL; INFILTRATION; INTRACAUDAL; PERINEURAL
Status: DISCONTINUED | OUTPATIENT
Start: 2023-01-24 | End: 2023-01-24

## 2023-01-24 RX ORDER — ONDANSETRON HYDROCHLORIDE 2 MG/ML
INJECTION, SOLUTION INTRAMUSCULAR; INTRAVENOUS
Status: DISCONTINUED | OUTPATIENT
Start: 2023-01-24 | End: 2023-01-24

## 2023-01-24 RX ORDER — INDOMETHACIN 50 MG/1
SUPPOSITORY RECTAL
Status: DISCONTINUED | OUTPATIENT
Start: 2023-01-24 | End: 2023-01-24 | Stop reason: HOSPADM

## 2023-01-24 RX ORDER — PROPOFOL 10 MG/ML
VIAL (ML) INTRAVENOUS
Status: DISCONTINUED | OUTPATIENT
Start: 2023-01-24 | End: 2023-01-24

## 2023-01-24 RX ORDER — FAMOTIDINE 10 MG/ML
INJECTION INTRAVENOUS
Status: DISCONTINUED | OUTPATIENT
Start: 2023-01-24 | End: 2023-01-24

## 2023-01-24 RX ORDER — EPHEDRINE SULFATE 50 MG/ML
INJECTION, SOLUTION INTRAVENOUS
Status: DISCONTINUED | OUTPATIENT
Start: 2023-01-24 | End: 2023-01-24

## 2023-01-24 RX ORDER — ROCURONIUM BROMIDE 10 MG/ML
INJECTION, SOLUTION INTRAVENOUS
Status: DISCONTINUED | OUTPATIENT
Start: 2023-01-24 | End: 2023-01-24

## 2023-01-24 RX ADMIN — SODIUM CHLORIDE, SODIUM LACTATE, POTASSIUM CHLORIDE, AND CALCIUM CHLORIDE: .6; .31; .03; .02 INJECTION, SOLUTION INTRAVENOUS at 03:01

## 2023-01-24 RX ADMIN — SUGAMMADEX 100 MG: 100 INJECTION, SOLUTION INTRAVENOUS at 03:01

## 2023-01-24 RX ADMIN — ONDANSETRON 4 MG: 2 INJECTION INTRAMUSCULAR; INTRAVENOUS at 03:01

## 2023-01-24 RX ADMIN — EPHEDRINE SULFATE 10 MG: 50 INJECTION INTRAVENOUS at 03:01

## 2023-01-24 RX ADMIN — PROPOFOL 110 MG: 10 INJECTION, EMULSION INTRAVENOUS at 03:01

## 2023-01-24 RX ADMIN — FAMOTIDINE 20 MG: 10 INJECTION, SOLUTION INTRAVENOUS at 03:01

## 2023-01-24 RX ADMIN — LIDOCAINE HYDROCHLORIDE 60 MG: 20 INJECTION, SOLUTION EPIDURAL; INFILTRATION; INTRACAUDAL; PERINEURAL at 03:01

## 2023-01-24 RX ADMIN — ROCURONIUM BROMIDE 30 MG: 10 INJECTION, SOLUTION INTRAVENOUS at 03:01

## 2023-01-24 NOTE — ANESTHESIA PREPROCEDURE EVALUATION
01/24/2023  Vic Reyez is a 72 y.o., male.    Patient Active Problem List   Diagnosis    Decompensated hepatic cirrhosis    Biliary stricture    Alcohol use disorder, severe, dependence    Primary hypertension    Hyponatremia    Coagulopathy    Pancreatic lesion    Abdominal fluid collection    Unintentional weight loss    Splenic artery aneurysm    Chronic pancreatitis       Past Surgical History:   Procedure Laterality Date    ABDOMINAL AORTOGRAPHY N/A 10/04/2022    Procedure: AORTOGRAM-ABDOMINAL;  Surgeon: Felice Epstein MD;  Location: [facility] CATH/EP LAB;  Service: Vascular;  Laterality: N/A;    APPENDECTOMY      ARTERIOGRAM, MESENTERIC N/A 10/04/2022    Procedure: ARTERIOGRAM, MESENTERIC;  Surgeon: Felice Epstein MD;  Location: [facility] CATH/EP LAB;  Service: Vascular;  Laterality: N/A;    COLONOSCOPY      ENDOSCOPIC ULTRASOUND OF UPPER GASTROINTESTINAL TRACT  02/04/2022    Procedure: ULTRASOUND, UPPER GI TRACT, ENDOSCOPIC;  Surgeon: Chuy Bay MD;  Location: [facility] ENDO (2ND FLR);  Service: Endoscopy;;    ENDOSCOPIC ULTRASOUND OF UPPER GASTROINTESTINAL TRACT N/A 06/03/2022    Procedure: ULTRASOUND, UPPER GI TRACT, ENDOSCOPIC;  Surgeon: Roger Viveros III, MD;  Location: [facility] ENDO;  Service: Endoscopy;  Laterality: N/A;    ENDOSCOPIC ULTRASOUND OF UPPER GASTROINTESTINAL TRACT N/A 11/07/2022    Procedure: ULTRASOUND, UPPER GI TRACT, ENDOSCOPIC;  Surgeon: Roger Viveros III, MD;  Location: [facility] ENDO;  Service: Endoscopy;  Laterality: N/A;    ERCP N/A 02/04/2022    Procedure: ERCP (ENDOSCOPIC RETROGRADE CHOLANGIOPANCREATOGRAPHY);  Surgeon: Chuy Bay MD;  Location: [facility] ENDO (2ND FLR);  Service: Endoscopy;  Laterality: N/A;    ERCP N/A 04/19/2022    Procedure: ERCP (ENDOSCOPIC RETROGRADE CHOLANGIOPANCREATOGRAPHY);  Surgeon: Chuy Bay MD;  Location: [facility] ENDO (2ND  FLR);  Service: Endoscopy;  Laterality: N/A;  4/1: fully vaccinated. labs prior. instructions mailed to sister and patient.-SC  4/12/22-Confirmed new arrival time of 10:45am with pt's sister and updated instructions emailed-DS    ERCP N/A 10/05/2022    Procedure: ERCP (ENDOSCOPIC RETROGRADE CHOLANGIOPANCREATOGRAPHY);  Surgeon: Roger Viveros III, MD;  Location: [facility];  Service: Endoscopy;  Laterality: N/A;    EYE SURGERY Left     JOINT REPLACEMENT Bilateral     knee replacement    KNEE SURGERY      RECONSTRUCTION OF SHOULDER Right     TONSILLECTOMY      UPPER ENDOSCOPIC ULTRASOUND W/ FNA  06/03/2022        Tobacco Use:  The patient  reports that he has been smoking cigarettes. He has a 10.00 pack-year smoking history. He has never used smokeless tobacco.     Results for orders placed or performed during the hospital encounter of 11/29/22   EKG 12-lead    Collection Time: 11/30/22  1:05 AM    Narrative    Test Reason : R07.9,    Vent. Rate : 090 BPM     Atrial Rate : 090 BPM     P-R Int : 184 ms          QRS Dur : 098 ms      QT Int : 384 ms       P-R-T Axes : 077 058 043 degrees     QTc Int : 469 ms    Normal sinus rhythm  Normal ECG  When compared with ECG of 29-NOV-2022 22:40,  No significant change was found  Confirmed by Michael Martin MD ([id number]) on 12/2/2022 3:44:28 PM    Referred By: [email or web address]   SELF           Confirmed By:Michael Martin MD             Lab Results   Component Value Date    WBC 5.69 01/19/2023    HGB 11.4 (L) 01/19/2023    HCT 34.4 (L) 01/19/2023    MCV 90 01/19/2023     (L) 01/19/2023     BMP  Lab Results   Component Value Date     (L) 01/19/2023    K 3.8 01/19/2023     01/19/2023    CO2 20 (L) 01/19/2023    BUN 21 01/19/2023    CREATININE 0.6 01/19/2023    CALCIUM 9.0 01/19/2023    ANIONGAP 7 (L) 01/19/2023    GLU 86 01/19/2023     12/01/2022     (H) 11/29/2022       No results found for this or any previous visit.            Pre-op  Assessment    I have reviewed the Patient Summary Reports.     I have reviewed the Nursing Notes. I have reviewed the NPO Status.   I have reviewed the Medications.     Review of Systems  Anesthesia Hx:  Denies Family Hx of Anesthesia complications.   Denies Personal Hx of Anesthesia complications.   Social:  Smoker Hx of ETOH abuse   Hematology/Oncology:     Oncology Normal    -- Anemia:   EENT/Dental:  EENT/Dental Normal Left upper central incisor broken.  Right upper lateral incisor broken at base.   Cardiovascular:   Hypertension, well controlled Dysrhythmias (History AFib. Currently in sinus)  ECG has been reviewed. 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     Pulmonary:   Bilateral interstitial lung disease per CT June 2022, but patient denies any lung disease.   Renal/:  Renal/ Normal     Hepatic/GI:   Liver Disease, (cirrhosis) Hx of chronic pancreatitis  Hx of pancreatic cyst   Musculoskeletal:  Musculoskeletal Normal    Neurological:  Neurology Normal    Endocrine:  Endocrine Normal        Physical Exam  General: Well nourished, Cooperative, Alert and Oriented    Airway:  Mallampati: III / II  Mouth Opening: Normal  TM Distance: Normal  Tongue: Normal  Neck ROM: Normal ROM    Chest/Lungs:  Clear to auscultation, Normal Respiratory Rate    Heart:  Rate: Normal  Rhythm: Regular Rhythm  Sounds: Normal        Anesthesia Plan  Type of Anesthesia, risks & benefits discussed:    Anesthesia Type: Gen ETT  Intra-op Monitoring Plan: Standard ASA Monitors  Post Op Pain Control Plan:   (medical reason for not using multimodal pain management)  Informed Consent: Informed consent signed with the Patient and all parties understand the risks and agree with anesthesia plan.  All questions answered.   ASA Score: 3  Anesthesia Plan Notes:       GETA  Zofran, Pepcid, Decadron 4 mg    Ready For Surgery From Anesthesia Perspective.     .

## 2023-01-24 NOTE — H&P
GASTROENTEROLOGY PRE-PROCEDURE H&P NOTE  Patient Name: Vic Reyez  Patient MRN: [id number]  Patient : 1950    Service date: 2023    PCP: Primary Doctor No    No chief complaint on file.      HPI: Patient is a 72 y.o. male with PMHx appy, cirrhosis, pancreatitis, EtOH / tobacco use, biliary stricture s/p recent ERCP (suspected IgG4 related), splenic artery embolization presents for evaluation of biliary stricture. This process has been going on for >6 months. All bx negative; IgG4 stains and AIP changes on bx      Past Medical History:  Past Medical History:   Diagnosis Date    Alcohol abuse 2022    Decompensated hepatic cirrhosis 2022    Encounter for blood transfusion     Hypertension     Lab test positive for detection of COVID-19 virus 2021    Pancreatic cyst 2022        Past Surgical History:  Past Surgical History:   Procedure Laterality Date    ABDOMINAL AORTOGRAPHY N/A 10/04/2022    Procedure: AORTOGRAM-ABDOMINAL;  Surgeon: Felice Epstein MD;  Location: [facility] CATH/EP LAB;  Service: Vascular;  Laterality: N/A;    APPENDECTOMY      ARTERIOGRAM, MESENTERIC N/A 10/04/2022    Procedure: ARTERIOGRAM, MESENTERIC;  Surgeon: Felice Epstein MD;  Location: [facility] CATH/EP LAB;  Service: Vascular;  Laterality: N/A;    COLONOSCOPY      ENDOSCOPIC ULTRASOUND OF UPPER GASTROINTESTINAL TRACT  2022    Procedure: ULTRASOUND, UPPER GI TRACT, ENDOSCOPIC;  Surgeon: Chuy Bay MD;  Location: [address]);  Service: Endoscopy;;    ENDOSCOPIC ULTRASOUND OF UPPER GASTROINTESTINAL TRACT N/A 2022    Procedure: ULTRASOUND, UPPER GI TRACT, ENDOSCOPIC;  Surgeon: Roger Viveros III, MD;  Location: [facility] ENDO;  Service: Endoscopy;  Laterality: N/A;    ENDOSCOPIC ULTRASOUND OF UPPER GASTROINTESTINAL TRACT N/A 2022    Procedure: ULTRASOUND, UPPER GI TRACT, ENDOSCOPIC;  Surgeon: Roger Viveros III, MD;  Location: [facility];  Service: Endoscopy;  Laterality: N/A;    ERCP  N/A 02/04/2022    Procedure: ERCP (ENDOSCOPIC RETROGRADE CHOLANGIOPANCREATOGRAPHY);  Surgeon: Chuy Bay MD;  [facility]  Service: Endoscopy;  Laterality: N/A;    ERCP N/A 04/19/2022    Procedure: ERCP (ENDOSCOPIC RETROGRADE CHOLANGIOPANCREATOGRAPHY);  Surgeon: Chuy Bay MD;  Location: [facility] ([address] [place] [address]);  Service: Endoscopy;  Laterality: N/A;  4/1: fully vaccinated. labs prior. instructions mailed to sister and patient.-SC  4/12/22-Confirmed new arrival time of 10:45am with pt's sister and updated instructions emailed-DS    ERCP N/A 10/05/2022    Procedure: ERCP (ENDOSCOPIC RETROGRADE CHOLANGIOPANCREATOGRAPHY);  Surgeon: Roger Viveros III, MD;  Location: [facility];  Service: Endoscopy;  Laterality: N/A;    EYE SURGERY Left     JOINT REPLACEMENT Bilateral     knee replacement    KNEE SURGERY      RECONSTRUCTION OF SHOULDER Right     TONSILLECTOMY      UPPER ENDOSCOPIC ULTRASOUND W/ FNA  06/03/2022        Home Medications:  Medications Prior to Admission   Medication Sig Dispense Refill Last Dose    aspirin 325 MG tablet Take 325 mg by mouth once daily.   Past Week    HYDROcodone-acetaminophen (NORCO) 5-325 mg per tablet Take 1 tablet by mouth every 8 (eight) hours as needed for Pain. 12 tablet 0 Past Week    multivit-mins no.63/iron/folic (M-VIT ORAL) Take 1 tablet by mouth once daily.   1/23/2023    predniSONE (DELTASONE) 10 MG tablet Take 40 mg by mouth.   1/23/2023    ursodioL (ACTIGALL) 300 mg capsule Take 1 capsule (300 mg total) by mouth 2 (two) times daily. 60 capsule 0 1/23/2023       Inpatient Medications:        Review of patient's allergies indicates:  No Known Allergies    Social History:   Social History     Occupational History    Not on file   Tobacco Use    Smoking status: Every Day     Packs/day: 0.25     Years: 40.00     Pack years: 10.00     Types: Cigarettes    Smokeless tobacco: Never    Tobacco comments:     Pt smoked on and off for 40 years   Substance and  "Sexual Activity    Alcohol use: Yes     Alcohol/week: 10.0 standard drinks     Types: 10 Cans of beer per week    Drug use: Never    Sexual activity: Not Currently       Family History:   History reviewed. No pertinent family history.    Review of Systems:  A 10 point review of systems was performed and was normal, except as mentioned in the HPI, including constitutional, HEENT, heme, lymph, cardiovascular, respiratory, gastrointestinal, genitourinary, neurologic, endocrine, psychiatric and musculoskeletal.      OBJECTIVE:    Physical Exam:  24 Hour Vital Sign Ranges: Temp:  [97.9 °F (36.6 °C)] 97.9 °F (36.6 °C)  Pulse:  [63] 63  Resp:  [15] 15  SpO2:  [100 %] 100 %  BP: (142)/(74) 142/74  Most recent vitals: BP (!) 142/74 (BP Location: Left arm, Patient Position: Lying)   Pulse 63   Temp 97.9 °F (36.6 °C) (Oral)   Resp 15   Ht 5' 10.5" (1.791 m)   Wt 54.4 kg (120 lb)   SpO2 100% Comment: room air  BMI 16.97 kg/m²    GEN: well-developed, well-nourished, awake and alert, non-toxic appearing adult  HEENT: PERRL, sclera anicteric, oral mucosa pink and moist without lesion  NECK: trachea midline; Good ROM  CV: regular rate and rhythm, no murmurs or gallops  RESP: clear to auscultation bilaterally, no wheezes, rhonci or rales  ABD: soft, non-tender, non-distended, normal bowel sounds  EXT: no swelling or edema, 2+ pulses distally  SKIN: no rashes or jaundice  PSYCH: normal affect    Labs:   No results for input(s): WBC, MCV, PLT in the last 72 hours.    Invalid input(s): HGBAU  No results for input(s): NA, K, CL, CO2, BUN, GLU in the last 72 hours.    Invalid input(s): CREA  No results for input(s): ALB in the last 72 hours.    Invalid input(s): ALKP, SGOT, SGPT, TBIL, DBIL, TPRO  No results for input(s): PT, INR, PTT in the last 72 hours.      IMPRESSION / RECOMMENDATIONS:  ERCP  with interventions as warranted.   RIsks, benefits, alternatives discussed in detail regarding upcoming procedures and sedation. Some " of the more common endoscopic complications include but not limited to immediate or delayed perforation, bleeding, infections, pain, inadvertent injury to surrounding tissue / organs and possible need for surgical evaluation. Patient expressed understanding, all questions answered and will proceed with procedure as planned.     Roger Viveros III  1/24/2023  3:16 PM

## 2023-01-24 NOTE — TRANSFER OF CARE
"Anesthesia Transfer of Care Note    Patient: Vic Reyez    Procedure(s) Performed: Procedure(s) (LRB):  ERCP (ENDOSCOPIC RETROGRADE CHOLANGIOPANCREATOGRAPHY) (N/A)    Patient location: PACU    Anesthesia Type: general    Transport from OR: Transported from OR on room air with adequate spontaneous ventilation    Post pain: adequate analgesia    Post assessment: no apparent anesthetic complications    Post vital signs: stable    Level of consciousness: awake    Nausea/Vomiting: no nausea/vomiting    Complications: none    Transfer of care protocol was followedComments: Patient stable, report to RN, questions answered.  I am available during the recovery time for any further needs       Last vitals:   Visit Vitals  BP (!) 142/74 (BP Location: Left arm, Patient Position: Lying)   Pulse 63   Temp 36.6 °C (97.9 °F) (Oral)   Resp 15   Ht 5' 10.5" (1.791 m)   Wt 54.4 kg (120 lb)   SpO2 100%   BMI 16.97 kg/m²     "

## 2023-01-24 NOTE — PROVATION PATIENT INSTRUCTIONS
Discharge Summary/Instructions after an Endoscopic Procedure  Patient Name: Vic Reyez  Patient MRN: [id number]  Patient YOB: 1950  Tuesday, January 24, 2023  Roger Viveros III, MD  RESTRICTIONS:  During your procedure today, you received medications for sedation.  These   medications may affect your judgment, balance and coordination.  Therefore,   for 24 hours, you have the following restrictions:   - DO NOT drive a car, operate machinery, make legal/financial decisions,   sign important papers or drink alcohol.    ACTIVITY:  Today: no heavy lifting, straining or running due to procedural   sedation/anesthesia.  The following day: return to full activity including work.  DIET:  Eat and drink normally unless instructed otherwise.     TREATMENT FOR COMMON SIDE EFFECTS:  - Mild abdominal pain, nausea, belching, bloating or excessive gas:  rest,   eat lightly and use a heating pad.  - Sore Throat: treat with throat lozenges and/or gargle with warm salt   water.  - Because air was used during the procedure, expelling large amounts of air   from your rectum or belching is normal.  - If a bowel prep was taken, you may not have a bowel movement for 1-3 days.    This is normal.  SYMPTOMS TO WATCH FOR AND REPORT TO YOUR PHYSICIAN:  1. Abdominal pain or bloating, other than gas cramps.  2. Chest pain.  3. Back pain.  4. Signs of infection such as: chills or fever occurring within 24 hours   after the procedure.  5. Rectal bleeding, which would show as bright red, maroon, or black stools.   (A tablespoon of blood from the rectum is not serious, especially if   hemorrhoids are present.)  6. Vomiting.  7. Weakness or dizziness.  GO DIRECTLY TO THE NEAREST EMERGENCY ROOM IF YOU HAVE ANY OF THE FOLLOWING:      Difficulty breathing              Chills and/or fever over 101 F   Persistent vomiting and/or vomiting blood   Severe abdominal pain   Severe chest pain   Black, tarry stools   Bleeding- more than one  tablespoon   Any other symptom or condition that you feel may need urgent attention  Your doctor recommends these additional instructions:  If any biopsies were taken, your doctors clinic will contact you in 1 to 2   weeks with any results.  - Advance diet as tolerated.   - Continue present medications.   - Given improvement in clinical symptoms as well as biliary strictures, will   taper prednisone by 5mg weekly over next 2 months; If recurrent disease,   consider rituximab (caution w/ liver disease)  - Discharge patient to home (with escort).  For questions, problems or results please call your physician - Roger Viveros III, MD at Work:  [phone].  [facility], EMERGENCY ROOM PHONE NUMBER: [phone]  IF A COMPLICATION OR EMERGENCY SITUATION ARISES AND YOU ARE UNABLE TO REACH   YOUR PHYSICIAN - GO DIRECTLY TO THE EMERGENCY ROOM.  Roger Viveros III, MD  1/24/2023 4:07:42 PM  This report has been verified and signed electronically.  Dear patient,  As a result of recent federal legislation (The Federal Cures Act), you may   receive lab or pathology results from your procedure in your [facility]   account before your physician is able to contact you. Your physician or   their representative will relay the results to you with their   recommendations at their soonest availability.  Thank you,  PROVATION

## 2023-01-25 NOTE — ANESTHESIA POSTPROCEDURE EVALUATION
Anesthesia Post Evaluation    Patient: Vic Reyez    Procedure(s) Performed: Procedure(s) (LRB):  ERCP (ENDOSCOPIC RETROGRADE CHOLANGIOPANCREATOGRAPHY) (N/A)    Final Anesthesia Type: general      Patient location during evaluation: PACU  Patient participation: Yes- Able to Participate  Level of consciousness: awake and alert  Post-procedure vital signs: reviewed and stable  Pain management: adequate  Airway patency: patent    PONV status at discharge: No PONV  Anesthetic complications: no      Cardiovascular status: hemodynamically stable  Respiratory status: unassisted, spontaneous ventilation and room air  Hydration status: euvolemic  Follow-up not needed.          Vitals Value Taken Time   /76 01/24/23 1615   Temp 36.3 °C (97.4 °F) 01/24/23 1605   Pulse 68 01/24/23 1629   Resp 18 01/24/23 1627   SpO2 100 % 01/24/23 1629   Vitals shown include unvalidated device data.      Event Time   Out of Recovery 16:30:15         Pain/Minda Score: Minda Score: 10 (1/24/2023  4:15 PM)

## 2023-02-06 ENCOUNTER — TELEPHONE (OUTPATIENT)
Dept: HEPATOLOGY | Facility: CLINIC | Age: 73
End: 2023-02-06
Payer: MEDICARE

## 2023-02-06 DIAGNOSIS — K83.1 BILIARY STENOSIS: ICD-10-CM

## 2023-02-06 RX ORDER — URSODIOL 300 MG/1
300 CAPSULE ORAL 2 TIMES DAILY
Qty: 60 CAPSULE | Refills: 11 | Status: SHIPPED | OUTPATIENT
Start: 2023-02-06 | End: 2024-03-30

## 2023-02-06 RX ORDER — HYDROCODONE BITARTRATE AND ACETAMINOPHEN 5; 325 MG/1; MG/1
TABLET ORAL
Qty: 30 TABLET | Refills: 0 | OUTPATIENT
Start: 2023-02-06

## 2023-02-06 NOTE — TELEPHONE ENCOUNTER
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  He is calling for a refill of Ursodiol.  If he still need this Rx, it should go to [place] [facility] since they deliver and he does not have transportation.    Thank you.

## 2023-06-06 ENCOUNTER — PATIENT OUTREACH (OUTPATIENT)
Dept: ADMINISTRATIVE | Facility: HOSPITAL | Age: 73
End: 2023-06-06
Payer: MEDICARE

## 2023-06-06 ENCOUNTER — OFFICE VISIT (OUTPATIENT)
Dept: FAMILY MEDICINE | Facility: CLINIC | Age: 73
End: 2023-06-06
Payer: MEDICARE

## 2023-06-06 VITALS
DIASTOLIC BLOOD PRESSURE: 82 MMHG | HEIGHT: 71 IN | OXYGEN SATURATION: 99 % | HEART RATE: 78 BPM | SYSTOLIC BLOOD PRESSURE: 132 MMHG | RESPIRATION RATE: 19 BRPM | BODY MASS INDEX: 20.35 KG/M2 | WEIGHT: 145.38 LBS

## 2023-06-06 DIAGNOSIS — Z13.6 ENCOUNTER FOR LIPID SCREENING FOR CARDIOVASCULAR DISEASE: ICD-10-CM

## 2023-06-06 DIAGNOSIS — F10.20 ALCOHOL USE DISORDER, SEVERE, DEPENDENCE: ICD-10-CM

## 2023-06-06 DIAGNOSIS — K74.60 DECOMPENSATED HEPATIC CIRRHOSIS: ICD-10-CM

## 2023-06-06 DIAGNOSIS — K86.1 CHRONIC PANCREATITIS, UNSPECIFIED PANCREATITIS TYPE: ICD-10-CM

## 2023-06-06 DIAGNOSIS — K62.5 BRIGHT RED BLOOD PER RECTUM: ICD-10-CM

## 2023-06-06 DIAGNOSIS — Z13.220 ENCOUNTER FOR LIPID SCREENING FOR CARDIOVASCULAR DISEASE: ICD-10-CM

## 2023-06-06 DIAGNOSIS — I72.8 SPLENIC ARTERY ANEURYSM: ICD-10-CM

## 2023-06-06 DIAGNOSIS — K72.90 DECOMPENSATED HEPATIC CIRRHOSIS: ICD-10-CM

## 2023-06-06 DIAGNOSIS — Z76.89 ENCOUNTER TO ESTABLISH CARE WITH NEW DOCTOR: ICD-10-CM

## 2023-06-06 DIAGNOSIS — H43.392 VITREOUS FLOATERS OF LEFT EYE: ICD-10-CM

## 2023-06-06 DIAGNOSIS — Z51.81 ENCOUNTER FOR MEDICATION MONITORING: ICD-10-CM

## 2023-06-06 DIAGNOSIS — H53.8 BLURRY VISION, LEFT EYE: Primary | ICD-10-CM

## 2023-06-06 DIAGNOSIS — D68.9 COAGULATION DEFECT, UNSPECIFIED: ICD-10-CM

## 2023-06-06 PROCEDURE — 1159F MED LIST DOCD IN RCRD: CPT | Mod: CPTII,S$GLB,, | Performed by: FAMILY MEDICINE

## 2023-06-06 PROCEDURE — 3008F BODY MASS INDEX DOCD: CPT | Mod: CPTII,S$GLB,, | Performed by: FAMILY MEDICINE

## 2023-06-06 PROCEDURE — 99204 OFFICE O/P NEW MOD 45 MIN: CPT | Mod: S$GLB,,, | Performed by: FAMILY MEDICINE

## 2023-06-06 PROCEDURE — 1159F PR MEDICATION LIST DOCUMENTED IN MEDICAL RECORD: ICD-10-PCS | Mod: CPTII,S$GLB,, | Performed by: FAMILY MEDICINE

## 2023-06-06 PROCEDURE — 99999 PR PBB SHADOW E&M-EST. PATIENT-LVL IV: ICD-10-PCS | Mod: PBBFAC,,, | Performed by: FAMILY MEDICINE

## 2023-06-06 PROCEDURE — 3008F PR BODY MASS INDEX (BMI) DOCUMENTED: ICD-10-PCS | Mod: CPTII,S$GLB,, | Performed by: FAMILY MEDICINE

## 2023-06-06 PROCEDURE — 99204 PR OFFICE/OUTPT VISIT, NEW, LEVL IV, 45-59 MIN: ICD-10-PCS | Mod: S$GLB,,, | Performed by: FAMILY MEDICINE

## 2023-06-06 PROCEDURE — 99999 PR PBB SHADOW E&M-EST. PATIENT-LVL IV: CPT | Mod: PBBFAC,,, | Performed by: FAMILY MEDICINE

## 2023-06-06 PROCEDURE — 3075F PR MOST RECENT SYSTOLIC BLOOD PRESS GE 130-139MM HG: ICD-10-PCS | Mod: CPTII,S$GLB,, | Performed by: FAMILY MEDICINE

## 2023-06-06 PROCEDURE — 3075F SYST BP GE 130 - 139MM HG: CPT | Mod: CPTII,S$GLB,, | Performed by: FAMILY MEDICINE

## 2023-06-06 PROCEDURE — 3079F PR MOST RECENT DIASTOLIC BLOOD PRESSURE 80-89 MM HG: ICD-10-PCS | Mod: CPTII,S$GLB,, | Performed by: FAMILY MEDICINE

## 2023-06-06 PROCEDURE — 3079F DIAST BP 80-89 MM HG: CPT | Mod: CPTII,S$GLB,, | Performed by: FAMILY MEDICINE

## 2023-06-06 RX ORDER — AMOXICILLIN 500 MG
1 CAPSULE ORAL DAILY
COMMUNITY

## 2023-06-06 NOTE — LETTER
FAX      AUTHORIZATION FOR RELEASE OF   CONFIDENTIAL INFORMATION        [facility] MED REC DEPT      We are seeing Vic Reyez, date of birth 1950, in the clinic at [facility]. Maryse Das MD is the patient's PCP. Vic Reyez has an outstanding lab/procedure at the time we reviewed their chart. In order to help keep their health information updated, Vic Reyez has authorized us to request the following medical record(s):                  COLONOSCOPY (MOST RECENT WITHIN 10 YRS)    LIPID PANEL (MOST RECENT)         Please fax records to Maryse Das MD at [facility]  [phone].     If you have any questions, please contact Chanell at [phone].    Chanell Mcgowan LPN  Performance Improvement Coordinator  [facility]  [address], [place] [id number] [phone]      Patient Name: Vic Reyez  : 1950  Patient Phone #: [phone]

## 2023-06-06 NOTE — PROGRESS NOTES
Population Health Chart Review & Patient Outreach Details:     Reason for Outreach Encounter:     [x]  Non-Compliant Report   []  Payor Report (Hum[employer], PHN, BCBS, MSSP, MCIP, UHC, etc.)   []  Pre-Visit Chart Review     Updates Requested / Reviewed:     [x]  Care Everywhere    []     [x]  External Sources ([facility], [facility], DIS, etc.)   []  Care Team Updated    Patient Outreach Method:    []  Telephone Outreach Completed   [] Successful   [] Left Voicemail   [] Unable to Contact (wrong number, no voicemail)  []  [employer] Portal Outreach Sent  []  Letter Outreach Mailed  [x]  Fax Sent for External Records  []  External Records Upload    Health Maintenance Topics Addressed and Outreach Outcomes / Actions Taken:        []      Breast Cancer Screening []  Mammo Scheduled      []  External Records Requested     []  Added Reminder to Complete to Upcoming Primary Care Appt Notes     []  Patient Declined     []  Patient Will Call Back to Schedule     []  Patient Will Schedule with External Provider / Order Routed if Applicable             []       Cervical Cancer Screening []  Pap Scheduled      []  External Records Requested     []  Added Reminder to Complete to Upcoming Primary Care Appt Notes     []  Patient Declined     []  Patient Will Call Back to Schedule     []  Patient Will Schedule with External Provider               [x]          Colorectal Cancer Screening []  Colonoscopy Case Request or Referral Placed     [x]  External Records Requested     []  Added Reminder to Complete to Upcoming Primary Care Appt Notes     []  Patient Declined     []  Patient Will Call Back to Schedule     []  Patient Will Schedule with External Provider     []  Fit Kit Mailed (add the SmartPhrase under additional notes)     []  Reminded Patient to Complete Home Test             []      Diabetic Eye Exam []  Eye Camera Scheduled or Optometry Referral Placed     []  External Records Requested     []  Added Reminder to Complete to  Upcoming Primary Care Appt Notes     []  Patient Declined     []  Patient Will Call Back to Schedule     []  Patient Will Schedule with External Provider             []      Blood Pressure Control []  Primary Care Follow Up Visit Scheduled     []  Remote Blood Pressure Reading Captured     []  Added Reminder to Complete to Upcoming Primary Care Appt Notes     []  Patient Declined     []  Patient Will Call Back / Patient Will Send Portal Message with Reading     []  Patient Will Call Back to Schedule Provider Visit             []       HbA1c & Other Labs []  Lab Appt Scheduled for Due Labs     []  Primary Care Follow Up Visit Scheduled      []  Reminded Patient to Complete Home Test     []  Added Reminder to Complete to Upcoming Primary Care Appt Notes     []  Patient Declined     []  Patient Will Call Back to Schedule     []  Patient Will Schedule with External Provider / Order Routed if Applicable           []    Schedule Primary Care Appt []  Primary Care Appt Scheduled     []  Patient Declined     []  Patient Will Call Back to Schedule     []  Pt Established with External Provider & Updated Care Team             []      Medication Adherence []  Primary Care Appointment Scheduled     []  Added Reminder to Upcoming Primary Care Appt Notes     []  Patient Reminded to  Prescription     []  Patient Declined, Provider Notified if Needed     []  Sent Provider Message to Review and/or Add Exclusion to Problem List             []      Osteoporosis Screening []  DXA Appointment Scheduled     []  External Records Requested     []  Added Reminder to Complete to Upcoming Primary Care Appt Notes     []  Patient Declined     []  Patient Will Call Back to Schedule     []  Patient Will Schedule with External Provider / Order Routed if Applicable     Additional Care Coordinator Notes:     06/06/2023  EFAX SENT TO [facility] MED REC DEPT FOR MOST RECENT LIPID PANEL & COLONOSCOPY RESULTS    Further Action Needed If Patient  Returns Outreach:

## 2023-06-06 NOTE — PROGRESS NOTES
[facility] - Clinic Note    Subjective      Mr. Reyez is a 72 y.o. male who presents to clinic to establish care.     Reports for the past week he has been having blurry vision and seeing black dots/worm like shapes.   Admits to itchness of the eye  [facility] and had a new lens put in.    History of hepatic cirrhosis and chronic pancreatitis. Followed by surgery and hepatology.   Had an ERCP done 1/2023 due to bilary stricture.   Reports Dr. Viveros had patient on prednisone and he finished the script. Unsure if he needs to continue this. Does not have an upcoming appointment.   Has not been taking the urosodil either. He did not know it was called in for him.  Continues to drink alcohol.      He does reports since his surgery in January he has been having bright red per rectum during a BM. Occurs intermittently.   No family history of colon cancer.   [facility].     [facility] Vic has a past medical history of Alcohol abuse (02/02/2022), Decompensated hepatic cirrhosis (02/02/2022), Encounter for blood transfusion, Hypertension, Lab test positive for detection of COVID-19 virus (09/2021), and Pancreatic cyst (06/03/2022).   [facility] has a past surgical history that includes Knee surgery; Appendectomy; Joint replacement (Bilateral); Eye surgery (Left); Reconstruction of shoulder (Right); ERCP (N/A, 02/04/2022); Endoscopic ultrasound of upper gastrointestinal tract (02/04/2022); Colonoscopy; Tonsillectomy; ERCP (N/A, 04/19/2022); Upper endoscopic ultrasound w/ FNA (06/03/2022); Endoscopic ultrasound of upper gastrointestinal tract (N/A, 06/03/2022); Abdominal aortography (N/A, 10/04/2022); arteriogram, mesenteric (N/A, 10/04/2022); ERCP (N/A, 10/05/2022); Endoscopic ultrasound of upper gastrointestinal tract (N/A, 11/07/2022); and ERCP (N/A, 1/24/2023).    Vic's family history is not on file.    Ho reports that he has been  "smoking cigarettes. He has a 10.00 pack-year smoking history. He has never used smokeless tobacco. He reports current alcohol use of about 10.0 standard drinks per week. He reports that he does not use drugs.   [facility] Ho has No Known Allergies.   [facility] Ho has a current medication list which includes the following prescription(s): aspirin, multivit-mins no.63/iron/folic, fish oil-omega-3 fatty acids, hydrocodone-acetaminophen, prednisone, and ursodiol.     Review of Systems   Constitutional:  Negative for activity change, appetite change, chills, fatigue and fever.   Eyes:  Positive for itching and visual disturbance. Negative for pain and discharge.   Respiratory:  Negative for cough and shortness of breath.    Cardiovascular:  Negative for chest pain, palpitations and leg swelling.   Gastrointestinal:  Negative for abdominal distention, abdominal pain, constipation, diarrhea, nausea and vomiting.   Skin:  Negative for wound.   Neurological:  Negative for dizziness and headaches.   Psychiatric/Behavioral:  Negative for confusion.    Objective     /82 (BP Location: Left arm, Patient Position: Sitting, BP Method: Medium (Manual))   Pulse 78   Resp 19   Ht 5' 10.5" (1.791 m)   Wt 66 kg (145 lb 6.4 oz)   SpO2 99%   BMI 20.57 kg/m²     Physical Exam   Constitutional: normal appearance.  Non-toxic appearance. No distress. He does not appear ill.   HENT:   Head: Normocephalic and atraumatic.   Eyes: Right eye exhibits no discharge. Left eye exhibits no discharge.   Cardiovascular: Normal rate, regular rhythm, normal heart sounds and normal pulses. Exam reveals no gallop and no friction rub.   No murmur heard.Pulmonary:      Effort: Pulmonary effort is normal. No respiratory distress.      Breath sounds: Normal breath sounds. No wheezing, rhonchi or rales.     Abdominal: Normal appearance.   Musculoskeletal:      Right lower leg: No edema.      Left lower leg: No edema.   Lymphadenopathy:     He has no cervical " adenopathy.   Neurological: He is alert.   Skin: Skin is warm and dry. Capillary refill takes less than 2 seconds. He is not diaphoretic.   Psychiatric: His behavior is normal. Mood, judgment and thought content normal.   Vitals reviewed.   Assessment/Plan     Ho was seen today for establish care and eye problem.    Diagnoses and all orders for this visit:  -New patient and new problem to me    Blurry vision, left eye  -     Ambulatory referral/consult to Ophthalmology; Future    Vitreous floaters of left eye  -     Ambulatory referral/consult to Ophthalmology; Future    Encounter for lipid screening for cardiovascular disease  -     Lipid panel; Future    Encounter for medication monitoring  -     Comprehensive metabolic panel; Future  -     CBC auto differential; Future    Bright red blood per rectum  -     Comprehensive metabolic panel; Future  -     CBC auto differential; Future  -     Ambulatory referral/consult to General Surgery; Future    Encounter to establish care with new doctor    Decompensated hepatic cirrhosis  -     Comprehensive metabolic panel; Future  -     CBC auto differential; Future  - Followed by hepatology    Chronic pancreatitis, unspecified pancreatitis type  -stable at this time. 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 Stable at this time. Followed by hepatology    Alcohol use disorder, severe, dependence  -continues to drink beer at night    Splenic artery aneurysm  -history of Endovascular coiling of the splenic artery. Imaging from 11/2022 revealed no recurrent aneurysm.     Follow up in about 1 year (around 6/6/2024), or if symptoms worsen or fail to improve.    Future Appointments   Date Time Provider Department Center   6/21/2023  1:30 PM Rhona Garcia MD [facility]   6/6/2024  2:20 PM Maryse Das MD [facility]       Maryse Das MD  Family Medicine  [facility]

## 2023-06-21 ENCOUNTER — OFFICE VISIT (OUTPATIENT)
Dept: SURGERY | Facility: CLINIC | Age: 73
End: 2023-06-21
Payer: MEDICARE

## 2023-06-21 VITALS
HEIGHT: 70 IN | WEIGHT: 146 LBS | OXYGEN SATURATION: 96 % | BODY MASS INDEX: 20.9 KG/M2 | DIASTOLIC BLOOD PRESSURE: 66 MMHG | HEART RATE: 84 BPM | SYSTOLIC BLOOD PRESSURE: 120 MMHG

## 2023-06-21 DIAGNOSIS — K62.5 BRIGHT RED BLOOD PER RECTUM: Primary | ICD-10-CM

## 2023-06-21 DIAGNOSIS — K57.90 DIVERTICULOSIS: ICD-10-CM

## 2023-06-21 PROCEDURE — 1159F PR MEDICATION LIST DOCUMENTED IN MEDICAL RECORD: ICD-10-PCS | Mod: CPTII,S$GLB,, | Performed by: STUDENT IN AN ORGANIZED HEALTH CARE EDUCATION/TRAINING PROGRAM

## 2023-06-21 PROCEDURE — 3078F DIAST BP <80 MM HG: CPT | Mod: CPTII,S$GLB,, | Performed by: STUDENT IN AN ORGANIZED HEALTH CARE EDUCATION/TRAINING PROGRAM

## 2023-06-21 PROCEDURE — 99999 PR PBB SHADOW E&M-EST. PATIENT-LVL V: CPT | Mod: PBBFAC,,, | Performed by: STUDENT IN AN ORGANIZED HEALTH CARE EDUCATION/TRAINING PROGRAM

## 2023-06-21 PROCEDURE — 3074F PR MOST RECENT SYSTOLIC BLOOD PRESSURE < 130 MM HG: ICD-10-PCS | Mod: CPTII,S$GLB,, | Performed by: STUDENT IN AN ORGANIZED HEALTH CARE EDUCATION/TRAINING PROGRAM

## 2023-06-21 PROCEDURE — 3078F PR MOST RECENT DIASTOLIC BLOOD PRESSURE < 80 MM HG: ICD-10-PCS | Mod: CPTII,S$GLB,, | Performed by: STUDENT IN AN ORGANIZED HEALTH CARE EDUCATION/TRAINING PROGRAM

## 2023-06-21 PROCEDURE — 1159F MED LIST DOCD IN RCRD: CPT | Mod: CPTII,S$GLB,, | Performed by: STUDENT IN AN ORGANIZED HEALTH CARE EDUCATION/TRAINING PROGRAM

## 2023-06-21 PROCEDURE — 3288F FALL RISK ASSESSMENT DOCD: CPT | Mod: CPTII,S$GLB,, | Performed by: STUDENT IN AN ORGANIZED HEALTH CARE EDUCATION/TRAINING PROGRAM

## 2023-06-21 PROCEDURE — 1126F AMNT PAIN NOTED NONE PRSNT: CPT | Mod: CPTII,S$GLB,, | Performed by: STUDENT IN AN ORGANIZED HEALTH CARE EDUCATION/TRAINING PROGRAM

## 2023-06-21 PROCEDURE — 3074F SYST BP LT 130 MM HG: CPT | Mod: CPTII,S$GLB,, | Performed by: STUDENT IN AN ORGANIZED HEALTH CARE EDUCATION/TRAINING PROGRAM

## 2023-06-21 PROCEDURE — 99999 PR PBB SHADOW E&M-EST. PATIENT-LVL V: ICD-10-PCS | Mod: PBBFAC,,, | Performed by: STUDENT IN AN ORGANIZED HEALTH CARE EDUCATION/TRAINING PROGRAM

## 2023-06-21 PROCEDURE — 3008F PR BODY MASS INDEX (BMI) DOCUMENTED: ICD-10-PCS | Mod: CPTII,S$GLB,, | Performed by: STUDENT IN AN ORGANIZED HEALTH CARE EDUCATION/TRAINING PROGRAM

## 2023-06-21 PROCEDURE — 3008F BODY MASS INDEX DOCD: CPT | Mod: CPTII,S$GLB,, | Performed by: STUDENT IN AN ORGANIZED HEALTH CARE EDUCATION/TRAINING PROGRAM

## 2023-06-21 PROCEDURE — 1126F PR PAIN SEVERITY QUANTIFIED, NO PAIN PRESENT: ICD-10-PCS | Mod: CPTII,S$GLB,, | Performed by: STUDENT IN AN ORGANIZED HEALTH CARE EDUCATION/TRAINING PROGRAM

## 2023-06-21 PROCEDURE — 99203 PR OFFICE/OUTPT VISIT, NEW, LEVL III, 30-44 MIN: ICD-10-PCS | Mod: S$GLB,,, | Performed by: STUDENT IN AN ORGANIZED HEALTH CARE EDUCATION/TRAINING PROGRAM

## 2023-06-21 PROCEDURE — 1101F PT FALLS ASSESS-DOCD LE1/YR: CPT | Mod: CPTII,S$GLB,, | Performed by: STUDENT IN AN ORGANIZED HEALTH CARE EDUCATION/TRAINING PROGRAM

## 2023-06-21 PROCEDURE — 3288F PR FALLS RISK ASSESSMENT DOCUMENTED: ICD-10-PCS | Mod: CPTII,S$GLB,, | Performed by: STUDENT IN AN ORGANIZED HEALTH CARE EDUCATION/TRAINING PROGRAM

## 2023-06-21 PROCEDURE — 1101F PR PT FALLS ASSESS DOC 0-1 FALLS W/OUT INJ PAST YR: ICD-10-PCS | Mod: CPTII,S$GLB,, | Performed by: STUDENT IN AN ORGANIZED HEALTH CARE EDUCATION/TRAINING PROGRAM

## 2023-06-21 PROCEDURE — 99203 OFFICE O/P NEW LOW 30 MIN: CPT | Mod: S$GLB,,, | Performed by: STUDENT IN AN ORGANIZED HEALTH CARE EDUCATION/TRAINING PROGRAM

## 2023-06-21 RX ORDER — SODIUM CHLORIDE 9 MG/ML
INJECTION, SOLUTION INTRAVENOUS CONTINUOUS
Status: CANCELLED | OUTPATIENT
Start: 2023-06-21

## 2023-06-21 NOTE — PROGRESS NOTES
Patient, Vic Reyez, was instructed on Miralax bowel prep. Patient was given instructions on pre-op, elle-op, and post-op scheduled appointments.  Patient received blue folder containing all written instructions.    Colonoscopy scheduled for August 10, 2023    Gustavo Caceres MA

## 2023-06-21 NOTE — PATIENT INSTRUCTIONS
Miralax Bowel Prep Instructions     Date of procedure:  August 10, 2023    Bowel Prep Instructions:  You will need to purchase at the store the following from the pharmacy:  One (1) box laxative tablets, (NOT STOOL SOFTNERS)  8.3 oz bottle of Miralax (or generic)   Two (2) quarts (64oz) of liquid to drink. We suggest Gatorade or Powerade.  Do not follow packaging instructions on either of these items    Mix the 2 qts of liquid and the 8.3 oz bottle of Miralax together to make your prep drink.        Do not take the following Medications for 3 days prior to your procedure:   Aspirin, Excedrin, BC powder or goodies powders   Ibuprofen or Motrin  Naproxen or Aleve  Fish oils  Vitamin E    Day 1 August 9, 2023   All day you will be on a Clear Liquid Diet   You may have the following chicken, beef or bone broth, Jell-O, Popsicles, Sprite, Water, Gatorade, Powerade, and Black Coffee.   Do not eat or drink anything RED OR PURPLE IN COLOR     At Noon 12:00 pm, you will take two laxative tablets.      At 2:00 pm, you will drink half of your Miralax prep Drink     At 6:00 pm, you will drink the remaining half of your prep drink and two laxative tablets.      Do not eat or drink after Midnight       Day 2 August 10, 2023   Take all morning medications after the procedure    Location of Department:   [facility] [address], [place] [address]    Parking:    Use parking lot 1  Enter at the main hospital entrance  Check in with outpatient registration    Contact:   Someone from surgery will call you with a time of arrival.   If you do not receive a call from surgery by 2pm the business day (August 9, 2023) before your procedure, please call [phone].     Transportation:   You will NOT be able to drive yourself.  You are required to have someone drive you home from the hospital due to the anesthesia.

## 2023-06-21 NOTE — H&P
[facility] Surgery H&P Note    Subjective:       Patient ID: Vic Reyez is a 72 y.o. male.    Chief Complaint: bright red blood per rectum  HPI:  Vic Reyez is a 72 y.o. male with history of alcohol abuse, hepatic cirrhosis, hypertension, pancreatitis presents today for evaluation of bright red blood per rectum.  In January the patient underwent ERCP for removal of a pancreatic stent due to history of biliary stricture.  He has done well since that procedure.  He did have several episodes of bright red blood per rectum over the past few months.  Has not occurred in several weeks now.  The bleeding is painless.  He denies change in bowel habits.  He is no longer having abdominal pain that he was having with the biliary stricture.  [facility] and was [facility].    Past Medical History:   Diagnosis Date    Alcohol abuse 02/02/2022    Decompensated hepatic cirrhosis 02/02/2022    Encounter for blood transfusion     Hypertension     Lab test positive for detection of COVID-19 virus 09/2021    Pancreatic cyst 06/03/2022     Past Surgical History:   Procedure Laterality Date    ABDOMINAL AORTOGRAPHY N/A 10/04/2022    Procedure: AORTOGRAM-ABDOMINAL;  Surgeon: Felice Epstein MD;  Location: [facility] CATH/EP LAB;  Service: Vascular;  Laterality: N/A;    APPENDECTOMY      ARTERIOGRAM, MESENTERIC N/A 10/04/2022    Procedure: ARTERIOGRAM, MESENTERIC;  Surgeon: Felice Epstein MD;  Location: [facility] CATH/EP LAB;  Service: Vascular;  Laterality: N/A;    COLONOSCOPY      ENDOSCOPIC ULTRASOUND OF UPPER GASTROINTESTINAL TRACT  02/04/2022    Procedure: ULTRASOUND, UPPER GI TRACT, ENDOSCOPIC;  Surgeon: Chuy Bay MD;  Location: [facility] ENDO ([address] [place] [address]);  Service: Endoscopy;;    ENDOSCOPIC ULTRASOUND OF UPPER GASTROINTESTINAL TRACT N/A 06/03/2022    Procedure: ULTRASOUND, UPPER GI TRACT, ENDOSCOPIC;  Surgeon: Roger Viveros III, MD;  Location: [facility] ENDO;  Service: Endoscopy;  Laterality: N/A;     ENDOSCOPIC ULTRASOUND OF UPPER GASTROINTESTINAL TRACT N/A 11/07/2022    Procedure: ULTRASOUND, UPPER GI TRACT, ENDOSCOPIC;  Surgeon: Roger Viveros III, MD;  Location: [facility] ENDO;  Service: Endoscopy;  Laterality: N/A;    ERCP N/A 02/04/2022    Procedure: ERCP (ENDOSCOPIC RETROGRADE CHOLANGIOPANCREATOGRAPHY);  Surgeon: Cuhy Bay MD;  Location: [facility] ENDO (2ND FLR);  Service: Endoscopy;  Laterality: N/A;    ERCP N/A 04/19/2022    Procedure: ERCP (ENDOSCOPIC RETROGRADE CHOLANGIOPANCREATOGRAPHY);  Surgeon: Chuy Bay MD;  Location: [facility] (2ND FLR);  Service: Endoscopy;  Laterality: N/A;  4/1: fully vaccinated. labs prior. instructions mailed to sister and patient.-SC  4/12/22-Confirmed new arrival time of 10:45am with pt's sister and updated instructions emailed-DS    ERCP N/A 10/05/2022    Procedure: ERCP (ENDOSCOPIC RETROGRADE CHOLANGIOPANCREATOGRAPHY);  Surgeon: Roger Viveros III, MD;  Location: [facility];  Service: Endoscopy;  Laterality: N/A;    ERCP N/A 1/24/2023    Procedure: ERCP (ENDOSCOPIC RETROGRADE CHOLANGIOPANCREATOGRAPHY);  Surgeon: Roger Viveros III, MD;  Location: [facility];  Service: Endoscopy;  Laterality: N/A;    EYE SURGERY Left     JOINT REPLACEMENT Bilateral     knee replacement    KNEE SURGERY      RECONSTRUCTION OF SHOULDER Right     TONSILLECTOMY      UPPER ENDOSCOPIC ULTRASOUND W/ FNA  06/03/2022     History reviewed. No pertinent family history.  Social History     Socioeconomic History    Marital status: Single   Tobacco Use    Smoking status: Every Day     Packs/day: 0.25     Years: 40.00     Pack years: 10.00     Types: Cigarettes    Smokeless tobacco: Never    Tobacco comments:     Pt smoked on and off for 40 years   Substance and Sexual Activity    Alcohol use: Yes     Alcohol/week: 10.0 standard drinks     Types: 10 Cans of beer per week    Drug use: Never    Sexual activity: Not Currently       Current Outpatient Medications   Medication Sig Dispense  "Refill    aspirin 325 MG tablet Take 325 mg by mouth once daily.      HYDROcodone-acetaminophen (NORCO) 5-325 mg per tablet TAKE 1 TABLET BY MOUTH EVERY 4 TO 6 HOURS AS NEEDED FOR PAIN 30 tablet 0    multivit-mins no.63/iron/folic (M-VIT ORAL) Take 1 tablet by mouth once daily.      omega-3 fatty acids/fish oil (FISH OIL-OMEGA-3 FATTY ACIDS) 300-1,000 mg capsule Take 1 capsule by mouth once daily.      predniSONE (DELTASONE) 10 MG tablet Take 40 mg by mouth.      ursodioL (ACTIGALL) 300 mg capsule Take 1 capsule (300 mg total) by mouth 2 (two) times daily. (Patient not taking: Reported on 6/6/2023) 60 capsule 11     No current facility-administered medications for this visit.     Review of patient's allergies indicates:  No Known Allergies    Review of Systems   Constitutional:  Negative for appetite change, chills and fever.   HENT:  Negative for congestion, dental problem and drooling.    Eyes:  Negative for photophobia, discharge and itching.   Respiratory:  Negative for apnea and chest tightness.    Cardiovascular:  Negative for chest pain, palpitations and leg swelling.   Gastrointestinal:  Positive for blood in stool. Negative for abdominal distention and abdominal pain.   Endocrine: Negative for cold intolerance and heat intolerance.   Genitourinary:  Negative for difficulty urinating and dysuria.   Musculoskeletal:  Negative for arthralgias and back pain.   Skin:  Negative for color change and pallor.   Neurological:  Negative for dizziness, facial asymmetry and headaches.   Hematological:  Negative for adenopathy. Does not bruise/bleed easily.   Psychiatric/Behavioral:  Negative for agitation, behavioral problems and confusion.      Objective:      Vitals:    06/21/23 1308   BP: 120/66   Pulse: 84   SpO2: 96%   Weight: 66.2 kg (146 lb)   Height: 5' 10" (1.778 m)     Physical Exam  Exam conducted with a chaperone present.   Constitutional:       Appearance: He is well-developed.   HENT:      Head: " Normocephalic and atraumatic.   Eyes:      Pupils: Pupils are equal, round, and reactive to light.   Cardiovascular:      Rate and Rhythm: Normal rate and regular rhythm.   Pulmonary:      Effort: Pulmonary effort is normal.      Breath sounds: Normal breath sounds.   Abdominal:      General: Bowel sounds are normal. There is no distension.      Palpations: Abdomen is soft.      Tenderness: There is no abdominal tenderness.   Genitourinary:     Rectum: Tenderness present. No external hemorrhoid.      Comments: Could not tolerate YANIV  Musculoskeletal:         General: Normal range of motion.      Cervical back: Normal range of motion and neck supple.   Skin:     General: Skin is warm.      Findings: No erythema.   Neurological:      Mental Status: He is alert and oriented to person, place, and time.   Psychiatric:         Behavior: Behavior normal.       Lab Review: CBC:   Lab Results   Component Value Date    WBC 5.69 01/19/2023    RBC 3.81 (L) 01/19/2023    HGB 11.4 (L) 01/19/2023    HCT 34.4 (L) 01/19/2023     (L) 01/19/2023     BMP:   Lab Results   Component Value Date    GLU 86 01/19/2023     (L) 01/19/2023    K 3.8 01/19/2023     01/19/2023    CO2 20 (L) 01/19/2023    BUN 21 01/19/2023    CREATININE 0.6 01/19/2023    CALCIUM 9.0 01/19/2023     Diagnostics Review:  n/a     Assessment:       1. Bright red blood per rectum    2. Diverticulosis        Plan:   Bright red blood per rectum  -     Ambulatory referral/consult to General Surgery  -     Full code; Standing  -     Place in Outpatient; Standing  -     Case Request Operating Room: COLONOSCOPY  -     Vital signs; Standing  -     Insert peripheral IV; Standing  -     Notify Physician; Standing  -     Diet NPO; Standing  -     Place LUCILLE hose; Standing  -     Place sequential compression device; Standing    Diverticulosis  -     Full code; Standing  -     Place in Outpatient; Standing  -     Case Request Operating Room: COLONOSCOPY  -      Vital signs; Standing  -     Insert peripheral IV; Standing  -     Notify Physician; Standing  -     Diet NPO; Standing  -     Place LUCILLE hose; Standing  -     Place sequential compression device; Standing    Other orders  -     0.9%  NaCl infusion        Medical Decision Making/Counseling:  BRBRP could be related to diverticulosis, polyps, mass, hemorrhoids  Patient unable to tolerate YANIV due to tenderness  No external hemorrhoids or visible fissures  Recommend diagnostic colonoscopy    Risks and benefits of endoscopy were discussed in depth in clinic.  From a procedural standpoint, we discuss the benefits of colonoscopy to be finding colon cancers at early stages, including polyps which can be endoscopically resectable, to finding early stage colon cancers which can be better treated with current medical and surgical therapies in order to give patients a longer survival, if found in these early stages.  From a standpoint of risks, the risk of bleeding and perforation of the colon were discussed.  I personally discussed that if complications of bleeding or perforation were to occur, the patient could need as little as a blood transfusion and as much as possible hospital admission, repeat procedure, or even surgery.    Patient instructed that best way to communicate with my office staff is for patient to get on the [email or web address] patient portal to expedite communication and communication issues that may occur.  Patient was given instructions on how to get on the portal.  I encouraged patient to obtain portal access as well.  Ultimately it is up to the patient to obtain access.  Patient voiced understanding.

## 2023-06-29 ENCOUNTER — OFFICE VISIT (OUTPATIENT)
Dept: OPTOMETRY | Facility: CLINIC | Age: 73
End: 2023-06-29
Payer: MEDICARE

## 2023-06-29 DIAGNOSIS — H43.392 VITREOUS SYNERESIS OF LEFT EYE: Primary | ICD-10-CM

## 2023-06-29 DIAGNOSIS — Z96.1 PSEUDOPHAKIA OF LEFT EYE: ICD-10-CM

## 2023-06-29 DIAGNOSIS — H25.11 NUCLEAR SCLEROSIS OF RIGHT EYE: ICD-10-CM

## 2023-06-29 DIAGNOSIS — H26.492 POSTERIOR CAPSULAR OPACIFICATION NON VISUALLY SIGNIFICANT OF LEFT EYE: ICD-10-CM

## 2023-06-29 PROCEDURE — 1101F PT FALLS ASSESS-DOCD LE1/YR: CPT | Mod: CPTII,S$GLB,, | Performed by: OPTOMETRIST

## 2023-06-29 PROCEDURE — 99999 PR PBB SHADOW E&M-EST. PATIENT-LVL III: ICD-10-PCS | Mod: PBBFAC,,, | Performed by: OPTOMETRIST

## 2023-06-29 PROCEDURE — 1126F AMNT PAIN NOTED NONE PRSNT: CPT | Mod: CPTII,S$GLB,, | Performed by: OPTOMETRIST

## 2023-06-29 PROCEDURE — 1159F MED LIST DOCD IN RCRD: CPT | Mod: CPTII,S$GLB,, | Performed by: OPTOMETRIST

## 2023-06-29 PROCEDURE — 3288F FALL RISK ASSESSMENT DOCD: CPT | Mod: CPTII,S$GLB,, | Performed by: OPTOMETRIST

## 2023-06-29 PROCEDURE — 99204 OFFICE O/P NEW MOD 45 MIN: CPT | Mod: S$GLB,,, | Performed by: OPTOMETRIST

## 2023-06-29 PROCEDURE — 1159F PR MEDICATION LIST DOCUMENTED IN MEDICAL RECORD: ICD-10-PCS | Mod: CPTII,S$GLB,, | Performed by: OPTOMETRIST

## 2023-06-29 PROCEDURE — 1101F PR PT FALLS ASSESS DOC 0-1 FALLS W/OUT INJ PAST YR: ICD-10-PCS | Mod: CPTII,S$GLB,, | Performed by: OPTOMETRIST

## 2023-06-29 PROCEDURE — 3288F PR FALLS RISK ASSESSMENT DOCUMENTED: ICD-10-PCS | Mod: CPTII,S$GLB,, | Performed by: OPTOMETRIST

## 2023-06-29 PROCEDURE — 1126F PR PAIN SEVERITY QUANTIFIED, NO PAIN PRESENT: ICD-10-PCS | Mod: CPTII,S$GLB,, | Performed by: OPTOMETRIST

## 2023-06-29 PROCEDURE — 1160F PR REVIEW ALL MEDS BY PRESCRIBER/CLIN PHARMACIST DOCUMENTED: ICD-10-PCS | Mod: CPTII,S$GLB,, | Performed by: OPTOMETRIST

## 2023-06-29 PROCEDURE — 1160F RVW MEDS BY RX/DR IN RCRD: CPT | Mod: CPTII,S$GLB,, | Performed by: OPTOMETRIST

## 2023-06-29 PROCEDURE — 99204 PR OFFICE/OUTPT VISIT, NEW, LEVL IV, 45-59 MIN: ICD-10-PCS | Mod: S$GLB,,, | Performed by: OPTOMETRIST

## 2023-06-29 PROCEDURE — 99999 PR PBB SHADOW E&M-EST. PATIENT-LVL III: CPT | Mod: PBBFAC,,, | Performed by: OPTOMETRIST

## 2023-06-29 NOTE — PROGRESS NOTES
"HPI    Pt here today for loss of or blurred vision - OS only x 1 month.    States   had coughing episode about a month ago then noticed sudden loss of vision.         Also started seeing floaters OS.    States symptoms are worse during the day then at night when laying down.    Feels "disoriented" at time due to symptoms.    Denies any light flashes or eye pain.    [facility] ~ [facility] with [facility]    Laying down watching tv, had a coughing spell, laid back down and went to   sleep  Woke up the next morning with a haze that comes and goes, lots of floaters   and black spots  Calms down when laying down, worse when walking around  Happened a month ago, stable, no worse, no better    History of eye drifting OS since childhood trauma  Last edited by Shaka Rodriguez, OD on 6/29/2023  9:49 AM.            Assessment /Plan     For exam results, see Encounter Report.    Vitreous syneresis of left eye  -     Ambulatory referral/consult to Ophthalmology  -     Ambulatory referral/consult to Ophthalmology; Future; Expected date: 07/29/2023    Nuclear sclerosis of right eye    Pseudophakia of left eye  -     Ambulatory referral/consult to Ophthalmology    Posterior capsular opacification non visually significant of left eye      1. Vitreous syneresis of left eye  Large vitreous syneresis inferior periphery  Negative pernell's sign  No holes/tears/breaks/RD OS  Referred to Dr. Crespo for scleral depress eval  Return sooner if any worsening of symptoms    - Ambulatory referral/consult to Ophthalmology; Future    2. Nuclear sclerosis of right eye  Moderate, not visually significant. Discussed possible ocular affects of cataracts. Acceptable BCVA OU. Discussed treatment options. Surgery not recommended at this time. Monitor yearly.     3. Pseudophakia of left eye  4. Posterior capsular opacification non visually significant of left eye  S/p cataract extraction OS x 4 years  Mild PCO, not visually " significant  Recheck 1 year, sooner if no improvement to vision OS

## 2023-07-16 ENCOUNTER — NURSE TRIAGE (OUTPATIENT)
Dept: ADMINISTRATIVE | Facility: CLINIC | Age: 73
End: 2023-07-16
Payer: MEDICARE

## 2023-07-17 NOTE — TELEPHONE ENCOUNTER
"Caller c/o L side of neck pain and L shoulder x 3 days, pt states that he took OTC pain medication x 10 mins ago. Caller states pain is 8/10 at this time. Caller advised per protocol, but told to call back or go into the ED within 2 hours s/p taking OTC pain medication if no relief. Caller refused appt at this time.  Pt advised per protocol and verbalized understanding.   Reason for Disposition   [1] MODERATE neck pain (e.g., interferes with normal activities) AND [2] present > 3 days    Additional Information   Negative: Shock suspected (e.g., cold/pale/clammy skin, too weak to stand, low BP, rapid pulse)   Negative: Difficult to awaken or acting confused (e.g., disoriented, slurred speech)   Negative: [1] Similar pain previously AND [2] it was from "heart attack"   Negative: [1] Similar pain previously AND [2] it was from "angina" AND [3] not relieved by nitroglycerin   Negative: Sounds like a life-threatening emergency to the triager   Negative: Difficulty breathing or unusual sweating (e.g., sweating without exertion)   Negative: [1] Stiff neck (can't put chin to chest) AND [2] headache   Negative: [1] Stiff neck (can't put chin to chest) AND [2] fever   Negative: Weakness of an arm or hand   Negative: Problems with bowel or bladder control   Negative: Head is twisting to one side (or ask "is it turning against your will?")   Negative: Patient sounds very sick or weak to the triager   Negative: [1] SEVERE neck pain (e.g., excruciating, unable to do any normal activities) AND [2] not improved after 2 hours of pain medicine   Negative: [1] Fever > 100.0 F (37.8 C) AND [2] Intravenous Drug Use (IVDU)   Negative: [1] Fever > 100.0 F (37.8 C) AND [2] diabetes mellitus or weak immune system (e.g., HIV positive, cancer chemo, splenectomy, organ transplant, chronic steroids)   Negative: Numbness in an arm or hand (i.e., loss of sensation)   Negative: Tenderness or swelling of front of neck over windpipe   Negative: Rash " "in same area as pain (may be described as "small blisters")   Negative: High-risk adult (e.g., history of cancer, HIV, or IV drug use)    Protocols used: Neck Pain or [id number]    "

## 2023-07-19 ENCOUNTER — OFFICE VISIT (OUTPATIENT)
Dept: OPHTHALMOLOGY | Facility: CLINIC | Age: 73
End: 2023-07-19
Payer: MEDICARE

## 2023-07-19 DIAGNOSIS — H43.392 VITREOUS SYNERESIS OF LEFT EYE: Primary | ICD-10-CM

## 2023-07-19 DIAGNOSIS — H43.813 VITREOUS DEGENERATION OF BOTH EYES: ICD-10-CM

## 2023-07-19 DIAGNOSIS — H04.123 DRY EYE SYNDROME OF BOTH EYES: ICD-10-CM

## 2023-07-19 DIAGNOSIS — Z96.1 PSEUDOPHAKIA, LEFT EYE: ICD-10-CM

## 2023-07-19 DIAGNOSIS — H25.21 AGE-RELATED HYPERMATURE CATARACT, RIGHT: ICD-10-CM

## 2023-07-19 PROCEDURE — 1126F PR PAIN SEVERITY QUANTIFIED, NO PAIN PRESENT: ICD-10-PCS | Mod: CPTII,S$GLB,, | Performed by: OPHTHALMOLOGY

## 2023-07-19 PROCEDURE — 92134 OCT, RETINA - OU - BOTH EYES: ICD-10-PCS | Mod: S$GLB,,, | Performed by: OPHTHALMOLOGY

## 2023-07-19 PROCEDURE — 3288F FALL RISK ASSESSMENT DOCD: CPT | Mod: CPTII,S$GLB,, | Performed by: OPHTHALMOLOGY

## 2023-07-19 PROCEDURE — 1159F MED LIST DOCD IN RCRD: CPT | Mod: CPTII,S$GLB,, | Performed by: OPHTHALMOLOGY

## 2023-07-19 PROCEDURE — 99204 PR OFFICE/OUTPT VISIT, NEW, LEVL IV, 45-59 MIN: ICD-10-PCS | Mod: S$GLB,,, | Performed by: OPHTHALMOLOGY

## 2023-07-19 PROCEDURE — 92201 OPSCPY EXTND RTA DRAW UNI/BI: CPT | Mod: S$GLB,,, | Performed by: OPHTHALMOLOGY

## 2023-07-19 PROCEDURE — 1159F PR MEDICATION LIST DOCUMENTED IN MEDICAL RECORD: ICD-10-PCS | Mod: CPTII,S$GLB,, | Performed by: OPHTHALMOLOGY

## 2023-07-19 PROCEDURE — 99999 PR PBB SHADOW E&M-EST. PATIENT-LVL III: ICD-10-PCS | Mod: PBBFAC,,, | Performed by: OPHTHALMOLOGY

## 2023-07-19 PROCEDURE — 92201 PR OPHTHALMOSCOPY, EXT, W/RET DRAW/SCLERAL DEPR, I&R, UNI/BI: ICD-10-PCS | Mod: S$GLB,,, | Performed by: OPHTHALMOLOGY

## 2023-07-19 PROCEDURE — 3288F PR FALLS RISK ASSESSMENT DOCUMENTED: ICD-10-PCS | Mod: CPTII,S$GLB,, | Performed by: OPHTHALMOLOGY

## 2023-07-19 PROCEDURE — 92134 CPTRZ OPH DX IMG PST SGM RTA: CPT | Mod: S$GLB,,, | Performed by: OPHTHALMOLOGY

## 2023-07-19 PROCEDURE — 99204 OFFICE O/P NEW MOD 45 MIN: CPT | Mod: S$GLB,,, | Performed by: OPHTHALMOLOGY

## 2023-07-19 PROCEDURE — 1101F PR PT FALLS ASSESS DOC 0-1 FALLS W/OUT INJ PAST YR: ICD-10-PCS | Mod: CPTII,S$GLB,, | Performed by: OPHTHALMOLOGY

## 2023-07-19 PROCEDURE — 99999 PR PBB SHADOW E&M-EST. PATIENT-LVL III: CPT | Mod: PBBFAC,,, | Performed by: OPHTHALMOLOGY

## 2023-07-19 PROCEDURE — 1160F RVW MEDS BY RX/DR IN RCRD: CPT | Mod: CPTII,S$GLB,, | Performed by: OPHTHALMOLOGY

## 2023-07-19 PROCEDURE — 1126F AMNT PAIN NOTED NONE PRSNT: CPT | Mod: CPTII,S$GLB,, | Performed by: OPHTHALMOLOGY

## 2023-07-19 PROCEDURE — 1101F PT FALLS ASSESS-DOCD LE1/YR: CPT | Mod: CPTII,S$GLB,, | Performed by: OPHTHALMOLOGY

## 2023-07-19 PROCEDURE — 1160F PR REVIEW ALL MEDS BY PRESCRIBER/CLIN PHARMACIST DOCUMENTED: ICD-10-PCS | Mod: CPTII,S$GLB,, | Performed by: OPHTHALMOLOGY

## 2023-07-19 NOTE — PROGRESS NOTES
A/P    ICD-10-CM ICD-9-CM   1. Vitreous syneresis of left eye  H43.392 379.24   2. Vitreous degeneration of both eyes  H43.813 379.21   3. Age-related hypermature cataract, right  H25.21 366.18   4. Pseudophakia, left eye  Z96.1 V43.1   5. Dry eye syndrome of both eyes  H04.123 375.15       1. Vitreous syneresis of left eye  2. Vitreous degeneration of both eyes  Referral from Dr. Rodriguez for retina check  Has floaters moreso now about a month  Exam notable for large prominent central floaters OS, partial PVD, no RT/RD with   Plan: Observation for now, counseled on possibility of PPV given large central floaters impacting vision and function, pt to think about surgery, will recheck in 2 weeks     Sleep head of bed elevated  Pathology of PVD, Retinal Tear, Retinal Detachment reviewed in great detail  RD precautions discussed in detail, patient expressed understanding  RTC immediately PRN (especially ANY change flashes, floaters, vision, visual field)     3. Age-related hypermature cataract, right  Mod NS, NVS  Plan: Observation     4. Pseudophakia, left eye  Good lens position  Plan: Observation, update Mrx prn    5. Dry eye syndrome of both eyes  Mild dry eye  Rec ATs prn     RTC 2 weeks DFE/OCTm OU consider PPV OS     I saw and examined the patient and reviewed in detail the findings documented. The final examination findings, image interpretations, and plan as documented in the record represent my personal judgment and conclusions.    Tee Crespo MD  Vitreoretinal Surgery   [facility]

## 2023-08-02 ENCOUNTER — OFFICE VISIT (OUTPATIENT)
Dept: OPHTHALMOLOGY | Facility: CLINIC | Age: 73
End: 2023-08-02
Payer: MEDICARE

## 2023-08-02 DIAGNOSIS — H43.813 VITREOUS DEGENERATION OF BOTH EYES: ICD-10-CM

## 2023-08-02 DIAGNOSIS — Z96.1 PSEUDOPHAKIA, LEFT EYE: ICD-10-CM

## 2023-08-02 DIAGNOSIS — H04.123 DRY EYE SYNDROME OF BOTH EYES: ICD-10-CM

## 2023-08-02 DIAGNOSIS — H43.392 VITREOUS FLOATERS OF LEFT EYE: ICD-10-CM

## 2023-08-02 DIAGNOSIS — H25.21 AGE-RELATED HYPERMATURE CATARACT, RIGHT: ICD-10-CM

## 2023-08-02 DIAGNOSIS — H43.392 VITREOUS SYNERESIS OF LEFT EYE: Primary | ICD-10-CM

## 2023-08-02 PROCEDURE — 1159F MED LIST DOCD IN RCRD: CPT | Mod: CPTII,S$GLB,, | Performed by: OPHTHALMOLOGY

## 2023-08-02 PROCEDURE — 99999 PR PBB SHADOW E&M-EST. PATIENT-LVL IV: ICD-10-PCS | Mod: PBBFAC,,, | Performed by: OPHTHALMOLOGY

## 2023-08-02 PROCEDURE — 3288F PR FALLS RISK ASSESSMENT DOCUMENTED: ICD-10-PCS | Mod: CPTII,S$GLB,, | Performed by: OPHTHALMOLOGY

## 2023-08-02 PROCEDURE — 1101F PR PT FALLS ASSESS DOC 0-1 FALLS W/OUT INJ PAST YR: ICD-10-PCS | Mod: CPTII,S$GLB,, | Performed by: OPHTHALMOLOGY

## 2023-08-02 PROCEDURE — 92134 CPTRZ OPH DX IMG PST SGM RTA: CPT | Mod: S$GLB,,, | Performed by: OPHTHALMOLOGY

## 2023-08-02 PROCEDURE — 1126F PR PAIN SEVERITY QUANTIFIED, NO PAIN PRESENT: ICD-10-PCS | Mod: CPTII,S$GLB,, | Performed by: OPHTHALMOLOGY

## 2023-08-02 PROCEDURE — 99214 OFFICE O/P EST MOD 30 MIN: CPT | Mod: S$GLB,,, | Performed by: OPHTHALMOLOGY

## 2023-08-02 PROCEDURE — 1160F RVW MEDS BY RX/DR IN RCRD: CPT | Mod: CPTII,S$GLB,, | Performed by: OPHTHALMOLOGY

## 2023-08-02 PROCEDURE — 99214 PR OFFICE/OUTPT VISIT, EST, LEVL IV, 30-39 MIN: ICD-10-PCS | Mod: S$GLB,,, | Performed by: OPHTHALMOLOGY

## 2023-08-02 PROCEDURE — 3288F FALL RISK ASSESSMENT DOCD: CPT | Mod: CPTII,S$GLB,, | Performed by: OPHTHALMOLOGY

## 2023-08-02 PROCEDURE — 92134 OCT, RETINA - OU - BOTH EYES: ICD-10-PCS | Mod: S$GLB,,, | Performed by: OPHTHALMOLOGY

## 2023-08-02 PROCEDURE — 1160F PR REVIEW ALL MEDS BY PRESCRIBER/CLIN PHARMACIST DOCUMENTED: ICD-10-PCS | Mod: CPTII,S$GLB,, | Performed by: OPHTHALMOLOGY

## 2023-08-02 PROCEDURE — 1101F PT FALLS ASSESS-DOCD LE1/YR: CPT | Mod: CPTII,S$GLB,, | Performed by: OPHTHALMOLOGY

## 2023-08-02 PROCEDURE — 99999 PR PBB SHADOW E&M-EST. PATIENT-LVL IV: CPT | Mod: PBBFAC,,, | Performed by: OPHTHALMOLOGY

## 2023-08-02 PROCEDURE — 1126F AMNT PAIN NOTED NONE PRSNT: CPT | Mod: CPTII,S$GLB,, | Performed by: OPHTHALMOLOGY

## 2023-08-02 PROCEDURE — 1159F PR MEDICATION LIST DOCUMENTED IN MEDICAL RECORD: ICD-10-PCS | Mod: CPTII,S$GLB,, | Performed by: OPHTHALMOLOGY

## 2023-08-02 RX ORDER — PHENYLEPHRINE HYDROCHLORIDE 25 MG/ML
1 SOLUTION/ DROPS OPHTHALMIC
Status: CANCELLED | OUTPATIENT
Start: 2023-08-02

## 2023-08-02 RX ORDER — PREDNISOLONE ACETATE 10 MG/ML
1 SUSPENSION/ DROPS OPHTHALMIC
Status: CANCELLED | OUTPATIENT
Start: 2023-08-02

## 2023-08-02 RX ORDER — CYCLOPENTOLATE HYDROCHLORIDE 10 MG/ML
1 SOLUTION/ DROPS OPHTHALMIC
Status: CANCELLED | OUTPATIENT
Start: 2023-08-02

## 2023-08-02 RX ORDER — TETRACAINE HYDROCHLORIDE 5 MG/ML
1 SOLUTION OPHTHALMIC
Status: CANCELLED | OUTPATIENT
Start: 2023-08-02

## 2023-08-02 RX ORDER — MOXIFLOXACIN 5 MG/ML
1 SOLUTION/ DROPS OPHTHALMIC
Status: CANCELLED | OUTPATIENT
Start: 2023-08-02

## 2023-08-02 NOTE — PROGRESS NOTES
HPI    Pt presents for 2 week dfe/oct OU     States no changes to vision.     No ocular meds  Last edited by Yaneli Esparza on 8/2/2023  1:13 PM.          A/P    ICD-10-CM ICD-9-CM   1. Vitreous syneresis of left eye  H43.392 379.24   2. Vitreous degeneration of both eyes  H43.813 379.21   3. Age-related hypermature cataract, right  H25.21 366.18   4. Pseudophakia, left eye  Z96.1 V43.1   5. Dry eye syndrome of both eyes  H04.123 375.15         1. Vitreous syneresis of left eye  2. Vitreous degeneration of both eyes  Here for floater f/u     Exam notable for large prominent central floaters OS, partial PVD, no RT/RD    Plan: patient significantly impacted by large central floaters, given findings, recommend vitrectomy, aiming for Aug 14th under mac block    Risks, benefits and alternatives to surgery and anesthesia including no treatment were discussed with the patient including: death, coma, blindness, bleeding, retinal detachment, cataract, need for more surgeries and glaucoma. The patient understands and accepts risks All questions were answered and the patient wishes to proceed with surgery    Recommend Pars Plana Vitrectomy  Right Eye   R/B/A to surgery and anesthesia discussed with patient including: death, coma, blindness, bleeding, retinal detachment, cataract, and glaucoma Patient understands and accepts risks All questions answered Patient wishes to proceed with surgery      3. Age-related hypermature cataract, right  Mod NS, NVS  Plan: Observation     4. Pseudophakia, left eye  Good lens position  Plan: Observation, update Mrx prn    5. Dry eye syndrome of both eyes  Mild dry eye  Rec ATs prn     RTC post-op    I saw and examined the patient and reviewed in detail the findings documented. The final examination findings, image interpretations, and plan as documented in the record represent my personal judgment and conclusions.    Tee Crespo MD  Vitreoretinal Surgery   [facility]

## 2023-08-08 ENCOUNTER — TELEPHONE (OUTPATIENT)
Dept: OPHTHALMOLOGY | Facility: CLINIC | Age: 73
End: 2023-08-08
Payer: MEDICARE

## 2023-08-08 NOTE — TELEPHONE ENCOUNTER
----- Message from Annemarie Bobo sent at 8/7/2023  5:00 PM CDT -----  Contact: [phone]    ----- Message -----  From: Franki Salinas  Sent: 8/7/2023   2:28 PM CDT  To: [facility] Staff    Pt is calling because he was trying to see if the arrival time for his SX is set. He states he needs the time earlier than Friday so that he can arrange for transportation through his insurance. He states if he can even just get a round about time just so he can give them something. Please call back to further assist.

## 2023-08-11 ENCOUNTER — TELEPHONE (OUTPATIENT)
Dept: OPHTHALMOLOGY | Facility: CLINIC | Age: 73
End: 2023-08-11
Payer: MEDICARE

## 2023-08-11 NOTE — PRE-PROCEDURE INSTRUCTIONS
PREOP INSTRUCTIONS:  No food,milk or milk products for 8 hours before surgery.  Clear liquids like water,gatorade,apple juice are allowed up until 2 hours before surgery.  Instructed to follow the surgeon's instructions if they differ from these.  Shower instructions as well as directions to the Surgery Center were given.  Encouraged to wear loose fitting,comfortable clothing.  Medication instructions for pm prior to and am of procedure reviewed.  Instructed to avoid taking vitamins,supplements,aspirin and ibuprofen the morning of surgery.    Patient denies any side effects or issues with anesthesia or sedation.       Patient reported that he drinks beer daily and is concerned about receiving enough anesthesia to be effective.Informed patient that his anesthesia team will be in to speak with him after he arrives to the hospital and he will be able to communicate any specific information to them at that time

## 2023-08-13 RX ORDER — TETRACAINE HYDROCHLORIDE 5 MG/ML
1 SOLUTION OPHTHALMIC
Status: CANCELLED | OUTPATIENT
Start: 2023-08-13

## 2023-08-13 RX ORDER — PHENYLEPHRINE HYDROCHLORIDE 25 MG/ML
1 SOLUTION/ DROPS OPHTHALMIC
Status: CANCELLED | OUTPATIENT
Start: 2023-08-13

## 2023-08-13 RX ORDER — CYCLOPENTOLATE HYDROCHLORIDE 10 MG/ML
1 SOLUTION/ DROPS OPHTHALMIC
Status: CANCELLED | OUTPATIENT
Start: 2023-08-13

## 2023-08-13 RX ORDER — PREDNISOLONE ACETATE 10 MG/ML
1 SUSPENSION/ DROPS OPHTHALMIC
Status: CANCELLED | OUTPATIENT
Start: 2023-08-13

## 2023-08-13 RX ORDER — MOXIFLOXACIN 5 MG/ML
1 SOLUTION/ DROPS OPHTHALMIC
Status: CANCELLED | OUTPATIENT
Start: 2023-08-13

## 2023-08-14 ENCOUNTER — ANESTHESIA EVENT (OUTPATIENT)
Dept: SURGERY | Facility: HOSPITAL | Age: 73
End: 2023-08-14
Payer: MEDICARE

## 2023-08-14 ENCOUNTER — HOSPITAL ENCOUNTER (OUTPATIENT)
Facility: HOSPITAL | Age: 73
Discharge: HOME | End: 2023-08-14
Attending: OPHTHALMOLOGY | Admitting: OPHTHALMOLOGY
Payer: MEDICARE

## 2023-08-14 ENCOUNTER — ANESTHESIA (OUTPATIENT)
Dept: SURGERY | Facility: HOSPITAL | Age: 73
End: 2023-08-14
Payer: MEDICARE

## 2023-08-14 VITALS
TEMPERATURE: 98 F | DIASTOLIC BLOOD PRESSURE: 74 MMHG | SYSTOLIC BLOOD PRESSURE: 129 MMHG | WEIGHT: 145 LBS | BODY MASS INDEX: 20.81 KG/M2 | OXYGEN SATURATION: 99 % | RESPIRATION RATE: 20 BRPM | HEART RATE: 70 BPM

## 2023-08-14 DIAGNOSIS — H43.392 VITREOUS FLOATERS OF LEFT EYE: ICD-10-CM

## 2023-08-14 DIAGNOSIS — H43.813 VITREOUS DEGENERATION OF BOTH EYES: ICD-10-CM

## 2023-08-14 DIAGNOSIS — H43.392 VITREOUS SYNERESIS OF LEFT EYE: ICD-10-CM

## 2023-08-14 PROCEDURE — 37000009 HC ANESTHESIA EA ADD 15 MINS: Performed by: OPHTHALMOLOGY

## 2023-08-14 PROCEDURE — 71000015 HC POSTOP RECOV 1ST HR: Performed by: OPHTHALMOLOGY

## 2023-08-14 PROCEDURE — D9220A PRA ANESTHESIA: Mod: CRNA,,, | Performed by: STUDENT IN AN ORGANIZED HEALTH CARE EDUCATION/TRAINING PROGRAM

## 2023-08-14 PROCEDURE — 25000003 PHARM REV CODE 250: Performed by: OPHTHALMOLOGY

## 2023-08-14 PROCEDURE — 67036 REMOVAL OF INNER EYE FLUID: CPT | Mod: LT,,, | Performed by: OPHTHALMOLOGY

## 2023-08-14 PROCEDURE — 71000044 HC DOSC ROUTINE RECOVERY FIRST HOUR: Performed by: OPHTHALMOLOGY

## 2023-08-14 PROCEDURE — 36000706: Performed by: OPHTHALMOLOGY

## 2023-08-14 PROCEDURE — 36000707: Performed by: OPHTHALMOLOGY

## 2023-08-14 PROCEDURE — 27201423 OPTIME MED/SURG SUP & DEVICES STERILE SUPPLY: Performed by: OPHTHALMOLOGY

## 2023-08-14 PROCEDURE — 37000008 HC ANESTHESIA 1ST 15 MINUTES: Performed by: OPHTHALMOLOGY

## 2023-08-14 PROCEDURE — D9220A PRA ANESTHESIA: Mod: ANES,,, | Performed by: ANESTHESIOLOGY

## 2023-08-14 PROCEDURE — 63600175 PHARM REV CODE 636 W HCPCS: Performed by: OPHTHALMOLOGY

## 2023-08-14 PROCEDURE — 63600175 PHARM REV CODE 636 W HCPCS: Performed by: STUDENT IN AN ORGANIZED HEALTH CARE EDUCATION/TRAINING PROGRAM

## 2023-08-14 PROCEDURE — 67036 PR VITRECTOMY,MECHANICAL: ICD-10-PCS | Mod: LT,,, | Performed by: OPHTHALMOLOGY

## 2023-08-14 PROCEDURE — 25000003 PHARM REV CODE 250: Performed by: STUDENT IN AN ORGANIZED HEALTH CARE EDUCATION/TRAINING PROGRAM

## 2023-08-14 PROCEDURE — D9220A PRA ANESTHESIA: ICD-10-PCS | Mod: CRNA,,, | Performed by: STUDENT IN AN ORGANIZED HEALTH CARE EDUCATION/TRAINING PROGRAM

## 2023-08-14 PROCEDURE — D9220A PRA ANESTHESIA: ICD-10-PCS | Mod: ANES,,, | Performed by: ANESTHESIOLOGY

## 2023-08-14 RX ORDER — FENTANYL CITRATE 50 UG/ML
25 INJECTION, SOLUTION INTRAMUSCULAR; INTRAVENOUS EVERY 5 MIN PRN
Status: DISCONTINUED | OUTPATIENT
Start: 2023-08-14 | End: 2023-08-14 | Stop reason: HOSPADM

## 2023-08-14 RX ORDER — PHENYLEPHRINE HYDROCHLORIDE 25 MG/ML
1 SOLUTION/ DROPS OPHTHALMIC
Status: DISCONTINUED | OUTPATIENT
Start: 2023-08-14 | End: 2023-08-14

## 2023-08-14 RX ORDER — ONDANSETRON 2 MG/ML
4 INJECTION INTRAMUSCULAR; INTRAVENOUS DAILY PRN
Status: DISCONTINUED | OUTPATIENT
Start: 2023-08-14 | End: 2023-08-14 | Stop reason: HOSPADM

## 2023-08-14 RX ORDER — PHENYLEPHRINE HYDROCHLORIDE 25 MG/ML
1 SOLUTION/ DROPS OPHTHALMIC
Status: DISCONTINUED | OUTPATIENT
Start: 2023-08-14 | End: 2023-08-14 | Stop reason: HOSPADM

## 2023-08-14 RX ORDER — NEOMYCIN SULFATE, POLYMYXIN B SULFATE, AND DEXAMETHASONE 3.5; 10000; 1 MG/G; [USP'U]/G; MG/G
OINTMENT OPHTHALMIC
Status: DISCONTINUED
Start: 2023-08-14 | End: 2023-08-14 | Stop reason: HOSPADM

## 2023-08-14 RX ORDER — DEXMEDETOMIDINE HYDROCHLORIDE 100 UG/ML
INJECTION, SOLUTION INTRAVENOUS
Status: DISCONTINUED | OUTPATIENT
Start: 2023-08-14 | End: 2023-08-14

## 2023-08-14 RX ORDER — NEOMYCIN SULFATE, POLYMYXIN B SULFATE, AND DEXAMETHASONE 3.5; 10000; 1 MG/G; [USP'U]/G; MG/G
OINTMENT OPHTHALMIC
Status: DISCONTINUED | OUTPATIENT
Start: 2023-08-14 | End: 2023-08-14 | Stop reason: HOSPADM

## 2023-08-14 RX ORDER — MOXIFLOXACIN 5 MG/ML
1 SOLUTION/ DROPS OPHTHALMIC
Status: DISCONTINUED | OUTPATIENT
Start: 2023-08-14 | End: 2023-08-14 | Stop reason: HOSPADM

## 2023-08-14 RX ORDER — ONDANSETRON 2 MG/ML
INJECTION INTRAMUSCULAR; INTRAVENOUS
Status: DISCONTINUED | OUTPATIENT
Start: 2023-08-14 | End: 2023-08-14

## 2023-08-14 RX ORDER — DEXAMETHASONE SODIUM PHOSPHATE 4 MG/ML
INJECTION, SOLUTION INTRA-ARTICULAR; INTRALESIONAL; INTRAMUSCULAR; INTRAVENOUS; SOFT TISSUE
Status: DISCONTINUED
Start: 2023-08-14 | End: 2023-08-14 | Stop reason: HOSPADM

## 2023-08-14 RX ORDER — PREDNISOLONE ACETATE 10 MG/ML
1 SUSPENSION/ DROPS OPHTHALMIC
Status: DISCONTINUED | OUTPATIENT
Start: 2023-08-14 | End: 2023-08-14 | Stop reason: HOSPADM

## 2023-08-14 RX ORDER — MOXIFLOXACIN 5 MG/ML
SOLUTION/ DROPS OPHTHALMIC
Status: DISCONTINUED | OUTPATIENT
Start: 2023-08-14 | End: 2023-08-14 | Stop reason: HOSPADM

## 2023-08-14 RX ORDER — PREDNISOLONE ACETATE 10 MG/ML
1 SUSPENSION/ DROPS OPHTHALMIC
Status: DISCONTINUED | OUTPATIENT
Start: 2023-08-14 | End: 2023-08-14

## 2023-08-14 RX ORDER — CYCLOPENTOLATE HYDROCHLORIDE 10 MG/ML
1 SOLUTION/ DROPS OPHTHALMIC
Status: DISCONTINUED | OUTPATIENT
Start: 2023-08-14 | End: 2023-08-14 | Stop reason: HOSPADM

## 2023-08-14 RX ORDER — EPINEPHRINE 1 MG/ML
INJECTION, SOLUTION, CONCENTRATE INTRAVENOUS
Status: DISCONTINUED | OUTPATIENT
Start: 2023-08-14 | End: 2023-08-14 | Stop reason: HOSPADM

## 2023-08-14 RX ORDER — CYCLOPENTOLATE HYDROCHLORIDE 10 MG/ML
1 SOLUTION/ DROPS OPHTHALMIC
Status: DISCONTINUED | OUTPATIENT
Start: 2023-08-14 | End: 2023-08-14

## 2023-08-14 RX ORDER — BUPIVACAINE HYDROCHLORIDE 7.5 MG/ML
INJECTION, SOLUTION EPIDURAL; RETROBULBAR
Status: DISCONTINUED
Start: 2023-08-14 | End: 2023-08-14 | Stop reason: HOSPADM

## 2023-08-14 RX ORDER — DEXAMETHASONE SODIUM PHOSPHATE 4 MG/ML
INJECTION, SOLUTION INTRA-ARTICULAR; INTRALESIONAL; INTRAMUSCULAR; INTRAVENOUS; SOFT TISSUE
Status: DISCONTINUED | OUTPATIENT
Start: 2023-08-14 | End: 2023-08-14 | Stop reason: HOSPADM

## 2023-08-14 RX ORDER — LIDOCAINE HYDROCHLORIDE 20 MG/ML
INJECTION, SOLUTION EPIDURAL; INFILTRATION; INTRACAUDAL; PERINEURAL
Status: DISCONTINUED
Start: 2023-08-14 | End: 2023-08-14 | Stop reason: HOSPADM

## 2023-08-14 RX ORDER — TETRACAINE HYDROCHLORIDE 5 MG/ML
1 SOLUTION OPHTHALMIC
Status: DISCONTINUED | OUTPATIENT
Start: 2023-08-14 | End: 2023-08-14 | Stop reason: HOSPADM

## 2023-08-14 RX ORDER — TETRACAINE HYDROCHLORIDE 5 MG/ML
1 SOLUTION OPHTHALMIC
Status: DISCONTINUED | OUTPATIENT
Start: 2023-08-14 | End: 2023-08-14

## 2023-08-14 RX ORDER — MOXIFLOXACIN 5 MG/ML
1 SOLUTION/ DROPS OPHTHALMIC
Status: DISCONTINUED | OUTPATIENT
Start: 2023-08-14 | End: 2023-08-14

## 2023-08-14 RX ORDER — PROPOFOL 10 MG/ML
VIAL (ML) INTRAVENOUS
Status: DISCONTINUED | OUTPATIENT
Start: 2023-08-14 | End: 2023-08-14

## 2023-08-14 RX ORDER — MIDAZOLAM HYDROCHLORIDE 1 MG/ML
INJECTION, SOLUTION INTRAMUSCULAR; INTRAVENOUS
Status: DISCONTINUED | OUTPATIENT
Start: 2023-08-14 | End: 2023-08-14

## 2023-08-14 RX ORDER — FENTANYL CITRATE 50 UG/ML
INJECTION, SOLUTION INTRAMUSCULAR; INTRAVENOUS
Status: DISCONTINUED | OUTPATIENT
Start: 2023-08-14 | End: 2023-08-14

## 2023-08-14 RX ADMIN — PREDNISOLONE ACETATE 1 DROP: 10 SUSPENSION/ DROPS OPHTHALMIC at 08:08

## 2023-08-14 RX ADMIN — MOXIFLOXACIN OPHTHALMIC 1 DROP: 5 SOLUTION/ DROPS OPHTHALMIC at 08:08

## 2023-08-14 RX ADMIN — FENTANYL CITRATE 25 MCG: 50 INJECTION INTRAMUSCULAR; INTRAVENOUS at 09:08

## 2023-08-14 RX ADMIN — CYCLOPENTOLATE HYDROCHLORIDE 1 DROP: 10 SOLUTION/ DROPS OPHTHALMIC at 08:08

## 2023-08-14 RX ADMIN — FENTANYL CITRATE 25 MCG: 50 INJECTION INTRAMUSCULAR; INTRAVENOUS at 08:08

## 2023-08-14 RX ADMIN — MIDAZOLAM 2 MG: 1 INJECTION INTRAMUSCULAR; INTRAVENOUS at 08:08

## 2023-08-14 RX ADMIN — PHENYLEPHRINE HYDROCHLORIDE 1 DROP: 25 SOLUTION/ DROPS OPHTHALMIC at 08:08

## 2023-08-14 RX ADMIN — PROPOFOL 30 MG: 10 INJECTION, EMULSION INTRAVENOUS at 08:08

## 2023-08-14 RX ADMIN — DEXMEDETOMIDINE 8 MCG: 200 INJECTION, SOLUTION INTRAVENOUS at 09:08

## 2023-08-14 RX ADMIN — ONDANSETRON 4 MG: 2 INJECTION INTRAMUSCULAR; INTRAVENOUS at 09:08

## 2023-08-14 RX ADMIN — FENTANYL CITRATE 50 MCG: 50 INJECTION INTRAMUSCULAR; INTRAVENOUS at 08:08

## 2023-08-14 RX ADMIN — SODIUM CHLORIDE: 9 INJECTION, SOLUTION INTRAVENOUS at 08:08

## 2023-08-14 RX ADMIN — TETRACAINE HYDROCHLORIDE 1 DROP: 5 SOLUTION OPHTHALMIC at 07:08

## 2023-08-14 RX ADMIN — PROPOFOL 40 MG: 10 INJECTION, EMULSION INTRAVENOUS at 08:08

## 2023-08-14 NOTE — ANESTHESIA POSTPROCEDURE EVALUATION
Anesthesia Post Evaluation    Patient: Vic Reyez    Procedure(s) Performed: Procedure(s) (LRB):  VITRECTOMY, PARS PLANA APPROACH (Left)    Final Anesthesia Type: MAC      Patient location during evaluation: PACU  Patient participation: Yes- Able to Participate  Level of consciousness: awake and alert  Post-procedure vital signs: reviewed and stable  Pain management: adequate  Airway patency: patent    PONV status at discharge: No PONV  Anesthetic complications: no      Cardiovascular status: blood pressure returned to baseline and hemodynamically stable  Respiratory status: unassisted and spontaneous ventilation  Hydration status: euvolemic  Follow-up not needed.          Vitals Value Taken Time   /74 08/14/23 0932   Temp 36.7 °C (98.1 °F) 08/14/23 0917   Pulse 70 08/14/23 0944   Resp 46 08/14/23 0944   SpO2 99 % 08/14/23 0944   Vitals shown include unvalidated device data.      No case tracking events are documented in the log.      Pain/Minda Score: Minda Score: 10 (8/14/2023  9:30 AM)

## 2023-08-14 NOTE — OP NOTE
PATIENT NAME: Vic Reyez      MEDICAL RECORD NUMBER: [id number]     Date of Surgery 8/14/2023     ATTENDING SURGEON: Tee Crsepo MD (A)    ASSISTANT SURGEON: Juan Pablo Hanson MD (F), Aleks Bravo MD (R)    PREOPERATIVE DIAGNOSIS:  Symptomatic vitreous floaters of the Left  eye.    OPERATION:  Pars plana vitrectomy, fluid air exchange of the Left  eye.    POSTOPERATIVE DIAGNOSIS:  Same    ANESTHESIA:  Monitored anesthesia care    COMPLICATIONS:  None.    SPECIMENS:  None.    EBL:  Minimal      Indications for Surgery  Vic Reyez   is a 73 y.o.  year old presenting with worsening symptomatic vitreous floaters of the Left  eye. The recommendation was for surgical repair. After the risks, benefits and alternatives were discussed with Vic Reyez  the patient consented to the procedure and was scheduled for surgery.     Description of Procedure:    The patient, Vic Reyez  gave informed consent for the following operation which was  performed under Monitored anesthesia care. The patient was examined with an indirect ophthalmoscope and we decided to proceed with surgery. A time out was performed and confirmed by all in the room that the Left  eye was the correct operative eye. Following an initial infusion of propofol, the patient was given a retrobulbar injection of an equal mixture of 0.75% Marcaine and 4% lidocaine and then the eye was prepped and draped in the usual sterile fashion for vitrectomy surgery.     The cornea was kept moist with OVD placed on the surface throughout the operation. A 25 gauge vitrectomy system was used. Initial entry into the eye was gained with 3 trocars placed 3 mm from the limbus. In the inferotemporal location, the infusion cannula was secured in position. The open end of the cannula was visualized through the dilated pupil and the infusion was turned on when it was in the correct position. The 2 superior sclerotomies were used for entry of a light pipe and a vitrectomy cutter.   A core vitrectomy was conducted. The posterior hyaloid was elevated, followed by peripheral vitrectomy out towards the vitreous base.      We inspected the retinal periphery with scleral depression. No retinal breaks were noted. A partial air fluid exchange was performed. The trocars were removed and found to be watertight.     The intraocular pressure remained normal. Betadine was place on the eye. Vancomycin and Decadron were injected into the sub-Tenon space. The eye was irrigated with BSS. A drop of atropine and Maxitrol ointment were placed on the eye, which was then patched and shielded. There were no complications.     The patient returned to the PACU in stable condition and will follow up tomorrow in the eye clinic.

## 2023-08-14 NOTE — ANESTHESIA PREPROCEDURE EVALUATION
08/14/2023  Vic Reyez is a 73 y.o., male.  Pre-operative evaluation for Procedure(s) (LRB):  VITRECTOMY, PARS PLANA APPROACH (Left)    Vic Reyez is a 73 y.o. male     Patient Active Problem List   Diagnosis    Decompensated hepatic cirrhosis    Biliary stricture    Alcohol use disorder, severe, dependence    Primary hypertension    Hyponatremia    Coagulopathy    Pancreatic lesion    Abdominal fluid collection    Unintentional weight loss    Splenic artery aneurysm    Chronic pancreatitis    Vitreous syneresis of left eye    Vitreous degeneration of both eyes    Age-related hypermature cataract, right    Pseudophakia, left eye    Dry eye syndrome of both eyes       Review of patient's allergies indicates:  No Known Allergies    No current facility-administered medications on file prior to encounter.     Current Outpatient Medications on File Prior to Encounter   Medication Sig Dispense Refill    aspirin 325 MG tablet Take 325 mg by mouth once daily.      multivit-mins no.63/iron/folic (M-VIT ORAL) Take 1 tablet by mouth once daily.      omega-3 fatty acids/fish oil (FISH OIL-OMEGA-3 FATTY ACIDS) 300-1,000 mg capsule Take 1 capsule by mouth once daily.      HYDROcodone-acetaminophen (NORCO) 5-325 mg per tablet TAKE 1 TABLET BY MOUTH EVERY 4 TO 6 HOURS AS NEEDED FOR PAIN 30 tablet 0    predniSONE (DELTASONE) 10 MG tablet Take 40 mg by mouth.      ursodioL (ACTIGALL) 300 mg capsule Take 1 capsule (300 mg total) by mouth 2 (two) times daily. (Patient not taking: Reported on 6/6/2023) 60 capsule 11       Past Surgical History:   Procedure Laterality Date    ABDOMINAL AORTOGRAPHY N/A 10/04/2022    Procedure: AORTOGRAM-ABDOMINAL;  Surgeon: Felice Epstein MD;  Location: [facility] CATH/EP LAB;  Service: Vascular;  Laterality: N/A;    APPENDECTOMY      ARTERIOGRAM, MESENTERIC N/A  10/04/2022    Procedure: ARTERIOGRAM, MESENTERIC;  Surgeon: Felice Epstein MD;  Location: [facility] CATH/EP LAB;  Service: Vascular;  Laterality: N/A;    COLONOSCOPY      ENDOSCOPIC ULTRASOUND OF UPPER GASTROINTESTINAL TRACT  02/04/2022    Procedure: ULTRASOUND, UPPER GI TRACT, ENDOSCOPIC;  Surgeon: Chuy Bay MD;  Location: [facility] ENDO (2ND FLR);  Service: Endoscopy;;    ENDOSCOPIC ULTRASOUND OF UPPER GASTROINTESTINAL TRACT N/A 06/03/2022    Procedure: ULTRASOUND, UPPER GI TRACT, ENDOSCOPIC;  Surgeon: Roger Viveros III, MD;  Location: [facility] ENDO;  Service: Endoscopy;  Laterality: N/A;    ENDOSCOPIC ULTRASOUND OF UPPER GASTROINTESTINAL TRACT N/A 11/07/2022    Procedure: ULTRASOUND, UPPER GI TRACT, ENDOSCOPIC;  Surgeon: Roger Viveros III, MD;  Location: [facility] ENDO;  Service: Endoscopy;  Laterality: N/A;    ERCP N/A 02/04/2022    Procedure: ERCP (ENDOSCOPIC RETROGRADE CHOLANGIOPANCREATOGRAPHY);  Surgeon: Chuy Bay MD;  Location: [facility] ENDO (2ND FLR);  Service: Endoscopy;  Laterality: N/A;    ERCP N/A 04/19/2022    Procedure: ERCP (ENDOSCOPIC RETROGRADE CHOLANGIOPANCREATOGRAPHY);  Surgeon: Chuy Bay MD;  Location: [facility] ENDO (2ND FLR);  Service: Endoscopy;  Laterality: N/A;  4/1: fully vaccinated. labs prior. instructions mailed to sister and patient.-SC  4/12/22-Confirmed new arrival time of 10:45am with pt's sister and updated instructions emailed-    ERCP N/A 10/05/2022    Procedure: ERCP (ENDOSCOPIC RETROGRADE CHOLANGIOPANCREATOGRAPHY);  Surgeon: Roger Viveros III, MD;  Location: [facility] ENDO;  Service: Endoscopy;  Laterality: N/A;    ERCP N/A 1/24/2023    Procedure: ERCP (ENDOSCOPIC RETROGRADE CHOLANGIOPANCREATOGRAPHY);  Surgeon: Roger Viveros III, MD;  Location: [facility] ENDO;  Service: Endoscopy;  Laterality: N/A;    EYE SURGERY Left     JOINT REPLACEMENT Bilateral     knee replacement    KNEE SURGERY      RECONSTRUCTION OF SHOULDER Right     TONSILLECTOMY      UPPER  ENDOSCOPIC ULTRASOUND W/ FNA  06/03/2022       Social History     Socioeconomic History    Marital status: Single   Tobacco Use    Smoking status: Every Day     Current packs/day: 0.25     Average packs/day: 0.3 packs/day for 40.0 years (10.0 ttl pk-yrs)     Types: Cigarettes    Smokeless tobacco: Never    Tobacco comments:     Pt smoked on and off for 40 years   Substance and Sexual Activity    Alcohol use: Yes     Alcohol/week: 10.0 standard drinks of alcohol     Types: 10 Cans of beer per week    Drug use: Never    Sexual activity: Not Currently           Pre-op Assessment    I have reviewed the Patient Summary Reports.     I have reviewed the Nursing Notes.    I have reviewed the Medications.     Review of Systems  Anesthesia Hx:  No problems with previous Anesthesia  History of prior surgery of interest to airway management or planning: Denies Family Hx of Anesthesia complications.   Denies Personal Hx of Anesthesia complications.   Social:  Non-Smoker    Hematology/Oncology:  Hematology Normal   Oncology Normal     EENT/Dental:EENT/Dental Normal   Cardiovascular:   Hypertension    Pulmonary:  Pulmonary Normal    Renal/:  Renal/ Normal     Hepatic/GI:   Liver Disease,    Musculoskeletal:  Musculoskeletal Normal    Neurological:  Neurology Normal    Endocrine:  Endocrine Normal    Psych:  Psychiatric Normal           Physical Exam  General: Well nourished and Cooperative    Airway:  Mallampati: II   Mouth Opening: Normal  TM Distance: Normal  Tongue: Normal  Neck ROM: Normal ROM    Dental:  Intact    Chest/Lungs:  Clear to auscultation, Normal Respiratory Rate    Heart:  Rate: Normal  Rhythm: Regular Rhythm  Sounds: Normal        Anesthesia Plan  Type of Anesthesia, risks & benefits discussed:    Anesthesia Type: Gen ETT, Gen Supraglottic Airway, Gen Natural Airway, MAC  Intra-op Monitoring Plan: Standard ASA Monitors  Post Op Pain Control Plan: multimodal analgesia  Induction:  IV  Airway Plan: ,  Post-Induction  Informed Consent: Informed consent signed with the Patient and all parties understand the risks and agree with anesthesia plan.  All questions answered.   ASA Score: 3  Day of Surgery Review of History & Physical: H&P Update referred to the surgeon/provider.    Ready For Surgery From Anesthesia Perspective.     .

## 2023-08-14 NOTE — TRANSFER OF CARE
Anesthesia Transfer of Care Note    Patient: Vic Reyez    Procedure(s) Performed: Procedure(s) (LRB):  VITRECTOMY, PARS PLANA APPROACH (Left)    Patient location: PACU    Anesthesia Type: MAC    Transport from OR: Transported from OR on room air with adequate spontaneous ventilation    Post pain: adequate analgesia    Post assessment: no apparent anesthetic complications    Post vital signs: stable    Level of consciousness: awake, alert and oriented    Nausea/Vomiting: no nausea/vomiting    Complications: none    Transfer of care protocol was followed      Last vitals:   Visit Vitals  BP (!) 127/65 (BP Location: Left arm, Patient Position: Lying)   Pulse 78   Temp 37.2 °C (99 °F) (Temporal)   Resp 18   Wt 65.8 kg (145 lb)   SpO2 99%   BMI 20.81 kg/m²

## 2023-08-14 NOTE — PATIENT INSTRUCTIONS
Post-Operative Instructions:    - Keep eye shield & dressing in place until we remove it tomorrow in eye clinic.  - It is all right to watch television or to read.   - Tylenol as needed for general discomfort.     - Maintain head in a Head Up position  - Keep your face out of water for five days after surgery - NO SHOWERS.  - No excessive exercise.    - No bending, lifting or straining.   - Call MD if significant pain or nausea / vomiting uncontrolled by medications  - Call MD if temperature in excess of 101' F  - Return to eye clinic for post op examination tomorrow morning.  - Bring medicine bag to tomorrow's appointment.    FOR EMERGENCIES:  If any unusual problems or difficulties occur, contact Dr. Macdonald or the eye resident on call at the hospital main line

## 2023-08-14 NOTE — BRIEF OP NOTE
Brief Operative Note  Ophthalmology     Pre-Op Dx: vitreous floaters left     Post Op Dx: same     Procedure Performed: vitrectomy left     Attending Surgeon: Tee Crespo MD     Assistant Surgeon: Juan Pablo Hanson MD    Anesthesia: Local/Mac, retrobulbar injection of 10cc mixture 0.75%Marcaine, 2% Xylocaine    Estimated blood loss: Minimal    Complications: None    Specimen: None    Disposition: Stable to recovery    Findings/Outcome: retina attached    Date of Discharge: today 8/14/2023     Discharge Disposition: home     F/U: tomorrow AM with Dr Crespo

## 2023-08-14 NOTE — PROGRESS NOTES
Plan of care reviewed with patient and family.  Understanding verbalized.  VSS, tolerating PO, denies nausea, pain well controlled. RN reviewed all discharge instructions.  Questions answered.  Patient verbalized understanding.      Awaiting [employer]

## 2023-08-14 NOTE — H&P
Pre-Operative History & Physical  Ophthalmology      SUBJECTIVE:     History of Present Illness:  Patient is a 73 y.o. male presents with Vitreous syneresis of left eye [H43.392]  Vitreous degeneration of both eyes [H43.813]  Vitreous floaters of left eye [H43.392].    MEDICATIONS:   PTA Medications   Medication Sig    aspirin 325 MG tablet Take 325 mg by mouth once daily.    multivit-mins no.63/iron/folic (M-VIT ORAL) Take 1 tablet by mouth once daily.    omega-3 fatty acids/fish oil (FISH OIL-OMEGA-3 FATTY ACIDS) 300-1,000 mg capsule Take 1 capsule by mouth once daily.    HYDROcodone-acetaminophen (NORCO) 5-325 mg per tablet TAKE 1 TABLET BY MOUTH EVERY 4 TO 6 HOURS AS NEEDED FOR PAIN    predniSONE (DELTASONE) 10 MG tablet Take 40 mg by mouth.    ursodioL (ACTIGALL) 300 mg capsule Take 1 capsule (300 mg total) by mouth 2 (two) times daily. (Patient not taking: Reported on 6/6/2023)       ALLERGIES: Review of patient's allergies indicates:  No Known Allergies    PAST MEDICAL HISTORY:   Past Medical History:   Diagnosis Date    Alcohol abuse 02/02/2022    Decompensated hepatic cirrhosis 02/02/2022    Encounter for blood transfusion     Hypertension     Lab test positive for detection of COVID-19 virus 09/2021    Pancreatic cyst 06/03/2022     PAST SURGICAL HISTORY:   Past Surgical History:   Procedure Laterality Date    ABDOMINAL AORTOGRAPHY N/A 10/04/2022    Procedure: AORTOGRAM-ABDOMINAL;  Surgeon: Felice Epstein MD;  Location: [facility] CATH/EP LAB;  Service: Vascular;  Laterality: N/A;    APPENDECTOMY      ARTERIOGRAM, MESENTERIC N/A 10/04/2022    Procedure: ARTERIOGRAM, MESENTERIC;  Surgeon: Felice Epstein MD;  Location: [facility] CATH/EP LAB;  Service: Vascular;  Laterality: N/A;    COLONOSCOPY      ENDOSCOPIC ULTRASOUND OF UPPER GASTROINTESTINAL TRACT  02/04/2022    Procedure: ULTRASOUND, UPPER GI TRACT, ENDOSCOPIC;  Surgeon: Chuy Bay MD;  Location: [facility] ENDO ([address] [place] [address]);  Service: Endoscopy;;    ENDOSCOPIC  ULTRASOUND OF UPPER GASTROINTESTINAL TRACT N/A 06/03/2022    Procedure: ULTRASOUND, UPPER GI TRACT, ENDOSCOPIC;  Surgeon: Roger Viveros III, MD;  Location: [facility] ENDO;  Service: Endoscopy;  Laterality: N/A;    ENDOSCOPIC ULTRASOUND OF UPPER GASTROINTESTINAL TRACT N/A 11/07/2022    Procedure: ULTRASOUND, UPPER GI TRACT, ENDOSCOPIC;  Surgeon: Roger Viveros III, MD;  Location: [facility] ENDO;  Service: Endoscopy;  Laterality: N/A;    ERCP N/A 02/04/2022    Procedure: ERCP (ENDOSCOPIC RETROGRADE CHOLANGIOPANCREATOGRAPHY);  Surgeon: Chuy Bay MD;  Location: [facility] (2ND FLR);  Service: Endoscopy;  Laterality: N/A;    ERCP N/A 04/19/2022    Procedure: ERCP (ENDOSCOPIC RETROGRADE CHOLANGIOPANCREATOGRAPHY);  Surgeon: Chuy Bay MD;  Location: [facility] (2ND FLR);  Service: Endoscopy;  Laterality: N/A;  4/1: fully vaccinated. labs prior. instructions mailed to sister and patient.-SC  4/12/22-Confirmed new arrival time of 10:45am with pt's sister and updated instructions emailed-DS    ERCP N/A 10/05/2022    Procedure: ERCP (ENDOSCOPIC RETROGRADE CHOLANGIOPANCREATOGRAPHY);  Surgeon: Roger Viveros III, MD;  Location: [facility];  Service: Endoscopy;  Laterality: N/A;    ERCP N/A 1/24/2023    Procedure: ERCP (ENDOSCOPIC RETROGRADE CHOLANGIOPANCREATOGRAPHY);  Surgeon: Roger Viveros III, MD;  Location: [facility];  Service: Endoscopy;  Laterality: N/A;    EYE SURGERY Left     JOINT REPLACEMENT Bilateral     knee replacement    KNEE SURGERY      RECONSTRUCTION OF SHOULDER Right     TONSILLECTOMY      UPPER ENDOSCOPIC ULTRASOUND W/ FNA  06/03/2022     PAST FAMILY HISTORY: History reviewed. No pertinent family history.  SOCIAL HISTORY:   Social History     Tobacco Use    Smoking status: Every Day     Current packs/day: 0.25     Average packs/day: 0.3 packs/day for 40.0 years (10.0 ttl pk-yrs)     Types: Cigarettes    Smokeless tobacco: Never    Tobacco comments:     Pt smoked on and off for 40 years    Substance Use Topics    Alcohol use: Yes     Alcohol/week: 10.0 standard drinks of alcohol     Types: 10 Cans of beer per week    Drug use: Never        MENTAL STATUS: Alert    REVIEW OF SYSTEMS: Negative    OBJECTIVE:     Vital Signs (Most Recent)  Temp: 99 °F (37.2 °C) (08/14/23 0804)  Pulse: 78 (08/14/23 0804)  Resp: 18 (08/14/23 0804)  BP: (!) 145/69 (08/14/23 0804)  SpO2: 99 % (08/14/23 0804)    Physical Exam:  General: NAD  HEENT: AT/NC, left eye vitreous floaters  Lungs: Adequate respirations  Heart: + pulses  Abdomen: Soft    ASSESSMENT/PLAN:     Patient is a 73 y.o. male with visually significant vitreous floaters in the left eye     - Risks/benefits/alternatives of the procedure including, but not limited to, infection, bleeding, pain, ptosis, macular edema, corneal edema, persistent corneal defect, retinal tears, retinal detachment, epiretinal membrane, elevated intraocular pressure, hypotony, possible need for strict post-op head positioning, possible temporary avoidance of air travel, loss of vision, loss of the eye, paralysis, and death were discussed with the patient and/or family. The patient/family voiced good understanding, the informed consent was signed, witnessed, and placed in chart. All patient and family questions were answered.   - Will proceed with PPV of left eye  - Plan for MAC with block  - Allergies reviewed: Review of patient's allergies indicates:  No Known Allergies  - patient is aking blood thinners (occasional aspirin but not recently per patient), ok to continue them    Jaquan Bravo MD  [facility]

## 2023-08-15 ENCOUNTER — TELEPHONE (OUTPATIENT)
Dept: OPHTHALMOLOGY | Facility: CLINIC | Age: 73
End: 2023-08-15
Payer: MEDICARE

## 2023-08-15 ENCOUNTER — TELEPHONE (OUTPATIENT)
Dept: OPHTHALMOLOGY | Facility: CLINIC | Age: 73
End: 2023-08-15

## 2023-08-15 ENCOUNTER — OFFICE VISIT (OUTPATIENT)
Dept: OPHTHALMOLOGY | Facility: CLINIC | Age: 73
End: 2023-08-15
Payer: MEDICARE

## 2023-08-15 ENCOUNTER — PATIENT MESSAGE (OUTPATIENT)
Dept: OPHTHALMOLOGY | Facility: CLINIC | Age: 73
End: 2023-08-15

## 2023-08-15 DIAGNOSIS — H43.813 VITREOUS DEGENERATION OF BOTH EYES: ICD-10-CM

## 2023-08-15 DIAGNOSIS — H25.21 AGE-RELATED HYPERMATURE CATARACT, RIGHT: ICD-10-CM

## 2023-08-15 DIAGNOSIS — H43.392 VITREOUS SYNERESIS OF LEFT EYE: Primary | ICD-10-CM

## 2023-08-15 PROCEDURE — 1125F PR PAIN SEVERITY QUANTIFIED, PAIN PRESENT: ICD-10-PCS | Mod: CPTII,S$GLB,, | Performed by: OPHTHALMOLOGY

## 2023-08-15 PROCEDURE — 1160F PR REVIEW ALL MEDS BY PRESCRIBER/CLIN PHARMACIST DOCUMENTED: ICD-10-PCS | Mod: CPTII,S$GLB,, | Performed by: OPHTHALMOLOGY

## 2023-08-15 PROCEDURE — 99024 PR POST-OP FOLLOW-UP VISIT: ICD-10-PCS | Mod: S$GLB,,, | Performed by: OPHTHALMOLOGY

## 2023-08-15 PROCEDURE — 1101F PR PT FALLS ASSESS DOC 0-1 FALLS W/OUT INJ PAST YR: ICD-10-PCS | Mod: CPTII,S$GLB,, | Performed by: OPHTHALMOLOGY

## 2023-08-15 PROCEDURE — 99024 POSTOP FOLLOW-UP VISIT: CPT | Mod: S$GLB,,, | Performed by: OPHTHALMOLOGY

## 2023-08-15 PROCEDURE — 1159F PR MEDICATION LIST DOCUMENTED IN MEDICAL RECORD: ICD-10-PCS | Mod: CPTII,S$GLB,, | Performed by: OPHTHALMOLOGY

## 2023-08-15 PROCEDURE — 99999 PR PBB SHADOW E&M-EST. PATIENT-LVL III: CPT | Mod: PBBFAC,,, | Performed by: OPHTHALMOLOGY

## 2023-08-15 PROCEDURE — 3288F PR FALLS RISK ASSESSMENT DOCUMENTED: ICD-10-PCS | Mod: CPTII,S$GLB,, | Performed by: OPHTHALMOLOGY

## 2023-08-15 PROCEDURE — 1125F AMNT PAIN NOTED PAIN PRSNT: CPT | Mod: CPTII,S$GLB,, | Performed by: OPHTHALMOLOGY

## 2023-08-15 PROCEDURE — 1160F RVW MEDS BY RX/DR IN RCRD: CPT | Mod: CPTII,S$GLB,, | Performed by: OPHTHALMOLOGY

## 2023-08-15 PROCEDURE — 1101F PT FALLS ASSESS-DOCD LE1/YR: CPT | Mod: CPTII,S$GLB,, | Performed by: OPHTHALMOLOGY

## 2023-08-15 PROCEDURE — 1159F MED LIST DOCD IN RCRD: CPT | Mod: CPTII,S$GLB,, | Performed by: OPHTHALMOLOGY

## 2023-08-15 PROCEDURE — 3288F FALL RISK ASSESSMENT DOCD: CPT | Mod: CPTII,S$GLB,, | Performed by: OPHTHALMOLOGY

## 2023-08-15 PROCEDURE — 99999 PR PBB SHADOW E&M-EST. PATIENT-LVL III: ICD-10-PCS | Mod: PBBFAC,,, | Performed by: OPHTHALMOLOGY

## 2023-08-15 NOTE — PROGRESS NOTES
HPI    1 day PO Symptomatic vitreous floaters of the Left  eye.    Cc: PT reports blurriness and soreness    -headaches  -eye pain   -diplopia      Eye Meds: PF OS qid                    Vigamox OS qid                    Atropines OS qd                   Ointment OS qd    POHx:   PO OPERATION:08/02/2023 by Dr. ROMERO Crespo, M  Pars plana vitrectomy, fluid air exchange of the Left  eye.     Hx of PCIOL OS   Last edited by Sindy Diallo MA on 8/15/2023  9:21 AM.           A/P    ICD-10-CM ICD-9-CM   1. Vitreous syneresis of left eye  H43.392 379.24   2. Vitreous degeneration of both eyes  H43.813 379.21   3. Age-related hypermature cataract, right  H25.21 366.18       1. Vitreous syneresis of left eye  2. Vitreous degeneration of both eyes     Pre-op Exam notable for large prominent central floaters OS, partial PVD, no RT/RD       Postop: s/p PPV/AFx (Date 8/14/23 by Zak/Valentin/Lawrence) for VS Floaters  Positioning: Upright  Duration: 5 days    8/15/2023 VA 20/30, IOP 4, formed, retina flat, partial air fill     Instructions reviewed   - RD precautions  - Return for increasing pain/decreasing vision  - No getting the eye wet, no rubbing the eye, no heavy lifting for 1 month after surgery  Drops:  Vigamox  - 4   Pred - 4(start 8/15/2023)  Cyc -4  Maxitrol - QHS         3. Age-related hypermature cataract, right  Mod NS, NVS  Plan: Observation     4. Pseudophakia, left eye  Good lens position  Plan: Observation, update Mrx prn    5. Dry eye syndrome of both eyes  Mild dry eye  Rec ATs prn     RTC POW1 DFE/OCTm OS ([place])    I saw and examined the patient and reviewed in detail the findings documented. The final examination findings, image interpretations, and plan as documented in the record represent my personal judgment and conclusions.    Tee Crespo MD  Vitreoretinal Surgery   [facility]

## 2023-08-15 NOTE — TELEPHONE ENCOUNTER
Spoke to pt and scheduled appt       -TD     ----- Message from Yaneli Esparza sent at 8/15/2023 11:55 AM CDT -----  This pt needs 1 week DFE/ MOCT OS with Dr. Crespo

## 2023-08-23 ENCOUNTER — OFFICE VISIT (OUTPATIENT)
Dept: OPHTHALMOLOGY | Facility: CLINIC | Age: 73
End: 2023-08-23
Payer: MEDICARE

## 2023-08-23 DIAGNOSIS — H43.392 VITREOUS SYNERESIS OF LEFT EYE: Primary | ICD-10-CM

## 2023-08-23 DIAGNOSIS — H43.813 VITREOUS DEGENERATION OF BOTH EYES: ICD-10-CM

## 2023-08-23 DIAGNOSIS — H25.21 AGE-RELATED HYPERMATURE CATARACT, RIGHT: ICD-10-CM

## 2023-08-23 PROCEDURE — 92134 CPTRZ OPH DX IMG PST SGM RTA: CPT | Mod: S$GLB,,, | Performed by: OPHTHALMOLOGY

## 2023-08-23 PROCEDURE — 3288F PR FALLS RISK ASSESSMENT DOCUMENTED: ICD-10-PCS | Mod: CPTII,S$GLB,, | Performed by: OPHTHALMOLOGY

## 2023-08-23 PROCEDURE — 1159F PR MEDICATION LIST DOCUMENTED IN MEDICAL RECORD: ICD-10-PCS | Mod: CPTII,S$GLB,, | Performed by: OPHTHALMOLOGY

## 2023-08-23 PROCEDURE — 1101F PR PT FALLS ASSESS DOC 0-1 FALLS W/OUT INJ PAST YR: ICD-10-PCS | Mod: CPTII,S$GLB,, | Performed by: OPHTHALMOLOGY

## 2023-08-23 PROCEDURE — 99024 PR POST-OP FOLLOW-UP VISIT: ICD-10-PCS | Mod: S$GLB,,, | Performed by: OPHTHALMOLOGY

## 2023-08-23 PROCEDURE — 1160F RVW MEDS BY RX/DR IN RCRD: CPT | Mod: CPTII,S$GLB,, | Performed by: OPHTHALMOLOGY

## 2023-08-23 PROCEDURE — 3288F FALL RISK ASSESSMENT DOCD: CPT | Mod: CPTII,S$GLB,, | Performed by: OPHTHALMOLOGY

## 2023-08-23 PROCEDURE — 92134 OCT, RETINA - OU - BOTH EYES: ICD-10-PCS | Mod: S$GLB,,, | Performed by: OPHTHALMOLOGY

## 2023-08-23 PROCEDURE — 1159F MED LIST DOCD IN RCRD: CPT | Mod: CPTII,S$GLB,, | Performed by: OPHTHALMOLOGY

## 2023-08-23 PROCEDURE — 1126F PR PAIN SEVERITY QUANTIFIED, NO PAIN PRESENT: ICD-10-PCS | Mod: CPTII,S$GLB,, | Performed by: OPHTHALMOLOGY

## 2023-08-23 PROCEDURE — 1101F PT FALLS ASSESS-DOCD LE1/YR: CPT | Mod: CPTII,S$GLB,, | Performed by: OPHTHALMOLOGY

## 2023-08-23 PROCEDURE — 99024 POSTOP FOLLOW-UP VISIT: CPT | Mod: S$GLB,,, | Performed by: OPHTHALMOLOGY

## 2023-08-23 PROCEDURE — 1160F PR REVIEW ALL MEDS BY PRESCRIBER/CLIN PHARMACIST DOCUMENTED: ICD-10-PCS | Mod: CPTII,S$GLB,, | Performed by: OPHTHALMOLOGY

## 2023-08-23 PROCEDURE — 1126F AMNT PAIN NOTED NONE PRSNT: CPT | Mod: CPTII,S$GLB,, | Performed by: OPHTHALMOLOGY

## 2023-08-23 NOTE — PROGRESS NOTES
HPI    Pt here for 1 week PO PPV OS.     States stopped all drops on yesterday. No new complaints. Denies pain/   FOL/ floaters.   Last edited by Annemarie Piper on 8/23/2023  2:21 PM.            A/P    ICD-10-CM ICD-9-CM   1. Vitreous syneresis of left eye  H43.392 379.24   2. Vitreous degeneration of both eyes  H43.813 379.21   3. Age-related hypermature cataract, right  H25.21 366.18         1. Vitreous syneresis of left eye  2. Vitreous degeneration of both eyes     Pre-op Exam notable for large prominent central floaters OS, partial PVD, no RT/RD       Postop: s/p PPV/AFx (Date 8/14/23 by Zak/Valentin/Lawrence) for VS Floaters  Positioning: Upright  Duration: 5 days    8/23/2023 VA 20/20 (was 20/30), IOP 14, formed, retina flat, air resolved     Instructions reviewed   - RD precautions  - Return for increasing pain/decreasing vision  - No getting the eye wet, no rubbing the eye, no heavy lifting for 1 month after surgery  Drops:  Vigamox  - 4 stop  Pred - 4(start weekly taper   Cyc -4 stop  Maxitrol - QHS stop         3. Age-related hypermature cataract, right  Mod NS, NVS  Plan: Observation     4. Pseudophakia, left eye  Good lens position  Plan: Observation, update Mrx prn    5. Dry eye syndrome of both eyes  Mild dry eye  Rec ATs prn     RTC POM1 DFE/OCTm OS ([place])    I saw and examined the patient and reviewed in detail the findings documented. The final examination findings, image interpretations, and plan as documented in the record represent my personal judgment and conclusions.    Tee Crespo MD  Vitreoretinal Surgery   [facility]

## 2023-10-10 NOTE — ED NOTES
Bed: 24  Expected date:   Expected time:   Means of arrival:   Comments:   [facility] note    Patient afebrile.  WBC 23,000.  Change to ciprofloxacin    Kidneys without obstruction on ultrasound    To be transferred to skilled nursing facility.  Would complete 10 day with follow up as outpatient.    We will sign off at this time    Please call if any questions   Gurpreet Santana  [facility]  [phone]

## 2023-10-14 ENCOUNTER — HOSPITAL ENCOUNTER (OUTPATIENT)
Facility: HOSPITAL | Age: 73
Discharge: HOME | End: 2023-10-16
Attending: EMERGENCY MEDICINE | Admitting: STUDENT IN AN ORGANIZED HEALTH CARE EDUCATION/TRAINING PROGRAM
Payer: MEDICARE

## 2023-10-14 DIAGNOSIS — R10.12 LEFT UPPER QUADRANT ABDOMINAL PAIN: ICD-10-CM

## 2023-10-14 DIAGNOSIS — R07.9 CHEST PAIN: ICD-10-CM

## 2023-10-14 DIAGNOSIS — E87.1 HYPONATREMIA: Primary | ICD-10-CM

## 2023-10-14 DIAGNOSIS — F10.920 ALCOHOLIC INTOXICATION WITHOUT COMPLICATION: ICD-10-CM

## 2023-10-14 DIAGNOSIS — K86.0 ALCOHOL-INDUCED CHRONIC PANCREATITIS: ICD-10-CM

## 2023-10-14 LAB
ALBUMIN SERPL BCP-MCNC: 3.8 G/DL (ref 3.5–5.2)
ALP SERPL-CCNC: 121 U/L (ref 55–135)
ALT SERPL W/O P-5'-P-CCNC: 20 U/L (ref 10–44)
AMPHET+METHAMPHET UR QL: NEGATIVE
ANION GAP SERPL CALC-SCNC: 10 MMOL/L (ref 8–16)
AST SERPL-CCNC: 29 U/L (ref 10–40)
BARBITURATES UR QL SCN>200 NG/ML: NEGATIVE
BASOPHILS # BLD AUTO: 0.01 K/UL (ref 0–0.2)
BASOPHILS NFR BLD: 0.2 % (ref 0–1.9)
BENZODIAZ UR QL SCN>200 NG/ML: NEGATIVE
BILIRUB SERPL-MCNC: 0.4 MG/DL (ref 0.1–1)
BNP SERPL-MCNC: 64 PG/ML (ref 0–99)
BUN SERPL-MCNC: 5 MG/DL (ref 8–23)
BZE UR QL SCN: NEGATIVE
CALCIUM SERPL-MCNC: 9 MG/DL (ref 8.7–10.5)
CANNABINOIDS UR QL SCN: NEGATIVE
CHLORIDE SERPL-SCNC: 93 MMOL/L (ref 95–110)
CO2 SERPL-SCNC: 17 MMOL/L (ref 23–29)
CREAT SERPL-MCNC: 0.5 MG/DL (ref 0.5–1.4)
CREAT UR-MCNC: 17.8 MG/DL (ref 23–375)
DIFFERENTIAL METHOD: ABNORMAL
EOSINOPHIL # BLD AUTO: 0.8 K/UL (ref 0–0.5)
EOSINOPHIL NFR BLD: 13.9 % (ref 0–8)
ERYTHROCYTE [DISTWIDTH] IN BLOOD BY AUTOMATED COUNT: 14.6 % (ref 11.5–14.5)
EST. GFR  (NO RACE VARIABLE): >60 ML/MIN/1.73 M^2
ETHANOL SERPL-MCNC: 124 MG/DL
GLUCOSE SERPL-MCNC: 111 MG/DL (ref 70–110)
HCT VFR BLD AUTO: 36.5 % (ref 40–54)
HGB BLD-MCNC: 12.9 G/DL (ref 14–18)
IMM GRANULOCYTES # BLD AUTO: 0.01 K/UL (ref 0–0.04)
IMM GRANULOCYTES NFR BLD AUTO: 0.2 % (ref 0–0.5)
LIPASE SERPL-CCNC: 65 U/L (ref 4–60)
LYMPHOCYTES # BLD AUTO: 1.3 K/UL (ref 1–4.8)
LYMPHOCYTES NFR BLD: 22.6 % (ref 18–48)
MAGNESIUM SERPL-MCNC: 1.4 MG/DL (ref 1.6–2.6)
MCH RBC QN AUTO: 29.9 PG (ref 27–31)
MCHC RBC AUTO-ENTMCNC: 35.3 G/DL (ref 32–36)
MCV RBC AUTO: 85 FL (ref 82–98)
MONOCYTES # BLD AUTO: 1 K/UL (ref 0.3–1)
MONOCYTES NFR BLD: 18.7 % (ref 4–15)
NEUTROPHILS # BLD AUTO: 2.5 K/UL (ref 1.8–7.7)
NEUTROPHILS NFR BLD: 44.4 % (ref 38–73)
NRBC BLD-RTO: 0 /100 WBC
OPIATES UR QL SCN: NEGATIVE
PCP UR QL SCN>25 NG/ML: NEGATIVE
PLATELET # BLD AUTO: 131 K/UL (ref 150–450)
PMV BLD AUTO: 9.7 FL (ref 9.2–12.9)
POTASSIUM SERPL-SCNC: 3.6 MMOL/L (ref 3.5–5.1)
PROT SERPL-MCNC: 7.3 G/DL (ref 6–8.4)
RBC # BLD AUTO: 4.31 M/UL (ref 4.6–6.2)
SODIUM SERPL-SCNC: 120 MMOL/L (ref 136–145)
TOXICOLOGY INFORMATION: ABNORMAL
TROPONIN I SERPL HS-MCNC: 6.1 PG/ML (ref 0–14.9)
TROPONIN I SERPL HS-MCNC: 6.9 PG/ML (ref 0–14.9)
WBC # BLD AUTO: 5.52 K/UL (ref 3.9–12.7)

## 2023-10-14 PROCEDURE — 93005 ELECTROCARDIOGRAM TRACING: CPT | Performed by: GENERAL PRACTICE

## 2023-10-14 PROCEDURE — 85025 COMPLETE CBC W/AUTO DIFF WBC: CPT

## 2023-10-14 PROCEDURE — 99285 EMERGENCY DEPT VISIT HI MDM: CPT | Mod: 25

## 2023-10-14 PROCEDURE — 25500020 PHARM REV CODE 255: Performed by: EMERGENCY MEDICINE

## 2023-10-14 PROCEDURE — 25000003 PHARM REV CODE 250: Performed by: EMERGENCY MEDICINE

## 2023-10-14 PROCEDURE — 82077 ASSAY SPEC XCP UR&BREATH IA: CPT | Performed by: EMERGENCY MEDICINE

## 2023-10-14 PROCEDURE — 83880 ASSAY OF NATRIURETIC PEPTIDE: CPT

## 2023-10-14 PROCEDURE — 63600175 PHARM REV CODE 636 W HCPCS: Performed by: EMERGENCY MEDICINE

## 2023-10-14 PROCEDURE — 83690 ASSAY OF LIPASE: CPT

## 2023-10-14 PROCEDURE — 80053 COMPREHEN METABOLIC PANEL: CPT

## 2023-10-14 PROCEDURE — G0378 HOSPITAL OBSERVATION PER HR: HCPCS

## 2023-10-14 PROCEDURE — 93010 EKG 12-LEAD: ICD-10-PCS | Mod: ,,, | Performed by: GENERAL PRACTICE

## 2023-10-14 PROCEDURE — 96375 TX/PRO/DX INJ NEW DRUG ADDON: CPT

## 2023-10-14 PROCEDURE — 80307 DRUG TEST PRSMV CHEM ANLYZR: CPT | Performed by: EMERGENCY MEDICINE

## 2023-10-14 PROCEDURE — 84484 ASSAY OF TROPONIN QUANT: CPT

## 2023-10-14 PROCEDURE — 83735 ASSAY OF MAGNESIUM: CPT

## 2023-10-14 PROCEDURE — 93010 ELECTROCARDIOGRAM REPORT: CPT | Mod: ,,, | Performed by: GENERAL PRACTICE

## 2023-10-14 PROCEDURE — 96365 THER/PROPH/DIAG IV INF INIT: CPT | Mod: 59

## 2023-10-14 PROCEDURE — 36415 COLL VENOUS BLD VENIPUNCTURE: CPT | Performed by: EMERGENCY MEDICINE

## 2023-10-14 RX ORDER — ONDANSETRON 2 MG/ML
4 INJECTION INTRAMUSCULAR; INTRAVENOUS
Status: COMPLETED | OUTPATIENT
Start: 2023-10-14 | End: 2023-10-14

## 2023-10-14 RX ORDER — AMOXICILLIN 250 MG
1 CAPSULE ORAL 2 TIMES DAILY
Status: DISCONTINUED | OUTPATIENT
Start: 2023-10-15 | End: 2023-10-16 | Stop reason: HOSPADM

## 2023-10-14 RX ORDER — MAGNESIUM SULFATE 1 G/100ML
1 INJECTION INTRAVENOUS ONCE
Status: COMPLETED | OUTPATIENT
Start: 2023-10-14 | End: 2023-10-15

## 2023-10-14 RX ORDER — ENOXAPARIN SODIUM 100 MG/ML
40 INJECTION SUBCUTANEOUS EVERY 24 HOURS
Status: DISCONTINUED | OUTPATIENT
Start: 2023-10-15 | End: 2023-10-16 | Stop reason: HOSPADM

## 2023-10-14 RX ORDER — HYDROMORPHONE HYDROCHLORIDE 1 MG/ML
0.5 INJECTION, SOLUTION INTRAMUSCULAR; INTRAVENOUS; SUBCUTANEOUS
Status: COMPLETED | OUTPATIENT
Start: 2023-10-14 | End: 2023-10-14

## 2023-10-14 RX ORDER — SODIUM CHLORIDE 0.9 % (FLUSH) 0.9 %
10 SYRINGE (ML) INJECTION
Status: DISCONTINUED | OUTPATIENT
Start: 2023-10-15 | End: 2023-10-16 | Stop reason: HOSPADM

## 2023-10-14 RX ORDER — ONDANSETRON 2 MG/ML
4 INJECTION INTRAMUSCULAR; INTRAVENOUS EVERY 8 HOURS PRN
Status: DISCONTINUED | OUTPATIENT
Start: 2023-10-15 | End: 2023-10-16 | Stop reason: HOSPADM

## 2023-10-14 RX ORDER — HYDROMORPHONE HYDROCHLORIDE 1 MG/ML
0.5 INJECTION, SOLUTION INTRAMUSCULAR; INTRAVENOUS; SUBCUTANEOUS EVERY 4 HOURS PRN
Status: DISCONTINUED | OUTPATIENT
Start: 2023-10-15 | End: 2023-10-15

## 2023-10-14 RX ORDER — TALC
6 POWDER (GRAM) TOPICAL NIGHTLY PRN
Status: DISCONTINUED | OUTPATIENT
Start: 2023-10-15 | End: 2023-10-16 | Stop reason: HOSPADM

## 2023-10-14 RX ORDER — SODIUM CHLORIDE 9 MG/ML
INJECTION, SOLUTION INTRAVENOUS CONTINUOUS
Status: DISCONTINUED | OUTPATIENT
Start: 2023-10-15 | End: 2023-10-15

## 2023-10-14 RX ORDER — PANTOPRAZOLE SODIUM 40 MG/10ML
40 INJECTION, POWDER, LYOPHILIZED, FOR SOLUTION INTRAVENOUS DAILY
Status: DISCONTINUED | OUTPATIENT
Start: 2023-10-15 | End: 2023-10-16 | Stop reason: HOSPADM

## 2023-10-14 RX ADMIN — ASCORBIC ACID, VITAMIN A PALMITATE, CHOLECALCIFEROL, THIAMINE HYDROCHLORIDE, RIBOFLAVIN-5 PHOSPHATE SODIUM, PYRIDOXINE HYDROCHLORIDE, NIACINAMIDE, DEXPANTHENOL, ALPHA-TOCOPHEROL ACETATE, VITAMIN K1, FOLIC ACID, BIOTIN, CYANOCOBALAMIN: 200; 3300; 200; 6; 3.6; 6; 40; 15; 10; 150; 600; 60; 5 INJECTION, SOLUTION INTRAVENOUS at 10:10

## 2023-10-14 RX ADMIN — IOHEXOL 100 ML: 350 INJECTION, SOLUTION INTRAVENOUS at 10:10

## 2023-10-14 RX ADMIN — HYDROMORPHONE HYDROCHLORIDE 0.5 MG: 0.5 INJECTION, SOLUTION INTRAMUSCULAR; INTRAVENOUS; SUBCUTANEOUS at 09:10

## 2023-10-14 RX ADMIN — SODIUM CHLORIDE 50 ML: 3 INJECTION, SOLUTION INTRAVENOUS at 10:10

## 2023-10-14 RX ADMIN — ONDANSETRON 4 MG: 2 INJECTION INTRAMUSCULAR; INTRAVENOUS at 09:10

## 2023-10-15 PROBLEM — K86.3 PSEUDOCYST OF PANCREAS: Status: ACTIVE | Noted: 2022-02-04

## 2023-10-15 PROBLEM — E83.42 HYPOMAGNESEMIA: Status: ACTIVE | Noted: 2023-10-15

## 2023-10-15 LAB
ALBUMIN SERPL BCP-MCNC: 3.4 G/DL (ref 3.5–5.2)
ALP SERPL-CCNC: 107 U/L (ref 55–135)
ALT SERPL W/O P-5'-P-CCNC: 16 U/L (ref 10–44)
ANION GAP SERPL CALC-SCNC: 6 MMOL/L (ref 8–16)
ANION GAP SERPL CALC-SCNC: 7 MMOL/L (ref 8–16)
AST SERPL-CCNC: 23 U/L (ref 10–40)
BASOPHILS # BLD AUTO: 0.02 K/UL (ref 0–0.2)
BASOPHILS NFR BLD: 0.4 % (ref 0–1.9)
BILIRUB SERPL-MCNC: 0.3 MG/DL (ref 0.1–1)
BUN SERPL-MCNC: 4 MG/DL (ref 8–23)
CALCIUM SERPL-MCNC: 8.4 MG/DL (ref 8.7–10.5)
CALCIUM SERPL-MCNC: 8.5 MG/DL (ref 8.7–10.5)
CALCIUM SERPL-MCNC: 8.7 MG/DL (ref 8.7–10.5)
CALCIUM SERPL-MCNC: 8.7 MG/DL (ref 8.7–10.5)
CHLORIDE SERPL-SCNC: 101 MMOL/L (ref 95–110)
CHLORIDE SERPL-SCNC: 102 MMOL/L (ref 95–110)
CHLORIDE SERPL-SCNC: 104 MMOL/L (ref 95–110)
CHLORIDE SERPL-SCNC: 104 MMOL/L (ref 95–110)
CO2 SERPL-SCNC: 19 MMOL/L (ref 23–29)
CO2 SERPL-SCNC: 21 MMOL/L (ref 23–29)
CREAT SERPL-MCNC: 0.4 MG/DL (ref 0.5–1.4)
DIFFERENTIAL METHOD: ABNORMAL
EOSINOPHIL # BLD AUTO: 0.7 K/UL (ref 0–0.5)
EOSINOPHIL NFR BLD: 15.8 % (ref 0–8)
ERYTHROCYTE [DISTWIDTH] IN BLOOD BY AUTOMATED COUNT: 14.9 % (ref 11.5–14.5)
EST. GFR  (NO RACE VARIABLE): >60 ML/MIN/1.73 M^2
GLUCOSE SERPL-MCNC: 107 MG/DL (ref 70–110)
GLUCOSE SERPL-MCNC: 110 MG/DL (ref 70–110)
GLUCOSE SERPL-MCNC: 110 MG/DL (ref 70–110)
GLUCOSE SERPL-MCNC: 137 MG/DL (ref 70–110)
HCT VFR BLD AUTO: 35.2 % (ref 40–54)
HGB BLD-MCNC: 12.2 G/DL (ref 14–18)
IMM GRANULOCYTES # BLD AUTO: 0.01 K/UL (ref 0–0.04)
IMM GRANULOCYTES NFR BLD AUTO: 0.2 % (ref 0–0.5)
LYMPHOCYTES # BLD AUTO: 1.2 K/UL (ref 1–4.8)
LYMPHOCYTES NFR BLD: 26.5 % (ref 18–48)
MAGNESIUM SERPL-MCNC: 1.8 MG/DL (ref 1.6–2.6)
MCH RBC QN AUTO: 29.5 PG (ref 27–31)
MCHC RBC AUTO-ENTMCNC: 34.7 G/DL (ref 32–36)
MCV RBC AUTO: 85 FL (ref 82–98)
MONOCYTES # BLD AUTO: 0.9 K/UL (ref 0.3–1)
MONOCYTES NFR BLD: 19.1 % (ref 4–15)
NEUTROPHILS # BLD AUTO: 1.8 K/UL (ref 1.8–7.7)
NEUTROPHILS NFR BLD: 38 % (ref 38–73)
NRBC BLD-RTO: 0 /100 WBC
OSMOLALITY UR: 330 MOSM/KG (ref 50–1200)
PLATELET # BLD AUTO: 133 K/UL (ref 150–450)
PMV BLD AUTO: 9.4 FL (ref 9.2–12.9)
POTASSIUM SERPL-SCNC: 3.9 MMOL/L (ref 3.5–5.1)
POTASSIUM SERPL-SCNC: 3.9 MMOL/L (ref 3.5–5.1)
POTASSIUM SERPL-SCNC: 4 MMOL/L (ref 3.5–5.1)
POTASSIUM SERPL-SCNC: 4.8 MMOL/L (ref 3.5–5.1)
PROT SERPL-MCNC: 6.4 G/DL (ref 6–8.4)
RBC # BLD AUTO: 4.13 M/UL (ref 4.6–6.2)
SODIUM SERPL-SCNC: 127 MMOL/L (ref 136–145)
SODIUM SERPL-SCNC: 129 MMOL/L (ref 136–145)
SODIUM SERPL-SCNC: 130 MMOL/L (ref 136–145)
SODIUM SERPL-SCNC: 130 MMOL/L (ref 136–145)
SODIUM UR-SCNC: 115 MMOL/L (ref 20–250)
WBC # BLD AUTO: 4.61 K/UL (ref 3.9–12.7)

## 2023-10-15 PROCEDURE — 25000003 PHARM REV CODE 250: Performed by: INTERNAL MEDICINE

## 2023-10-15 PROCEDURE — 83735 ASSAY OF MAGNESIUM: CPT | Performed by: STUDENT IN AN ORGANIZED HEALTH CARE EDUCATION/TRAINING PROGRAM

## 2023-10-15 PROCEDURE — 25000003 PHARM REV CODE 250: Performed by: STUDENT IN AN ORGANIZED HEALTH CARE EDUCATION/TRAINING PROGRAM

## 2023-10-15 PROCEDURE — 96367 TX/PROPH/DG ADDL SEQ IV INF: CPT

## 2023-10-15 PROCEDURE — 83935 ASSAY OF URINE OSMOLALITY: CPT | Performed by: INTERNAL MEDICINE

## 2023-10-15 PROCEDURE — 80048 BASIC METABOLIC PNL TOTAL CA: CPT | Mod: 91 | Performed by: STUDENT IN AN ORGANIZED HEALTH CARE EDUCATION/TRAINING PROGRAM

## 2023-10-15 PROCEDURE — 80053 COMPREHEN METABOLIC PANEL: CPT | Performed by: STUDENT IN AN ORGANIZED HEALTH CARE EDUCATION/TRAINING PROGRAM

## 2023-10-15 PROCEDURE — G0378 HOSPITAL OBSERVATION PER HR: HCPCS

## 2023-10-15 PROCEDURE — 96376 TX/PRO/DX INJ SAME DRUG ADON: CPT

## 2023-10-15 PROCEDURE — 85025 COMPLETE CBC W/AUTO DIFF WBC: CPT | Performed by: STUDENT IN AN ORGANIZED HEALTH CARE EDUCATION/TRAINING PROGRAM

## 2023-10-15 PROCEDURE — 63600175 PHARM REV CODE 636 W HCPCS: Performed by: STUDENT IN AN ORGANIZED HEALTH CARE EDUCATION/TRAINING PROGRAM

## 2023-10-15 PROCEDURE — 94761 N-INVAS EAR/PLS OXIMETRY MLT: CPT

## 2023-10-15 PROCEDURE — 84300 ASSAY OF URINE SODIUM: CPT | Performed by: INTERNAL MEDICINE

## 2023-10-15 PROCEDURE — 36415 COLL VENOUS BLD VENIPUNCTURE: CPT | Performed by: STUDENT IN AN ORGANIZED HEALTH CARE EDUCATION/TRAINING PROGRAM

## 2023-10-15 PROCEDURE — 80048 BASIC METABOLIC PNL TOTAL CA: CPT | Mod: XB | Performed by: STUDENT IN AN ORGANIZED HEALTH CARE EDUCATION/TRAINING PROGRAM

## 2023-10-15 RX ORDER — VIT C/E/ZN/COPPR/LUTEIN/ZEAXAN 250MG-90MG
1000 CAPSULE ORAL DAILY
COMMUNITY

## 2023-10-15 RX ORDER — HYDROCODONE BITARTRATE AND ACETAMINOPHEN 5; 325 MG/1; MG/1
1 TABLET ORAL EVERY 6 HOURS PRN
Status: DISCONTINUED | OUTPATIENT
Start: 2023-10-15 | End: 2023-10-16 | Stop reason: HOSPADM

## 2023-10-15 RX ORDER — HYDROCODONE BITARTRATE AND ACETAMINOPHEN 5; 325 MG/1; MG/1
1 TABLET ORAL EVERY 6 HOURS PRN
Status: DISCONTINUED | OUTPATIENT
Start: 2023-10-15 | End: 2023-10-15

## 2023-10-15 RX ORDER — HYDROMORPHONE HYDROCHLORIDE 1 MG/ML
0.5 INJECTION, SOLUTION INTRAMUSCULAR; INTRAVENOUS; SUBCUTANEOUS ONCE
Status: COMPLETED | OUTPATIENT
Start: 2023-10-15 | End: 2023-10-15

## 2023-10-15 RX ADMIN — HYDROMORPHONE HYDROCHLORIDE 0.5 MG: 0.5 INJECTION, SOLUTION INTRAMUSCULAR; INTRAVENOUS; SUBCUTANEOUS at 04:10

## 2023-10-15 RX ADMIN — HYDROCODONE BITARTRATE AND ACETAMINOPHEN 1 TABLET: 5; 325 TABLET ORAL at 08:10

## 2023-10-15 RX ADMIN — HYDROMORPHONE HYDROCHLORIDE 0.5 MG: 0.5 INJECTION, SOLUTION INTRAMUSCULAR; INTRAVENOUS; SUBCUTANEOUS at 09:10

## 2023-10-15 RX ADMIN — HYDROMORPHONE HYDROCHLORIDE 0.5 MG: 0.5 INJECTION, SOLUTION INTRAMUSCULAR; INTRAVENOUS; SUBCUTANEOUS at 01:10

## 2023-10-15 RX ADMIN — SODIUM CHLORIDE: 0.9 INJECTION, SOLUTION INTRAVENOUS at 05:10

## 2023-10-15 RX ADMIN — MAGNESIUM SULFATE 1 G: 1 INJECTION INTRAVENOUS at 03:10

## 2023-10-15 RX ADMIN — HYDROCODONE BITARTRATE AND ACETAMINOPHEN 1 TABLET: 5; 325 TABLET ORAL at 02:10

## 2023-10-15 NOTE — HPI
Vic Reyez is a 73 y.o.  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  The pain started 10/14 morning, but progressed throughout the day prompting ER visit.  He has had previous episodes of pancreatitis and this feels similar. He is a daily beer drinker, last drink 10/14/23.  In the ER, abd CT does not show signs of pancreatitis, lipase only minimally elevated.  He is hyponatremic and will be admitted to hospital medicine. Patient denies change in chest pain, shortness of breath, palpitations, nausea, vomiting, diarrhea, constipation,  hematochezia, hematemesis, fever, cough, congestion, runny nose, hearing, numbness, tingling, headache, focal weakness, dysuria, frequency, hematuria

## 2023-10-15 NOTE — PLAN OF CARE
Problem: Skin Injury Risk Increased  Goal: Skin Health and Integrity  Outcome: Ongoing, Progressing     Problem: Impaired Wound Healing  Goal: Optimal Wound Healing  Outcome: Ongoing, Progressing

## 2023-10-15 NOTE — SUBJECTIVE & OBJECTIVE
Interval History:  Patient complains of mild crampy abdominal pain radiating from left-to-right.  Denies any nausea and vomiting.  Denies chest pains.    Review of Systems  Objective:     Vital Signs (Most Recent):  Temp: 98.7 °F (37.1 °C) (10/15/23 1152)  Pulse: 67 (10/15/23 1152)  Resp: 18 (10/15/23 1152)  BP: 126/73 (10/15/23 1152)  SpO2: 97 % (10/15/23 1152) Vital Signs (24h Range):  Temp:  [97.8 °F (36.6 °C)-98.7 °F (37.1 °C)] 98.7 °F (37.1 °C)  Pulse:  [58-68] 67  Resp:  [14-20] 18  SpO2:  [97 %-100 %] 97 %  BP: (118-135)/(66-83) 126/73     Weight: 67 kg (147 lb 9.6 oz)  Body mass index is 20.59 kg/m².    Intake/Output Summary (Last 24 hours) at 10/15/2023 1206  Last data filed at 10/15/2023 0917  Gross per 24 hour   Intake 530 ml   Output 1100 ml   Net -570 ml         Physical Exam    General:     Resting comfortably, Appears stated age.  Eyes:    Anicteric sclera. Conjunctiva non-injected  HENT:    Head normocephalic, atraumatic. Mucous membranes moist.   Neck:   Trachea midline, no thyromegaly.  No JVD or adenopathy.   Heart:    S1, S2 auscultated. No murmurs rubs or gallops. Regular rhythm and rate.   Lungs:    Bilaterally clear in all lobes with equal chest rise.  No wheezes, rales, or rhonchi.   Abdomen:    Soft, nontender, nondistended.  Positive bowel sounds. No palpable masses.  No rebound or guarding.   Extremities:    Without clubbing or cyanosis.  No edema.  Peripheral pulses II/IV.  Neuro:    Alert, awake and oriented x3.  Cranial nerves appear grossly intact on limited neurological exam.  No focal motor or sensory deficit.  Skin:    No rashes, exudates, or nodules on limited skin exam  Psych:    Normal mood, affect and behavior with intact judgement and insight.    Significant Labs: All pertinent labs within the past 24 hours have been reviewed.    Significant Imaging: I have reviewed all pertinent imaging results/findings within the past 24 hours.

## 2023-10-15 NOTE — NURSING
Nurses Note -- 4 Eyes      10/15/2023   4:44 AM      Skin assessed during: Admit      [] No Altered Skin Integrity Present    []Prevention Measures Documented      [x] Yes- Altered Skin Integrity Present or Discovered   [] LDA Added if Not in Epic (Describe Wound)   [x] New Altered Skin Integrity was Present on Admit and Documented in LDA   [x] Wound Image Taken    Wound Care Consulted? No    Attending Nurse:  Pino Infante RN/Staff Member:  [id number]

## 2023-10-15 NOTE — PLAN OF CARE
[facility]  Initial Discharge Assessment    CM engaged with patient at bedside to complete d/c assessment. Patient reports that he does not have a POA or an advanced directive.  Patient reports that he lives alone and has limited family support. Patient expressed financial struggles and would like assistance with locating available food calvo/resources in his region. Patient confirms receiving SNAP benefits with low benefit amount. Patient does not have any DME or outpatient services and is not on dialysis. Patient does not take coumadin. Patient would like assistance with transportation home upon discharge. Would like to be transported via [email or web address] services. Patient was alert/oriented. Patient engaged appropriately throughout assessment.       Primary Care Provider: Maryse Das MD    Admission Diagnosis: Hyponatremia [E87.1]    Admission Date: 10/14/2023  Expected Discharge Date:     Transition of Care Barriers: (P) Unable to afford medication, Transportation, No family/friends to help    Payor: HU[employer] MANAGED MEDICARE / Plan: HU[employer] MEDICARE PPO / Product Type: Medicare Advantage /     Extended Emergency Contact Information  Primary Emergency Contact: Bhargavi aMriee   [facility]  Home Phone: [phone]  Mobile Phone: [phone]  Relation: Sister    Discharge Plan A: (P) Home  Discharge Plan B: (P) Home      [facility] - [facility] [address]  [facility] [address]  [facility] [address]  Phone: [phone] Fax: [phone]    [facility] - [place], [place] - [address]  [address]  [facility] [address]  Phone: [phone] Fax: [phone]      Initial Assessment (most recent)       Adult Discharge Assessment - 10/15/23 0953          Discharge Assessment    Assessment Type Discharge Planning Assessment (P)      Confirmed/corrected address, phone number and insurance Yes (P)      Confirmed Demographics Correct on Facesheet (P)       Source of Information patient (P)      When was your last doctors appointment? -- (P)    September 2023    Does patient/caregiver understand observation status Yes (P)      Reason For Admission Chronic Pancreatitis (P)      People in Home alone (P)      Facility Arrived From: Home (P)      Do you expect to return to your current living situation? Yes (P)      Do you have help at home or someone to help you manage your care at home? No (P)      Prior to hospitilization cognitive status: Alert/Oriented (P)      Current cognitive status: Alert/Oriented (P)      Equipment Currently Used at Home none (P)      Readmission within 30 days? No (P)      Patient currently being followed by outpatient case management? No (P)      Do you currently have service(s) that help you manage your care at home? No (P)      Do you take prescription medications? Yes (P)      Do you have prescription coverage? Yes (P)      Coverage [employer]/ [employer] (P)      Do you have any problems affording any of your prescribed medications? Yes (P)      If yes, what medications? Liver Medication. Patient could not recall name. (P)      Is the patient taking medications as prescribed? no (P)      If no, which medications is patient not taking? Liver Medication (P)      Who is going to help you get home at discharge? [employer] ([employer]) [phone] (P)      How do you get to doctors appointments? health plan transportation;car, drives self (P)      Are you on dialysis? No (P)      Do you take coumadin? No (P)      DME Needed Upon Discharge  none (P)      Discharge Plan discussed with: Patient (P)      Transition of Care Barriers Unable to afford medication;Transportation;No family/friends to help (P)      Discharge Plan A Home (P)      Discharge Plan B Home (P)         Physical Activity    On average, how many days per week do you engage in moderate to strenuous exercise (like a brisk walk)? 0 days (P)      On average, how many  minutes do you engage in exercise at this level? 0 min (P)         Financial Resource Strain    How hard is it for you to pay for the very basics like food, housing, medical care, and heating? Somewhat hard (P)         Housing Stability    In the last 12 months, was there a time when you were not able to pay the mortgage or rent on time? No (P)      In the last 12 months, how many places have you lived? 1 (P)      In the last 12 months, was there a time when you did not have a steady place to sleep or slept in a shelter (including now)? No (P)         Transportation Needs    In the past 12 months, has lack of transportation kept you from medical appointments or from getting medications? No (P)      In the past 12 months, has lack of transportation kept you from meetings, work, or from getting things needed for daily living? No (P)         Food Insecurity    Within the past 12 months, you worried that your food would run out before you got the money to buy more. Never true (P)      Within the past 12 months, the food you bought just didn't last and you didn't have money to get more. Never true (P)         Stress    Do you feel stress - tense, restless, nervous, or anxious, or unable to sleep at night because your mind is troubled all the time - these days? To some extent (P)         Social Connections    In a typical week, how many times do you talk on the phone with family, friends, or neighbors? More than three times a week (P)      How often do you get together with friends or relatives? Once a week (P)      How often do you attend Jainism or Moravian services? Never (P)      Do you belong to any clubs or organizations such as Jainism groups, unions, fraternal or athletic groups, or school groups? No (P)      How often do you attend meetings of the clubs or organizations you belong to? Never (P)      Are you , , , , never , or living with a partner? Never  (P)          Alcohol Use    Q1: How often do you have a drink containing alcohol? Monthly or less (P)      Q2: How many drinks containing alcohol do you have on a typical day when you are drinking? 1 or 2 (P)      Q3: How often do you have six or more drinks on one occasion? Less than monthly (P)

## 2023-10-15 NOTE — ASSESSMENT & PLAN NOTE
Initial sodium 120.  Started on NS at 100 mL/hr with rapid correction in 12 hours to 130.  IV fluids have been stopped.  We will discontinue BMP q.4 H at this time.  Monitor sodium with morning labs.  We will obtain urine sodium and urine osmolality to evaluate etiology.

## 2023-10-15 NOTE — ASSESSMENT & PLAN NOTE
Not in acute flare.  Patient has crampy abdominal pain radiating from left-to-right.  P.r.n. pain medication with Norco 5 mg.  Tolerating a diet.

## 2023-10-15 NOTE — ED PROVIDER NOTES
Encounter Date: 10/14/2023       History     Chief Complaint   Patient presents with    Chest Pain     From home. Complaining of substernal/rib pain back pain. Patient able to ambulate to bed. EMS gave 1 spray of nitro, 324 aspirin, and 100 fentanyl. Also complaining of L eye cloudiness.      Emergent evaluation of a 73-year-old male with history of decompensated hepatic cirrhosis with chronic alcohol abuse, chronic pancreatitis with pancreatic pseudocyst, hypertension splenic artery aneurysm, presents to the ER due to left upper quadrant epigastric and left chest pain that has been constant today he reports pain was present in the morning.  Improved some and he drank beer at a friend's house he reports pain worsens.  He was not eaten today.  No nausea or vomiting.  He reports no shortness of breath weakness dizziness or lightheadedness.  He reports pain is 10/10 and was not improve with fentanyl 100 mcg given by EMS they also gave aspirin and 1 spray of nitro.  No change in pain.  Pain does not radiate to the arm neck or jaw.  No fever chills sweats no not no diarrhea      Review of patient's allergies indicates:  No Known Allergies  Past Medical History:   Diagnosis Date    Alcohol abuse 02/02/2022    Decompensated hepatic cirrhosis 02/02/2022    Encounter for blood transfusion     Hypertension     Lab test positive for detection of COVID-19 virus 09/2021    Pancreatic cyst 06/03/2022     Past Surgical History:   Procedure Laterality Date    ABDOMINAL AORTOGRAPHY N/A 10/04/2022    Procedure: AORTOGRAM-ABDOMINAL;  Surgeon: Felice Epstein MD;  Location: [facility] CATH/EP LAB;  Service: Vascular;  Laterality: N/A;    APPENDECTOMY      ARTERIOGRAM, MESENTERIC N/A 10/04/2022    Procedure: ARTERIOGRAM, MESENTERIC;  Surgeon: Felice Epstein MD;  Location: [facility] CATH/EP LAB;  Service: Vascular;  Laterality: N/A;    COLONOSCOPY      ENDOSCOPIC ULTRASOUND OF UPPER GASTROINTESTINAL TRACT  02/04/2022    Procedure: ULTRASOUND,  UPPER GI TRACT, ENDOSCOPIC;  Surgeon: Chuy Bay MD;  Location: [facility] ENDO (2ND FLR);  Service: Endoscopy;;    ENDOSCOPIC ULTRASOUND OF UPPER GASTROINTESTINAL TRACT N/A 06/03/2022    Procedure: ULTRASOUND, UPPER GI TRACT, ENDOSCOPIC;  Surgeon: Roger Viveros III, MD;  Location: [facility] ENDO;  Service: Endoscopy;  Laterality: N/A;    ENDOSCOPIC ULTRASOUND OF UPPER GASTROINTESTINAL TRACT N/A 11/07/2022    Procedure: ULTRASOUND, UPPER GI TRACT, ENDOSCOPIC;  Surgeon: Roger Viveros III, MD;  Location: [facility] ENDO;  Service: Endoscopy;  Laterality: N/A;    ERCP N/A 02/04/2022    Procedure: ERCP (ENDOSCOPIC RETROGRADE CHOLANGIOPANCREATOGRAPHY);  Surgeon: Chuy Bay MD;  Location: [facility] ENDO (2ND FLR);  Service: Endoscopy;  Laterality: N/A;    ERCP N/A 04/19/2022    Procedure: ERCP (ENDOSCOPIC RETROGRADE CHOLANGIOPANCREATOGRAPHY);  Surgeon: Chuy Bay MD;  Location: [facility] (2ND FLR);  Service: Endoscopy;  Laterality: N/A;  4/1: fully vaccinated. labs prior. instructions mailed to sister and patient.-SC  4/12/22-Confirmed new arrival time of 10:45am with pt's sister and updated instructions emailed-DS    ERCP N/A 10/05/2022    Procedure: ERCP (ENDOSCOPIC RETROGRADE CHOLANGIOPANCREATOGRAPHY);  Surgeon: Roger Viveros III, MD;  Location: [facility] ENDO;  Service: Endoscopy;  Laterality: N/A;    ERCP N/A 1/24/2023    Procedure: ERCP (ENDOSCOPIC RETROGRADE CHOLANGIOPANCREATOGRAPHY);  Surgeon: Roger Viveros III, MD;  Location: [facility];  Service: Endoscopy;  Laterality: N/A;    EYE SURGERY Left     JOINT REPLACEMENT Bilateral     knee replacement    KNEE SURGERY      RECONSTRUCTION OF SHOULDER Right     TONSILLECTOMY      UPPER ENDOSCOPIC ULTRASOUND W/ FNA  06/03/2022    VITRECTOMY BY PARS PLANA APPROACH Left 8/14/2023    Procedure: VITRECTOMY, PARS PLANA APPROACH;  Surgeon: Tee Crespo MD;  Location: [facility] OR 1ST FLR;  Service: Ophthalmology;  Laterality: Left;     History reviewed. No pertinent  family history.  Social History     Tobacco Use    Smoking status: Every Day     Current packs/day: 0.25     Average packs/day: 0.3 packs/day for 40.0 years (10.0 ttl pk-yrs)     Types: Cigarettes    Smokeless tobacco: Never    Tobacco comments:     Pt smoked on and off for 40 years   Substance Use Topics    Alcohol use: Yes     Alcohol/week: 10.0 standard drinks of alcohol     Types: 10 Cans of beer per week    Drug use: Never     Review of Systems   Constitutional:  Negative for activity change, appetite change, chills, diaphoresis, fatigue and fever.   HENT:  Negative for congestion, postnasal drip and rhinorrhea.    Respiratory:  Negative for cough, chest tightness, shortness of breath and wheezing.    Cardiovascular:  Positive for chest pain. Negative for palpitations.   Gastrointestinal:  Positive for abdominal distention and abdominal pain. Negative for constipation, diarrhea, nausea and vomiting.   Genitourinary:  Negative for dysuria, frequency and urgency.   Musculoskeletal:  Positive for back pain. Negative for neck pain and neck stiffness.   Neurological:  Negative for dizziness, weakness, light-headedness, numbness and headaches.   All other systems reviewed and are negative.      Physical Exam     Initial Vitals [10/1950]   BP Pulse Resp Temp SpO2   134/83 65 18 98.3 °F (36.8 °C) 100 %      MAP       --         Physical Exam    Nursing note and vitals reviewed.  Constitutional: He appears well-developed and well-nourished. He is not diaphoretic. He appears distressed.   HENT:   Head: Normocephalic and atraumatic.   Right Ear: External ear normal.   Left Ear: External ear normal.   Nose: Nose normal.   Mouth/Throat: Oropharynx is clear and moist.   Eyes: Conjunctivae and EOM are normal. Pupils are equal, round, and reactive to light.   Neck: Neck supple. No tracheal deviation present.   Normal range of motion.  Cardiovascular:  Normal rate, regular rhythm, normal heart sounds and intact distal  pulses.     Exam reveals no gallop and no friction rub.       No murmur heard.  Pulmonary/Chest: Breath sounds normal. No stridor. No respiratory distress. He has no wheezes. He has no rhonchi. He has no rales. He exhibits no tenderness.   Sats 97% on room air respirations 18 clear breath sounds bilaterally   Abdominal: Abdomen is soft. Bowel sounds are normal. He exhibits no distension and no mass. There is abdominal tenderness. There is guarding. There is no rebound.   Musculoskeletal:         General: No edema. Normal range of motion.      Cervical back: Normal range of motion and neck supple.     Neurological: He is alert and oriented to person, place, and time. He has normal strength. No cranial nerve deficit or sensory deficit.   Skin: Skin is warm and dry. No rash noted. No erythema. No pallor.   Psychiatric: He has a normal mood and affect. His behavior is normal. Judgment and thought content normal.         ED Course   Procedures  Labs Reviewed   CBC W/ AUTO DIFFERENTIAL - Abnormal; Notable for the following components:       Result Value    RBC 4.31 (*)     Hemoglobin 12.9 (*)     Hematocrit 36.5 (*)     RDW 14.6 (*)     Platelets 131 (*)     Eos # 0.8 (*)     Mono % 18.7 (*)     Eosinophil % 13.9 (*)     All other components within normal limits   COMPREHENSIVE METABOLIC PANEL - Abnormal; Notable for the following components:    Sodium 120 (*)     Chloride 93 (*)     CO2 17 (*)     Glucose 111 (*)     BUN 5 (*)     All other components within normal limits   MAGNESIUM - Abnormal; Notable for the following components:    Magnesium 1.4 (*)     All other components within normal limits   LIPASE - Abnormal; Notable for the following components:    Lipase 65 (*)     All other components within normal limits   DRUG SCREEN PANEL, URINE EMERGENCY - Abnormal; Notable for the following components:    Creatinine, Urine 17.8 (*)     All other components within normal limits    Narrative:     Specimen Source->Urine    ALCOHOL,MEDICAL (ETHANOL) - Abnormal; Notable for the following components:    Alcohol, Serum 124 (*)     All other components within normal limits   B-TYPE NATRIURETIC PEPTIDE   TROPONIN I HIGH SENSITIVITY   TROPONIN I HIGH SENSITIVITY   ALCOHOL,MEDICAL (ETHANOL)     EKG Readings: (Independently Interpreted)   Initial Reading: No STEMI. Rhythm: Normal Sinus Rhythm. Heart Rate: 64. Ectopy: No Ectopy. Conduction: Normal.       Imaging Results              CT Abdomen Pelvis With Contrast (Final result)  Result time 10/14/23 22:31:04      Final result by Dung Winters MD (10/14/23 22:31:04)                   Narrative:    EXAM DESCRIPTION:  CT ABDOMEN PELVIS WITH CONTRAST  RadLex: CT ABDOMEN PELVIS WITH IV CONTRAST    CLINICAL HISTORY:  73 years  Male;  Pancreatitis, acute, severe    TECHNOLOGIST NOTES:    TECHNIQUE: Helical CT imaging was obtained of the abdomen and pelvis with multiplanar reconstructions. This exam was performed according to our departmental dose-optimization program, which includes automated exposure control, adjustment of the mA and/or kV according to patient size and/or use of iterative reconstruction techniques.    COMPARISON: None currently available    FINDINGS:  Abdomen:  No evidence of acute disease in the visualized lung bases.  The liver, spleen, pancreas, adrenal glands and kidneys show no acute abnormality. . Multiple embolization coils adjacent to the body and tail of the pancreas. There is a small amount of fluid near the splenic hilum, roughly 2.2 cm in size. This could be a small pseudocyst. No evidence of acute pancreatitis.    There are no calcified gallstones. There is no gallbladder wall thickening. No pericholecystic fluid.  There is mild biliary duct dilatation.  No significant hydronephrosis bilaterally.    No free air.    There is no significant free fluid.    There is no significant aneurysmal enlargement of the abdominal aorta.    Pelvis:  There is no evidence of bowel  obstruction. There is a moderate volume of material in the colon, greatest in the right colon.   No herniated bowel loops.  . No focal inflammatory changes . Evaluation of the bowel is limited without oral contrast or with incomplete bowel opacification.   There is no significant free fluid in the pelvis. The bladder is distended.    No acute osseous finding. There is grade 1 anterolisthesis of L5 on S1 with severe degenerative disc disease also.    IMPRESSION:  1.  No CT evidence of acute pancreatitis. There may be a 2.2 cm pseudocyst near the splenic hilum.  2.  No bowel obstruction, herniated bowel loops or focal inflammatory changes.  3.  Moderate volume of stool in the colon, greatest in the right colon  4.  Please see body of report for non-critical findings.    Electronically signed by:  Dung Winters MD  10/14/2023 10:31 PM CDT Workstation: [id number]                                     X-Ray Chest AP Portable (In process)                   X-Rays:   Independently Interpreted Readings:   Chest X-Ray: Normal heart size. Chronic changes unchanged from November 2022     Medications   magnesium sulfate in dextrose IVPB (premix) 1 g (has no administration in time range)   sodium chloride 0.9% 1,000 mL with mvi, (ADULT) no.4 with vit K 3,300 unit- 150 mcg/10 mL 10 mL, thiamine 100 mg, folic acid 1 mg infusion ( Intravenous New Bag 10/14/23 2254)   sodium chloride 3% HYPERTONIC bolus (0 mLs Intravenous Stopped 10/14/23 2253)   HYDROmorphone injection 0.5 mg (0.5 mg Intravenous Given 10/14/23 2138)   ondansetron injection 4 mg (4 mg Intravenous Given 10/14/23 2138)   iohexoL (OMNIPAQUE 350) injection 100 mL (100 mLs Intravenous Given 10/14/23 2204)     Medical Decision Making  Emergent evaluation of a 73-year-old male with history of decompensated hepatic cirrhosis with chronic alcohol abuse, chronic pancreatitis with pancreatic pseudocyst, hypertension splenic artery aneurysm, presents to the ER due to left upper  quadrant epigastric and left chest pain that has been constant today he reports pain was present in the morning.  Improved some and he drank beer at a friend's house he reports pain worsens.  He was not eaten today.  No nausea or vomiting.  He reports no shortness of breath weakness dizziness or lightheadedness.  He reports pain is 10/10 and was not improve with fentanyl 100 mcg given by EMS they also gave aspirin and 1 spray of nitro.  No change in pain.  Pain does not radiate to the arm neck or jaw.  No fever chills sweats no not no diarrhea  On physical exam patient is writhing in pain holding left upper quadrant was not wanting to stay in bed despite nursing staff having put seizure pads on the bed in encouraging him him to stay in the bed.  He was tenderness throughout bilateral upper quadrants worsened left upper quadrant and epigastrium with voluntary guarding normal cardiac and lung exam.  No peripheral edema  MDM    Patient presents for emergent evaluation of acute left upper quadrant epigastric and left chest pain that poses a possible threat to life and/or bodily function.    Differential diagnosis includes but is not limited to acute pancreatitis, acute cholecystitis and cholangitis, colitis, acute appendicitis, urinary tract infection,  testicular torsion,epididymitis and orchitis, prostatitis, pyelonephritis, kidney stone, volvulus, small bowel obstruction, enteritis, gastritis, colitis, mesenteric ischemia, AAA, AAA rupture, aortic dissection, constipation, upper or lower gi bleeding, traumatic injury.     In the ED patient found to have acute on chronic pancreatitis, hyponatremia, alcohol intoxication  I ordered labs and personally reviewed them.  Labs significant for see below   I ordered X-rays and personally reviewed them and reviewed the radiologist interpretation.  Xray significant for see below.    I ordered EKG and personally reviewed it.  EKG significant for see below.    I ordered CT scan  and personally reviewed it and reviewed the radiologist interpretation.  CT significant for   IMPRESSION:   1.  No CT evidence of acute pancreatitis. There may be a 2.2 cm pseudocyst near the splenic hilum.   2.  No bowel obstruction, herniated bowel loops or focal inflammatory changes.   3.  Moderate volume of stool in the colon, greatest in the right colon   4.  Please see body of report for non-critical findings.     Admission MDM  I discussed the patient presentation labs, ekg, X-rays, CT findings with the Hospitalist.   Patient was managed in the ED with IV Dilaudid 0.5 mg, Zofran 4 mg, banana bag, 1 g of magnesium IV, 50 mL of 3% normal saline due to moderate hyponatremia  The response to treatment was improved  Patient required emergent consultation to hospitalist for admission.  Chanell Gracia M.D.       Amount and/or Complexity of Data Reviewed  Labs: ordered.     Details: Troponin #1 6.1  Troponin #2 6.9  Mag 1.4  Drug screen negative  H&H 12.9 and 36.5 platelets [id number]  Sodium 120 chloride 93 bicarb 17 glucose 111  BNP 64  Alcohol 124  Lipase 65  Radiology: ordered.    Risk  Prescription drug management.  Decision regarding hospitalization.                               Clinical Impression:   Final diagnoses:  [R07.9] Chest pain  [E87.1] Hyponatremia (Primary)  [K86.0] Alcohol-induced chronic pancreatitis  [F10.920] Alcoholic intoxication without complication  [R10.12] Left upper quadrant abdominal pain        ED Disposition Condition    Admit Stable                Chanell Gracia MD  10/14/23 8841

## 2023-10-15 NOTE — RESPIRATORY THERAPY
10/15/23 0818   Patient Assessment/Suction   Level of Consciousness (AVPU) alert   Respiratory Effort Normal;Unlabored   Expansion/Accessory Muscles/Retractions no retractions;no use of accessory muscles   Rhythm/Pattern, Respiratory depth regular;no shortness of breath reported;pattern regular;unlabored   PRE-TX-O2   Device (Oxygen Therapy) room air   SpO2 97 %   Pulse Oximetry Type Intermittent   $ Pulse Oximetry - Multiple Charge Pulse Oximetry - Multiple   Pulse 62   Resp 18   Positioning   Body Position position changed independently   Head of Bed (HOB) Positioning HOB at 30 degrees   Positioning/Transfer Devices pillows;in use

## 2023-10-15 NOTE — PHARMACY MED REC
"Admission Medication History     The home medication history was taken by Lyle Cervantes.    You may go to "Admission" then "Reconcile Home Medications" tabs to review and/or act upon these items.     The home medication list has been updated by the Pharmacy department.   Please read ALL comments highlighted in yellow.   Please address this information as you see fit.    Feel free to contact us if you have any questions or require assistance.        Medications listed below were obtained from: Patient/family and Analytic software- [employer]  No current facility-administered medications on file prior to encounter.     Current Outpatient Medications on File Prior to Encounter   Medication Sig Dispense Refill    aspirin 325 MG tablet Take 325 mg by mouth once daily.      cholecalciferol, vitamin D3, (VITAMIN D3) 25 mcg (1,000 unit) capsule Take 1,000 Units by mouth once daily.      multivit-mins no.63/iron/folic (M-VIT ORAL) Take 1 tablet by mouth once daily.      omega-3 fatty acids/fish oil (FISH OIL-OMEGA-3 FATTY ACIDS) 300-1,000 mg capsule Take 1 capsule by mouth once daily.      HYDROcodone-acetaminophen (NORCO) 5-325 mg per tablet TAKE 1 TABLET BY MOUTH EVERY 4 TO 6 HOURS AS NEEDED FOR PAIN (Patient not taking: Reported on 10/15/2023) 30 tablet 0    predniSONE (DELTASONE) 10 MG tablet Take 40 mg by mouth.      ursodioL (ACTIGALL) 300 mg capsule Take 1 capsule (300 mg total) by mouth 2 (two) times daily. (Patient not taking: Reported on 10/15/2023) 60 capsule 11         Lyle Cervantes  EXT 1922                 .          "

## 2023-10-15 NOTE — H&P
Patient Name: Vic Reyez  MRN: [id number]  Patient Class: OP- Observation   Admission Date: 10/14/2023  7:46 PM  Attending Physician: Franki Mendieta MD  Primary Care Provider: Maryse Das MD  Face-to-Face encounter date: 10/14/2023  Code Status: Full  MPOA:  Chief Complaint: Chest Pain (From home. Complaining of substernal/rib pain back pain. Patient able to ambulate to bed. EMS gave 1 spray of nitro, 324 aspirin, and 100 fentanyl. Also complaining of L eye cloudiness. )        HISTORY OF PRESENT ILLNESS:     Vic Reyez is a 73 y.o.  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  The pain started this morning, but progressed throughout the day prompting ER visit.  He has had previous episodes of pancreatitis and this feels similar. He is a daily beer drinker, last drink today.  In the ER, abd CT does not show signs of pancreatitis, lipase only minimally elevated.  He is hyponatremic and will be admitted to hospital medicine. Patient denies change in chest pain, shortness of breath, palpitations, nausea, vomiting, diarrhea, constipation,  hematochezia, hematemesis, fever, cough, congestion, runny nose, hearing, numbness, tingling, headache, focal weakness, dysuria, frequency, hematuria  History was obtained from the patient and ER physician Sign-out.     REVIEW OF SYSTEMS:     10 Point Review of System was performed and was found to be negative except for that mentioned already in the HPI above.     PAST MEDICAL HISTORY:     Past Medical History:   Diagnosis Date    Alcohol abuse 02/02/2022    Decompensated hepatic cirrhosis 02/02/2022    Encounter for blood transfusion     Hypertension     Lab test positive for detection of COVID-19 virus 09/2021    Pancreatic cyst 06/03/2022       PAST SURGICAL  HISTORY:     Past Surgical History:   Procedure Laterality Date    ABDOMINAL AORTOGRAPHY N/A 10/04/2022    Procedure: AORTOGRAM-ABDOMINAL;  Surgeon: Felice Epstein MD;  Location: [facility] CATH/EP LAB;  Service: Vascular;  Laterality: N/A;    APPENDECTOMY      ARTERIOGRAM, MESENTERIC N/A 10/04/2022    Procedure: ARTERIOGRAM, MESENTERIC;  Surgeon: Felice Epstein MD;  Location: [facility] CATH/EP LAB;  Service: Vascular;  Laterality: N/A;    COLONOSCOPY      ENDOSCOPIC ULTRASOUND OF UPPER GASTROINTESTINAL TRACT  02/04/2022    Procedure: ULTRASOUND, UPPER GI TRACT, ENDOSCOPIC;  Surgeon: Chuy Bay MD;  Location: [facility] ENDO (2ND FLR);  Service: Endoscopy;;    ENDOSCOPIC ULTRASOUND OF UPPER GASTROINTESTINAL TRACT N/A 06/03/2022    Procedure: ULTRASOUND, UPPER GI TRACT, ENDOSCOPIC;  Surgeon: Roger Viveros III, MD;  Location: [facility] ENDO;  Service: Endoscopy;  Laterality: N/A;    ENDOSCOPIC ULTRASOUND OF UPPER GASTROINTESTINAL TRACT N/A 11/07/2022    Procedure: ULTRASOUND, UPPER GI TRACT, ENDOSCOPIC;  Surgeon: Roger Viveros III, MD;  Location: [facility] ENDO;  Service: Endoscopy;  Laterality: N/A;    ERCP N/A 02/04/2022    Procedure: ERCP (ENDOSCOPIC RETROGRADE CHOLANGIOPANCREATOGRAPHY);  Surgeon: Chuy Bay MD;  Location: [facility] ENDO (2ND FLR);  Service: Endoscopy;  Laterality: N/A;    ERCP N/A 04/19/2022    Procedure: ERCP (ENDOSCOPIC RETROGRADE CHOLANGIOPANCREATOGRAPHY);  Surgeon: Chuy Bay MD;  Location: [facility] ENDO (2ND FLR);  Service: Endoscopy;  Laterality: N/A;  4/1: fully vaccinated. labs prior. instructions mailed to sister and patient.-SC  4/12/22-Confirmed new arrival time of 10:45am with pt's sister and updated instructions emailed-    ERCP N/A 10/05/2022    Procedure: ERCP (ENDOSCOPIC RETROGRADE CHOLANGIOPANCREATOGRAPHY);  Surgeon: Roger Viveros III, MD;  Location: [facility] ENDO;  Service: Endoscopy;  Laterality: N/A;    ERCP N/A 1/24/2023    Procedure: ERCP (ENDOSCOPIC RETROGRADE  CHOLANGIOPANCREATOGRAPHY);  Surgeon: Roger Viveros III, MD;  Location: [facility];  Service: Endoscopy;  Laterality: N/A;    EYE SURGERY Left     JOINT REPLACEMENT Bilateral     knee replacement    KNEE SURGERY      RECONSTRUCTION OF SHOULDER Right     TONSILLECTOMY      UPPER ENDOSCOPIC ULTRASOUND W/ FNA  06/03/2022    VITRECTOMY BY PARS PLANA APPROACH Left 8/14/2023    Procedure: VITRECTOMY, PARS PLANA APPROACH;  Surgeon: Tee Crespo MD;  Location: [address];  Service: Ophthalmology;  Laterality: Left;       ALLERGIES:   Patient has no known allergies.    FAMILY HISTORY:     History reviewed. No pertinent family history.    SOCIAL HISTORY:     Social History     Tobacco Use    Smoking status: Every Day     Current packs/day: 0.25     Average packs/day: 0.3 packs/day for 40.0 years (10.0 ttl pk-yrs)     Types: Cigarettes    Smokeless tobacco: Never    Tobacco comments:     Pt smoked on and off for 40 years   Substance Use Topics    Alcohol use: Yes     Alcohol/week: 10.0 standard drinks of alcohol     Types: 10 Cans of beer per week        Social History     Substance and Sexual Activity   Sexual Activity Not Currently        HOME MEDICATIONS:     Prior to Admission medications    Medication Sig Start Date End Date Taking? Authorizing Provider   aspirin 325 MG tablet Take 325 mg by mouth once daily.    Provider, Historical   HYDROcodone-acetaminophen (NORCO) 5-325 mg per tablet TAKE 1 TABLET BY MOUTH EVERY 4 TO 6 HOURS AS NEEDED FOR PAIN 1/24/23   Roger Viveros III, MD   multivit-mins no.63/iron/folic (M-VIT ORAL) Take 1 tablet by mouth once daily.    Provider, Historical   omega-3 fatty acids/fish oil (FISH OIL-OMEGA-3 FATTY ACIDS) 300-1,000 mg capsule Take 1 capsule by mouth once daily.    Provider, Historical   predniSONE (DELTASONE) 10 MG tablet Take 40 mg by mouth. 12/14/22   Provider, Historical   ursodioL (ACTIGALL) 300 mg capsule Take 1 capsule (300 mg total) by mouth 2 (two) times  daily. 2/6/23 8/15/23  Serene Garay MD         PHYSICAL EXAM:     /73   Pulse (!) 58   Temp 98.3 °F (36.8 °C) (Oral)   Resp 18   Wt 68 kg (150 lb)   SpO2 97%   BMI 21.52 kg/m²   Vitals Reviewed    General appearance: no apparent distress.   Skin: No Rash.   Neuro: Motor and sensory exams grossly intact. Good tone. Power in all 4 extremities 5/5.   HENT: Atraumatic head. Moist mucous membranes of oral cavity.  Eyes: Normal extraocular movements.   Lungs: Clear to auscultation bilaterally. No wheezing present.   Heart: Regular rate and rhythm. S1 and S2 present with no murmurs/gallop/rub. No pedal edema. No JVD present.   Abdomen: Soft, non-distended, +tender to palp.   Extremities: No cyanosis, clubbing.  Psych/mental status: Alert and oriented. Cooperative. Responds appropriately to questions.       EMERGENCY DEPARTMENT LABS AND IMAGING:     Labs Reviewed   CBC W/ AUTO DIFFERENTIAL - Abnormal; Notable for the following components:       Result Value    RBC 4.31 (*)     Hemoglobin 12.9 (*)     Hematocrit 36.5 (*)     RDW 14.6 (*)     Platelets 131 (*)     Eos # 0.8 (*)     Mono % 18.7 (*)     Eosinophil % 13.9 (*)     All other components within normal limits   COMPREHENSIVE METABOLIC PANEL - Abnormal; Notable for the following components:    Sodium 120 (*)     Chloride 93 (*)     CO2 17 (*)     Glucose 111 (*)     BUN 5 (*)     All other components within normal limits   MAGNESIUM - Abnormal; Notable for the following components:    Magnesium 1.4 (*)     All other components within normal limits   LIPASE - Abnormal; Notable for the following components:    Lipase 65 (*)     All other components within normal limits   DRUG SCREEN PANEL, URINE EMERGENCY - Abnormal; Notable for the following components:    Creatinine, Urine 17.8 (*)     All other components within normal limits    Narrative:     Specimen Source->Urine   ALCOHOL,MEDICAL (ETHANOL) - Abnormal; Notable for the following components:     Alcohol, Serum 124 (*)     All other components within normal limits   B-TYPE NATRIURETIC PEPTIDE   TROPONIN I HIGH SENSITIVITY   TROPONIN I HIGH SENSITIVITY   ALCOHOL,MEDICAL (ETHANOL)   BASIC METABOLIC PANEL       CT Abdomen Pelvis With Contrast   Final Result      X-Ray Chest AP Portable    (Results Pending)       ASSESSMENT & PLAN:     Vic Reyez is a 73 y.o. male admitted for hyponatremia, chronic pancreatitis     Active Hospital Problems    Diagnosis    *Chronic pancreatitis    Hyponatremia        Plan    Chronic pancreatitis  Hyponatremia  Abd CT does not show signs of pancreatitis  Lipase only minimally elevated  Thought to AI related, but he is a daily drinker and smokes  Na 120  -NS at 100 ml/hr   -PO fluid restrict  -bmp q4, goal correction 6-8 meq in 24 hrs  -dilaudid IV prn pain  -zofran prn nausea    ETOH and tobacco use disorder  Counseled regarding risks  Offer pharm assistance at dc      Diet: Clear Liquid    DVT Prophylaxis: low molecular weight heparin and Encourage ambulation. OOB as tolerated.     Discharge Planning and Disposition:  Home independent    Expected LOS: 1-2    __________________________    INPATIENT LIST OF MEDICATIONS     Current Facility-Administered Medications:     [START ON 10/15/2023] 0.9%  NaCl infusion, , Intravenous, Continuous, Franki Mendieta MD    [START ON 10/15/2023] enoxaparin injection 40 mg, 40 mg, Subcutaneous, Daily, Franki Mendieta MD    [START ON 10/15/2023] HYDROmorphone injection 0.5 mg, 0.5 mg, Intravenous, Q4H PRN, Franki Mendieta MD    magnesium sulfate in dextrose IVPB (premix) 1 g, 1 g, Intravenous, Once, Chanell Gracia MD    [START ON 10/15/2023] melatonin tablet 6 mg, 6 mg, Oral, Nightly PRN, Franki Mendieta MD    [START ON 10/15/2023] ondansetron injection 4 mg, 4 mg, Intravenous, Q8H PRN, Franki Mendieta MD    [START ON 10/15/2023] pantoprazole injection 40 mg, 40 mg, Intravenous, Daily, Anam  Franki MCCORMICK MD    [START ON 10/15/2023] senna-docusate 8.6-50 mg per tablet 1 tablet, 1 tablet, Oral, BID, Franki Mendieta MD    [START ON 10/15/2023] sodium chloride 0.9% flush 10 mL, 10 mL, Intravenous, PRN, Franki Mendieta MD    Current Outpatient Medications:     aspirin 325 MG tablet, Take 325 mg by mouth once daily., Disp: , Rfl:     HYDROcodone-acetaminophen (NORCO) 5-325 mg per tablet, TAKE 1 TABLET BY MOUTH EVERY 4 TO 6 HOURS AS NEEDED FOR PAIN, Disp: 30 tablet, Rfl: 0    multivit-mins no.63/iron/folic (M-VIT ORAL), Take 1 tablet by mouth once daily., Disp: , Rfl:     omega-3 fatty acids/fish oil (FISH OIL-OMEGA-3 FATTY ACIDS) 300-1,000 mg capsule, Take 1 capsule by mouth once daily., Disp: , Rfl:     predniSONE (DELTASONE) 10 MG tablet, Take 40 mg by mouth., Disp: , Rfl:     ursodioL (ACTIGALL) 300 mg capsule, Take 1 capsule (300 mg total) by mouth 2 (two) times daily., Disp: 60 capsule, Rfl: 11      Scheduled Meds:   [START ON 10/15/2023] enoxparin  40 mg Subcutaneous Daily    magnesium sulfate IVPB  1 g Intravenous Once    [START ON 10/15/2023] pantoprazole  40 mg Intravenous Daily    [START ON 10/15/2023] senna-docusate 8.6-50 mg  1 tablet Oral BID     Continuous Infusions:   [START ON 10/15/2023] sodium chloride 0.9%       PRN Meds:.[START ON 10/15/2023] HYDROmorphone, [START ON 10/15/2023] melatonin, [START ON 10/15/2023] ondansetron, [START ON 10/15/2023] sodium chloride 0.9%      Franki Mendieta  [facility] Hospitalist  10/14/2023

## 2023-10-15 NOTE — PLAN OF CARE
10/15/23 1012   PEREZ Message   Medicare Outpatient and Observation Notification regarding financial responsibility Given to patient/caregiver;Explained to patient/caregiver;Signed/date by patient/caregiver   Date PEREZ was signed 10/15/23   Time PEREZ was signed 1012     CM thoroughly reviewed patient on MOON documentation. Patient verbalized understanding. Patient signed off.    CM scanned patient document into chart.

## 2023-10-15 NOTE — PROGRESS NOTES
[facility] Medicine  Progress Note    Patient Name: Vic Reyez  MRN: [id number]  Patient Class: OP- Observation   Admission Date: 10/14/2023  Length of Stay: 0 days  Attending Physician: Franki Mendieta,*  Primary Care Provider: Maryse Das MD        Subjective:     Principal Problem:Chronic pancreatitis        HPI:  Vic Reyez is a 73 y.o.  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  The pain started 10/14 morning, but progressed throughout the day prompting ER visit.  He has had previous episodes of pancreatitis and this feels similar. He is a daily beer drinker, last drink 10/14/23.  In the ER, abd CT does not show signs of pancreatitis, lipase only minimally elevated.  He is hyponatremic and will be admitted to hospital medicine. Patient denies change in chest pain, shortness of breath, palpitations, nausea, vomiting, diarrhea, constipation,  hematochezia, hematemesis, fever, cough, congestion, runny nose, hearing, numbness, tingling, headache, focal weakness, dysuria, frequency, hematuria        Overview/Hospital Course:  Patient is a 73-year-old male who reported to the emergency department on 10/14/2023 for evaluation of abdominal pain and chest pains.  He is history of chronic pancreatitis however his lipase was 65.  CT abdomen showed moderate stool burden and a pseudocyst but no evidence of acute pancreatitis.  Sodium was found to be 120 and patient was started on IV fluids.      Interval History:  Patient complains of mild crampy abdominal pain radiating from left-to-right.  Denies any nausea and vomiting.  Denies chest pains.    Review of Systems  Objective:     Vital Signs (Most Recent):  Temp: 98.7 °F (37.1 °C) (10/15/23 1152)  Pulse: 67 (10/15/23 1152)  Resp: 18  (10/15/23 1152)  BP: 126/73 (10/15/23 1152)  SpO2: 97 % (10/15/23 1152) Vital Signs (24h Range):  Temp:  [97.8 °F (36.6 °C)-98.7 °F (37.1 °C)] 98.7 °F (37.1 °C)  Pulse:  [58-68] 67  Resp:  [14-20] 18  SpO2:  [97 %-100 %] 97 %  BP: (118-135)/(66-83) 126/73     Weight: 67 kg (147 lb 9.6 oz)  Body mass index is 20.59 kg/m².    Intake/Output Summary (Last 24 hours) at 10/15/2023 1206  Last data filed at 10/15/2023 0917  Gross per 24 hour   Intake 530 ml   Output 1100 ml   Net -570 ml         Physical Exam    General:     Resting comfortably, Appears stated age.  Eyes:    Anicteric sclera. Conjunctiva non-injected  HENT:    Head normocephalic, atraumatic. Mucous membranes moist.   Neck:   Trachea midline, no thyromegaly.  No JVD or adenopathy.   Heart:    S1, S2 auscultated. No murmurs rubs or gallops. Regular rhythm and rate.   Lungs:    Bilaterally clear in all lobes with equal chest rise.  No wheezes, rales, or rhonchi.   Abdomen:    Soft, nontender, nondistended.  Positive bowel sounds. No palpable masses.  No rebound or guarding.   Extremities:    Without clubbing or cyanosis.  No edema.  Peripheral pulses II/IV.  Neuro:    Alert, awake and oriented x3.  Cranial nerves appear grossly intact on limited neurological exam.  No focal motor or sensory deficit.  Skin:    No rashes, exudates, or nodules on limited skin exam  Psych:    Normal mood, affect and behavior with intact judgement and insight.    Significant Labs: All pertinent labs within the past 24 hours have been reviewed.    Significant Imaging: I have reviewed all pertinent imaging results/findings within the past 24 hours.      Assessment/Plan:      * Chronic pancreatitis  Not in acute flare.  Patient has crampy abdominal pain radiating from left-to-right.  P.r.n. pain medication with Norco 5 mg.  Tolerating a diet.      Hypomagnesemia  Repleted  Recent Labs   Lab 10/15/23  0641   MG 1.8        Pseudocyst of pancreas  Follow up  outpatient      Hyponatremia  Initial sodium 120.  Started on NS at 100 mL/hr with rapid correction in 12 hours to 130.  IV fluids have been stopped.  We will discontinue BMP q.4 H at this time.  Monitor sodium with morning labs.  We will obtain urine sodium and urine osmolality to evaluate etiology.      Alcohol use disorder, severe, dependence  Patient received thiamine.    Last drink 10/14/2025.        VTE Risk Mitigation (From admission, onward)         Ordered     enoxaparin injection 40 mg  Daily         10/14/23 2350     IP VTE HIGH RISK PATIENT  Once         10/14/23 2350     Place sequential compression device  Until discontinued         10/14/23 2350                Discharge Planning   NAFISA:      Code Status: Full Code   Is the patient medically ready for discharge?:     Reason for patient still in hospital (select all that apply): Laboratory test and Treatment  Discharge Plan A: Home                  Dez John DO  Department of Hospital Medicine   [facility]

## 2023-10-15 NOTE — HOSPITAL COURSE
Patient is a 73-year-old male who reported to the emergency department on 10/14/2023 for evaluation of abdominal pain and chest pains.  He is history of chronic pancreatitis however his lipase was 65.  CT abdomen showed moderate stool burden and a pseudocyst but no evidence of acute pancreatitis.  Sodium was found to be 120.  Patient was given hypertonic saline then started on normal saline.  Symptoms and sodium improved quickly.  Urine sodium was 115 and urine osmolality was 330 however these were obtained after patient received a significant amount of volume.  TSH was 3.8.  Patient remains clinically stable and sodium remained stable off IV fluids.  Patient will be discharged home and should follow up with primary care physician and Gastroenterology.    Recommend repeating BMP at follow-up appointment.    Physical Exam  General:     Resting comfortably, Appears stated age.  Eyes:    Anicteric sclera. Conjunctiva non-injected  HENT:    Head normocephalic, atraumatic. Mucous membranes moist.   Neck:   Trachea midline, no thyromegaly.  No JVD or adenopathy.   Heart:    S1, S2 auscultated. No murmurs rubs or gallops. Regular rhythm and rate.   Lungs:    Bilaterally clear in all lobes with equal chest rise.  No wheezes, rales, or rhonchi.   Abdomen:    Soft, nontender, nondistended.  Positive bowel sounds. No palpable masses.  No rebound or guarding.   Extremities:    Without clubbing or cyanosis.  No edema.  Peripheral pulses II/IV.  Neuro:    Alert, awake and oriented x3.  Cranial nerves appear grossly intact on limited neurological exam.  No focal motor or sensory deficit.  Skin:    No rashes, exudates, or nodules on limited skin exam  Psych:    Normal mood, affect and behavior with intact judgement and insight    Imaging Results              CT Abdomen Pelvis With Contrast (Final result)  Result time 10/14/23 22:31:04      Final result by Dung Winters MD (10/14/23 22:31:04)                   Narrative:    EXAM  DESCRIPTION:  CT ABDOMEN PELVIS WITH CONTRAST  RadLex: CT ABDOMEN PELVIS WITH IV CONTRAST    CLINICAL HISTORY:  73 years  Male;  Pancreatitis, acute, severe    TECHNOLOGIST NOTES:    TECHNIQUE: Helical CT imaging was obtained of the abdomen and pelvis with multiplanar reconstructions. This exam was performed according to our departmental dose-optimization program, which includes automated exposure control, adjustment of the mA and/or kV according to patient size and/or use of iterative reconstruction techniques.    COMPARISON: None currently available    FINDINGS:  Abdomen:  No evidence of acute disease in the visualized lung bases.  The liver, spleen, pancreas, adrenal glands and kidneys show no acute abnormality. . Multiple embolization coils adjacent to the body and tail of the pancreas. There is a small amount of fluid near the splenic hilum, roughly 2.2 cm in size. This could be a small pseudocyst. No evidence of acute pancreatitis.    There are no calcified gallstones. There is no gallbladder wall thickening. No pericholecystic fluid.  There is mild biliary duct dilatation.  No significant hydronephrosis bilaterally.    No free air.    There is no significant free fluid.    There is no significant aneurysmal enlargement of the abdominal aorta.    Pelvis:  There is no evidence of bowel obstruction. There is a moderate volume of material in the colon, greatest in the right colon.   No herniated bowel loops.  . No focal inflammatory changes . Evaluation of the bowel is limited without oral contrast or with incomplete bowel opacification.   There is no significant free fluid in the pelvis. The bladder is distended.    No acute osseous finding. There is grade 1 anterolisthesis of L5 on S1 with severe degenerative disc disease also.    IMPRESSION:  1.  No CT evidence of acute pancreatitis. There may be a 2.2 cm pseudocyst near the splenic hilum.  2.  No bowel obstruction, herniated bowel loops or focal inflammatory  changes.  3.  Moderate volume of stool in the colon, greatest in the right colon  4.  Please see body of report for non-critical findings.    Electronically signed by:  Dung Winters MD  10/14/2023 10:31 PM CDT Workstation: [id number]                                     X-Ray Chest AP Portable (Final result)  Result time 10/15/23 08:06:15      Final result by Winston Mckeon Jr., MD (10/15/23 08:06:15)                   Narrative:    HISTORY: angina    COMPARISON:11/29/2022    FINDINGS:Cardiomediastinal silhouette is normal in size. Minimal a atherosclerosis. Scattered reticular opacities are established throughout the bilateral hilar and lower lung zones bilaterally that are stable in radiographic appearance. No evidence of pneumothorax. Both right and left CP angles are relatively clear. Mild dextroconvex alignment on the AP inspection.      IMPRESSION:  1. Scattered reticular opacities seen within both veda and infrahilar regions that maintaining equal parameters upon comparison.  2. No evidence of adverse change upon comparison.    Electronically signed by:  Winston Mckeon MD  10/15/2023 08:06 AM CDT Workstation: [id number]

## 2023-10-16 ENCOUNTER — CLINICAL SUPPORT (OUTPATIENT)
Dept: SMOKING CESSATION | Facility: CLINIC | Age: 73
End: 2023-10-16
Payer: COMMERCIAL

## 2023-10-16 VITALS
DIASTOLIC BLOOD PRESSURE: 70 MMHG | SYSTOLIC BLOOD PRESSURE: 120 MMHG | TEMPERATURE: 100 F | HEART RATE: 85 BPM | RESPIRATION RATE: 18 BRPM | OXYGEN SATURATION: 96 % | BODY MASS INDEX: 20.67 KG/M2 | WEIGHT: 147.63 LBS | HEIGHT: 71 IN

## 2023-10-16 DIAGNOSIS — F17.200 NICOTINE DEPENDENCE: Primary | ICD-10-CM

## 2023-10-16 PROBLEM — E87.1 HYPONATREMIA: Status: RESOLVED | Noted: 2022-02-02 | Resolved: 2023-10-16

## 2023-10-16 PROBLEM — E83.42 HYPOMAGNESEMIA: Status: RESOLVED | Noted: 2023-10-15 | Resolved: 2023-10-16

## 2023-10-16 LAB
ALBUMIN SERPL BCP-MCNC: 3.5 G/DL (ref 3.5–5.2)
ALP SERPL-CCNC: 115 U/L (ref 55–135)
ALT SERPL W/O P-5'-P-CCNC: 17 U/L (ref 10–44)
ANION GAP SERPL CALC-SCNC: 5 MMOL/L (ref 8–16)
AST SERPL-CCNC: 24 U/L (ref 10–40)
BASOPHILS # BLD AUTO: 0.02 K/UL (ref 0–0.2)
BASOPHILS NFR BLD: 0.5 % (ref 0–1.9)
BILIRUB SERPL-MCNC: 0.5 MG/DL (ref 0.1–1)
BUN SERPL-MCNC: 6 MG/DL (ref 8–23)
CALCIUM SERPL-MCNC: 8.9 MG/DL (ref 8.7–10.5)
CHLORIDE SERPL-SCNC: 102 MMOL/L (ref 95–110)
CO2 SERPL-SCNC: 24 MMOL/L (ref 23–29)
CORTIS SERPL-MCNC: 16.8 UG/DL (ref 4.3–22.4)
CREAT SERPL-MCNC: 0.5 MG/DL (ref 0.5–1.4)
DIFFERENTIAL METHOD: ABNORMAL
EOSINOPHIL # BLD AUTO: 0.7 K/UL (ref 0–0.5)
EOSINOPHIL NFR BLD: 15.9 % (ref 0–8)
ERYTHROCYTE [DISTWIDTH] IN BLOOD BY AUTOMATED COUNT: 15.2 % (ref 11.5–14.5)
EST. GFR  (NO RACE VARIABLE): >60 ML/MIN/1.73 M^2
GLUCOSE SERPL-MCNC: 120 MG/DL (ref 70–110)
HCT VFR BLD AUTO: 37.2 % (ref 40–54)
HGB BLD-MCNC: 12.8 G/DL (ref 14–18)
IMM GRANULOCYTES # BLD AUTO: 0.01 K/UL (ref 0–0.04)
IMM GRANULOCYTES NFR BLD AUTO: 0.2 % (ref 0–0.5)
LYMPHOCYTES # BLD AUTO: 1.3 K/UL (ref 1–4.8)
LYMPHOCYTES NFR BLD: 29.1 % (ref 18–48)
MCH RBC QN AUTO: 29.2 PG (ref 27–31)
MCHC RBC AUTO-ENTMCNC: 34.4 G/DL (ref 32–36)
MCV RBC AUTO: 85 FL (ref 82–98)
MONOCYTES # BLD AUTO: 0.8 K/UL (ref 0.3–1)
MONOCYTES NFR BLD: 17.7 % (ref 4–15)
NEUTROPHILS # BLD AUTO: 1.6 K/UL (ref 1.8–7.7)
NEUTROPHILS NFR BLD: 36.6 % (ref 38–73)
NRBC BLD-RTO: 0 /100 WBC
PLATELET # BLD AUTO: 152 K/UL (ref 150–450)
PMV BLD AUTO: 9.2 FL (ref 9.2–12.9)
POTASSIUM SERPL-SCNC: 4 MMOL/L (ref 3.5–5.1)
PROT SERPL-MCNC: 6.8 G/DL (ref 6–8.4)
RBC # BLD AUTO: 4.38 M/UL (ref 4.6–6.2)
SODIUM SERPL-SCNC: 131 MMOL/L (ref 136–145)
TSH SERPL DL<=0.005 MIU/L-ACNC: 3.83 UIU/ML (ref 0.34–5.6)
WBC # BLD AUTO: 4.4 K/UL (ref 3.9–12.7)

## 2023-10-16 PROCEDURE — 36415 COLL VENOUS BLD VENIPUNCTURE: CPT | Performed by: STUDENT IN AN ORGANIZED HEALTH CARE EDUCATION/TRAINING PROGRAM

## 2023-10-16 PROCEDURE — 99406 BEHAV CHNG SMOKING 3-10 MIN: CPT

## 2023-10-16 PROCEDURE — 63600175 PHARM REV CODE 636 W HCPCS: Performed by: STUDENT IN AN ORGANIZED HEALTH CARE EDUCATION/TRAINING PROGRAM

## 2023-10-16 PROCEDURE — 99406 BEHAV CHNG SMOKING 3-10 MIN: CPT | Mod: S$GLB,,, | Performed by: INTERNAL MEDICINE

## 2023-10-16 PROCEDURE — 80053 COMPREHEN METABOLIC PANEL: CPT | Performed by: STUDENT IN AN ORGANIZED HEALTH CARE EDUCATION/TRAINING PROGRAM

## 2023-10-16 PROCEDURE — C9113 INJ PANTOPRAZOLE SODIUM, VIA: HCPCS | Performed by: STUDENT IN AN ORGANIZED HEALTH CARE EDUCATION/TRAINING PROGRAM

## 2023-10-16 PROCEDURE — G0378 HOSPITAL OBSERVATION PER HR: HCPCS

## 2023-10-16 PROCEDURE — 96375 TX/PRO/DX INJ NEW DRUG ADDON: CPT

## 2023-10-16 PROCEDURE — 36415 COLL VENOUS BLD VENIPUNCTURE: CPT | Performed by: INTERNAL MEDICINE

## 2023-10-16 PROCEDURE — 25000003 PHARM REV CODE 250: Performed by: INTERNAL MEDICINE

## 2023-10-16 PROCEDURE — 99406 PT REFUSED TOBACCO CESSATION: ICD-10-PCS | Mod: S$GLB,,, | Performed by: INTERNAL MEDICINE

## 2023-10-16 PROCEDURE — 85025 COMPLETE CBC W/AUTO DIFF WBC: CPT | Performed by: STUDENT IN AN ORGANIZED HEALTH CARE EDUCATION/TRAINING PROGRAM

## 2023-10-16 PROCEDURE — 84443 ASSAY THYROID STIM HORMONE: CPT | Performed by: INTERNAL MEDICINE

## 2023-10-16 PROCEDURE — 82533 TOTAL CORTISOL: CPT | Performed by: INTERNAL MEDICINE

## 2023-10-16 RX ADMIN — HYDROCODONE BITARTRATE AND ACETAMINOPHEN 1 TABLET: 5; 325 TABLET ORAL at 08:10

## 2023-10-16 RX ADMIN — PANTOPRAZOLE SODIUM 40 MG: 40 INJECTION, POWDER, LYOPHILIZED, FOR SOLUTION INTRAVENOUS at 08:10

## 2023-10-16 NOTE — PROGRESS NOTES
10/16/23 1145   Tobacco Cessation Intervention   Do you use any type of tobacco product? Yes   Are you interested in quitting use of tobacco products? Thinking about quitting   Are you interested in Nicotine Replacement for symptom relief? No   $ Smoking Cessation Charges Smoking Cessation - Intermediate (Non-CTTS)

## 2023-10-16 NOTE — DISCHARGE SUMMARY
[facility] Medicine  Discharge Summary      Patient Name: Vic Reyez  MRN: [id number]  [facility]: [id number]  Patient Class: OP- Observation  Admission Date: 10/14/2023  Hospital Length of Stay: 0 days  Discharge Date and Time:  10/16/2023 11:03 AM  Attending Physician: Dez John DO   Discharging Provider: Dez John DO  Primary Care Provider: Maryse Das MD    Primary Care Team: Networked reference to record PCT     HPI:   Vic Reyez is a 73 y.o.  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  The pain started 10/14 morning, but progressed throughout the day prompting ER visit.  He has had previous episodes of pancreatitis and this feels similar. He is a daily beer drinker, last drink 10/14/23.  In the ER, abd CT does not show signs of pancreatitis, lipase only minimally elevated.  He is hyponatremic and will be admitted to hospital medicine. Patient denies change in chest pain, shortness of breath, palpitations, nausea, vomiting, diarrhea, constipation,  hematochezia, hematemesis, fever, cough, congestion, runny nose, hearing, numbness, tingling, headache, focal weakness, dysuria, frequency, hematuria        * No surgery found *      Hospital Course:   Patient is a 73-year-old male who reported to the emergency department on 10/14/2023 for evaluation of abdominal pain and chest pains.  He is history of chronic pancreatitis however his lipase was 65.  CT abdomen showed moderate stool burden and a pseudocyst but no evidence of acute pancreatitis.  Sodium was found to be 120.  Patient was given hypertonic saline then started on normal saline.  Symptoms and sodium improved quickly.  Urine sodium was 115 and urine osmolality was 330 however these were obtained after patient  received a significant amount of volume.  TSH was 3.8.  Patient remains clinically stable and sodium remained stable off IV fluids.  Patient will be discharged home and should follow up with primary care physician and Gastroenterology.    Recommend repeating BMP at follow-up appointment.    Physical Exam  General:     Resting comfortably, Appears stated age.  Eyes:    Anicteric sclera. Conjunctiva non-injected  HENT:    Head normocephalic, atraumatic. Mucous membranes moist.   Neck:   Trachea midline, no thyromegaly.  No JVD or adenopathy.   Heart:    S1, S2 auscultated. No murmurs rubs or gallops. Regular rhythm and rate.   Lungs:    Bilaterally clear in all lobes with equal chest rise.  No wheezes, rales, or rhonchi.   Abdomen:    Soft, nontender, nondistended.  Positive bowel sounds. No palpable masses.  No rebound or guarding.   Extremities:    Without clubbing or cyanosis.  No edema.  Peripheral pulses II/IV.  Neuro:    Alert, awake and oriented x3.  Cranial nerves appear grossly intact on limited neurological exam.  No focal motor or sensory deficit.  Skin:    No rashes, exudates, or nodules on limited skin exam  Psych:    Normal mood, affect and behavior with intact judgement and insight    Imaging Results              CT Abdomen Pelvis With Contrast (Final result)  Result time 10/14/23 22:31:04      Final result by Dung Winters MD (10/14/23 22:31:04)                   Narrative:    EXAM DESCRIPTION:  CT ABDOMEN PELVIS WITH CONTRAST  RadLex: CT ABDOMEN PELVIS WITH IV CONTRAST    CLINICAL HISTORY:  73 years  Male;  Pancreatitis, acute, severe    TECHNOLOGIST NOTES:    TECHNIQUE: Helical CT imaging was obtained of the abdomen and pelvis with multiplanar reconstructions. This exam was performed according to our departmental dose-optimization program, which includes automated exposure control, adjustment of the mA and/or kV according to patient size and/or use of iterative reconstruction techniques.    COMPARISON:  None currently available    FINDINGS:  Abdomen:  No evidence of acute disease in the visualized lung bases.  The liver, spleen, pancreas, adrenal glands and kidneys show no acute abnormality. . Multiple embolization coils adjacent to the body and tail of the pancreas. There is a small amount of fluid near the splenic hilum, roughly 2.2 cm in size. This could be a small pseudocyst. No evidence of acute pancreatitis.    There are no calcified gallstones. There is no gallbladder wall thickening. No pericholecystic fluid.  There is mild biliary duct dilatation.  No significant hydronephrosis bilaterally.    No free air.    There is no significant free fluid.    There is no significant aneurysmal enlargement of the abdominal aorta.    Pelvis:  There is no evidence of bowel obstruction. There is a moderate volume of material in the colon, greatest in the right colon.   No herniated bowel loops.  . No focal inflammatory changes . Evaluation of the bowel is limited without oral contrast or with incomplete bowel opacification.   There is no significant free fluid in the pelvis. The bladder is distended.    No acute osseous finding. There is grade 1 anterolisthesis of L5 on S1 with severe degenerative disc disease also.    IMPRESSION:  1.  No CT evidence of acute pancreatitis. There may be a 2.2 cm pseudocyst near the splenic hilum.  2.  No bowel obstruction, herniated bowel loops or focal inflammatory changes.  3.  Moderate volume of stool in the colon, greatest in the right colon  4.  Please see body of report for non-critical findings.    Electronically signed by:  Dung Winters MD  10/14/2023 10:31 PM CDT Workstation: [id number]                                     X-Ray Chest AP Portable (Final result)  Result time 10/15/23 08:06:15      Final result by Winston Mckeon Jr., MD (10/15/23 08:06:15)                   Narrative:    HISTORY: angina    COMPARISON:11/29/2022    FINDINGS:Cardiomediastinal silhouette is normal in  size. Minimal a atherosclerosis. Scattered reticular opacities are established throughout the bilateral hilar and lower lung zones bilaterally that are stable in radiographic appearance. No evidence of pneumothorax. Both right and left CP angles are relatively clear. Mild dextroconvex alignment on the AP inspection.      IMPRESSION:  1. Scattered reticular opacities seen within both veda and infrahilar regions that maintaining equal parameters upon comparison.  2. No evidence of adverse change upon comparison.    Electronically signed by:  Winston Mckeon MD  10/15/2023 08:06 AM CDT Workstation: [id number]                                        Goals of Care Treatment Preferences:  Code Status: Full Code      Consults:     No new Assessment & Plan notes have been filed under this hospital service since the last note was generated.  Service: Hospital Medicine    Final Active Diagnoses:    Diagnosis Date Noted POA    PRINCIPAL PROBLEM:  Chronic pancreatitis [K86.1] 11/30/2022 Yes    Pseudocyst of pancreas [K86.3] 02/04/2022 Yes    Alcohol use disorder, severe, dependence [F10.20] 02/02/2022 Yes     Chronic      Problems Resolved During this Admission:    Diagnosis Date Noted Date Resolved POA    Hypomagnesemia [E83.42] 10/15/2023 10/16/2023 Yes    Hyponatremia [E87.1] 02/02/2022 10/16/2023 Yes       Discharged Condition: stable    Disposition: Home or Self Care    Follow Up:   Follow-up Information     Maryse Das MD. Go on 10/19/2023.    Specialty: Family Medicine  Why: 10:20 am  Contact information:  [address] [facility] [address]  [phone]             Roegr Viveros III, MD. Go on 10/25/2023.    Specialty: Gastroenterology  Why: at 12:30 pm  Contact information:  [address] [place] [address] [address] [place] [address]  [phone]                       Patient Instructions:   No discharge procedures on file.    Significant Diagnostic Studies: Labs:   CMP   Recent Labs   Lab 10/14/23  2005  10/15/23  0110 10/15/23  0641 10/15/23  0923 10/16/23  0456   *   < > 130*  130* 129* 131*   K 3.6   < > 3.9  3.9 4.0 4.0   CL 93*   < > 104  104 102 102   CO2 17*   < > 19*  19* 21* 24   *   < > 110  110 137* 120*   BUN 5*   < > 4*  4* 4* 6*   CREATININE 0.5   < > 0.4*  0.4* 0.4* 0.5   CALCIUM 9.0   < > 8.7  8.7 8.4* 8.9   PROT 7.3  --  6.4  --  6.8   ALBUMIN 3.8  --  3.4*  --  3.5   BILITOT 0.4  --  0.3  --  0.5   ALKPHOS 121  --  107  --  115   AST 29  --  23  --  24   ALT 20  --  16  --  17   ANIONGAP 10   < > 7*  7* 6* 5*    < > = values in this interval not displayed.    and CBC   Recent Labs   Lab 10/14/23  2005 10/15/23  0641 10/16/23  0456   WBC 5.52 4.61 4.40   HGB 12.9* 12.2* 12.8*   HCT 36.5* 35.2* 37.2*   * 133* 152       Pending Diagnostic Studies:     None         Medications:  Reconciled Home Medications:      Medication List      CONTINUE taking these medications    aspirin 325 MG tablet  Take 325 mg by mouth once daily.     fish oil-omega-3 fatty acids 300-1,000 mg capsule  Take 1 capsule by mouth once daily.     M-VIT ORAL  Take 1 tablet by mouth once daily.     predniSONE 10 MG tablet  Commonly known as: DELTASONE  Take 40 mg by mouth.     ursodioL 300 mg capsule  Commonly known as: ACTIGALL  Take 1 capsule (300 mg total) by mouth 2 (two) times daily.     VITAMIN D3 25 mcg (1,000 unit) capsule  Generic drug: cholecalciferol (vitamin D3)  Take 1,000 Units by mouth once daily.        ASK your doctor about these medications    HYDROcodone-acetaminophen 5-325 mg per tablet  Commonly known as: NORCO  TAKE 1 TABLET BY MOUTH EVERY 4 TO 6 HOURS AS NEEDED FOR PAIN            Indwelling Lines/Drains at time of discharge:   Lines/Drains/Airways     None                 Time spent on the discharge of patient: 32 minutes         Dez John DO  Department of Hospital Medicine  [facility]

## 2023-10-16 NOTE — PLAN OF CARE
Patient cleared for discharge from case management standpoint.    Follow up appointments scheduled and added to AVS.    CM provided pt a Senior Guide with resources for food calvo and meals on wheels in [facility].    CM referred pt to [facility] outpatient case management services.    Chart and discharge orders reviewed.  Patient discharged home with no further case management needs.       10/16/23 1111   Final Note   Assessment Type Final Discharge Note   Anticipated Discharge Disposition Home   Hospital Resources/Appts/Education Provided Provided patient/caregiver with written discharge plan information;Appointments scheduled and added to AVS   Post-Acute Status   Discharge Delays (!) Personal Transportation

## 2023-10-16 NOTE — NURSING
Discharge instructions given to PT, Pt stated understood. IV and Telly box remove. Pt tolerated this well. Pt's ride is here.

## 2023-10-17 ENCOUNTER — PATIENT OUTREACH (OUTPATIENT)
Dept: ADMINISTRATIVE | Facility: OTHER | Age: 73
End: 2023-10-17
Payer: MEDICARE

## 2023-10-17 NOTE — PROGRESS NOTES
CHW - Outreach Attempt    Community Health Worker left a voicemail message for 1st attempt to contact patient regarding: community community resources    Community Health Worker to attempt to contact patient on: 10/17/23

## 2023-10-20 NOTE — PROGRESS NOTES
CHW - Outreach Attempt    Community Health Worker left a voicemail message for 2nd attempt to contact patient regarding: community resources    Community Health Worker to attempt to contact patient on: 1020/23

## 2023-10-24 NOTE — PROGRESS NOTES
CHW - Outreach Attempt    Community Health Worker left a voicemail message for 3rd attempt to contact patient regarding: community resources    Community Health Worker to attempt to contact patient on: 10/24/23        CHW - Unable to Contact    Community Health Worker to close episode at this time due to three missed attempts for patient contact.

## 2023-11-16 ENCOUNTER — TELEPHONE (OUTPATIENT)
Dept: FAMILY MEDICINE | Facility: CLINIC | Age: 73
End: 2023-11-16
Payer: MEDICARE

## 2023-11-16 NOTE — TELEPHONE ENCOUNTER
Left VM for PT to call back and reschedule his June appt with one of the new primary's in office if he would like.     Thank you,   Thea Block MA

## 2023-12-14 ENCOUNTER — TELEPHONE (OUTPATIENT)
Dept: OPHTHALMOLOGY | Facility: CLINIC | Age: 73
End: 2023-12-14
Payer: MEDICARE

## 2023-12-14 NOTE — TELEPHONE ENCOUNTER
----- Message -----   From: Vic Reyez   Sent: 12/13/2023   5:31 PM CST   To: [facility] Clinical Support Staff   Subject: Appointment Request                                Appointment Request From: Vic Reyez      With Provider: Tee Crespo MD [[facility] - [address] [place] [address]]      Preferred Date Range: Any      Preferred Times: Any Time      Reason for visit: [place] have to arrange transportation, please make an appointment I can attend, my [employer] insurance will have to be notified for pickup and return rides      Comments:   Left eye is acting up it's getting cloudy and I can actually see my pulse throbbing, right eye fine except for cataract   Returned pt call regarding appt. I am unsure if he is keeping his appt for today.

## 2023-12-14 NOTE — TELEPHONE ENCOUNTER
Spoke with Pt he will need later date for appt and I cosmo for 12/19/2023. He will go to the ED if any changes or concerns.

## 2023-12-19 ENCOUNTER — TELEPHONE (OUTPATIENT)
Dept: OPHTHALMOLOGY | Facility: CLINIC | Age: 73
End: 2023-12-19

## 2023-12-19 ENCOUNTER — OFFICE VISIT (OUTPATIENT)
Dept: OPHTHALMOLOGY | Facility: CLINIC | Age: 73
End: 2023-12-19
Payer: MEDICARE

## 2023-12-19 DIAGNOSIS — H43.392 VITREOUS SYNERESIS OF LEFT EYE: Primary | ICD-10-CM

## 2023-12-19 DIAGNOSIS — Z96.1 PSEUDOPHAKIA, LEFT EYE: ICD-10-CM

## 2023-12-19 DIAGNOSIS — H43.813 VITREOUS DEGENERATION OF BOTH EYES: ICD-10-CM

## 2023-12-19 DIAGNOSIS — H18.513 FUCHS' CORNEAL DYSTROPHY OF BOTH EYES: ICD-10-CM

## 2023-12-19 DIAGNOSIS — H25.21 AGE-RELATED HYPERMATURE CATARACT, RIGHT: ICD-10-CM

## 2023-12-19 PROCEDURE — 1101F PR PT FALLS ASSESS DOC 0-1 FALLS W/OUT INJ PAST YR: ICD-10-PCS | Mod: CPTII,S$GLB,, | Performed by: OPHTHALMOLOGY

## 2023-12-19 PROCEDURE — 1160F RVW MEDS BY RX/DR IN RCRD: CPT | Mod: CPTII,S$GLB,, | Performed by: OPHTHALMOLOGY

## 2023-12-19 PROCEDURE — 99214 OFFICE O/P EST MOD 30 MIN: CPT | Mod: S$GLB,,, | Performed by: OPHTHALMOLOGY

## 2023-12-19 PROCEDURE — 1159F PR MEDICATION LIST DOCUMENTED IN MEDICAL RECORD: ICD-10-PCS | Mod: CPTII,S$GLB,, | Performed by: OPHTHALMOLOGY

## 2023-12-19 PROCEDURE — 3288F PR FALLS RISK ASSESSMENT DOCUMENTED: ICD-10-PCS | Mod: CPTII,S$GLB,, | Performed by: OPHTHALMOLOGY

## 2023-12-19 PROCEDURE — 1159F MED LIST DOCD IN RCRD: CPT | Mod: CPTII,S$GLB,, | Performed by: OPHTHALMOLOGY

## 2023-12-19 PROCEDURE — 99999 PR PBB SHADOW E&M-EST. PATIENT-LVL II: CPT | Mod: PBBFAC,,, | Performed by: OPHTHALMOLOGY

## 2023-12-19 PROCEDURE — 99214 PR OFFICE/OUTPT VISIT, EST, LEVL IV, 30-39 MIN: ICD-10-PCS | Mod: S$GLB,,, | Performed by: OPHTHALMOLOGY

## 2023-12-19 PROCEDURE — 3288F FALL RISK ASSESSMENT DOCD: CPT | Mod: CPTII,S$GLB,, | Performed by: OPHTHALMOLOGY

## 2023-12-19 PROCEDURE — 99999 PR PBB SHADOW E&M-EST. PATIENT-LVL II: ICD-10-PCS | Mod: PBBFAC,,, | Performed by: OPHTHALMOLOGY

## 2023-12-19 PROCEDURE — 1125F AMNT PAIN NOTED PAIN PRSNT: CPT | Mod: CPTII,S$GLB,, | Performed by: OPHTHALMOLOGY

## 2023-12-19 PROCEDURE — 1160F PR REVIEW ALL MEDS BY PRESCRIBER/CLIN PHARMACIST DOCUMENTED: ICD-10-PCS | Mod: CPTII,S$GLB,, | Performed by: OPHTHALMOLOGY

## 2023-12-19 PROCEDURE — 1125F PR PAIN SEVERITY QUANTIFIED, PAIN PRESENT: ICD-10-PCS | Mod: CPTII,S$GLB,, | Performed by: OPHTHALMOLOGY

## 2023-12-19 PROCEDURE — 92134 CPTRZ OPH DX IMG PST SGM RTA: CPT | Mod: S$GLB,,, | Performed by: OPHTHALMOLOGY

## 2023-12-19 PROCEDURE — 92134 OCT, RETINA - OU - BOTH EYES: ICD-10-PCS | Mod: S$GLB,,, | Performed by: OPHTHALMOLOGY

## 2023-12-19 PROCEDURE — 1101F PT FALLS ASSESS-DOCD LE1/YR: CPT | Mod: CPTII,S$GLB,, | Performed by: OPHTHALMOLOGY

## 2023-12-19 RX ORDER — SODIUM CHLORIDE 50 MG/ML
1 SOLUTION/ DROPS OPHTHALMIC EVERY 4 HOURS PRN
Qty: 15 ML | Refills: 6 | Status: ON HOLD | OUTPATIENT
Start: 2023-12-19 | End: 2024-03-31 | Stop reason: HOSPADM

## 2023-12-19 NOTE — TELEPHONE ENCOUNTER
----- Message from Monet Rosario sent at 12/19/2023  2:55 PM CST -----  Regarding: appt  Pt needs Cornea check next available.

## 2023-12-19 NOTE — TELEPHONE ENCOUNTER
Left message requesting patient call back to schedule fuchs' eval with Dr. Maurer per Dr. Crespo.

## 2023-12-19 NOTE — PROGRESS NOTES
HPI    1 day PO Symptomatic vitreous floaters of the Left  eye.    Cc: PT reports cloudiness OS and when lying down and the cloudiness goes   away.     -headaches  ++cloudy vision (appearance of a dense fog) OS  ++eye pain  OS (used ointment gel and it went away)  -diplopia  ++floaters/--flashes  --curtain /shadow/veils    Eye Meds:  PF OS qid                     Vigamox OS qid                     Ointment OS qd                      Atropine  OS qd    POHx:   1. PO OPERATION:08/02/2023 by Dr. ROMERO Crespo, M    Pars plana vitrectomy, fluid air exchange of the Left  eye.     2. Hx of PCIOL OS   Last edited by Sindy Diallo MA on 12/19/2023  2:07 PM.           A/P    ICD-10-CM ICD-9-CM   1. Vitreous syneresis of left eye  H43.392 379.24   2. Vitreous degeneration of both eyes  H43.813 379.21   3. Fuchs' corneal dystrophy of both eyes  H18.513 371.57   4. Age-related hypermature cataract, right  H25.21 366.18   5. Pseudophakia, left eye  Z96.1 V43.1         1. Vitreous syneresis of left eye  2. Vitreous degeneration of both eyes     Pre-op Exam notable for large prominent central floaters OS, partial PVD, no RT/RD       Postop: s/p PPV/AFx (Date 8/14/23 by Zak/Valentin/Lawrence) for VS Floaters  Positioning: Upright  Duration: 5 days    12/19/2023 [facility] 20/25 (was 20/20), IOP 20, retina flat  Plan: Observation, doing well, symptoms moreso related to #3      Instructions reviewed   - RD precautions  - Return for increasing pain/decreasing vision      3. Fuchs' corneal dystrophy of both eyes  Mod guttata on exam, pt having blurred vision    Plan: start Rasheeda QID, will have pt see corneal specialist for monitoring    4. Age-related hypermature cataract, right  Mod NS, borderline VS  Plan: Observation for now     5. Pseudophakia, left eye  Good lens position  Plan: Observation              RTC Zak 2-3 mo DFE/OCTm OU ([place])  RTC Cornea specialist for Fuchs check    I saw and examined the patient and reviewed in detail the  findings documented. The final examination findings, image interpretations, and plan as documented in the record represent my personal judgment and conclusions.    Tee Crespo MD  Vitreoretinal Surgery   [facility]

## 2023-12-25 ENCOUNTER — HOSPITAL ENCOUNTER (INPATIENT)
Facility: HOSPITAL | Age: 73
LOS: 2 days | Discharge: HOME | DRG: 299 | End: 2023-12-28
Attending: EMERGENCY MEDICINE | Admitting: INTERNAL MEDICINE
Payer: MEDICARE

## 2023-12-25 DIAGNOSIS — K85.90 ACUTE PANCREATITIS, UNSPECIFIED COMPLICATION STATUS, UNSPECIFIED PANCREATITIS TYPE: Primary | ICD-10-CM

## 2023-12-25 DIAGNOSIS — E87.1 HYPONATREMIA: ICD-10-CM

## 2023-12-25 DIAGNOSIS — R10.9 ABDOMINAL PAIN: ICD-10-CM

## 2023-12-25 LAB
ALBUMIN SERPL BCP-MCNC: 4 G/DL (ref 3.5–5.2)
ALBUMIN SERPL BCP-MCNC: 4 G/DL (ref 3.5–5.2)
ALP SERPL-CCNC: 117 U/L (ref 55–135)
ALP SERPL-CCNC: 117 U/L (ref 55–135)
ALT SERPL W/O P-5'-P-CCNC: 16 U/L (ref 10–44)
ALT SERPL W/O P-5'-P-CCNC: 16 U/L (ref 10–44)
ANION GAP SERPL CALC-SCNC: 15 MMOL/L (ref 8–16)
ANION GAP SERPL CALC-SCNC: 15 MMOL/L (ref 8–16)
AST SERPL-CCNC: 20 U/L (ref 10–40)
AST SERPL-CCNC: 20 U/L (ref 10–40)
BASOPHILS # BLD AUTO: 0.02 K/UL (ref 0–0.2)
BASOPHILS NFR BLD: 0.2 % (ref 0–1.9)
BILIRUB SERPL-MCNC: 0.5 MG/DL (ref 0.1–1)
BILIRUB SERPL-MCNC: 0.5 MG/DL (ref 0.1–1)
BNP SERPL-MCNC: 56 PG/ML (ref 0–99)
BUN SERPL-MCNC: 5 MG/DL (ref 8–23)
BUN SERPL-MCNC: 5 MG/DL (ref 8–23)
CALCIUM SERPL-MCNC: 9.3 MG/DL (ref 8.7–10.5)
CALCIUM SERPL-MCNC: 9.3 MG/DL (ref 8.7–10.5)
CHLORIDE SERPL-SCNC: 95 MMOL/L (ref 95–110)
CHLORIDE SERPL-SCNC: 95 MMOL/L (ref 95–110)
CO2 SERPL-SCNC: 15 MMOL/L (ref 23–29)
CO2 SERPL-SCNC: 15 MMOL/L (ref 23–29)
CREAT SERPL-MCNC: 0.4 MG/DL (ref 0.5–1.4)
DIFFERENTIAL METHOD BLD: ABNORMAL
EOSINOPHIL # BLD AUTO: 0.4 K/UL (ref 0–0.5)
EOSINOPHIL NFR BLD: 3.6 % (ref 0–8)
ERYTHROCYTE [DISTWIDTH] IN BLOOD BY AUTOMATED COUNT: 13.9 % (ref 11.5–14.5)
EST. GFR  (NO RACE VARIABLE): >60 ML/MIN/1.73 M^2
EST. GFR  (NO RACE VARIABLE): >60 ML/MIN/1.73 M^2
GLUCOSE SERPL-MCNC: 126 MG/DL (ref 70–110)
GLUCOSE SERPL-MCNC: 126 MG/DL (ref 70–110)
HCT VFR BLD AUTO: 35.9 % (ref 40–54)
HGB BLD-MCNC: 12.6 G/DL (ref 14–18)
IMM GRANULOCYTES # BLD AUTO: 0.04 K/UL (ref 0–0.04)
IMM GRANULOCYTES NFR BLD AUTO: 0.4 % (ref 0–0.5)
LIPASE SERPL-CCNC: 50 U/L (ref 4–60)
LYMPHOCYTES # BLD AUTO: 0.8 K/UL (ref 1–4.8)
LYMPHOCYTES NFR BLD: 7.8 % (ref 18–48)
MAGNESIUM SERPL-MCNC: 1.4 MG/DL (ref 1.6–2.6)
MCH RBC QN AUTO: 30 PG (ref 27–31)
MCHC RBC AUTO-ENTMCNC: 35.1 G/DL (ref 32–36)
MCV RBC AUTO: 86 FL (ref 82–98)
MONOCYTES # BLD AUTO: 0.9 K/UL (ref 0.3–1)
MONOCYTES NFR BLD: 9.3 % (ref 4–15)
NEUTROPHILS # BLD AUTO: 7.6 K/UL (ref 1.8–7.7)
NEUTROPHILS NFR BLD: 78.7 % (ref 38–73)
NRBC BLD-RTO: 0 /100 WBC
PLATELET # BLD AUTO: 158 K/UL (ref 150–450)
PMV BLD AUTO: 9.2 FL (ref 9.2–12.9)
POTASSIUM SERPL-SCNC: 3.6 MMOL/L (ref 3.5–5.1)
POTASSIUM SERPL-SCNC: 3.6 MMOL/L (ref 3.5–5.1)
PROT SERPL-MCNC: 7.3 G/DL (ref 6–8.4)
PROT SERPL-MCNC: 7.3 G/DL (ref 6–8.4)
RBC # BLD AUTO: 4.2 M/UL (ref 4.6–6.2)
SAMPLE: ABNORMAL
SODIUM SERPL-SCNC: 125 MMOL/L (ref 136–145)
SODIUM SERPL-SCNC: 125 MMOL/L (ref 136–145)
TROPONIN I SERPL HS-MCNC: 9.5 PG/ML (ref 0–14.9)
WBC # BLD AUTO: 9.64 K/UL (ref 3.9–12.7)

## 2023-12-25 PROCEDURE — 80053 COMPREHEN METABOLIC PANEL: CPT | Performed by: NURSE PRACTITIONER

## 2023-12-25 PROCEDURE — 96375 TX/PRO/DX INJ NEW DRUG ADDON: CPT

## 2023-12-25 PROCEDURE — 93005 ELECTROCARDIOGRAM TRACING: CPT | Performed by: INTERNAL MEDICINE

## 2023-12-25 PROCEDURE — 83735 ASSAY OF MAGNESIUM: CPT | Performed by: NURSE PRACTITIONER

## 2023-12-25 PROCEDURE — 25000003 PHARM REV CODE 250: Performed by: EMERGENCY MEDICINE

## 2023-12-25 PROCEDURE — 36415 COLL VENOUS BLD VENIPUNCTURE: CPT | Performed by: EMERGENCY MEDICINE

## 2023-12-25 PROCEDURE — 96366 THER/PROPH/DIAG IV INF ADDON: CPT

## 2023-12-25 PROCEDURE — 63600175 PHARM REV CODE 636 W HCPCS: Performed by: EMERGENCY MEDICINE

## 2023-12-25 PROCEDURE — 84484 ASSAY OF TROPONIN QUANT: CPT | Performed by: NURSE PRACTITIONER

## 2023-12-25 PROCEDURE — 85025 COMPLETE CBC W/AUTO DIFF WBC: CPT | Performed by: NURSE PRACTITIONER

## 2023-12-25 PROCEDURE — 96365 THER/PROPH/DIAG IV INF INIT: CPT

## 2023-12-25 PROCEDURE — 83690 ASSAY OF LIPASE: CPT | Performed by: NURSE PRACTITIONER

## 2023-12-25 PROCEDURE — 80053 COMPREHEN METABOLIC PANEL: CPT | Mod: 91 | Performed by: EMERGENCY MEDICINE

## 2023-12-25 PROCEDURE — 83880 ASSAY OF NATRIURETIC PEPTIDE: CPT | Performed by: NURSE PRACTITIONER

## 2023-12-25 PROCEDURE — 99285 EMERGENCY DEPT VISIT HI MDM: CPT | Mod: 25

## 2023-12-25 PROCEDURE — 93010 ELECTROCARDIOGRAM REPORT: CPT | Mod: ,,, | Performed by: INTERNAL MEDICINE

## 2023-12-25 PROCEDURE — 82565 ASSAY OF CREATININE: CPT

## 2023-12-25 RX ORDER — MAGNESIUM SULFATE HEPTAHYDRATE 40 MG/ML
2 INJECTION, SOLUTION INTRAVENOUS ONCE
Status: COMPLETED | OUTPATIENT
Start: 2023-12-25 | End: 2023-12-26

## 2023-12-25 RX ORDER — MORPHINE SULFATE 4 MG/ML
4 INJECTION, SOLUTION INTRAMUSCULAR; INTRAVENOUS
Status: COMPLETED | OUTPATIENT
Start: 2023-12-25 | End: 2023-12-25

## 2023-12-25 RX ORDER — SODIUM CHLORIDE 9 MG/ML
1000 INJECTION, SOLUTION INTRAVENOUS
Status: COMPLETED | OUTPATIENT
Start: 2023-12-25 | End: 2023-12-25

## 2023-12-25 RX ORDER — ONDANSETRON HYDROCHLORIDE 2 MG/ML
4 INJECTION, SOLUTION INTRAVENOUS
Status: COMPLETED | OUTPATIENT
Start: 2023-12-25 | End: 2023-12-25

## 2023-12-25 RX ADMIN — MAGNESIUM SULFATE HEPTAHYDRATE 2 G: 40 INJECTION, SOLUTION INTRAVENOUS at 10:12

## 2023-12-25 RX ADMIN — SODIUM CHLORIDE 1000 ML: 0.9 INJECTION, SOLUTION INTRAVENOUS at 10:12

## 2023-12-25 RX ADMIN — ONDANSETRON 4 MG: 2 INJECTION INTRAMUSCULAR; INTRAVENOUS at 10:12

## 2023-12-25 RX ADMIN — MORPHINE SULFATE 4 MG: 4 INJECTION, SOLUTION INTRAMUSCULAR; INTRAVENOUS at 10:12

## 2023-12-26 PROBLEM — K81.9 CHOLECYSTITIS: Status: ACTIVE | Noted: 2023-12-26

## 2023-12-26 PROBLEM — K85.20 ALCOHOL-INDUCED ACUTE PANCREATITIS: Status: ACTIVE | Noted: 2023-12-26

## 2023-12-26 PROBLEM — E87.1 HYPONATREMIA: Status: ACTIVE | Noted: 2023-12-26

## 2023-12-26 PROBLEM — I82.890 SPLENIC VEIN THROMBOSIS: Status: ACTIVE | Noted: 2023-12-26

## 2023-12-26 LAB
ALBUMIN SERPL BCP-MCNC: 3.8 G/DL (ref 3.5–5.2)
ALBUMIN SERPL BCP-MCNC: 3.8 G/DL (ref 3.5–5.2)
ALP SERPL-CCNC: 113 U/L (ref 55–135)
ALP SERPL-CCNC: 114 U/L (ref 55–135)
ALT SERPL W/O P-5'-P-CCNC: 14 U/L (ref 10–44)
ALT SERPL W/O P-5'-P-CCNC: 15 U/L (ref 10–44)
ANION GAP SERPL CALC-SCNC: 5 MMOL/L (ref 8–16)
ANION GAP SERPL CALC-SCNC: 7 MMOL/L (ref 8–16)
ANION GAP SERPL CALC-SCNC: 7 MMOL/L (ref 8–16)
ANION GAP SERPL CALC-SCNC: 8 MMOL/L (ref 8–16)
ANION GAP SERPL CALC-SCNC: 9 MMOL/L (ref 8–16)
APTT PPP: 30 SEC (ref 21–32)
AST SERPL-CCNC: 18 U/L (ref 10–40)
AST SERPL-CCNC: 19 U/L (ref 10–40)
BASOPHILS # BLD AUTO: 0.01 K/UL (ref 0–0.2)
BASOPHILS # BLD AUTO: 0.02 K/UL (ref 0–0.2)
BASOPHILS NFR BLD: 0.2 % (ref 0–1.9)
BASOPHILS NFR BLD: 0.4 % (ref 0–1.9)
BILIRUB SERPL-MCNC: 0.5 MG/DL (ref 0.1–1)
BILIRUB SERPL-MCNC: 0.6 MG/DL (ref 0.1–1)
BILIRUB UR QL STRIP: NEGATIVE
BUN SERPL-MCNC: 5 MG/DL (ref 8–23)
BUN SERPL-MCNC: 6 MG/DL (ref 8–23)
CALCIUM SERPL-MCNC: 8.5 MG/DL (ref 8.7–10.5)
CALCIUM SERPL-MCNC: 8.9 MG/DL (ref 8.7–10.5)
CALCIUM SERPL-MCNC: 9 MG/DL (ref 8.7–10.5)
CALCIUM SERPL-MCNC: 9 MG/DL (ref 8.7–10.5)
CALCIUM SERPL-MCNC: 9.1 MG/DL (ref 8.7–10.5)
CEA SERPL-MCNC: 5.4 NG/ML (ref 0–5)
CHLORIDE SERPL-SCNC: 100 MMOL/L (ref 95–110)
CHLORIDE SERPL-SCNC: 101 MMOL/L (ref 95–110)
CHLORIDE SERPL-SCNC: 96 MMOL/L (ref 95–110)
CHLORIDE SERPL-SCNC: 99 MMOL/L (ref 95–110)
CHLORIDE SERPL-SCNC: 99 MMOL/L (ref 95–110)
CHOLEST SERPL-MCNC: 122 MG/DL (ref 120–199)
CHOLEST/HDLC SERPL: 1.9 {RATIO} (ref 2–5)
CLARITY UR: CLEAR
CO2 SERPL-SCNC: 19 MMOL/L (ref 23–29)
CO2 SERPL-SCNC: 21 MMOL/L (ref 23–29)
CO2 SERPL-SCNC: 21 MMOL/L (ref 23–29)
CO2 SERPL-SCNC: 22 MMOL/L (ref 23–29)
CO2 SERPL-SCNC: 22 MMOL/L (ref 23–29)
COLOR UR: YELLOW
CREAT SERPL-MCNC: 0.5 MG/DL (ref 0.5–1.4)
DIFFERENTIAL METHOD BLD: ABNORMAL
DIFFERENTIAL METHOD BLD: ABNORMAL
EOSINOPHIL # BLD AUTO: 0.7 K/UL (ref 0–0.5)
EOSINOPHIL # BLD AUTO: 0.7 K/UL (ref 0–0.5)
EOSINOPHIL NFR BLD: 10.4 % (ref 0–8)
EOSINOPHIL NFR BLD: 15.5 % (ref 0–8)
ERYTHROCYTE [DISTWIDTH] IN BLOOD BY AUTOMATED COUNT: 14.2 % (ref 11.5–14.5)
ERYTHROCYTE [DISTWIDTH] IN BLOOD BY AUTOMATED COUNT: 14.3 % (ref 11.5–14.5)
EST. GFR  (NO RACE VARIABLE): >60 ML/MIN/1.73 M^2
GLUCOSE SERPL-MCNC: 104 MG/DL (ref 70–110)
GLUCOSE SERPL-MCNC: 113 MG/DL (ref 70–110)
GLUCOSE SERPL-MCNC: 134 MG/DL (ref 70–110)
GLUCOSE SERPL-MCNC: 134 MG/DL (ref 70–110)
GLUCOSE SERPL-MCNC: 138 MG/DL (ref 70–110)
GLUCOSE SERPL-MCNC: 139 MG/DL (ref 70–110)
GLUCOSE UR QL STRIP: NEGATIVE
HCT VFR BLD AUTO: 34.5 % (ref 40–54)
HCT VFR BLD AUTO: 36.1 % (ref 40–54)
HDLC SERPL-MCNC: 65 MG/DL (ref 40–75)
HDLC SERPL: 53.3 % (ref 20–50)
HGB BLD-MCNC: 11.9 G/DL (ref 14–18)
HGB BLD-MCNC: 12.8 G/DL (ref 14–18)
HGB UR QL STRIP: NEGATIVE
IMM GRANULOCYTES # BLD AUTO: 0.02 K/UL (ref 0–0.04)
IMM GRANULOCYTES # BLD AUTO: 0.02 K/UL (ref 0–0.04)
IMM GRANULOCYTES NFR BLD AUTO: 0.3 % (ref 0–0.5)
IMM GRANULOCYTES NFR BLD AUTO: 0.4 % (ref 0–0.5)
INR PPP: 1.1 (ref 0.8–1.2)
KETONES UR QL STRIP: ABNORMAL
LDLC SERPL CALC-MCNC: 48.4 MG/DL (ref 63–159)
LEUKOCYTE ESTERASE UR QL STRIP: NEGATIVE
LYMPHOCYTES # BLD AUTO: 0.8 K/UL (ref 1–4.8)
LYMPHOCYTES # BLD AUTO: 0.9 K/UL (ref 1–4.8)
LYMPHOCYTES NFR BLD: 12.3 % (ref 18–48)
LYMPHOCYTES NFR BLD: 19.5 % (ref 18–48)
MAGNESIUM SERPL-MCNC: 1.9 MG/DL (ref 1.6–2.6)
MCH RBC QN AUTO: 29.7 PG (ref 27–31)
MCH RBC QN AUTO: 30.4 PG (ref 27–31)
MCHC RBC AUTO-ENTMCNC: 34.5 G/DL (ref 32–36)
MCHC RBC AUTO-ENTMCNC: 35.5 G/DL (ref 32–36)
MCV RBC AUTO: 86 FL (ref 82–98)
MCV RBC AUTO: 86 FL (ref 82–98)
MONOCYTES # BLD AUTO: 0.6 K/UL (ref 0.3–1)
MONOCYTES # BLD AUTO: 0.7 K/UL (ref 0.3–1)
MONOCYTES NFR BLD: 11 % (ref 4–15)
MONOCYTES NFR BLD: 11.6 % (ref 4–15)
NEUTROPHILS # BLD AUTO: 2.5 K/UL (ref 1.8–7.7)
NEUTROPHILS # BLD AUTO: 4.1 K/UL (ref 1.8–7.7)
NEUTROPHILS NFR BLD: 52.6 % (ref 38–73)
NEUTROPHILS NFR BLD: 65.8 % (ref 38–73)
NITRITE UR QL STRIP: NEGATIVE
NONHDLC SERPL-MCNC: 57 MG/DL
NRBC BLD-RTO: 0 /100 WBC
NRBC BLD-RTO: 0 /100 WBC
OSMOLALITY SERPL: 272 MOSM/KG (ref 280–300)
PH UR STRIP: 7 [PH] (ref 5–8)
PLATELET # BLD AUTO: 142 K/UL (ref 150–450)
PLATELET # BLD AUTO: 149 K/UL (ref 150–450)
PMV BLD AUTO: 9.1 FL (ref 9.2–12.9)
PMV BLD AUTO: 9.2 FL (ref 9.2–12.9)
POTASSIUM SERPL-SCNC: 3.8 MMOL/L (ref 3.5–5.1)
POTASSIUM SERPL-SCNC: 3.9 MMOL/L (ref 3.5–5.1)
POTASSIUM SERPL-SCNC: 4.1 MMOL/L (ref 3.5–5.1)
PROT SERPL-MCNC: 7 G/DL (ref 6–8.4)
PROT SERPL-MCNC: 7.1 G/DL (ref 6–8.4)
PROT UR QL STRIP: NEGATIVE
PROTHROMBIN TIME: 11.9 SEC (ref 9–12.5)
RBC # BLD AUTO: 4.01 M/UL (ref 4.6–6.2)
RBC # BLD AUTO: 4.21 M/UL (ref 4.6–6.2)
SODIUM SERPL-SCNC: 124 MMOL/L (ref 136–145)
SODIUM SERPL-SCNC: 127 MMOL/L (ref 136–145)
SODIUM SERPL-SCNC: 128 MMOL/L (ref 136–145)
SODIUM SERPL-SCNC: 128 MMOL/L (ref 136–145)
SODIUM SERPL-SCNC: 129 MMOL/L (ref 136–145)
SODIUM UR-SCNC: 43 MMOL/L (ref 20–250)
SP GR UR STRIP: 1.02 (ref 1–1.03)
TRIGL SERPL-MCNC: 43 MG/DL (ref 30–150)
URN SPEC COLLECT METH UR: ABNORMAL
UROBILINOGEN UR STRIP-ACNC: NEGATIVE EU/DL
WBC # BLD AUTO: 4.76 K/UL (ref 3.9–12.7)
WBC # BLD AUTO: 6.27 K/UL (ref 3.9–12.7)

## 2023-12-26 PROCEDURE — 85730 THROMBOPLASTIN TIME PARTIAL: CPT | Performed by: STUDENT IN AN ORGANIZED HEALTH CARE EDUCATION/TRAINING PROGRAM

## 2023-12-26 PROCEDURE — 63600175 PHARM REV CODE 636 W HCPCS: Performed by: INTERNAL MEDICINE

## 2023-12-26 PROCEDURE — 99900035 HC TECH TIME PER 15 MIN (STAT)

## 2023-12-26 PROCEDURE — 96361 HYDRATE IV INFUSION ADD-ON: CPT

## 2023-12-26 PROCEDURE — 83930 ASSAY OF BLOOD OSMOLALITY: CPT | Performed by: INTERNAL MEDICINE

## 2023-12-26 PROCEDURE — 94761 N-INVAS EAR/PLS OXIMETRY MLT: CPT

## 2023-12-26 PROCEDURE — 36415 COLL VENOUS BLD VENIPUNCTURE: CPT | Performed by: STUDENT IN AN ORGANIZED HEALTH CARE EDUCATION/TRAINING PROGRAM

## 2023-12-26 PROCEDURE — 12000002 HC ACUTE/MED SURGE SEMI-PRIVATE ROOM

## 2023-12-26 PROCEDURE — 63600175 PHARM REV CODE 636 W HCPCS: Performed by: RADIOLOGY

## 2023-12-26 PROCEDURE — 83935 ASSAY OF URINE OSMOLALITY: CPT | Performed by: INTERNAL MEDICINE

## 2023-12-26 PROCEDURE — 36415 COLL VENOUS BLD VENIPUNCTURE: CPT | Performed by: INTERNAL MEDICINE

## 2023-12-26 PROCEDURE — 63600175 PHARM REV CODE 636 W HCPCS: Performed by: EMERGENCY MEDICINE

## 2023-12-26 PROCEDURE — 25000003 PHARM REV CODE 250: Performed by: EMERGENCY MEDICINE

## 2023-12-26 PROCEDURE — 94799 UNLISTED PULMONARY SVC/PX: CPT

## 2023-12-26 PROCEDURE — 96376 TX/PRO/DX INJ SAME DRUG ADON: CPT

## 2023-12-26 PROCEDURE — 83735 ASSAY OF MAGNESIUM: CPT | Performed by: INTERNAL MEDICINE

## 2023-12-26 PROCEDURE — 63600175 PHARM REV CODE 636 W HCPCS: Performed by: STUDENT IN AN ORGANIZED HEALTH CARE EDUCATION/TRAINING PROGRAM

## 2023-12-26 PROCEDURE — 82378 CARCINOEMBRYONIC ANTIGEN: CPT | Performed by: INTERNAL MEDICINE

## 2023-12-26 PROCEDURE — 99900031 HC PATIENT EDUCATION (STAT)

## 2023-12-26 PROCEDURE — 96367 TX/PROPH/DG ADDL SEQ IV INF: CPT

## 2023-12-26 PROCEDURE — 25500020 PHARM REV CODE 255: Performed by: EMERGENCY MEDICINE

## 2023-12-26 PROCEDURE — 85025 COMPLETE CBC W/AUTO DIFF WBC: CPT | Performed by: INTERNAL MEDICINE

## 2023-12-26 PROCEDURE — 85610 PROTHROMBIN TIME: CPT | Performed by: STUDENT IN AN ORGANIZED HEALTH CARE EDUCATION/TRAINING PROGRAM

## 2023-12-26 PROCEDURE — 81003 URINALYSIS AUTO W/O SCOPE: CPT | Performed by: NURSE PRACTITIONER

## 2023-12-26 PROCEDURE — 80048 BASIC METABOLIC PNL TOTAL CA: CPT | Mod: 91 | Performed by: INTERNAL MEDICINE

## 2023-12-26 PROCEDURE — 80061 LIPID PANEL: CPT | Performed by: INTERNAL MEDICINE

## 2023-12-26 PROCEDURE — 84300 ASSAY OF URINE SODIUM: CPT | Performed by: INTERNAL MEDICINE

## 2023-12-26 PROCEDURE — 80053 COMPREHEN METABOLIC PANEL: CPT | Performed by: INTERNAL MEDICINE

## 2023-12-26 PROCEDURE — 86301 IMMUNOASSAY TUMOR CA 19-9: CPT | Performed by: INTERNAL MEDICINE

## 2023-12-26 PROCEDURE — 85025 COMPLETE CBC W/AUTO DIFF WBC: CPT | Mod: 91 | Performed by: STUDENT IN AN ORGANIZED HEALTH CARE EDUCATION/TRAINING PROGRAM

## 2023-12-26 PROCEDURE — 25000003 PHARM REV CODE 250: Performed by: INTERNAL MEDICINE

## 2023-12-26 RX ORDER — MORPHINE SULFATE 4 MG/ML
4 INJECTION, SOLUTION INTRAMUSCULAR; INTRAVENOUS
Status: COMPLETED | OUTPATIENT
Start: 2023-12-26 | End: 2023-12-26

## 2023-12-26 RX ORDER — KETOROLAC TROMETHAMINE 30 MG/ML
15 INJECTION, SOLUTION INTRAMUSCULAR; INTRAVENOUS EVERY 6 HOURS PRN
Status: DISCONTINUED | OUTPATIENT
Start: 2023-12-26 | End: 2023-12-28 | Stop reason: HOSPADM

## 2023-12-26 RX ORDER — MORPHINE SULFATE 2 MG/ML
2 INJECTION, SOLUTION INTRAMUSCULAR; INTRAVENOUS ONCE
Status: COMPLETED | OUTPATIENT
Start: 2023-12-26 | End: 2023-12-26

## 2023-12-26 RX ORDER — MORPHINE SULFATE 2 MG/ML
2 INJECTION, SOLUTION INTRAMUSCULAR; INTRAVENOUS EVERY 4 HOURS PRN
Status: DISCONTINUED | OUTPATIENT
Start: 2023-12-26 | End: 2023-12-28 | Stop reason: HOSPADM

## 2023-12-26 RX ORDER — HEPARIN SODIUM,PORCINE/D5W 25000/250
0-40 INTRAVENOUS SOLUTION INTRAVENOUS CONTINUOUS
Status: DISCONTINUED | OUTPATIENT
Start: 2023-12-26 | End: 2023-12-28 | Stop reason: HOSPADM

## 2023-12-26 RX ORDER — ONDANSETRON HYDROCHLORIDE 2 MG/ML
4 INJECTION, SOLUTION INTRAVENOUS EVERY 8 HOURS PRN
Status: DISCONTINUED | OUTPATIENT
Start: 2023-12-26 | End: 2023-12-28 | Stop reason: HOSPADM

## 2023-12-26 RX ORDER — FOLIC ACID 1 MG/1
1 TABLET ORAL DAILY
Status: DISCONTINUED | OUTPATIENT
Start: 2023-12-26 | End: 2023-12-28 | Stop reason: HOSPADM

## 2023-12-26 RX ORDER — TALC
6 POWDER (GRAM) TOPICAL NIGHTLY PRN
Status: DISCONTINUED | OUTPATIENT
Start: 2023-12-26 | End: 2023-12-28 | Stop reason: HOSPADM

## 2023-12-26 RX ORDER — SODIUM CHLORIDE 9 MG/ML
INJECTION, SOLUTION INTRAVENOUS CONTINUOUS
Status: DISCONTINUED | OUTPATIENT
Start: 2023-12-26 | End: 2023-12-28 | Stop reason: HOSPADM

## 2023-12-26 RX ORDER — SODIUM CHLORIDE 0.9 % (FLUSH) 0.9 %
10 SYRINGE (ML) INJECTION
Status: DISCONTINUED | OUTPATIENT
Start: 2023-12-26 | End: 2023-12-28 | Stop reason: HOSPADM

## 2023-12-26 RX ADMIN — SODIUM CHLORIDE: 0.9 INJECTION, SOLUTION INTRAVENOUS at 04:12

## 2023-12-26 RX ADMIN — THIAMINE HYDROCHLORIDE 100 MG: 100 INJECTION, SOLUTION INTRAMUSCULAR; INTRAVENOUS at 12:12

## 2023-12-26 RX ADMIN — IOHEXOL 100 ML: 350 INJECTION, SOLUTION INTRAVENOUS at 12:12

## 2023-12-26 RX ADMIN — MORPHINE SULFATE 2 MG: 2 INJECTION, SOLUTION INTRAMUSCULAR; INTRAVENOUS at 10:12

## 2023-12-26 RX ADMIN — MORPHINE SULFATE 2 MG: 2 INJECTION, SOLUTION INTRAMUSCULAR; INTRAVENOUS at 03:12

## 2023-12-26 RX ADMIN — KETOROLAC TROMETHAMINE 15 MG: 30 INJECTION, SOLUTION INTRAMUSCULAR; INTRAVENOUS at 08:12

## 2023-12-26 RX ADMIN — FOLIC ACID 1 MG: 1 TABLET ORAL at 09:12

## 2023-12-26 RX ADMIN — MORPHINE SULFATE 4 MG: 4 INJECTION, SOLUTION INTRAMUSCULAR; INTRAVENOUS at 02:12

## 2023-12-26 RX ADMIN — SODIUM CHLORIDE: 0.9 INJECTION, SOLUTION INTRAVENOUS at 08:12

## 2023-12-26 RX ADMIN — PIPERACILLIN AND TAZOBACTAM 3.38 G: 3; .375 INJECTION, POWDER, LYOPHILIZED, FOR SOLUTION INTRAVENOUS; PARENTERAL at 02:12

## 2023-12-26 RX ADMIN — KETOROLAC TROMETHAMINE 15 MG: 30 INJECTION, SOLUTION INTRAMUSCULAR; INTRAVENOUS at 05:12

## 2023-12-26 RX ADMIN — HEPARIN SODIUM 12 UNITS/KG/HR: 10000 INJECTION, SOLUTION INTRAVENOUS at 06:12

## 2023-12-26 NOTE — PLAN OF CARE
Problem: Adult Inpatient Plan of Care  Goal: Plan of Care Review  Outcome: Ongoing, Progressing  Goal: Patient-Specific Goal (Individualized)  Outcome: Ongoing, Progressing  Goal: Absence of Hospital-Acquired Illness or Injury  Outcome: Ongoing, Progressing  Goal: Optimal Comfort and Wellbeing  Outcome: Ongoing, Progressing  Goal: Readiness for Transition of Care  Outcome: Ongoing, Progressing     Problem: Impaired Wound Healing  Goal: Optimal Wound Healing  Outcome: Ongoing, Progressing     Problem: Pain Acute  Goal: Acceptable Pain Control and Functional Ability  Outcome: Ongoing, Progressing     Problem: Fatigue  Goal: Improved Activity Tolerance  Outcome: Ongoing, Progressing     Problem: Fall Injury Risk  Goal: Absence of Fall and Fall-Related Injury  Outcome: Ongoing, Progressing     Problem: Oral Intake Inadequate  Goal: Improved Oral Intake  Outcome: Ongoing, Progressing     Problem: Infection  Goal: Absence of Infection Signs and Symptoms  Outcome: Ongoing, Progressing

## 2023-12-26 NOTE — ED PROVIDER NOTES
Encounter Date: 12/25/2023       History     Chief Complaint   Patient presents with    Abdominal Pain     Pt having epigastric pain that started this afternoon, pt was here for pancreatitis.     Patient presents emergency department with reported abdominal pain started in his epigastric region radiating across his abdomen states he awoke this morning with some left upper quadrant pain states he took some aspirin and that pain seemed to get better and later on this evening the pain began in his epigastrium radiating across his abdomen he reports nausea denies any vomiting denies diarrhea states he had a bowel movement this morning he has a history of pancreatitis in the past he has a history of AAA repair with stenting        Review of patient's allergies indicates:  No Known Allergies  Past Medical History:   Diagnosis Date    Alcohol abuse 02/02/2022    Decompensated hepatic cirrhosis 02/02/2022    Encounter for blood transfusion     Hypertension     Lab test positive for detection of COVID-19 virus 09/2021    Pancreatic cyst 06/03/2022     Past Surgical History:   Procedure Laterality Date    ABDOMINAL AORTOGRAPHY N/A 10/04/2022    Procedure: AORTOGRAM-ABDOMINAL;  Surgeon: Felice Epstein MD;  Location: [facility] CATH/EP LAB;  Service: Vascular;  Laterality: N/A;    APPENDECTOMY      ARTERIOGRAM, MESENTERIC N/A 10/04/2022    Procedure: ARTERIOGRAM, MESENTERIC;  Surgeon: Felice Epstein MD;  Location: [facility] CATH/EP LAB;  Service: Vascular;  Laterality: N/A;    COLONOSCOPY      ENDOSCOPIC ULTRASOUND OF UPPER GASTROINTESTINAL TRACT  02/04/2022    Procedure: ULTRASOUND, UPPER GI TRACT, ENDOSCOPIC;  Surgeon: Chuy Bay MD;  Location: [facility] ([address] [place] [address]);  Service: Endoscopy;;    ENDOSCOPIC ULTRASOUND OF UPPER GASTROINTESTINAL TRACT N/A 06/03/2022    Procedure: ULTRASOUND, UPPER GI TRACT, ENDOSCOPIC;  Surgeon: Roger Viveros III, MD;  Location: [facility] ENDO;  Service: Endoscopy;  Laterality: N/A;    ENDOSCOPIC  ULTRASOUND OF UPPER GASTROINTESTINAL TRACT N/A 11/07/2022    Procedure: ULTRASOUND, UPPER GI TRACT, ENDOSCOPIC;  Surgeon: Roger Viveros III, MD;  Location: [facility] ENDO;  Service: Endoscopy;  Laterality: N/A;    ERCP N/A 02/04/2022    Procedure: ERCP (ENDOSCOPIC RETROGRADE CHOLANGIOPANCREATOGRAPHY);  Surgeon: Chuy Bay MD;  Location: [facility] ENDO (2ND FLR);  Service: Endoscopy;  Laterality: N/A;    ERCP N/A 04/19/2022    Procedure: ERCP (ENDOSCOPIC RETROGRADE CHOLANGIOPANCREATOGRAPHY);  Surgeon: Chuy Bay MD;  Location: [facility] (2ND FLR);  Service: Endoscopy;  Laterality: N/A;  4/1: fully vaccinated. labs prior. instructions mailed to sister and patient.-SC  4/12/22-Confirmed new arrival time of 10:45am with pt's sister and updated instructions emailed-DS    ERCP N/A 10/05/2022    Procedure: ERCP (ENDOSCOPIC RETROGRADE CHOLANGIOPANCREATOGRAPHY);  Surgeon: Roger Viveros III, MD;  Location: [facility] ENDO;  Service: Endoscopy;  Laterality: N/A;    ERCP N/A 1/24/2023    Procedure: ERCP (ENDOSCOPIC RETROGRADE CHOLANGIOPANCREATOGRAPHY);  Surgeon: Roger Viveros III, MD;  Location: [facility];  Service: Endoscopy;  Laterality: N/A;    EYE SURGERY Left     JOINT REPLACEMENT Bilateral     knee replacement    KNEE SURGERY      RECONSTRUCTION OF SHOULDER Right     TONSILLECTOMY      UPPER ENDOSCOPIC ULTRASOUND W/ FNA  06/03/2022    VITRECTOMY BY PARS PLANA APPROACH Left 8/14/2023    Procedure: VITRECTOMY, PARS PLANA APPROACH;  Surgeon: Tee Crespo MD;  [facility];  Service: Ophthalmology;  Laterality: Left;     No family history on file.  Social History     Tobacco Use    Smoking status: Every Day     Current packs/day: 0.25     Average packs/day: 0.3 packs/day for 40.0 years (10.0 ttl pk-yrs)     Types: Cigarettes    Smokeless tobacco: Never    Tobacco comments:     Pt has smoked on and off since age 19. Currently he smokes 1 pack per week and has cut back a lot throughout the years. No  patch needed per his hospital visit. Information and education provided on our outpatient smoking cessation program.   Substance Use Topics    Alcohol use: Yes     Alcohol/week: 10.0 standard drinks of alcohol     Types: 10 Cans of beer per week    Drug use: Never     Review of Systems   Constitutional:  Negative for chills and fever.   HENT:  Negative for congestion.    Respiratory:  Negative for cough and shortness of breath.    Cardiovascular:  Negative for chest pain.   Gastrointestinal:  Positive for abdominal pain and nausea. Negative for diarrhea and vomiting.   Genitourinary:  Negative for dysuria, frequency and urgency.   Skin:  Negative for rash.   All other systems reviewed and are negative.      Physical Exam     Initial Vitals [12/25/23 1924]   BP Pulse Resp Temp SpO2   (!) 177/97 70 20 98.7 °F (37.1 °C) 100 %      MAP       --         Physical Exam    Constitutional: He appears well-developed and well-nourished. No distress.   HENT:   Head: Normocephalic and atraumatic.   Right Ear: External ear normal.   Left Ear: External ear normal.   Mouth/Throat: Oropharynx is clear and moist.   Eyes: EOM are normal. Pupils are equal, round, and reactive to light.   Neck: Neck supple.   Normal range of motion.  Cardiovascular:  Normal rate, regular rhythm, S1 normal, S2 normal, normal heart sounds and intact distal pulses.           Pulmonary/Chest: Breath sounds normal.   Abdominal: Abdomen is soft. Bowel sounds are normal. There is abdominal tenderness in the right upper quadrant, epigastric area, periumbilical area and left upper quadrant.   Musculoskeletal:         General: Normal range of motion.      Cervical back: Normal range of motion and neck supple.     Neurological: He is alert and oriented to person, place, and time. He has normal strength. GCS score is 15. GCS eye subscore is 4. GCS verbal subscore is 5. GCS motor subscore is 6.   Skin: Skin is warm and dry. No rash noted.   Psychiatric: He has a  normal mood and affect. His behavior is normal.         ED Course   Procedures  Labs Reviewed   CBC W/ AUTO DIFFERENTIAL - Abnormal; Notable for the following components:       Result Value    RBC 4.20 (*)     Hemoglobin 12.6 (*)     Hematocrit 35.9 (*)     Lymph # 0.8 (*)     Gran % 78.7 (*)     Lymph % 7.8 (*)     All other components within normal limits   URINALYSIS, REFLEX TO URINE CULTURE - Abnormal; Notable for the following components:    Ketones, UA Trace (*)     All other components within normal limits    Narrative:     In and Out Cath as needed it patient unable to void  Specimen Source->Urine   COMPREHENSIVE METABOLIC PANEL - Abnormal; Notable for the following components:    Sodium 125 (*)     CO2 15 (*)     Glucose 126 (*)     BUN 5 (*)     Creatinine 0.4 (*)     All other components within normal limits   MAGNESIUM - Abnormal; Notable for the following components:    Magnesium 1.4 (*)     All other components within normal limits   COMPREHENSIVE METABOLIC PANEL - Abnormal; Notable for the following components:    Sodium 125 (*)     CO2 15 (*)     Glucose 126 (*)     BUN 5 (*)     Creatinine 0.4 (*)     All other components within normal limits   COMPREHENSIVE METABOLIC PANEL - Abnormal; Notable for the following components:    Sodium 124 (*)     CO2 19 (*)     Glucose 138 (*)     BUN 5 (*)     All other components within normal limits   ISTAT CREATININE - Abnormal; Notable for the following components:    POC Creatinine 0.4 (*)     All other components within normal limits   LIPASE   TROPONIN I HIGH SENSITIVITY   B-TYPE NATRIURETIC PEPTIDE   COMPREHENSIVE METABOLIC PANEL   MAGNESIUM   CBC W/ AUTO DIFFERENTIAL   COMPREHENSIVE METABOLIC PANEL   LIPID PANEL   BASIC METABOLIC PANEL   OSMOLALITY, SERUM   OSMOLALITY, URINE RANDOM   SODIUM, URINE, RANDOM          Imaging Results              US Abdomen Limited (In process)                      CT Abdomen Pelvis With IV Contrast NO Oral Contrast (Final  result)  Result time 12/26/23 00:28:16      Final result by Riki Jimenez MD (12/26/23 00:28:16)                   Narrative:    CT ABDOMEN PELVIS WITH IV CONTRAST    COMPARISONS:  CT abdomen and pelvis with contrast dated October 14, 2023.    ADDITIONAL PERTINENT HISTORY:   Pancreatitis.    TECHNIQUE:   Multiple axial images were obtained from the lung bases through the lesser trochanters after the adminstration of intravenous contrast.  One of the following dose optimization techniques was utilized in the performance of this exam: Automated exposure control; adjustment of the mA and/or kV according to the patient's size; or use of an iterative  reconstruction technique.  Specific details can be referenced in the facility's radiology CT exam operational policy.    CONTRAST:   100 mL of IV Omnipaque 350.    FINDINGS:  Lung bases: Minimal bibasilar scarring.    Free air/free fluid: Small amount of free fluid along the tail of the pancreas.  Liver:  Cirrhotic appearing liver with intrahepatic and extrahepatic ductal dilatation.  Spleen: Splenic granulomata.  Adrenal glands: negative  Kidneys /ureters/urinary bladder: Severe distention of the urinary bladder.  Pancreas: Calcifications along the head and body of the pancreas likely related to chronic pancreatitis. There is no enlarging fluid collection along the tail the pancreas with soft tissue stranding about the tail of the pancreas and extending into the lesser curvature of the stomach concerning for more acute pancreatitis with findings concerning for a developing loculated fluid collection measuring 4.5 cm compatible with a developing pseudocyst. This has increased since previous exam.  Gall Bladder:   Moderate distention of the gallbladder.    Bowel / mesentery: Mild increase in stool along the colon without evidence of bowel obstruction. Inflammatory changes along the proximal medial wall of the stomach likely reactive to the inflammatory process involving  the tail of the pancreas.    Lymph node assessment: negative    Pelvic contents: negative  Abdominal vasculature: Atherosclerotic disease of the abdominal aorta. Findings concerning for thrombosis of the splenic vein. The portal vein is patent.    Surrounding soft tissues: negative  Osseous structures: Grade 1 anterolisthesis of L5 on S1 with facet hypertrophic changes and bilateral pars interarticularis defects at this level. Spondylitic change involving other levels along the lumbar spine. No acute appearing bony abnormalities.      IMPRESSION:    1. Findings concerning for increasing changes of acute pancreatitis with findings concerning for a developing pseudocyst along the tail of the pancreas.  2. Findings highly suspicious for thrombosis of the splenic vein.  3. Other chronic findings as discussed above.    Electronically signed by:  Riki Jimenez MD  12/26/2023 12:28 AM CST Workstation: [id number]                                     Medications   morphine injection 4 mg (has no administration in time range)   sodium chloride 0.9% flush 10 mL (has no administration in time range)   melatonin tablet 6 mg (has no administration in time range)   morphine injection 2 mg (has no administration in time range)   ketorolac injection 15 mg (has no administration in time range)   ondansetron injection 4 mg (has no administration in time range)   0.9%  NaCl infusion (has no administration in time range)   thiamine (B-1) 100 mg in dextrose 5 % (D5W) 100 mL IVPB (has no administration in time range)   folic acid tablet 1 mg (has no administration in time range)   magnesium sulfate 2g in water 50mL IVPB (premix) (0 g Intravenous Stopped 12/26/23 0100)   ondansetron injection 4 mg (4 mg Intravenous Given 12/25/23 2251)   morphine injection 4 mg (4 mg Intravenous Given 12/25/23 2251)   0.9%  NaCl infusion (1,000 mLs Intravenous New Bag 12/25/23 2251)   iohexoL (OMNIPAQUE 350) injection 100 mL (100 mLs Intravenous Given  12/26/23 0008)   piperacillin-tazobactam 3.375 g in dextrose 5 % 100 mL IVPB (ready to mix) (3.375 g Intravenous New Bag 12/26/23 0227)     Medical Decision Making  Laboratory laboratory evaluation reviewed patient's lipase is normal he was found to be hyponatremic dehydrated I have begun IV fluid resuscitation he is received morphine and Zofran emergency department CT scan shows evidence of pancreatitis I have administered Zosyn in the emergency department I have spoken to  who will evaluate patient in the emergency department for admission    Amount and/or Complexity of Data Reviewed  Labs: ordered.  Radiology: ordered.    Risk  Prescription drug management.  Decision regarding hospitalization.                                      Clinical Impression:  Final diagnoses:  [R10.9] Abdominal pain  [K85.90] Acute pancreatitis, unspecified complication status, unspecified pancreatitis type (Primary)  [E87.1] Hyponatremia          ED Disposition Condition    Admit                 Martín Smith MD  12/26/23 4256

## 2023-12-26 NOTE — H&P
Pending sale to [facility]   History & Physical   Patient Name: Vic Reyez  MRN: [id number]  Admission Date: 12/25/2023  7:43 PM  Attending Physician: Dominic Thakur MD   Primary Care Provider: [facility] Primary Doctor  Face-to-Face encounter date: 12/26/2023    Patient information was obtained from patient, past medical records, ER physician, and ER records.     HISTORY OF PRESENT ILLNESS:     Vic Reyez is a 73 y.o. White male   With PMH of Alcohol abuse, liver cirrhosis, previous pancreatitis.    who presents with Abdominal pain.     Abdominal pain started in his epigastric region radiating across his abdomen states he awoke this morning with some left upper quadrant pain. States he took some aspirin and that pain seemed to get better and later on this evening the pain began in his epigastrium radiating across his abdomen he reports nausea denies any vomiting. Denies diarrhea or fever.     In the ED his lipase was normal, however CT was concerning for pancreatic inflammation. Patient was given IV Morphine with improvement of symptoms. His Na was 124. IVF was given. Patient was admitted for acute pancreatitis and hyponatremia.       REVIEW OF SYSTEMS:     All systems reviewed and are negative except as noted per above.    PAST MEDICAL HISTORY:     Past Medical History:   Diagnosis Date    Alcohol abuse 02/02/2022    Decompensated hepatic cirrhosis 02/02/2022    Encounter for blood transfusion     Hypertension     Lab test positive for detection of COVID-19 virus 09/2021    Pancreatic cyst 06/03/2022       PAST SURGICAL HISTORY:     Past Surgical History:   Procedure Laterality Date    ABDOMINAL AORTOGRAPHY N/A 10/04/2022    Procedure: AORTOGRAM-ABDOMINAL;  Surgeon: Felice Epstein MD;  Location: [facility] CATH/EP LAB;  Service: Vascular;  Laterality: N/A;    APPENDECTOMY      ARTERIOGRAM, MESENTERIC N/A 10/04/2022    Procedure: ARTERIOGRAM, MESENTERIC;  Surgeon: Felice Epstein MD;  Location: [facility]  CATH/EP LAB;  Service: Vascular;  Laterality: N/A;    COLONOSCOPY      ENDOSCOPIC ULTRASOUND OF UPPER GASTROINTESTINAL TRACT  02/04/2022    Procedure: ULTRASOUND, UPPER GI TRACT, ENDOSCOPIC;  Surgeon: Chuy Bay MD;  Location: [facility] ENDO (2ND FLR);  Service: Endoscopy;;    ENDOSCOPIC ULTRASOUND OF UPPER GASTROINTESTINAL TRACT N/A 06/03/2022    Procedure: ULTRASOUND, UPPER GI TRACT, ENDOSCOPIC;  Surgeon: Roger Viveros III, MD;  Location: [facility] ENDO;  Service: Endoscopy;  Laterality: N/A;    ENDOSCOPIC ULTRASOUND OF UPPER GASTROINTESTINAL TRACT N/A 11/07/2022    Procedure: ULTRASOUND, UPPER GI TRACT, ENDOSCOPIC;  Surgeon: Roger Viveros III, MD;  Location: [facility] ENDO;  Service: Endoscopy;  Laterality: N/A;    ERCP N/A 02/04/2022    Procedure: ERCP (ENDOSCOPIC RETROGRADE CHOLANGIOPANCREATOGRAPHY);  Surgeon: Chuy Bay MD;  Location: [facility] ENDO (2ND FLR);  Service: Endoscopy;  Laterality: N/A;    ERCP N/A 04/19/2022    Procedure: ERCP (ENDOSCOPIC RETROGRADE CHOLANGIOPANCREATOGRAPHY);  Surgeon: Chuy Bay MD;  Location: [facility] (2ND FLR);  Service: Endoscopy;  Laterality: N/A;  4/1: fully vaccinated. labs prior. instructions mailed to sister and patient.-SC  4/12/22-Confirmed new arrival time of 10:45am with pt's sister and updated instructions emailed-DS    ERCP N/A 10/05/2022    Procedure: ERCP (ENDOSCOPIC RETROGRADE CHOLANGIOPANCREATOGRAPHY);  Surgeon: Roger Viveros III, MD;  Location: [facility] ENDO;  Service: Endoscopy;  Laterality: N/A;    ERCP N/A 1/24/2023    Procedure: ERCP (ENDOSCOPIC RETROGRADE CHOLANGIOPANCREATOGRAPHY);  Surgeon: Roger Viveros III, MD;  Location: [facility] ENDO;  Service: Endoscopy;  Laterality: N/A;    EYE SURGERY Left     JOINT REPLACEMENT Bilateral     knee replacement    KNEE SURGERY      RECONSTRUCTION OF SHOULDER Right     TONSILLECTOMY      UPPER ENDOSCOPIC ULTRASOUND W/ FNA  06/03/2022    VITRECTOMY BY PARS PLANA APPROACH Left 8/14/2023    Procedure: VITRECTOMY,  PARS PLANA APPROACH;  Surgeon: Tee Crespo MD;  Location: [facility] OR [address] [place] [address];  Service: Ophthalmology;  Laterality: Left;       ALLERGIES:   Patient has no known allergies.    FAMILY HISTORY:   No family history on file.    SOCIAL HISTORY:     Social History     Tobacco Use    Smoking status: Every Day     Current packs/day: 0.25     Average packs/day: 0.3 packs/day for 40.0 years (10.0 ttl pk-yrs)     Types: Cigarettes    Smokeless tobacco: Never    Tobacco comments:     Pt has smoked on and off since age 19. Currently he smokes 1 pack per week and has cut back a lot throughout the years. No patch needed per his hospital visit. Information and education provided on our outpatient smoking cessation program.   Substance Use Topics    Alcohol use: Yes     Alcohol/week: 10.0 standard drinks of alcohol     Types: 10 Cans of beer per week        Social History     Substance and Sexual Activity   Sexual Activity Not Currently        HOME MEDICATIONS:     Prior to Admission medications    Medication Sig Start Date End Date Taking? Authorizing Provider   aspirin 325 MG tablet Take 325 mg by mouth once daily.    Provider, Historical   cholecalciferol, vitamin D3, (VITAMIN D3) 25 mcg (1,000 unit) capsule Take 1,000 Units by mouth once daily.    Provider, Historical   HYDROcodone-acetaminophen (NORCO) 5-325 mg per tablet TAKE 1 TABLET BY MOUTH EVERY 4 TO 6 HOURS AS NEEDED FOR PAIN 1/24/23   Roger Viveros III, MD   multivit-mins no.63/iron/folic (M-VIT ORAL) Take 1 tablet by mouth once daily.    Provider, Historical   omega-3 fatty acids/fish oil (FISH OIL-OMEGA-3 FATTY ACIDS) 300-1,000 mg capsule Take 1 capsule by mouth once daily.    Provider, Historical   predniSONE (DELTASONE) 10 MG tablet Take 40 mg by mouth. 12/14/22   Provider, Historical   sodium chloride 5% (HUMAIRA 128) 5 % ophthalmic solution Place 1 drop into both eyes every 4 (four) hours as needed. 12/19/23 12/18/24  Tee Crespo MD   ursodioL (ACTIGALL)  "300 mg capsule Take 1 capsule (300 mg total) by mouth 2 (two) times daily. 2/6/23 12/19/23  Serene Garay MD       PHYSICAL EXAM:     BP (!) 167/90   Pulse 85   Temp 98.7 °F (37.1 °C) (Oral)   Resp 17   Ht 5' 11" (1.803 m)   Wt 66.7 kg (147 lb)   SpO2 98%   BMI 20.50 kg/m²     Gen: Awake and alert  HEENT: Eyes with no icterus, not injected.  External ears with no lesions  Nares patent  Mouth moist mucous membranes  CV: Regular rate and rhythm, no murmurs, no edema.  Capillary refill < 2 seconds.  Lungs:  Clear to auscultation bilaterally, no tachypnea or increased work of breathing.  Abdomen:  Soft with active bowel sounds, + epigastric tenderness to palpation, no distention.  Musculoskeletal:  Moves all extremities with good strength.  No clubbing.  Skin:  Warm and dry, no obvious wounds or rashes.    Neuro:  No focal deficits.  No numbness or tingling.  Psych:  Calm and cooperative, awake alert and oriented.    LABS AND IMAGING:     Labs Reviewed   CBC W/ AUTO DIFFERENTIAL - Abnormal; Notable for the following components:       Result Value    RBC 4.20 (*)     Hemoglobin 12.6 (*)     Hematocrit 35.9 (*)     Lymph # 0.8 (*)     Gran % 78.7 (*)     Lymph % 7.8 (*)     All other components within normal limits   COMPREHENSIVE METABOLIC PANEL - Abnormal; Notable for the following components:    Sodium 125 (*)     CO2 15 (*)     Glucose 126 (*)     BUN 5 (*)     Creatinine 0.4 (*)     All other components within normal limits   MAGNESIUM - Abnormal; Notable for the following components:    Magnesium 1.4 (*)     All other components within normal limits   COMPREHENSIVE METABOLIC PANEL - Abnormal; Notable for the following components:    Sodium 125 (*)     CO2 15 (*)     Glucose 126 (*)     BUN 5 (*)     Creatinine 0.4 (*)     All other components within normal limits   COMPREHENSIVE METABOLIC PANEL - Abnormal; Notable for the following components:    Sodium 124 (*)     CO2 19 (*)     Glucose 138 (*)     BUN " 5 (*)     All other components within normal limits   ISTAT CREATININE - Abnormal; Notable for the following components:    POC Creatinine 0.4 (*)     All other components within normal limits   LIPASE   TROPONIN I HIGH SENSITIVITY   B-TYPE NATRIURETIC PEPTIDE   COMPREHENSIVE METABOLIC PANEL   URINALYSIS, REFLEX TO URINE CULTURE       CT Abdomen Pelvis With IV Contrast NO Oral Contrast   Final Result          ASSESSMENT & PLAN:   Vic Reyez is a 73 y.o. male admitted for    Recurrent acute pancreatitis   Likely alcohol related   Lipase normal, although CT shows evidence of pancreatic inflammation with possible pseudocyst formation. Patient has abdominal pain   Gallbladder distended   - ordered US gallbladder and lipid panel   - IV  cc/hr  - Analgesics: IV Morphine and Toradol  - Antiemetics     Alcohol abuse  - Thiamine and folic acid  - CIWA protocol     Hypomagnesemia   - replace and monitor     Hyponatremia   Likely beer potomania   Liver cirrhosis   - Follow serum and urine osm and urine Na   - Monitor BMP Q6H with IVF       Shandra Thakur MD   [facility] Hospitalist  12/26/2023  2:52 AM

## 2023-12-26 NOTE — PLAN OF CARE
Met with patient at bedside to complete initial assessment. Patient / family reports patient DOES have a living will and     Bhargavi Mariee (Sister)  [phone] (Mobile)    is medical POA.     Patient's physical address is [address] [place], [place] [id number].    [facility]  Initial Discharge Assessment       Primary Care Provider: Nenita, Primary Doctor    Admission Diagnosis: Abdominal pain [R10.9]    Admission Date: 12/25/2023  Expected Discharge Date:     Transition of Care Barriers: (P) None    Payor: HU[employer] MANAGED MEDICARE / Plan: HU[employer]NA MEDICARE PPO / Product Type: Medicare Advantage /     Extended Emergency Contact Information  Primary Emergency Contact: Bhargavi Mariee   [facility]  Home Phone: [phone]  Mobile Phone: [phone]  Relation: Sister    Discharge Plan A: (P) Home  Discharge Plan B: (P) Home      [facility] - [facility] [address]  [facility] [address]  [place] [address]  Phone: [phone] Fax: [phone]    [facility] - [place], [place] - [address]  [address] [facility] [address]  [place] [address]  Phone: [phone] Fax: [phone]      Initial Assessment (most recent)       Adult Discharge Assessment - 12/26/23 0937          Discharge Assessment    Assessment Type Discharge Planning Assessment (P)      Confirmed/corrected address, phone number and insurance Yes (P)      Confirmed Demographics Correct on Facesheet (P)    physical address: [address] [place] [address]    Source of Information patient (P)      Communicated NAFISA with patient/caregiver No (P)      Reason For Admission alcohol induced pancreatitis (P)      People in Home alone (P)      Facility Arrived From: home (P)      Do you expect to return to your current living situation? Yes (P)      Do you have help at home or someone to help you manage your care at home? Yes (P)      Who are your caregiver(s) and their phone number(s)? neighbor Elliott Oliveira (P)       Current cognitive status: Alert/Oriented (P)      Walking or Climbing Stairs Difficulty no (P)      Dressing/Bathing Difficulty no (P)      Equipment Currently Used at Home none (P)      Readmission within 30 days? No (P)      Patient currently being followed by outpatient case management? No (P)      Do you currently have service(s) that help you manage your care at home? No (P)      Do you have prescription coverage? Yes (P)      Coverage [employer] (P)      Who is going to help you get home at discharge? zulema Quintana (P)      How do you get to doctors appointments? health plan transportation (P)      Are you on dialysis? No (P)      Do you take coumadin? No (P)      Discharge Plan A Home (P)      Discharge Plan B Home (P)      DME Needed Upon Discharge  none (P)      Discharge Plan discussed with: Patient (P)      Transition of Care Barriers None (P)

## 2023-12-26 NOTE — PLAN OF CARE
Problem: Adult Inpatient Plan of Care  Goal: Plan of Care Review  Outcome: Ongoing, Progressing  Goal: Patient-Specific Goal (Individualized)  Outcome: Ongoing, Progressing     Problem: Pain Acute  Goal: Acceptable Pain Control and Functional Ability  Outcome: Ongoing, Progressing  Intervention: Prevent or Manage Pain  Flowsheets (Taken 12/26/2023 0529)  Sleep/Rest Enhancement:   awakenings minimized   consistent schedule promoted  Medication Review/Management: medications reviewed     Problem: Fatigue  Goal: Improved Activity Tolerance  Outcome: Ongoing, Progressing  Intervention: Promote Improved Energy  Flowsheets (Taken 12/26/2023 0529)  Sleep/Rest Enhancement:   awakenings minimized   consistent schedule promoted  Activity Management:   Ambulated -L4   Ambulated to bathroom - L4     Problem: Fall Injury Risk  Goal: Absence of Fall and Fall-Related Injury  Outcome: Ongoing, Progressing  Intervention: Promote Injury-Free Environment  Flowsheets (Taken 12/26/2023 0529)  Safety Promotion/Fall Prevention:   assistive device/personal item within reach   bed alarm set   instructed to call staff for mobility   side rails raised x 2     Problem: Oral Intake Inadequate  Goal: Improved Oral Intake  Outcome: Ongoing, Progressing  Intervention: Promote and Optimize Oral Intake  Flowsheets (Taken 12/26/2023 0529)  Oral Nutrition Promotion: (NPO) other (see comments)

## 2023-12-26 NOTE — ASSESSMENT & PLAN NOTE
Started heparin drip- will dc when on eliquis for at least 6 months-  If it does not get better - pt may need splenectomy

## 2023-12-26 NOTE — ASSESSMENT & PLAN NOTE
C/w IVF and can advance the diet to clear liquid now.   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

## 2023-12-26 NOTE — NURSING
Nurses Note -- 4 Eyes      12/26/2023   5:06 AM      Skin assessed during: Admit      [] No Altered Skin Integrity Present    []Prevention Measures Documented      [x] Yes- Altered Skin Integrity Present or Discovered   [x] LDA Added if Not in Epic (Describe Wound)   [x] New Altered Skin Integrity was Present on Admit and Documented in LDA   [x] Wound Image Taken    Wound Care Consulted? No    Attending Nurse:  Carrie Infante RN/Staff Member:   [id number]

## 2023-12-26 NOTE — SUBJECTIVE & OBJECTIVE
Interval History: with medicine pain is better but without its is still same pain otherwise no fever, chills, chest pain, sob, urine or bowel problem.     Review of Systems   Constitutional:  Negative for activity change, appetite change, chills and fever.   HENT:  Negative for congestion, ear pain and nosebleeds.    Eyes:  Negative for itching and visual disturbance.   Respiratory:  Negative for apnea, cough, chest tightness, shortness of breath and wheezing.    Cardiovascular:  Negative for chest pain, palpitations and leg swelling.   Gastrointestinal:  Positive for abdominal pain. Negative for abdominal distention, constipation, diarrhea, nausea and vomiting.   Genitourinary:  Negative for dysuria and flank pain.   Musculoskeletal:  Negative for back pain.   Neurological:  Negative for dizziness and headaches.     Objective:     Vital Signs (Most Recent):  Temp: 97.8 °F (36.6 °C) (12/26/23 1110)  Pulse: 60 (12/26/23 1110)  Resp: 17 (12/26/23 1110)  BP: 127/72 (12/26/23 1110)  SpO2: 99 % (12/26/23 1110) Vital Signs (24h Range):  Temp:  [97.5 °F (36.4 °C)-98.7 °F (37.1 °C)] 97.8 °F (36.6 °C)  Pulse:  [60-87] 60  Resp:  [16-26] 17  SpO2:  [98 %-100 %] 99 %  BP: (125-182)/(72-97) 127/72     Weight: 63 kg (138 lb 14.2 oz)  Body mass index is 19.37 kg/m².    Intake/Output Summary (Last 24 hours) at 12/26/2023 1141  Last data filed at 12/26/2023 0453  Gross per 24 hour   Intake 754.17 ml   Output --   Net 754.17 ml         Physical Exam  Vitals reviewed.   Constitutional:       General: He is not in acute distress.     Appearance: Normal appearance. He is not ill-appearing, toxic-appearing or diaphoretic.   HENT:      Head: Normocephalic and atraumatic.      Mouth/Throat:      Mouth: Mucous membranes are moist.      Pharynx: Oropharynx is clear.   Eyes:      General: No scleral icterus.     Conjunctiva/sclera: Conjunctivae normal.   Neck:      Vascular: No carotid bruit.   Cardiovascular:      Rate and Rhythm: Normal  rate and regular rhythm.      Pulses: Normal pulses.      Heart sounds: Normal heart sounds.   Pulmonary:      Effort: Pulmonary effort is normal.      Breath sounds: Normal breath sounds.   Abdominal:      General: Abdomen is flat. Bowel sounds are normal.      Palpations: Abdomen is soft.      Tenderness: There is abdominal tenderness.   Musculoskeletal:         General: Normal range of motion.   Skin:     General: Skin is warm.   Neurological:      General: No focal deficit present.      Mental Status: He is alert and oriented to person, place, and time. Mental status is at baseline.   Psychiatric:         Mood and Affect: Mood normal.         Behavior: Behavior normal.             Significant Labs: All pertinent labs within the past 24 hours have been reviewed.  BMP:   Recent Labs   Lab 12/26/23  0535   *  134*   *  127*   K 3.8  3.8   CL 99  99   CO2 21*  21*   BUN 5*  5*   CREATININE 0.5  0.5   CALCIUM 9.0  8.9   MG 1.9     CBC:   Recent Labs   Lab 12/25/23 2055 12/26/23  0535   WBC 9.64 6.27   HGB 12.6* 12.8*   HCT 35.9* 36.1*    142*     CMP:   Recent Labs   Lab 12/25/23 2055 12/25/23  2315 12/26/23  0535   *  125* 124* 128*  127*   K 3.6  3.6 3.8 3.8  3.8   CL 95  95 96 99  99   CO2 15*  15* 19* 21*  21*   *  126* 138* 134*  134*   BUN 5*  5* 5* 5*  5*   CREATININE 0.4*  0.4* 0.5 0.5  0.5   CALCIUM 9.3  9.3 9.0 9.0  8.9   PROT 7.3  7.3 7.1 7.0   ALBUMIN 4.0  4.0 3.8 3.8   BILITOT 0.5  0.5 0.5 0.6   ALKPHOS 117  117 114 113   AST 20  20 19 18   ALT 16  16 15 14   ANIONGAP 15  15 9 8  7*     Magnesium:   Recent Labs   Lab 12/25/23 2055 12/26/23  0535   MG 1.4* 1.9       Significant Imaging: I have reviewed all pertinent imaging results/findings within the past 24 hours.

## 2023-12-27 LAB
ALBUMIN SERPL BCP-MCNC: 3.4 G/DL (ref 3.5–5.2)
ALP SERPL-CCNC: 97 U/L (ref 55–135)
ALT SERPL W/O P-5'-P-CCNC: 12 U/L (ref 10–44)
ANION GAP SERPL CALC-SCNC: 5 MMOL/L (ref 8–16)
ANION GAP SERPL CALC-SCNC: 5 MMOL/L (ref 8–16)
ANION GAP SERPL CALC-SCNC: 7 MMOL/L (ref 8–16)
ANION GAP SERPL CALC-SCNC: 7 MMOL/L (ref 8–16)
APTT PPP: 34.2 SEC (ref 21–32)
APTT PPP: 43.2 SEC (ref 21–32)
APTT PPP: 45.5 SEC (ref 21–32)
AST SERPL-CCNC: 17 U/L (ref 10–40)
BASOPHILS # BLD AUTO: 0.02 K/UL (ref 0–0.2)
BASOPHILS # BLD AUTO: 0.02 K/UL (ref 0–0.2)
BASOPHILS NFR BLD: 0.5 % (ref 0–1.9)
BASOPHILS NFR BLD: 0.5 % (ref 0–1.9)
BILIRUB SERPL-MCNC: 0.6 MG/DL (ref 0.1–1)
BUN SERPL-MCNC: 5 MG/DL (ref 8–23)
BUN SERPL-MCNC: 6 MG/DL (ref 8–23)
CALCIUM SERPL-MCNC: 8.4 MG/DL (ref 8.7–10.5)
CALCIUM SERPL-MCNC: 8.6 MG/DL (ref 8.7–10.5)
CALCIUM SERPL-MCNC: 8.6 MG/DL (ref 8.7–10.5)
CALCIUM SERPL-MCNC: 8.8 MG/DL (ref 8.7–10.5)
CHLORIDE SERPL-SCNC: 100 MMOL/L (ref 95–110)
CHLORIDE SERPL-SCNC: 100 MMOL/L (ref 95–110)
CHLORIDE SERPL-SCNC: 101 MMOL/L (ref 95–110)
CHLORIDE SERPL-SCNC: 103 MMOL/L (ref 95–110)
CO2 SERPL-SCNC: 19 MMOL/L (ref 23–29)
CO2 SERPL-SCNC: 21 MMOL/L (ref 23–29)
CO2 SERPL-SCNC: 21 MMOL/L (ref 23–29)
CO2 SERPL-SCNC: 24 MMOL/L (ref 23–29)
CREAT SERPL-MCNC: 0.5 MG/DL (ref 0.5–1.4)
DIFFERENTIAL METHOD BLD: ABNORMAL
DIFFERENTIAL METHOD BLD: ABNORMAL
EOSINOPHIL # BLD AUTO: 0.8 K/UL (ref 0–0.5)
EOSINOPHIL # BLD AUTO: 0.8 K/UL (ref 0–0.5)
EOSINOPHIL NFR BLD: 18.4 % (ref 0–8)
EOSINOPHIL NFR BLD: 18.4 % (ref 0–8)
ERYTHROCYTE [DISTWIDTH] IN BLOOD BY AUTOMATED COUNT: 14.6 % (ref 11.5–14.5)
ERYTHROCYTE [DISTWIDTH] IN BLOOD BY AUTOMATED COUNT: 14.6 % (ref 11.5–14.5)
EST. GFR  (NO RACE VARIABLE): >60 ML/MIN/1.73 M^2
GLUCOSE SERPL-MCNC: 109 MG/DL (ref 70–110)
GLUCOSE SERPL-MCNC: 131 MG/DL (ref 70–110)
GLUCOSE SERPL-MCNC: 171 MG/DL (ref 70–110)
GLUCOSE SERPL-MCNC: 99 MG/DL (ref 70–110)
HCT VFR BLD AUTO: 34.8 % (ref 40–54)
HCT VFR BLD AUTO: 34.8 % (ref 40–54)
HGB BLD-MCNC: 11.8 G/DL (ref 14–18)
HGB BLD-MCNC: 11.8 G/DL (ref 14–18)
IMM GRANULOCYTES # BLD AUTO: 0.01 K/UL (ref 0–0.04)
IMM GRANULOCYTES # BLD AUTO: 0.01 K/UL (ref 0–0.04)
IMM GRANULOCYTES NFR BLD AUTO: 0.2 % (ref 0–0.5)
IMM GRANULOCYTES NFR BLD AUTO: 0.2 % (ref 0–0.5)
LYMPHOCYTES # BLD AUTO: 1.3 K/UL (ref 1–4.8)
LYMPHOCYTES # BLD AUTO: 1.3 K/UL (ref 1–4.8)
LYMPHOCYTES NFR BLD: 31 % (ref 18–48)
LYMPHOCYTES NFR BLD: 31 % (ref 18–48)
MCH RBC QN AUTO: 29.4 PG (ref 27–31)
MCH RBC QN AUTO: 29.4 PG (ref 27–31)
MCHC RBC AUTO-ENTMCNC: 33.9 G/DL (ref 32–36)
MCHC RBC AUTO-ENTMCNC: 33.9 G/DL (ref 32–36)
MCV RBC AUTO: 87 FL (ref 82–98)
MCV RBC AUTO: 87 FL (ref 82–98)
MONOCYTES # BLD AUTO: 0.5 K/UL (ref 0.3–1)
MONOCYTES # BLD AUTO: 0.5 K/UL (ref 0.3–1)
MONOCYTES NFR BLD: 11.1 % (ref 4–15)
MONOCYTES NFR BLD: 11.1 % (ref 4–15)
NEUTROPHILS # BLD AUTO: 1.6 K/UL (ref 1.8–7.7)
NEUTROPHILS # BLD AUTO: 1.6 K/UL (ref 1.8–7.7)
NEUTROPHILS NFR BLD: 38.8 % (ref 38–73)
NEUTROPHILS NFR BLD: 38.8 % (ref 38–73)
NRBC BLD-RTO: 0 /100 WBC
NRBC BLD-RTO: 0 /100 WBC
OSMOLALITY UR: 215 MOSM/KG (ref 50–1200)
PLATELET # BLD AUTO: 133 K/UL (ref 150–450)
PLATELET # BLD AUTO: 133 K/UL (ref 150–450)
PMV BLD AUTO: 9.2 FL (ref 9.2–12.9)
PMV BLD AUTO: 9.2 FL (ref 9.2–12.9)
POTASSIUM SERPL-SCNC: 3.9 MMOL/L (ref 3.5–5.1)
POTASSIUM SERPL-SCNC: 4.2 MMOL/L (ref 3.5–5.1)
PROT SERPL-MCNC: 6.3 G/DL (ref 6–8.4)
RBC # BLD AUTO: 4.02 M/UL (ref 4.6–6.2)
RBC # BLD AUTO: 4.02 M/UL (ref 4.6–6.2)
SODIUM SERPL-SCNC: 127 MMOL/L (ref 136–145)
SODIUM SERPL-SCNC: 128 MMOL/L (ref 136–145)
SODIUM SERPL-SCNC: 129 MMOL/L (ref 136–145)
SODIUM SERPL-SCNC: 129 MMOL/L (ref 136–145)
SODIUM UR-SCNC: 84 MMOL/L (ref 20–250)
WBC # BLD AUTO: 4.07 K/UL (ref 3.9–12.7)
WBC # BLD AUTO: 4.07 K/UL (ref 3.9–12.7)

## 2023-12-27 PROCEDURE — 80053 COMPREHEN METABOLIC PANEL: CPT | Performed by: INTERNAL MEDICINE

## 2023-12-27 PROCEDURE — 63600175 PHARM REV CODE 636 W HCPCS: Performed by: INTERNAL MEDICINE

## 2023-12-27 PROCEDURE — 85025 COMPLETE CBC W/AUTO DIFF WBC: CPT | Performed by: INTERNAL MEDICINE

## 2023-12-27 PROCEDURE — 83935 ASSAY OF URINE OSMOLALITY: CPT | Performed by: STUDENT IN AN ORGANIZED HEALTH CARE EDUCATION/TRAINING PROGRAM

## 2023-12-27 PROCEDURE — 36415 COLL VENOUS BLD VENIPUNCTURE: CPT | Performed by: INTERNAL MEDICINE

## 2023-12-27 PROCEDURE — 84300 ASSAY OF URINE SODIUM: CPT | Performed by: STUDENT IN AN ORGANIZED HEALTH CARE EDUCATION/TRAINING PROGRAM

## 2023-12-27 PROCEDURE — 12000002 HC ACUTE/MED SURGE SEMI-PRIVATE ROOM

## 2023-12-27 PROCEDURE — 80048 BASIC METABOLIC PNL TOTAL CA: CPT | Mod: 91 | Performed by: INTERNAL MEDICINE

## 2023-12-27 PROCEDURE — 25000003 PHARM REV CODE 250: Performed by: INTERNAL MEDICINE

## 2023-12-27 PROCEDURE — 94761 N-INVAS EAR/PLS OXIMETRY MLT: CPT

## 2023-12-27 PROCEDURE — 25000003 PHARM REV CODE 250: Performed by: STUDENT IN AN ORGANIZED HEALTH CARE EDUCATION/TRAINING PROGRAM

## 2023-12-27 PROCEDURE — 63600175 PHARM REV CODE 636 W HCPCS: Performed by: STUDENT IN AN ORGANIZED HEALTH CARE EDUCATION/TRAINING PROGRAM

## 2023-12-27 PROCEDURE — 85730 THROMBOPLASTIN TIME PARTIAL: CPT | Mod: 91 | Performed by: STUDENT IN AN ORGANIZED HEALTH CARE EDUCATION/TRAINING PROGRAM

## 2023-12-27 PROCEDURE — 36415 COLL VENOUS BLD VENIPUNCTURE: CPT | Performed by: STUDENT IN AN ORGANIZED HEALTH CARE EDUCATION/TRAINING PROGRAM

## 2023-12-27 RX ORDER — AMLODIPINE BESYLATE 5 MG/1
5 TABLET ORAL ONCE
Status: COMPLETED | OUTPATIENT
Start: 2023-12-27 | End: 2023-12-27

## 2023-12-27 RX ORDER — SIMETHICONE 80 MG
1 TABLET,CHEWABLE ORAL 3 TIMES DAILY PRN
Status: DISCONTINUED | OUTPATIENT
Start: 2023-12-27 | End: 2023-12-28 | Stop reason: HOSPADM

## 2023-12-27 RX ADMIN — SODIUM CHLORIDE: 0.9 INJECTION, SOLUTION INTRAVENOUS at 06:12

## 2023-12-27 RX ADMIN — HEPARIN SODIUM 14 UNITS/KG/HR: 10000 INJECTION, SOLUTION INTRAVENOUS at 09:12

## 2023-12-27 RX ADMIN — MORPHINE SULFATE 2 MG: 2 INJECTION, SOLUTION INTRAMUSCULAR; INTRAVENOUS at 10:12

## 2023-12-27 RX ADMIN — KETOROLAC TROMETHAMINE 15 MG: 30 INJECTION, SOLUTION INTRAMUSCULAR; INTRAVENOUS at 03:12

## 2023-12-27 RX ADMIN — KETOROLAC TROMETHAMINE 15 MG: 30 INJECTION, SOLUTION INTRAMUSCULAR; INTRAVENOUS at 02:12

## 2023-12-27 RX ADMIN — THIAMINE HYDROCHLORIDE 100 MG: 100 INJECTION, SOLUTION INTRAMUSCULAR; INTRAVENOUS at 10:12

## 2023-12-27 RX ADMIN — MORPHINE SULFATE 2 MG: 2 INJECTION, SOLUTION INTRAMUSCULAR; INTRAVENOUS at 06:12

## 2023-12-27 RX ADMIN — MORPHINE SULFATE 2 MG: 2 INJECTION, SOLUTION INTRAMUSCULAR; INTRAVENOUS at 12:12

## 2023-12-27 RX ADMIN — MORPHINE SULFATE 2 MG: 2 INJECTION, SOLUTION INTRAMUSCULAR; INTRAVENOUS at 08:12

## 2023-12-27 RX ADMIN — KETOROLAC TROMETHAMINE 15 MG: 30 INJECTION, SOLUTION INTRAMUSCULAR; INTRAVENOUS at 10:12

## 2023-12-27 RX ADMIN — AMLODIPINE BESYLATE 5 MG: 5 TABLET ORAL at 05:12

## 2023-12-27 RX ADMIN — SIMETHICONE CHEW TAB 80 MG 80 MG: 80 TABLET ORAL at 03:12

## 2023-12-27 RX ADMIN — FOLIC ACID 1 MG: 1 TABLET ORAL at 09:12

## 2023-12-27 NOTE — PROGRESS NOTES
"[facility]  Adult Nutrition   Progress Note (Initial Assessment)     SUMMARY     Recommendations  Recommendation/Intervention:   1. Recommend Ensure HP for additional energy protein intake.   2. Continue regular diet and encourage pointake.   3. Honor food prefs to improve intake.   4. RD following.  Goals: Improve po intake > 50% intake meal by f/u.  Nutrition Goal Status: progressing towards goal    Dietitian Rounds Brief  MST 3 with 4 kg weight lost in the past 3 months. Per H&P: pt with PMH of Alcohol abuse, liver cirrhosis, previous pancreatitis.  He presented to hospital with abdominal pain.  Pt tolerating liquids and diet just advanced.    PES: Inadequate energy/ protein intake related to pancreatitis as evidence by abdominal pain and poor po intake.    Diet order:   Current Diet Order: Regular diet                 Evaluation of Received Nutrient/Fluid Intake  Energy Calories Required: not meeting needs  Protein Required: not meeting needs  Fluid Required: not meeting needs    Tolerance: tolerating     % Intake of Estimated Energy Needs: 25 - 50 %  % Meal Intake: 75 - 100 %      Intake/Output Summary (Last 24 hours) at 12/27/2023 1619  Last data filed at 12/27/2023 1434  Gross per 24 hour   Intake 920 ml   Output --   Net 920 ml        Anthropometrics  Temp: 98.4 °F (36.9 °C)  Height Method: Stated  Height: 5' 11" (180.3 cm)  Height (inches): 71 in  Weight Method: Bed Scale  Weight: 63 kg (138 lb 14.2 oz)  Weight (lb): 138.89 lb  Ideal Body Weight (IBW), Male: 172 lb  % Ideal Body Weight, Male (lb): 80.75 %  BMI (Calculated): 19.4  BMI Grade: 18.5-24.9 - normal       Estimated/Assessed Needs  Weight Used For Calorie Calculations: 63 kg (138 lb 14.2 oz)  Energy Calorie Requirements (kcal): 8866-3312 kcal (25-30 kcal/ kg bw)  Energy Need Method: Kcal/kg  Protein Requirements: 63-95 (1.0-1.5 gm/ kg bw)  Weight Used For Protein Calculations: 63 kg (138 lb 14.2 oz)     Estimated Fluid Requirement " Method: RDA Method  RDA Method (mL): 1575       Reason for Assessment  Reason For Assessment: identified at risk by screening criteria  Relevant Medical History: Alcohol abuse,  Decompensated hepatic cirrhosis, Hypertension  Interdisciplinary Rounds: did not attend    Nutrition/Diet History  Patient Reported Diet/Restrictions/Preferences: general  Spiritual, Cultural Beliefs, Holiness Practices, Values that Affect Care: no  Food Allergies: NKFA  Factors Affecting Nutritional Intake: abdominal pain, decreased appetite, excessive alcohol intake    Nutrition Risk Screen  Nutrition Risk Screen: no indicators present     MST Score: 3  Have you recently lost weight without trying?: Yes: 14-23 lbs  Weight loss score: 2  Have you been eating poorly because of a decreased appetite?: Yes  Appetite score: 1       Weight History:  Wt Readings from Last 10 Encounters:   12/26/23 63 kg (138 lb 14.2 oz)   10/15/23 67 kg (147 lb 9.6 oz)   08/14/23 65.8 kg (145 lb)   06/21/23 66.2 kg (146 lb)   06/06/23 66 kg (145 lb 6.4 oz)   01/24/23 54.4 kg (120 lb)   11/30/22 52.2 kg (115 lb 1.3 oz)   11/18/22 59 kg (130 lb)   10/06/22 62.1 kg (136 lb 14.5 oz)   06/04/22 62.2 kg (137 lb 2 oz)        Lab/Procedures/Meds: Pertinent Labs/Meds Reviewed    Medications:Pertinent Medications Reviewed  Scheduled Meds:   folic acid  1 mg Oral Daily    heparin (PORCINE)  80 Units/kg (Adjusted) Intravenous Once    thiamine (B-1) 100 mg in dextrose 5 % (D5W) 100 mL IVPB  100 mg Intravenous Daily     Continuous Infusions:   sodium chloride 0.9% 75 mL/hr at 12/27/23 1256    heparin (porcine) in D5W 14 Units/kg/hr (12/27/23 0900)     PRN Meds:.heparin (PORCINE), heparin (PORCINE), ketorolac, melatonin, morphine, ondansetron, simethicone, sodium chloride 0.9%    Labs: Pertinent Labs Reviewed  Clinical Chemistry:  Recent Labs   Lab 12/25/23  2055 12/25/23  2315 12/26/23  0535 12/26/23  1219 12/27/23  0608 12/27/23  1158   *  125* 124* 128*  127*   < >  127* 128*   K 3.6  3.6 3.8 3.8  3.8   < > 3.9 3.9   CL 95  95 96 99  99   < > 101 100   CO2 15*  15* 19* 21*  21*   < > 19* 21*   *  126* 138* 134*  134*   < > 99 131*   BUN 5*  5* 5* 5*  5*   < > 5* 5*   CREATININE 0.4*  0.4* 0.5 0.5  0.5   < > 0.5 0.5   CALCIUM 9.3  9.3 9.0 9.0  8.9   < > 8.6* 8.6*   PROT 7.3  7.3 7.1 7.0  --  6.3  --    ALBUMIN 4.0  4.0 3.8 3.8  --  3.4*  --    BILITOT 0.5  0.5 0.5 0.6  --  0.6  --    ALKPHOS 117  117 114 113  --  97  --    AST 20  20 19 18  --  17  --    ALT 16  16 15 14  --  12  --    ANIONGAP 15  15 9 8  7*   < > 7* 7*   MG 1.4*  --  1.9  --   --   --    LIPASE 50  --   --   --   --   --     < > = values in this interval not displayed.     CBC:   Recent Labs   Lab 12/27/23  0609   WBC 4.07  4.07   RBC 4.02*  4.02*   HGB 11.8*  11.8*   HCT 34.8*  34.8*   *  133*   MCV 87  87   MCH 29.4  29.4   MCHC 33.9  33.9     Lipid Panel:  Recent Labs   Lab 12/26/23  0535   CHOL 122   HDL 65   LDLCALC 48.4*   TRIG 43   CHOLHDL 53.3*     Cardiac Profile:  Recent Labs   Lab 12/25/23 2055   BNP 56         Monitor and Evaluation  Food and Nutrient Intake: energy intake, food and beverage intake  Food and Nutrient Adminstration: diet order  Knowledge/Beliefs/Attitudes: food and nutrition knowledge/skill, beliefs and attitudes  Physical Activity and Function: nutrition-related ADLs and IADLs  Anthropometric Measurements: weight  Biochemical Data, Medical Tests and Procedures: inflammatory profile, gastrointestinal profile, lipid profile, glucose/endocrine profile  Nutrition-Focused Physical Findings: overall appearance     Nutrition Risk  Level of Risk/Frequency of Follow-up:  (weekly)     Nutrition Follow-Up  RD Follow-up?: Yes      Bella Lozoya RD 12/27/2023 4:19 PM

## 2023-12-27 NOTE — PLAN OF CARE
Problem: Oral Intake Inadequate  Goal: Improved Oral Intake  Outcome: Ongoing, Progressing  Intervention: Promote and Optimize Oral Intake  Flowsheets (Taken 12/27/2023 1618)  Oral Nutrition Promotion: calorie-dense liquids provided

## 2023-12-27 NOTE — CARE UPDATE
12/26/23 2019   Patient Assessment/Suction   Level of Consciousness (AVPU) alert   Respiratory Effort Normal;Unlabored   Expansion/Accessory Muscles/Retractions no use of accessory muscles   Rhythm/Pattern, Respiratory pattern regular;unlabored   Cough Frequency no cough   PRE-TX-O2   Device (Oxygen Therapy) room air   SpO2 100 %   Pulse Oximetry Type Intermittent   $ Pulse Oximetry - Multiple Charge Pulse Oximetry - Multiple   Pulse 80   Resp 18   Positioning   Head of Bed (HOB) Positioning HOB elevated   Education   $ Education Other (see comment);15 min  (sats)   Respiratory Evaluation   $ Care Plan Tech Time 15 min   $ Eval/Re-eval Charges Evaluation

## 2023-12-27 NOTE — SUBJECTIVE & OBJECTIVE
Interval History: HIDA negative for acute anahi- otherwise pt is agitate with IV drips- otherwise tolerating diet-     Review of Systems   Constitutional:  Negative for activity change, appetite change, chills and fever.   HENT:  Negative for congestion, ear pain and nosebleeds.    Eyes:  Negative for itching and visual disturbance.   Respiratory:  Negative for apnea, cough, chest tightness, shortness of breath and wheezing.    Cardiovascular:  Negative for chest pain, palpitations and leg swelling.   Gastrointestinal:  Negative for abdominal distention, abdominal pain, constipation, diarrhea, nausea and vomiting.   Genitourinary:  Negative for dysuria and flank pain.   Musculoskeletal:  Negative for back pain.   Neurological:  Negative for dizziness and headaches.     Objective:     Vital Signs (Most Recent):  Temp: 98.4 °F (36.9 °C) (12/27/23 1115)  Pulse: 73 (12/27/23 1115)  Resp: 19 (12/27/23 1115)  BP: (!) 167/94 (nurse notfified valerie) (12/27/23 1115)  SpO2: 98 % (12/27/23 1115) Vital Signs (24h Range):  Temp:  [98 °F (36.7 °C)-98.4 °F (36.9 °C)] 98.4 °F (36.9 °C)  Pulse:  [61-80] 73  Resp:  [16-19] 19  SpO2:  [96 %-100 %] 98 %  BP: (115-167)/(70-94) 167/94     Weight: 63 kg (138 lb 14.2 oz)  Body mass index is 19.37 kg/m².    Intake/Output Summary (Last 24 hours) at 12/27/2023 1255  Last data filed at 12/27/2023 0931  Gross per 24 hour   Intake 680 ml   Output --   Net 680 ml         Physical Exam  Vitals reviewed.   Constitutional:       General: He is not in acute distress.     Appearance: Normal appearance. He is not ill-appearing, toxic-appearing or diaphoretic.   HENT:      Head: Normocephalic and atraumatic.      Mouth/Throat:      Mouth: Mucous membranes are moist.      Pharynx: Oropharynx is clear.   Eyes:      General: No scleral icterus.     Conjunctiva/sclera: Conjunctivae normal.   Neck:      Vascular: No carotid bruit.   Cardiovascular:      Rate and Rhythm: Normal rate and regular rhythm.       Pulses: Normal pulses.      Heart sounds: Normal heart sounds.   Pulmonary:      Effort: Pulmonary effort is normal.      Breath sounds: Normal breath sounds.   Abdominal:      General: Abdomen is flat. Bowel sounds are normal.      Palpations: Abdomen is soft.   Musculoskeletal:         General: Normal range of motion.   Skin:     General: Skin is warm.   Neurological:      General: No focal deficit present.      Mental Status: He is alert and oriented to person, place, and time. Mental status is at baseline.   Psychiatric:         Mood and Affect: Mood normal.         Behavior: Behavior normal.             Significant Labs: All pertinent labs within the past 24 hours have been reviewed.  BMP:   Recent Labs   Lab 12/26/23  0535 12/26/23  1219 12/27/23  0608   *  134*   < > 99   *  127*   < > 127*   K 3.8  3.8   < > 3.9   CL 99  99   < > 101   CO2 21*  21*   < > 19*   BUN 5*  5*   < > 5*   CREATININE 0.5  0.5   < > 0.5   CALCIUM 9.0  8.9   < > 8.6*   MG 1.9  --   --     < > = values in this interval not displayed.     CBC:   Recent Labs   Lab 12/26/23  0535 12/26/23  1219 12/27/23  0609   WBC 6.27 4.76 4.07  4.07   HGB 12.8* 11.9* 11.8*  11.8*   HCT 36.1* 34.5* 34.8*  34.8*   * 149* 133*  133*     CMP:   Recent Labs   Lab 12/25/23  2315 12/26/23  0535 12/26/23  1219 12/26/23  1810 12/26/23  2346 12/27/23  0608   * 128*  127*   < > 129* 129* 127*   K 3.8 3.8  3.8   < > 3.9 3.9 3.9   CL 96 99  99   < > 100 103 101   CO2 19* 21*  21*   < > 22* 21* 19*   * 134*  134*   < > 139* 109 99   BUN 5* 5*  5*   < > 5* 6* 5*   CREATININE 0.5 0.5  0.5   < > 0.5 0.5 0.5   CALCIUM 9.0 9.0  8.9   < > 9.1 8.4* 8.6*   PROT 7.1 7.0  --   --   --  6.3   ALBUMIN 3.8 3.8  --   --   --  3.4*   BILITOT 0.5 0.6  --   --   --  0.6   ALKPHOS 114 113  --   --   --  97   AST 19 18  --   --   --  17   ALT 15 14  --   --   --  12   ANIONGAP 9 8  7*   < > 7* 5* 7*    < > = values in this interval  not displayed.     Magnesium:   Recent Labs   Lab 12/25/23 2055 12/26/23  0535   MG 1.4* 1.9       Significant Imaging: I have reviewed all pertinent imaging results/findings within the past 24 hours.

## 2023-12-27 NOTE — ASSESSMENT & PLAN NOTE
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

## 2023-12-27 NOTE — PROGRESS NOTES
[facility] Medicine  Progress Note    Patient Name: Vic Reyez  MRN: [id number]  Patient Class: IP- Inpatient   Admission Date: 12/25/2023  Length of Stay: 1 days  Attending Physician: Angy Vee MD  Primary Care Provider: Nenita, Primary Doctor        Subjective:     Principal Problem:Alcohol-induced acute pancreatitis        HPI:  No notes on file    Overview/Hospital Course:  No notes on file    Interval History: HIDA negative for acute anahi- otherwise pt is agitate with IV drips- otherwise tolerating diet-     Review of Systems   Constitutional:  Negative for activity change, appetite change, chills and fever.   HENT:  Negative for congestion, ear pain and nosebleeds.    Eyes:  Negative for itching and visual disturbance.   Respiratory:  Negative for apnea, cough, chest tightness, shortness of breath and wheezing.    Cardiovascular:  Negative for chest pain, palpitations and leg swelling.   Gastrointestinal:  Negative for abdominal distention, abdominal pain, constipation, diarrhea, nausea and vomiting.   Genitourinary:  Negative for dysuria and flank pain.   Musculoskeletal:  Negative for back pain.   Neurological:  Negative for dizziness and headaches.     Objective:     Vital Signs (Most Recent):  Temp: 98.4 °F (36.9 °C) (12/27/23 1115)  Pulse: 73 (12/27/23 1115)  Resp: 19 (12/27/23 1115)  BP: (!) 167/94 (nurse notfified valerie) (12/27/23 1115)  SpO2: 98 % (12/27/23 1115) Vital Signs (24h Range):  Temp:  [98 °F (36.7 °C)-98.4 °F (36.9 °C)] 98.4 °F (36.9 °C)  Pulse:  [61-80] 73  Resp:  [16-19] 19  SpO2:  [96 %-100 %] 98 %  BP: (115-167)/(70-94) 167/94     Weight: 63 kg (138 lb 14.2 oz)  Body mass index is 19.37 kg/m².    Intake/Output Summary (Last 24 hours) at 12/27/2023 1255  Last data filed at 12/27/2023 0931  Gross per 24 hour   Intake 680 ml   Output --   Net 680 ml         Physical Exam  Vitals reviewed.   Constitutional:       General: He is not in acute distress.     Appearance:  Normal appearance. He is not ill-appearing, toxic-appearing or diaphoretic.   HENT:      Head: Normocephalic and atraumatic.      Mouth/Throat:      Mouth: Mucous membranes are moist.      Pharynx: Oropharynx is clear.   Eyes:      General: No scleral icterus.     Conjunctiva/sclera: Conjunctivae normal.   Neck:      Vascular: No carotid bruit.   Cardiovascular:      Rate and Rhythm: Normal rate and regular rhythm.      Pulses: Normal pulses.      Heart sounds: Normal heart sounds.   Pulmonary:      Effort: Pulmonary effort is normal.      Breath sounds: Normal breath sounds.   Abdominal:      General: Abdomen is flat. Bowel sounds are normal.      Palpations: Abdomen is soft.   Musculoskeletal:         General: Normal range of motion.   Skin:     General: Skin is warm.   Neurological:      General: No focal deficit present.      Mental Status: He is alert and oriented to person, place, and time. Mental status is at baseline.   Psychiatric:         Mood and Affect: Mood normal.         Behavior: Behavior normal.             Significant Labs: All pertinent labs within the past 24 hours have been reviewed.  BMP:   Recent Labs   Lab 12/26/23  0535 12/26/23  1219 12/27/23  0608   *  134*   < > 99   *  127*   < > 127*   K 3.8  3.8   < > 3.9   CL 99  99   < > 101   CO2 21*  21*   < > 19*   BUN 5*  5*   < > 5*   CREATININE 0.5  0.5   < > 0.5   CALCIUM 9.0  8.9   < > 8.6*   MG 1.9  --   --     < > = values in this interval not displayed.     CBC:   Recent Labs   Lab 12/26/23  0535 12/26/23  1219 12/27/23  0609   WBC 6.27 4.76 4.07  4.07   HGB 12.8* 11.9* 11.8*  11.8*   HCT 36.1* 34.5* 34.8*  34.8*   * 149* 133*  133*     CMP:   Recent Labs   Lab 12/25/23  2315 12/26/23  0535 12/26/23  1219 12/26/23  1810 12/26/23  2346 12/27/23  0608   * 128*  127*   < > 129* 129* 127*   K 3.8 3.8  3.8   < > 3.9 3.9 3.9   CL 96 99  99   < > 100 103 101   CO2 19* 21*  21*   < > 22* 21* 19*   *  134*  134*   < > 139* 109 99   BUN 5* 5*  5*   < > 5* 6* 5*   CREATININE 0.5 0.5  0.5   < > 0.5 0.5 0.5   CALCIUM 9.0 9.0  8.9   < > 9.1 8.4* 8.6*   PROT 7.1 7.0  --   --   --  6.3   ALBUMIN 3.8 3.8  --   --   --  3.4*   BILITOT 0.5 0.6  --   --   --  0.6   ALKPHOS 114 113  --   --   --  97   AST 19 18  --   --   --  17   ALT 15 14  --   --   --  12   ANIONGAP 9 8  7*   < > 7* 5* 7*    < > = values in this interval not displayed.     Magnesium:   Recent Labs   Lab 12/25/23 2055 12/26/23  0535   MG 1.4* 1.9       Significant Imaging: I have reviewed all pertinent imaging results/findings within the past 24 hours.    Assessment/Plan:      * Alcohol-induced acute pancreatitis  C/w IVF and c/w diet- getting better- pending GI eval then dc planning  Causing or from - splenic vein thrombosis  C/w pain management        Cholecystitis  Chronic anahi as per HIDA    Splenic vein thrombosis  Started heparin drip- will dc when on eliquis for at least 6 months-  If it does not get better - pt may need splenectomy       Hyponatremia  C/w NS 75 ml and restrict free fluids to 1200 ml per day    Chronic pancreatitis  C/w pain management       Primary hypertension  C/w home meds      VTE Risk Mitigation (From admission, onward)           Ordered     heparin 25,000 units in dextrose 5% (100 units/ml) IV bolus from bag - ADDITIONAL PRN BOLUS - 60 units/kg  As needed (PRN)        Question:  Heparin Infusion Adjustment (DO NOT MODIFY ANSWER)  Answer:  [email or web address]\epic\Images\Pharmacy\HeparinInfusions\heparin HIGH INTENSITY nomogram for [facility] [id number]    12/26/23 1154     heparin 25,000 units in dextrose 5% (100 units/ml) IV bolus from bag - ADDITIONAL PRN BOLUS - 30 units/kg  As needed (PRN)        Question:  Heparin Infusion Adjustment (DO NOT MODIFY ANSWER)  Answer:  [email or web address]\epic\Images\Pharmacy\HeparinInfusions\heparin HIGH INTENSITY nomogram for [facility] [id number].pdf    12/26/23 1154     heparin 25,000 units in dextrose 5%  (100 units/ml) IV bolus from bag INITIAL BOLUS  Once        Question:  Heparin Infusion Adjustment (DO NOT MODIFY ANSWER)  Answer:  [email or web address]\epic\Images\Pharmacy\HeparinInfusions\heparin HIGH INTENSITY nomogram for [facility] [id number].pdf    12/26/23 1154     heparin 25,000 units in dextrose 5% 250 mL (100 units/mL) infusion HIGH INTENSITY nomogram - [facility]  Continuous        Question:  Begin at (units/kg/hr)  Answer:  18    12/26/23 1154     IP VTE HIGH RISK PATIENT  Once         12/26/23 0305     Place sequential compression device  Until discontinued         12/26/23 0305                    Discharge Planning   NAFISA: 12/28/2023     Code Status: Full Code   Is the patient medically ready for discharge?:     Reason for patient still in hospital (select all that apply): Treatment  Discharge Plan A: Home                  Angy Vee MD  Department of Hospital Medicine   [facility]

## 2023-12-27 NOTE — PROGRESS NOTES
"[facility]  Adult Nutrition   Progress Note (Initial Assessment)     SUMMARY     Recommendations  Recommendation/Intervention:   1. Recommend Ensure HP for additional energy protein intake.   2. Continue regular diet and encourage pointake.   3. Honor food prefs to improve intake.   4. RD following.  Goals: Improve po intake > 50% intake meal by f/u.  Nutrition Goal Status: progressing towards goal    Dietitian Rounds Brief  MST 3 with 4 kg weight lost in the past 3 months. Per H&P: pt with PMH of Alcohol abuse, liver cirrhosis, previous pancreatitis.  He presented to hospital with abdominal pain.  Pt tolerating liquids and diet just advanced.    PES: Inadequate energy protein intake related to pancreatitis as evidence by labs, abdominal pain and  poor intake over the past few days.    Diet order:   Current Diet Order: Regular diet                 Evaluation of Received Nutrient/Fluid Intake  Energy Calories Required: not meeting needs  Protein Required: not meeting needs  Fluid Required: not meeting needs    Tolerance: tolerating     % Intake of Estimated Energy Needs: 25 - 50 %  % Meal Intake: 25 - 50 %      Intake/Output Summary (Last 24 hours) at 12/27/2023 1615  Last data filed at 12/27/2023 1434  Gross per 24 hour   Intake 920 ml   Output --   Net 920 ml        Anthropometrics  Temp: 98.4 °F (36.9 °C)  Height Method: Stated  Height: 5' 11" (180.3 cm)  Height (inches): 71 in  Weight Method: Bed Scale  Weight: 63 kg (138 lb 14.2 oz)  Weight (lb): 138.89 lb  Ideal Body Weight (IBW), Male: 172 lb  % Ideal Body Weight, Male (lb): 80.75 %  BMI (Calculated): 19.4  BMI Grade: 18.5-24.9 - normal       Estimated/Assessed Needs  Weight Used For Calorie Calculations: 63 kg (138 lb 14.2 oz)  Energy Calorie Requirements (kcal): 7575-9206 kcal (25-30 kcal/ kg bw)  Energy Need Method: Kcal/kg  Protein Requirements: 63-95 (1.0-1.5 gm/ kg bw)  Weight Used For Protein Calculations: 63 kg (138 lb 14.2 oz)   "   Estimated Fluid Requirement Method: RDA Method  RDA Method (mL): 1575       Reason for Assessment  Reason For Assessment: identified at risk by screening criteria  Relevant Medical History: Alcohol abuse,  Decompensated hepatic cirrhosis, Hypertension  Interdisciplinary Rounds: did not attend    Nutrition/Diet History  Patient Reported Diet/Restrictions/Preferences: general  Spiritual, Cultural Beliefs, Jewish Practices, Values that Affect Care: no  Food Allergies: NKFA  Factors Affecting Nutritional Intake: abdominal pain, decreased appetite, excessive alcohol intake    Nutrition Risk Screen  Nutrition Risk Screen: no indicators present     MST Score: 3  Have you recently lost weight without trying?: Yes: 14-23 lbs  Weight loss score: 2  Have you been eating poorly because of a decreased appetite?: Yes  Appetite score: 1       Weight History:  Wt Readings from Last 10 Encounters:   12/26/23 63 kg (138 lb 14.2 oz)   10/15/23 67 kg (147 lb 9.6 oz)   08/14/23 65.8 kg (145 lb)   06/21/23 66.2 kg (146 lb)   06/06/23 66 kg (145 lb 6.4 oz)   01/24/23 54.4 kg (120 lb)   11/30/22 52.2 kg (115 lb 1.3 oz)   11/18/22 59 kg (130 lb)   10/06/22 62.1 kg (136 lb 14.5 oz)   06/04/22 62.2 kg (137 lb 2 oz)        Lab/Procedures/Meds: Pertinent Labs/Meds Reviewed    Medications:Pertinent Medications Reviewed  Scheduled Meds:   folic acid  1 mg Oral Daily    heparin (PORCINE)  80 Units/kg (Adjusted) Intravenous Once    thiamine (B-1) 100 mg in dextrose 5 % (D5W) 100 mL IVPB  100 mg Intravenous Daily     Continuous Infusions:   sodium chloride 0.9% 75 mL/hr at 12/27/23 1256    heparin (porcine) in D5W 14 Units/kg/hr (12/27/23 0900)     PRN Meds:.heparin (PORCINE), heparin (PORCINE), ketorolac, melatonin, morphine, ondansetron, simethicone, sodium chloride 0.9%    Labs: Pertinent Labs Reviewed  Clinical Chemistry:  Recent Labs   Lab 12/25/23  2055 12/25/23  2315 12/26/23  0535 12/26/23  1219 12/27/23  0608 12/27/23  1158   *   "125* 124* 128*  127*   < > 127* 128*   K 3.6  3.6 3.8 3.8  3.8   < > 3.9 3.9   CL 95  95 96 99  99   < > 101 100   CO2 15*  15* 19* 21*  21*   < > 19* 21*   *  126* 138* 134*  134*   < > 99 131*   BUN 5*  5* 5* 5*  5*   < > 5* 5*   CREATININE 0.4*  0.4* 0.5 0.5  0.5   < > 0.5 0.5   CALCIUM 9.3  9.3 9.0 9.0  8.9   < > 8.6* 8.6*   PROT 7.3  7.3 7.1 7.0  --  6.3  --    ALBUMIN 4.0  4.0 3.8 3.8  --  3.4*  --    BILITOT 0.5  0.5 0.5 0.6  --  0.6  --    ALKPHOS 117  117 114 113  --  97  --    AST 20  20 19 18  --  17  --    ALT 16  16 15 14  --  12  --    ANIONGAP 15  15 9 8  7*   < > 7* 7*   MG 1.4*  --  1.9  --   --   --    LIPASE 50  --   --   --   --   --     < > = values in this interval not displayed.     CBC:   Recent Labs   Lab 12/27/23  0609   WBC 4.07  4.07   RBC 4.02*  4.02*   HGB 11.8*  11.8*   HCT 34.8*  34.8*   *  133*   MCV 87  87   MCH 29.4  29.4   MCHC 33.9  33.9     Lipid Panel:  Recent Labs   Lab 12/26/23  0535   CHOL 122   HDL 65   LDLCALC 48.4*   TRIG 43   CHOLHDL 53.3*     Cardiac Profile:  Recent Labs   Lab 12/25/23  2055   BNP 56     Inflammatory Labs:  No results for input(s): "CRP" in the last 168 hours.  Diabetes:  No results for input(s): "HGBA1C", "POCTGLUCOSE" in the last 168 hours.  Thyroid & Parathyroid:  No results for input(s): "TSH", "FREET4", "P1UPCZY", "V9QHKDL", "THYROIDAB" in the last 168 hours.    Monitor and Evaluation  Food and Nutrient Intake: energy intake, food and beverage intake  Food and Nutrient Adminstration: diet order  Knowledge/Beliefs/Attitudes: food and nutrition knowledge/skill, beliefs and attitudes  Physical Activity and Function: nutrition-related ADLs and IADLs  Anthropometric Measurements: weight  Biochemical Data, Medical Tests and Procedures: inflammatory profile, gastrointestinal profile, lipid profile, glucose/endocrine profile  Nutrition-Focused Physical Findings: overall appearance     Nutrition Risk  Level of " Risk/Frequency of Follow-up:  (weekly)     Nutrition Follow-Up  RD Follow-up?: Yes      Bella Lozoya RD 12/27/2023 4:15 PM

## 2023-12-27 NOTE — CONSULTS
GASTROENTEROLOGY INPATIENT CONSULT NOTE  Patient Name: Vic Reyez  Patient MRN: [id number]  Patient : 1950    Admit Date: 2023  Service date: 2023    Reason for Consult: acute / chronic pancreatitis.     PCP: No, Primary Doctor    Chief Complaint   Patient presents with    Abdominal Pain     Pt having epigastric pain that started this afternoon, pt was here for pancreatitis.       HPI: Patient is a 73 y.o. 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 Acute / subacute, intermittent, progressive on admission. Had beer on eileen day which was inciting factor. Feeling better now.   Followed by advanced GI / hepatology services at [facility] in [facility] as well.  .    CHART REVIEW:  HIDA  - slight delay but patent cystic / CBD  RUQ u/s  - GB sludge w/ borderline wall thickening 3mm; 7mm CBD; fatty liver  CT Abd  - acute / chronic panc w/ 4.5cm developing pseudocyst; Distended GB; cirrhosis w/ biliary dilation; suspected splenic vein thrombus.   Labs  - Nml CMP / lipase  EtOH + 10/'23   ERCP  - stent removed; improved biliary stricture  CT  - CAD/PVD; splenic artery embolization; biliary stent in place; stable TOP cyst; tics  EUS  - acute / chronic panc w/ focal irregularity in HOP s/p FNA. 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 Atypical cells. .     Past Medical History:  Past Medical History:   Diagnosis Date    Alcohol abuse 02/02/2022    Decompensated hepatic cirrhosis 02/02/2022    Encounter for blood transfusion     Hypertension     Lab test positive for detection of COVID-19 virus 09/2021    Pancreatic cyst 06/03/2022        Past Surgical History:  Past Surgical History:   Procedure Laterality Date    ABDOMINAL AORTOGRAPHY N/A 10/04/2022    Procedure: AORTOGRAM-ABDOMINAL;  Surgeon: Felice Epstein MD;  Location: [facility] CATH/EP LAB;  Service: Vascular;  Laterality: N/A;    APPENDECTOMY      ARTERIOGRAM, MESENTERIC N/A 10/04/2022    Procedure: ARTERIOGRAM, MESENTERIC;  Surgeon: Felice Epstein MD;  Location: [facility] CATH/EP LAB;  Service: Vascular;  Laterality: N/A;    COLONOSCOPY      ENDOSCOPIC ULTRASOUND OF UPPER GASTROINTESTINAL TRACT  02/04/2022    Procedure: ULTRASOUND, UPPER GI TRACT, ENDOSCOPIC;  Surgeon: Chuy Bay MD;  Location: [facility] ([address] [place] [address]);  Service: Endoscopy;;    ENDOSCOPIC ULTRASOUND OF UPPER GASTROINTESTINAL TRACT N/A 06/03/2022    Procedure: ULTRASOUND, UPPER GI TRACT, ENDOSCOPIC;  Surgeon: Roger Viveros III, MD;  Location: [facility] ENDO;  Service: Endoscopy;  Laterality: N/A;    ENDOSCOPIC ULTRASOUND OF UPPER GASTROINTESTINAL TRACT N/A 11/07/2022    Procedure: ULTRASOUND, UPPER GI TRACT, ENDOSCOPIC;  Surgeon: Roger Viveros III, MD;  Location: [facility] ENDO;  Service: Endoscopy;  Laterality: N/A;    ERCP N/A 02/04/2022    Procedure: ERCP (ENDOSCOPIC RETROGRADE CHOLANGIOPANCREATOGRAPHY);  Surgeon: Chuy Bay MD;  Location: [facility] ENDO (2ND FLR);  Service: Endoscopy;  Laterality: N/A;    ERCP N/A 04/19/2022    Procedure: ERCP (ENDOSCOPIC RETROGRADE CHOLANGIOPANCREATOGRAPHY);  Surgeon: Chuy Bay MD;  Location: [facility] ENDO (2ND FLR);  Service: Endoscopy;  Laterality: N/A;  4/1: fully vaccinated. labs prior. instructions mailed to sister and patient.-SC  4/12/22-Confirmed new arrival  time of 10:45am with pt's sister and updated instructions emailed-DS    ERCP N/A 10/05/2022    Procedure: ERCP (ENDOSCOPIC RETROGRADE CHOLANGIOPANCREATOGRAPHY);  Surgeon: Roger Viveros III, MD;  Location: [facility] ENDO;  Service: Endoscopy;  Laterality: N/A;    ERCP N/A 1/24/2023    Procedure: ERCP (ENDOSCOPIC RETROGRADE CHOLANGIOPANCREATOGRAPHY);  Surgeon: Roger Viveros III, MD;  Location: [facility] ENDO;  Service: Endoscopy;  Laterality: N/A;    EYE SURGERY Left     JOINT REPLACEMENT Bilateral     knee replacement    KNEE SURGERY      RECONSTRUCTION OF SHOULDER Right     TONSILLECTOMY      UPPER ENDOSCOPIC ULTRASOUND W/ FNA  06/03/2022    VITRECTOMY BY PARS PLANA APPROACH Left 8/14/2023    Procedure: VITRECTOMY, PARS PLANA APPROACH;  Surgeon: Tee Crespo MD;  Location: [address];  Service: Ophthalmology;  Laterality: Left;        Home Medications:  Medications Prior to Admission   Medication Sig Dispense Refill Last Dose    aspirin 325 MG tablet Take 325 mg by mouth once daily.       cholecalciferol, vitamin D3, (VITAMIN D3) 25 mcg (1,000 unit) capsule Take 1,000 Units by mouth once daily.       HYDROcodone-acetaminophen (NORCO) 5-325 mg per tablet TAKE 1 TABLET BY MOUTH EVERY 4 TO 6 HOURS AS NEEDED FOR PAIN 30 tablet 0     multivit-mins no.63/iron/folic (M-VIT ORAL) Take 1 tablet by mouth once daily.       omega-3 fatty acids/fish oil (FISH OIL-OMEGA-3 FATTY ACIDS) 300-1,000 mg capsule Take 1 capsule by mouth once daily.       predniSONE (DELTASONE) 10 MG tablet Take 40 mg by mouth.       sodium chloride 5% (HUMAIRA 128) 5 % ophthalmic solution Place 1 drop into both eyes every 4 (four) hours as needed. 15 mL 6     ursodioL (ACTIGALL) 300 mg capsule Take 1 capsule (300 mg total) by mouth 2 (two) times daily. 60 capsule 11        Inpatient Medications:   folic acid  1 mg Oral Daily    heparin (PORCINE)  80 Units/kg (Adjusted) Intravenous Once    thiamine (B-1) 100 mg in dextrose 5 % (D5W) 100 mL IVPB   "100 mg Intravenous Daily     heparin (PORCINE), heparin (PORCINE), ketorolac, melatonin, morphine, ondansetron, simethicone, sodium chloride 0.9%    Review of patient's allergies indicates:  No Known Allergies    Social History:   Social History     Occupational History    Not on file   Tobacco Use    Smoking status: Every Day     Current packs/day: 0.25     Average packs/day: 0.3 packs/day for 40.0 years (10.0 ttl pk-yrs)     Types: Cigarettes    Smokeless tobacco: Never    Tobacco comments:     Pt has smoked on and off since age 19. Currently he smokes 1 pack per week and has cut back a lot throughout the years. No patch needed per his hospital visit. Information and education provided on our outpatient smoking cessation program.   Substance and Sexual Activity    Alcohol use: Yes     Alcohol/week: 10.0 standard drinks of alcohol     Types: 10 Cans of beer per week    Drug use: Never    Sexual activity: Not Currently       Family History:   No family history on file.    Review of Systems:  A 10 point review of systems was performed and was normal, except as mentioned in the HPI, including constitutional, HEENT, heme, lymph, cardiovascular, respiratory, gastrointestinal, genitourinary, neurologic, endocrine, psychiatric and musculoskeletal.      OBJECTIVE:    Physical Exam:  24 Hour Vital Sign Ranges: Temp:  [98 °F (36.7 °C)-98.4 °F (36.9 °C)] 98.4 °F (36.9 °C)  Pulse:  [61-80] 65  Resp:  [17-19] 19  SpO2:  [96 %-100 %] 99 %  BP: (115-167)/(70-96) 153/96  Most recent vitals: BP (!) 153/96 Comment: nurse notified valerie  Pulse 65   Temp 98.4 °F (36.9 °C) (Oral)   Resp 19   Ht 5' 11" (1.803 m)   Wt 63 kg (138 lb 14.2 oz)   SpO2 99%   BMI 19.37 kg/m²    GEN: well-developed, well-nourished, awake and alert, non-toxic appearing adult  HEENT: PERRL, sclera anicteric, oral mucosa pink and moist without lesion  NECK: trachea midline; Good ROM  CV: regular rate and rhythm, no murmurs or gallops  RESP: clear to " "auscultation bilaterally, no wheezes, rhonci or rales  ABD: soft, non-tender, non-distended, normal bowel sounds  EXT: no swelling or edema, 2+ pulses distally  SKIN: no rashes or jaundice  PSYCH: normal affect    Labs:   Recent Labs     12/26/23  0535 12/26/23  1219 12/27/23  0609   WBC 6.27 4.76 4.07  4.07   MCV 86 86 87  87   * 149* 133*  133*     Recent Labs     12/26/23  2346 12/27/23  0608 12/27/23  1158   * 127* 128*   K 3.9 3.9 3.9    101 100   CO2 21* 19* 21*   BUN 6* 5* 5*    99 131*     No results for input(s): "ALB" in the last 72 hours.    Invalid input(s): "ALKP", "SGOT", "SGPT", "TBIL", "DBIL", "TPRO"  Recent Labs     12/26/23  1219   INR 1.1         Radiology Review:  NM Hepatobiliary Scan (HIDA)   Final Result      US Abdomen Limited   Final Result      CT Abdomen Pelvis With IV Contrast NO Oral Contrast   Final Result            IMPRESSION / RECOMMENDATIONS:  73 y.o. male with PMHx appy, cirrhosis, pancreatitis, EtOH / tobacco use, biliary stricture s/p past ERCP (EtOH panc vs IgG4 related), splenic artery embolization presents for evaluation of abdominal pain w/ suspected mild acute on chronic pancreatitis and splenic vein thrombosis based on CT Abd. Feeling better, pratik PO w/o any concerning features at this time    -Conservative for now form GI perspective  -Again stressed EtOh cessation  -Ok to d/c in AM as long as no issues.     Thank you for this consult.    Roger BERTRAND Dauterive III  12/27/2023  5:22 PM        "

## 2023-12-28 VITALS
WEIGHT: 138.88 LBS | TEMPERATURE: 99 F | BODY MASS INDEX: 19.44 KG/M2 | RESPIRATION RATE: 18 BRPM | DIASTOLIC BLOOD PRESSURE: 89 MMHG | HEART RATE: 65 BPM | HEIGHT: 71 IN | OXYGEN SATURATION: 99 % | SYSTOLIC BLOOD PRESSURE: 147 MMHG

## 2023-12-28 LAB
ALBUMIN SERPL BCP-MCNC: 3.8 G/DL (ref 3.5–5.2)
ALP SERPL-CCNC: 99 U/L (ref 55–135)
ALT SERPL W/O P-5'-P-CCNC: 13 U/L (ref 10–44)
ANION GAP SERPL CALC-SCNC: 6 MMOL/L (ref 8–16)
ANION GAP SERPL CALC-SCNC: 7 MMOL/L (ref 8–16)
APTT PPP: 38.7 SEC (ref 21–32)
AST SERPL-CCNC: 17 U/L (ref 10–40)
BASOPHILS # BLD AUTO: 0.02 K/UL (ref 0–0.2)
BASOPHILS # BLD AUTO: 0.02 K/UL (ref 0–0.2)
BASOPHILS NFR BLD: 0.5 % (ref 0–1.9)
BASOPHILS NFR BLD: 0.5 % (ref 0–1.9)
BILIRUB SERPL-MCNC: 0.7 MG/DL (ref 0.1–1)
BUN SERPL-MCNC: 4 MG/DL (ref 8–23)
BUN SERPL-MCNC: 7 MG/DL (ref 8–23)
CALCIUM SERPL-MCNC: 8.3 MG/DL (ref 8.7–10.5)
CALCIUM SERPL-MCNC: 8.9 MG/DL (ref 8.7–10.5)
CANCER AG19-9 SERPL-ACNC: 14 U/ML (ref 0–35)
CHLORIDE SERPL-SCNC: 100 MMOL/L (ref 95–110)
CHLORIDE SERPL-SCNC: 100 MMOL/L (ref 95–110)
CO2 SERPL-SCNC: 23 MMOL/L (ref 23–29)
CO2 SERPL-SCNC: 23 MMOL/L (ref 23–29)
CREAT SERPL-MCNC: 0.5 MG/DL (ref 0.5–1.4)
CREAT SERPL-MCNC: 0.6 MG/DL (ref 0.5–1.4)
DIFFERENTIAL METHOD BLD: ABNORMAL
DIFFERENTIAL METHOD BLD: ABNORMAL
EOSINOPHIL # BLD AUTO: 0.6 K/UL (ref 0–0.5)
EOSINOPHIL # BLD AUTO: 0.6 K/UL (ref 0–0.5)
EOSINOPHIL NFR BLD: 15.4 % (ref 0–8)
EOSINOPHIL NFR BLD: 15.4 % (ref 0–8)
ERYTHROCYTE [DISTWIDTH] IN BLOOD BY AUTOMATED COUNT: 14.3 % (ref 11.5–14.5)
ERYTHROCYTE [DISTWIDTH] IN BLOOD BY AUTOMATED COUNT: 14.3 % (ref 11.5–14.5)
EST. GFR  (NO RACE VARIABLE): >60 ML/MIN/1.73 M^2
EST. GFR  (NO RACE VARIABLE): >60 ML/MIN/1.73 M^2
GLUCOSE SERPL-MCNC: 164 MG/DL (ref 70–110)
GLUCOSE SERPL-MCNC: 201 MG/DL (ref 70–110)
HCT VFR BLD AUTO: 35.1 % (ref 40–54)
HCT VFR BLD AUTO: 35.1 % (ref 40–54)
HGB BLD-MCNC: 12.1 G/DL (ref 14–18)
HGB BLD-MCNC: 12.1 G/DL (ref 14–18)
IMM GRANULOCYTES # BLD AUTO: 0.01 K/UL (ref 0–0.04)
IMM GRANULOCYTES # BLD AUTO: 0.01 K/UL (ref 0–0.04)
IMM GRANULOCYTES NFR BLD AUTO: 0.3 % (ref 0–0.5)
IMM GRANULOCYTES NFR BLD AUTO: 0.3 % (ref 0–0.5)
LYMPHOCYTES # BLD AUTO: 1.4 K/UL (ref 1–4.8)
LYMPHOCYTES # BLD AUTO: 1.4 K/UL (ref 1–4.8)
LYMPHOCYTES NFR BLD: 34.7 % (ref 18–48)
LYMPHOCYTES NFR BLD: 34.7 % (ref 18–48)
MCH RBC QN AUTO: 29.7 PG (ref 27–31)
MCH RBC QN AUTO: 29.7 PG (ref 27–31)
MCHC RBC AUTO-ENTMCNC: 34.5 G/DL (ref 32–36)
MCHC RBC AUTO-ENTMCNC: 34.5 G/DL (ref 32–36)
MCV RBC AUTO: 86 FL (ref 82–98)
MCV RBC AUTO: 86 FL (ref 82–98)
MONOCYTES # BLD AUTO: 0.5 K/UL (ref 0.3–1)
MONOCYTES # BLD AUTO: 0.5 K/UL (ref 0.3–1)
MONOCYTES NFR BLD: 12.6 % (ref 4–15)
MONOCYTES NFR BLD: 12.6 % (ref 4–15)
NEUTROPHILS # BLD AUTO: 1.4 K/UL (ref 1.8–7.7)
NEUTROPHILS # BLD AUTO: 1.4 K/UL (ref 1.8–7.7)
NEUTROPHILS NFR BLD: 36.5 % (ref 38–73)
NEUTROPHILS NFR BLD: 36.5 % (ref 38–73)
NRBC BLD-RTO: 0 /100 WBC
NRBC BLD-RTO: 0 /100 WBC
OSMOLALITY SERPL: 273 MOSM/KG (ref 280–300)
OSMOLALITY UR: 240 MOSM/KG (ref 50–1200)
PLATELET # BLD AUTO: 152 K/UL (ref 150–450)
PLATELET # BLD AUTO: 152 K/UL (ref 150–450)
PMV BLD AUTO: 10.1 FL (ref 9.2–12.9)
PMV BLD AUTO: 10.1 FL (ref 9.2–12.9)
POTASSIUM SERPL-SCNC: 3.5 MMOL/L (ref 3.5–5.1)
POTASSIUM SERPL-SCNC: 3.9 MMOL/L (ref 3.5–5.1)
PROT SERPL-MCNC: 7.1 G/DL (ref 6–8.4)
RBC # BLD AUTO: 4.07 M/UL (ref 4.6–6.2)
RBC # BLD AUTO: 4.07 M/UL (ref 4.6–6.2)
SODIUM SERPL-SCNC: 129 MMOL/L (ref 136–145)
SODIUM SERPL-SCNC: 130 MMOL/L (ref 136–145)
WBC # BLD AUTO: 3.89 K/UL (ref 3.9–12.7)
WBC # BLD AUTO: 3.89 K/UL (ref 3.9–12.7)

## 2023-12-28 PROCEDURE — 25000003 PHARM REV CODE 250: Performed by: INTERNAL MEDICINE

## 2023-12-28 PROCEDURE — 83930 ASSAY OF BLOOD OSMOLALITY: CPT | Performed by: STUDENT IN AN ORGANIZED HEALTH CARE EDUCATION/TRAINING PROGRAM

## 2023-12-28 PROCEDURE — 85025 COMPLETE CBC W/AUTO DIFF WBC: CPT | Performed by: INTERNAL MEDICINE

## 2023-12-28 PROCEDURE — 25000003 PHARM REV CODE 250: Performed by: STUDENT IN AN ORGANIZED HEALTH CARE EDUCATION/TRAINING PROGRAM

## 2023-12-28 PROCEDURE — 36415 COLL VENOUS BLD VENIPUNCTURE: CPT | Performed by: INTERNAL MEDICINE

## 2023-12-28 PROCEDURE — 63600175 PHARM REV CODE 636 W HCPCS: Performed by: INTERNAL MEDICINE

## 2023-12-28 PROCEDURE — 80048 BASIC METABOLIC PNL TOTAL CA: CPT | Performed by: INTERNAL MEDICINE

## 2023-12-28 PROCEDURE — 36415 COLL VENOUS BLD VENIPUNCTURE: CPT | Performed by: STUDENT IN AN ORGANIZED HEALTH CARE EDUCATION/TRAINING PROGRAM

## 2023-12-28 PROCEDURE — 80053 COMPREHEN METABOLIC PANEL: CPT | Performed by: INTERNAL MEDICINE

## 2023-12-28 PROCEDURE — 85730 THROMBOPLASTIN TIME PARTIAL: CPT | Performed by: STUDENT IN AN ORGANIZED HEALTH CARE EDUCATION/TRAINING PROGRAM

## 2023-12-28 RX ADMIN — MORPHINE SULFATE 2 MG: 2 INJECTION, SOLUTION INTRAMUSCULAR; INTRAVENOUS at 11:12

## 2023-12-28 RX ADMIN — SODIUM CHLORIDE: 0.9 INJECTION, SOLUTION INTRAVENOUS at 05:12

## 2023-12-28 RX ADMIN — MORPHINE SULFATE 2 MG: 2 INJECTION, SOLUTION INTRAMUSCULAR; INTRAVENOUS at 03:12

## 2023-12-28 RX ADMIN — FOLIC ACID 1 MG: 1 TABLET ORAL at 11:12

## 2023-12-28 NOTE — PLAN OF CARE
Discharge orders and chart reviewed with no further post-acute discharge needs identified at this time.  At this time, patient is cleared for discharge from Case Management standpoint.        12/28/23 1112   Final Note   Assessment Type Final Discharge Note   Anticipated Discharge Disposition Home   Hospital Resources/Appts/Education Provided   (Patient was recently assigned a PCP by Gen and will schedule his post hospital follow up appointment himself.)   Post-Acute Status   Coverage Gen   Discharge Delays None known at this time

## 2023-12-28 NOTE — HOSPITAL COURSE
Pt was found to be in acute on chronic pancreatitis which has got better- also found to have splenic vein thrombosis for which low dose Eliquis being prescribed.

## 2023-12-28 NOTE — DISCHARGE SUMMARY
[facility] Medicine  Discharge Summary      Patient Name: Vic Reyez  MRN: [id number]  DEVAUGHN: [id number]  Patient Class: IP- Inpatient  Admission Date: 12/25/2023  Hospital Length of Stay: 2 days  Discharge Date and Time: No discharge date for patient encounter.  Attending Physician: Ama Vee MD   Discharging Provider: Ama Vee MD  Primary Care Provider: Nenita, Primary Doctor    Primary Care Team: Networked reference to record PCT     HPI:   No notes on file    * No surgery found *      Hospital Course:   Pt was found to be in acute on chronic pancreatitis which has got better- also found to have splenic vein thrombosis for which low dose Eliquis being prescribed.      Goals of Care Treatment Preferences:  Code Status: Full Code      Consults:   Consults (From admission, onward)          Status Ordering Provider     Inpatient consult to Gastroenterology  Once        Provider:  Alex Kim MD    Completed AMA VEE            No new Assessment & Plan notes have been filed under this hospital service since the last note was generated.  Service: Hospital Medicine    Final Active Diagnoses:    Diagnosis Date Noted POA    PRINCIPAL PROBLEM:  Alcohol-induced acute pancreatitis [K85.20] 12/26/2023 Yes    Hyponatremia [E87.1] 12/26/2023 Unknown    Splenic vein thrombosis [I82.890] 12/26/2023 Unknown    Cholecystitis [K81.9] 12/26/2023 Unknown    Chronic pancreatitis [K86.1] 11/30/2022 Yes    Primary hypertension [I10] 02/02/2022 Yes     Chronic      Problems Resolved During this Admission:       Discharged Condition: stable    Disposition: Home or Self Care    Follow Up:    Patient Instructions:      Ambulatory referral/consult to Gastroenterology   Standing Status: Future   Referral Priority: Routine Referral Type: Consultation   Referral Reason: Specialty Services Required   Requested Specialty: Gastroenterology   Number of Visits Requested: 1     Notify your health care  provider if you experience any of the following:  temperature >100.4     Notify your health care provider if you experience any of the following:  persistent nausea and vomiting or diarrhea     Notify your health care provider if you experience any of the following:  severe uncontrolled pain     Notify your health care provider if you experience any of the following:  redness, tenderness, or signs of infection (pain, swelling, redness, odor or green/yellow discharge around incision site)     Notify your health care provider if you experience any of the following:  difficulty breathing or increased cough     Notify your health care provider if you experience any of the following:  severe persistent headache     Notify your health care provider if you experience any of the following:  worsening rash     Notify your health care provider if you experience any of the following:  persistent dizziness, light-headedness, or visual disturbances     Notify your health care provider if you experience any of the following:  increased confusion or weakness     Notify your health care provider if you experience any of the following:     Activity as tolerated       Significant Diagnostic Studies: Labs: All labs within the past 24 hours have been reviewed    Pending Diagnostic Studies:       Procedure Component Value Units Date/Time    Basic metabolic panel [[id number]]     Order Status: Sent Lab Status: No result     Specimen: Blood     Osmolality, Serum [[id number]] Collected: 12/28/23 0833    Order Status: Sent Lab Status: In process Updated: 12/28/23 0912    Specimen: Blood     Osmolality, urine [[id number]] Collected: 12/27/23 1557    Order Status: Sent Lab Status: In process Updated: 12/27/23 1605    Specimen: Urine, Clean Catch            Medications:  Reconciled Home Medications:      Medication List        START taking these medications      apixaban 2.5 mg Tab  Commonly known as: ELIQUIS  Take 1 tablet (2.5 mg total) by  mouth 2 (two) times daily.            CONTINUE taking these medications      aspirin 325 MG tablet  Take 325 mg by mouth once daily.     fish oil-omega-3 fatty acids 300-1,000 mg capsule  Take 1 capsule by mouth once daily.     HYDROcodone-acetaminophen 5-325 mg per tablet  Commonly known as: NORCO  TAKE 1 TABLET BY MOUTH EVERY 4 TO 6 HOURS AS NEEDED FOR PAIN     M-VIT ORAL  Take 1 tablet by mouth once daily.     sodium chloride 5% 5 % ophthalmic solution  Commonly known as: HUMAIRA 128  Place 1 drop into both eyes every 4 (four) hours as needed.     ursodioL 300 mg capsule  Commonly known as: ACTIGALL  Take 1 capsule (300 mg total) by mouth 2 (two) times daily.     VITAMIN D3 25 mcg (1,000 unit) capsule  Generic drug: cholecalciferol (vitamin D3)  Take 1,000 Units by mouth once daily.            STOP taking these medications      predniSONE 10 MG tablet  Commonly known as: DELTASONE              Indwelling Lines/Drains at time of discharge:   Lines/Drains/Airways       None                   Time spent on the discharge of patient: 25 minutes         Angy Vee MD  Department of Hospital Medicine  [facility]

## 2024-01-03 DIAGNOSIS — J41.0 SMOKERS' COUGH: ICD-10-CM

## 2024-01-03 DIAGNOSIS — I70.90 ATHEROSCLEROSIS OF ARTERY: ICD-10-CM

## 2024-01-03 DIAGNOSIS — F17.200 TOBACCO DEPENDENCE: ICD-10-CM

## 2024-01-03 DIAGNOSIS — Z09 HOSPITAL DISCHARGE FOLLOW-UP: Primary | ICD-10-CM

## 2024-02-19 ENCOUNTER — TELEPHONE (OUTPATIENT)
Dept: OPHTHALMOLOGY | Facility: CLINIC | Age: 74
End: 2024-02-19
Payer: MEDICARE

## 2024-03-30 ENCOUNTER — HOSPITAL ENCOUNTER (OUTPATIENT)
Facility: HOSPITAL | Age: 74
Discharge: HOME | DRG: 641 | End: 2024-03-31
Attending: EMERGENCY MEDICINE | Admitting: HOSPITALIST
Payer: MEDICARE

## 2024-03-30 DIAGNOSIS — R52 PAIN: ICD-10-CM

## 2024-03-30 DIAGNOSIS — R33.9 URINARY RETENTION: ICD-10-CM

## 2024-03-30 DIAGNOSIS — R07.9 CHEST PAIN: ICD-10-CM

## 2024-03-30 DIAGNOSIS — E87.1 HYPONATREMIA: ICD-10-CM

## 2024-03-30 DIAGNOSIS — R10.32 LEFT LOWER QUADRANT ABDOMINAL PAIN: Primary | ICD-10-CM

## 2024-03-30 LAB
ALBUMIN SERPL BCP-MCNC: 4.2 G/DL (ref 3.5–5.2)
ALP SERPL-CCNC: 100 U/L (ref 55–135)
ALT SERPL W/O P-5'-P-CCNC: 18 U/L (ref 10–44)
ANION GAP SERPL CALC-SCNC: 10 MMOL/L (ref 8–16)
ANION GAP SERPL CALC-SCNC: 5 MMOL/L (ref 8–16)
ANION GAP SERPL CALC-SCNC: 7 MMOL/L (ref 8–16)
AST SERPL-CCNC: 24 U/L (ref 10–40)
BASOPHILS # BLD AUTO: 0.02 K/UL (ref 0–0.2)
BASOPHILS NFR BLD: 0.5 % (ref 0–1.9)
BILIRUB SERPL-MCNC: 0.4 MG/DL (ref 0.1–1)
BILIRUB UR QL STRIP: NEGATIVE
BUN SERPL-MCNC: 3 MG/DL (ref 8–23)
BUN SERPL-MCNC: 4 MG/DL (ref 8–23)
BUN SERPL-MCNC: 6 MG/DL (ref 8–23)
CALCIUM SERPL-MCNC: 6.2 MG/DL (ref 8.7–10.5)
CALCIUM SERPL-MCNC: 9 MG/DL (ref 8.7–10.5)
CALCIUM SERPL-MCNC: 9 MG/DL (ref 8.7–10.5)
CHLORIDE SERPL-SCNC: 104 MMOL/L (ref 95–110)
CHLORIDE SERPL-SCNC: 87 MMOL/L (ref 95–110)
CHLORIDE SERPL-SCNC: 94 MMOL/L (ref 95–110)
CLARITY UR: CLEAR
CO2 SERPL-SCNC: 17 MMOL/L (ref 23–29)
CO2 SERPL-SCNC: 20 MMOL/L (ref 23–29)
CO2 SERPL-SCNC: 23 MMOL/L (ref 23–29)
COLOR UR: YELLOW
CREAT SERPL-MCNC: 0.2 MG/DL (ref 0.5–1.4)
CREAT SERPL-MCNC: 0.5 MG/DL (ref 0.5–1.4)
CREAT SERPL-MCNC: 0.5 MG/DL (ref 0.5–1.4)
DIFFERENTIAL METHOD BLD: ABNORMAL
EOSINOPHIL # BLD AUTO: 1 K/UL (ref 0–0.5)
EOSINOPHIL NFR BLD: 24.4 % (ref 0–8)
ERYTHROCYTE [DISTWIDTH] IN BLOOD BY AUTOMATED COUNT: 14.3 % (ref 11.5–14.5)
EST. GFR  (NO RACE VARIABLE): >60 ML/MIN/1.73 M^2
ETHANOL SERPL-MCNC: <10 MG/DL
GLUCOSE SERPL-MCNC: 129 MG/DL (ref 70–110)
GLUCOSE SERPL-MCNC: 84 MG/DL (ref 70–110)
GLUCOSE SERPL-MCNC: 97 MG/DL (ref 70–110)
GLUCOSE UR QL STRIP: NEGATIVE
HCT VFR BLD AUTO: 36.6 % (ref 40–54)
HGB BLD-MCNC: 12.7 G/DL (ref 14–18)
HGB UR QL STRIP: NEGATIVE
IMM GRANULOCYTES # BLD AUTO: 0.01 K/UL (ref 0–0.04)
IMM GRANULOCYTES NFR BLD AUTO: 0.2 % (ref 0–0.5)
KETONES UR QL STRIP: NEGATIVE
LEUKOCYTE ESTERASE UR QL STRIP: NEGATIVE
LIPASE SERPL-CCNC: 5 U/L (ref 4–60)
LYMPHOCYTES # BLD AUTO: 1.2 K/UL (ref 1–4.8)
LYMPHOCYTES NFR BLD: 29 % (ref 18–48)
MAGNESIUM SERPL-MCNC: 1.3 MG/DL (ref 1.6–2.6)
MCH RBC QN AUTO: 28.6 PG (ref 27–31)
MCHC RBC AUTO-ENTMCNC: 34.7 G/DL (ref 32–36)
MCV RBC AUTO: 82 FL (ref 82–98)
MONOCYTES # BLD AUTO: 0.4 K/UL (ref 0.3–1)
MONOCYTES NFR BLD: 10.6 % (ref 4–15)
NEUTROPHILS # BLD AUTO: 1.5 K/UL (ref 1.8–7.7)
NEUTROPHILS NFR BLD: 35.3 % (ref 38–73)
NITRITE UR QL STRIP: NEGATIVE
NRBC BLD-RTO: 0 /100 WBC
OSMOLALITY UR: 87 MOSM/KG (ref 50–1200)
PH UR STRIP: 6 [PH] (ref 5–8)
PLATELET # BLD AUTO: 129 K/UL (ref 150–450)
PMV BLD AUTO: 9.1 FL (ref 9.2–12.9)
POTASSIUM SERPL-SCNC: 3 MMOL/L (ref 3.5–5.1)
POTASSIUM SERPL-SCNC: 3.5 MMOL/L (ref 3.5–5.1)
POTASSIUM SERPL-SCNC: 3.9 MMOL/L (ref 3.5–5.1)
PROT SERPL-MCNC: 7.8 G/DL (ref 6–8.4)
PROT UR QL STRIP: NEGATIVE
RBC # BLD AUTO: 4.44 M/UL (ref 4.6–6.2)
SODIUM SERPL-SCNC: 117 MMOL/L (ref 136–145)
SODIUM SERPL-SCNC: 124 MMOL/L (ref 136–145)
SODIUM SERPL-SCNC: 126 MMOL/L (ref 136–145)
SODIUM UR-SCNC: 23 MMOL/L (ref 20–250)
SP GR UR STRIP: 1.01 (ref 1–1.03)
TSH SERPL DL<=0.005 MIU/L-ACNC: 1.11 UIU/ML (ref 0.34–5.6)
URN SPEC COLLECT METH UR: NORMAL
UROBILINOGEN UR STRIP-ACNC: NEGATIVE EU/DL
WBC # BLD AUTO: 4.14 K/UL (ref 3.9–12.7)

## 2024-03-30 PROCEDURE — 99900035 HC TECH TIME PER 15 MIN (STAT)

## 2024-03-30 PROCEDURE — 84300 ASSAY OF URINE SODIUM: CPT | Performed by: INTERNAL MEDICINE

## 2024-03-30 PROCEDURE — 99900031 HC PATIENT EDUCATION (STAT)

## 2024-03-30 PROCEDURE — 83935 ASSAY OF URINE OSMOLALITY: CPT | Performed by: INTERNAL MEDICINE

## 2024-03-30 PROCEDURE — 12000002 HC ACUTE/MED SURGE SEMI-PRIVATE ROOM

## 2024-03-30 PROCEDURE — 82077 ASSAY SPEC XCP UR&BREATH IA: CPT | Performed by: EMERGENCY MEDICINE

## 2024-03-30 PROCEDURE — 51702 INSERT TEMP BLADDER CATH: CPT

## 2024-03-30 PROCEDURE — 25000003 PHARM REV CODE 250: Performed by: HOSPITALIST

## 2024-03-30 PROCEDURE — 63600175 PHARM REV CODE 636 W HCPCS: Performed by: EMERGENCY MEDICINE

## 2024-03-30 PROCEDURE — 80048 BASIC METABOLIC PNL TOTAL CA: CPT | Mod: 91 | Performed by: HOSPITALIST

## 2024-03-30 PROCEDURE — 25000003 PHARM REV CODE 250: Performed by: EMERGENCY MEDICINE

## 2024-03-30 PROCEDURE — 84443 ASSAY THYROID STIM HORMONE: CPT | Performed by: NURSE PRACTITIONER

## 2024-03-30 PROCEDURE — 85025 COMPLETE CBC W/AUTO DIFF WBC: CPT | Performed by: NURSE PRACTITIONER

## 2024-03-30 PROCEDURE — 80053 COMPREHEN METABOLIC PANEL: CPT | Performed by: NURSE PRACTITIONER

## 2024-03-30 PROCEDURE — 25000003 PHARM REV CODE 250: Performed by: INTERNAL MEDICINE

## 2024-03-30 PROCEDURE — 81003 URINALYSIS AUTO W/O SCOPE: CPT | Performed by: NURSE PRACTITIONER

## 2024-03-30 PROCEDURE — 83735 ASSAY OF MAGNESIUM: CPT | Performed by: HOSPITALIST

## 2024-03-30 PROCEDURE — G0378 HOSPITAL OBSERVATION PER HR: HCPCS

## 2024-03-30 PROCEDURE — 96361 HYDRATE IV INFUSION ADD-ON: CPT | Mod: 59

## 2024-03-30 PROCEDURE — 94761 N-INVAS EAR/PLS OXIMETRY MLT: CPT | Mod: GZ

## 2024-03-30 PROCEDURE — 94799 UNLISTED PULMONARY SVC/PX: CPT

## 2024-03-30 PROCEDURE — 25500020 PHARM REV CODE 255: Performed by: EMERGENCY MEDICINE

## 2024-03-30 PROCEDURE — 96374 THER/PROPH/DIAG INJ IV PUSH: CPT | Mod: 59

## 2024-03-30 PROCEDURE — 83690 ASSAY OF LIPASE: CPT | Performed by: NURSE PRACTITIONER

## 2024-03-30 PROCEDURE — 99406 BEHAV CHNG SMOKING 3-10 MIN: CPT

## 2024-03-30 PROCEDURE — 99285 EMERGENCY DEPT VISIT HI MDM: CPT | Mod: 25

## 2024-03-30 PROCEDURE — 36415 COLL VENOUS BLD VENIPUNCTURE: CPT | Performed by: NURSE PRACTITIONER

## 2024-03-30 RX ORDER — TRAMADOL HYDROCHLORIDE 50 MG/1
50 TABLET ORAL EVERY 6 HOURS PRN
Status: DISCONTINUED | OUTPATIENT
Start: 2024-03-30 | End: 2024-03-31 | Stop reason: HOSPADM

## 2024-03-30 RX ORDER — LANOLIN ALCOHOL/MO/W.PET/CERES
800 CREAM (GRAM) TOPICAL
Status: DISCONTINUED | OUTPATIENT
Start: 2024-03-30 | End: 2024-03-31 | Stop reason: HOSPADM

## 2024-03-30 RX ORDER — SODIUM,POTASSIUM PHOSPHATES 280-250MG
2 POWDER IN PACKET (EA) ORAL
Status: DISCONTINUED | OUTPATIENT
Start: 2024-03-30 | End: 2024-03-31 | Stop reason: HOSPADM

## 2024-03-30 RX ORDER — IPRATROPIUM BROMIDE AND ALBUTEROL SULFATE 2.5; .5 MG/3ML; MG/3ML
3 SOLUTION RESPIRATORY (INHALATION) EVERY 6 HOURS PRN
Status: DISCONTINUED | OUTPATIENT
Start: 2024-03-30 | End: 2024-03-31 | Stop reason: HOSPADM

## 2024-03-30 RX ORDER — TALC
6 POWDER (GRAM) TOPICAL NIGHTLY PRN
Status: DISCONTINUED | OUTPATIENT
Start: 2024-03-30 | End: 2024-03-31 | Stop reason: HOSPADM

## 2024-03-30 RX ORDER — MUPIROCIN 20 MG/G
OINTMENT TOPICAL 2 TIMES DAILY
Status: DISCONTINUED | OUTPATIENT
Start: 2024-03-30 | End: 2024-03-31 | Stop reason: HOSPADM

## 2024-03-30 RX ORDER — POTASSIUM CHLORIDE 20 MEQ/1
20 TABLET, EXTENDED RELEASE ORAL 3 TIMES DAILY
Status: DISCONTINUED | OUTPATIENT
Start: 2024-03-30 | End: 2024-03-31 | Stop reason: HOSPADM

## 2024-03-30 RX ORDER — MORPHINE SULFATE 4 MG/ML
4 INJECTION, SOLUTION INTRAMUSCULAR; INTRAVENOUS
Status: COMPLETED | OUTPATIENT
Start: 2024-03-30 | End: 2024-03-30

## 2024-03-30 RX ORDER — IBUPROFEN 200 MG
16 TABLET ORAL
Status: DISCONTINUED | OUTPATIENT
Start: 2024-03-30 | End: 2024-03-31 | Stop reason: HOSPADM

## 2024-03-30 RX ORDER — BISACODYL 10 MG/1
10 SUPPOSITORY RECTAL DAILY PRN
Status: DISCONTINUED | OUTPATIENT
Start: 2024-03-30 | End: 2024-03-31 | Stop reason: HOSPADM

## 2024-03-30 RX ORDER — SIMETHICONE 80 MG
1 TABLET,CHEWABLE ORAL 4 TIMES DAILY PRN
Status: DISCONTINUED | OUTPATIENT
Start: 2024-03-30 | End: 2024-03-31 | Stop reason: HOSPADM

## 2024-03-30 RX ORDER — SODIUM CHLORIDE 9 MG/ML
1000 INJECTION, SOLUTION INTRAVENOUS
Status: COMPLETED | OUTPATIENT
Start: 2024-03-30 | End: 2024-03-30

## 2024-03-30 RX ORDER — GLUCAGON 1 MG
1 KIT INJECTION
Status: DISCONTINUED | OUTPATIENT
Start: 2024-03-30 | End: 2024-03-31 | Stop reason: HOSPADM

## 2024-03-30 RX ORDER — ASPIRIN 325 MG
325 TABLET ORAL DAILY
Status: DISCONTINUED | OUTPATIENT
Start: 2024-03-30 | End: 2024-03-31 | Stop reason: HOSPADM

## 2024-03-30 RX ORDER — ALUMINUM HYDROXIDE, MAGNESIUM HYDROXIDE, AND SIMETHICONE 1200; 120; 1200 MG/30ML; MG/30ML; MG/30ML
30 SUSPENSION ORAL 4 TIMES DAILY PRN
Status: DISCONTINUED | OUTPATIENT
Start: 2024-03-30 | End: 2024-03-31 | Stop reason: HOSPADM

## 2024-03-30 RX ORDER — PROCHLORPERAZINE EDISYLATE 5 MG/ML
5 INJECTION INTRAMUSCULAR; INTRAVENOUS EVERY 6 HOURS PRN
Status: DISCONTINUED | OUTPATIENT
Start: 2024-03-30 | End: 2024-03-31 | Stop reason: HOSPADM

## 2024-03-30 RX ORDER — NALOXONE HCL 0.4 MG/ML
0.02 VIAL (ML) INJECTION
Status: DISCONTINUED | OUTPATIENT
Start: 2024-03-30 | End: 2024-03-31 | Stop reason: HOSPADM

## 2024-03-30 RX ORDER — SODIUM CHLORIDE 0.9 % (FLUSH) 0.9 %
10 SYRINGE (ML) INJECTION EVERY 8 HOURS PRN
Status: DISCONTINUED | OUTPATIENT
Start: 2024-03-30 | End: 2024-03-31 | Stop reason: HOSPADM

## 2024-03-30 RX ORDER — SODIUM CHLORIDE 9 MG/ML
1000 INJECTION, SOLUTION INTRAVENOUS
Status: DISCONTINUED | OUTPATIENT
Start: 2024-03-30 | End: 2024-03-30

## 2024-03-30 RX ORDER — POLYETHYLENE GLYCOL 3350 17 G/17G
17 POWDER, FOR SOLUTION ORAL 2 TIMES DAILY
Status: DISCONTINUED | OUTPATIENT
Start: 2024-03-30 | End: 2024-03-31 | Stop reason: HOSPADM

## 2024-03-30 RX ORDER — IBUPROFEN 200 MG
24 TABLET ORAL
Status: DISCONTINUED | OUTPATIENT
Start: 2024-03-30 | End: 2024-03-31 | Stop reason: HOSPADM

## 2024-03-30 RX ORDER — ONDANSETRON 4 MG/1
8 TABLET, ORALLY DISINTEGRATING ORAL EVERY 8 HOURS PRN
Status: DISCONTINUED | OUTPATIENT
Start: 2024-03-30 | End: 2024-03-31 | Stop reason: HOSPADM

## 2024-03-30 RX ADMIN — POTASSIUM CHLORIDE 20 MEQ: 1500 TABLET, EXTENDED RELEASE ORAL at 09:03

## 2024-03-30 RX ADMIN — APIXABAN 2.5 MG: 2.5 TABLET, FILM COATED ORAL at 12:03

## 2024-03-30 RX ADMIN — POLYETHYLENE GLYCOL 3350 17 G: 17 POWDER, FOR SOLUTION ORAL at 12:03

## 2024-03-30 RX ADMIN — POTASSIUM BICARBONATE 20 MEQ: 391 TABLET, EFFERVESCENT ORAL at 09:03

## 2024-03-30 RX ADMIN — IOHEXOL 100 ML: 350 INJECTION, SOLUTION INTRAVENOUS at 05:03

## 2024-03-30 RX ADMIN — TRAMADOL HYDROCHLORIDE 50 MG: 50 TABLET, COATED ORAL at 12:03

## 2024-03-30 RX ADMIN — POTASSIUM CHLORIDE 20 MEQ: 1500 TABLET, EXTENDED RELEASE ORAL at 12:03

## 2024-03-30 RX ADMIN — SODIUM CHLORIDE 1000 ML: 9 INJECTION, SOLUTION INTRAVENOUS at 05:03

## 2024-03-30 RX ADMIN — POTASSIUM BICARBONATE 20 MEQ: 391 TABLET, EFFERVESCENT ORAL at 05:03

## 2024-03-30 RX ADMIN — POLYETHYLENE GLYCOL 3350 17 G: 17 POWDER, FOR SOLUTION ORAL at 09:03

## 2024-03-30 RX ADMIN — APIXABAN 2.5 MG: 2.5 TABLET, FILM COATED ORAL at 09:03

## 2024-03-30 RX ADMIN — MUPIROCIN 1 G: 20 OINTMENT TOPICAL at 09:03

## 2024-03-30 RX ADMIN — MORPHINE SULFATE 4 MG: 4 INJECTION, SOLUTION INTRAMUSCULAR; INTRAVENOUS at 05:03

## 2024-03-30 RX ADMIN — ASPIRIN 325 MG ORAL TABLET 325 MG: 325 PILL ORAL at 12:03

## 2024-03-30 NOTE — SUBJECTIVE & OBJECTIVE
Past Medical History:   Diagnosis Date    Alcohol abuse 02/02/2022    Decompensated hepatic cirrhosis 02/02/2022    Encounter for blood transfusion     Hypertension     Lab test positive for detection of COVID-19 virus 09/2021    Pancreatic cyst 06/03/2022       Past Surgical History:   Procedure Laterality Date    ABDOMINAL AORTOGRAPHY N/A 10/04/2022    Procedure: AORTOGRAM-ABDOMINAL;  Surgeon: Felice Epstein MD;  Location: [facility] CATH/EP LAB;  Service: Vascular;  Laterality: N/A;    APPENDECTOMY      ARTERIOGRAM, MESENTERIC N/A 10/04/2022    Procedure: ARTERIOGRAM, MESENTERIC;  Surgeon: Felice Epstein MD;  Location: [facility] CATH/EP LAB;  Service: Vascular;  Laterality: N/A;    COLONOSCOPY      ENDOSCOPIC ULTRASOUND OF UPPER GASTROINTESTINAL TRACT  02/04/2022    Procedure: ULTRASOUND, UPPER GI TRACT, ENDOSCOPIC;  Surgeon: Chuy Bay MD;  Location: [facility] ENDO (2ND FLR);  Service: Endoscopy;;    ENDOSCOPIC ULTRASOUND OF UPPER GASTROINTESTINAL TRACT N/A 06/03/2022    Procedure: ULTRASOUND, UPPER GI TRACT, ENDOSCOPIC;  Surgeon: Roger Viveros III, MD;  Location: [facility] ENDO;  Service: Endoscopy;  Laterality: N/A;    ENDOSCOPIC ULTRASOUND OF UPPER GASTROINTESTINAL TRACT N/A 11/07/2022    Procedure: ULTRASOUND, UPPER GI TRACT, ENDOSCOPIC;  Surgeon: Roger Viveros III, MD;  Location: [facility] ENDO;  Service: Endoscopy;  Laterality: N/A;    ERCP N/A 02/04/2022    Procedure: ERCP (ENDOSCOPIC RETROGRADE CHOLANGIOPANCREATOGRAPHY);  Surgeon: Chuy Bay MD;  Location: [facility] ENDO (2ND FLR);  Service: Endoscopy;  Laterality: N/A;    ERCP N/A 04/19/2022    Procedure: ERCP (ENDOSCOPIC RETROGRADE CHOLANGIOPANCREATOGRAPHY);  Surgeon: Chuy Bay MD;  Location: [facility] ENDO (2ND FLR);  Service: Endoscopy;  Laterality: N/A;  4/1: fully vaccinated. labs prior. instructions mailed to sister and patient.-SC  4/12/22-Confirmed new arrival time of 10:45am with pt's sister and updated instructions emailed-DS    ERCP N/A 10/05/2022     Procedure: ERCP (ENDOSCOPIC RETROGRADE CHOLANGIOPANCREATOGRAPHY);  Surgeon: Roger Viveros III, MD;  Location: [facility] ENDO;  Service: Endoscopy;  Laterality: N/A;    ERCP N/A 1/24/2023    Procedure: ERCP (ENDOSCOPIC RETROGRADE CHOLANGIOPANCREATOGRAPHY);  Surgeon: Roger Viveros III, MD;  Location: [facility] ENDO;  Service: Endoscopy;  Laterality: N/A;    EYE SURGERY Left     JOINT REPLACEMENT Bilateral     knee replacement    KNEE SURGERY      RECONSTRUCTION OF SHOULDER Right     TONSILLECTOMY      UPPER ENDOSCOPIC ULTRASOUND W/ FNA  06/03/2022    VITRECTOMY BY PARS PLANA APPROACH Left 8/14/2023    Procedure: VITRECTOMY, PARS PLANA APPROACH;  Surgeon: Tee Crespo MD;  Location: [address];  Service: Ophthalmology;  Laterality: Left;       Review of patient's allergies indicates:  No Known Allergies    No current facility-administered medications on file prior to encounter.     Current Outpatient Medications on File Prior to Encounter   Medication Sig    apixaban (ELIQUIS) 2.5 mg Tab Take 1 tablet (2.5 mg total) by mouth 2 (two) times daily.    aspirin 325 MG tablet Take 325 mg by mouth once daily.    cholecalciferol, vitamin D3, (VITAMIN D3) 25 mcg (1,000 unit) capsule Take 1,000 Units by mouth once daily.    HYDROcodone-acetaminophen (NORCO) 5-325 mg per tablet TAKE 1 TABLET BY MOUTH EVERY 4 TO 6 HOURS AS NEEDED FOR PAIN    multivit-mins no.63/iron/folic (M-VIT ORAL) Take 1 tablet by mouth once daily.    omega-3 fatty acids/fish oil (FISH OIL-OMEGA-3 FATTY ACIDS) 300-1,000 mg capsule Take 1 capsule by mouth once daily.    sodium chloride 5% (HUMAIRA 128) 5 % ophthalmic solution Place 1 drop into both eyes every 4 (four) hours as needed.    ursodioL (ACTIGALL) 300 mg capsule Take 1 capsule (300 mg total) by mouth 2 (two) times daily.     Family History    None       Tobacco Use    Smoking status: Every Day     Current packs/day: 0.25     Average packs/day: 0.3 packs/day for 40.0 years (10.0 ttl pk-yrs)      Types: Cigarettes    Smokeless tobacco: Never    Tobacco comments:     Pt has smoked on and off since age 19. Currently he smokes 1 pack per week and has cut back a lot throughout the years. No patch needed per his hospital visit. Information and education provided on our outpatient smoking cessation program.   Substance and Sexual Activity    Alcohol use: Yes     Alcohol/week: 10.0 standard drinks of alcohol     Types: 10 Cans of beer per week    Drug use: Never    Sexual activity: Not Currently     Review of Systems   Constitutional:  Negative for chills and fever.   HENT:  Negative for congestion and rhinorrhea.    Respiratory:  Negative for cough, shortness of breath and wheezing.    Cardiovascular:  Negative for chest pain, palpitations and leg swelling.   Gastrointestinal:  Positive for abdominal pain and nausea. Negative for abdominal distention and vomiting.   Endocrine: Negative for cold intolerance and heat intolerance.   Genitourinary:  Negative for dysuria and urgency.   Musculoskeletal:  Negative for arthralgias and neck stiffness.   Skin:  Negative for rash and wound.   Neurological:  Negative for dizziness and weakness.     Objective:     Vital Signs (Most Recent):  Temp: 98.3 °F (36.8 °C) (03/30/24 0010)  Pulse: 71 (03/30/24 0859)  Resp: (!) 23 (03/30/24 0859)  BP: (!) 160/87 (03/30/24 0859)  SpO2: 98 % (03/30/24 0859) Vital Signs (24h Range):  Temp:  [98.3 °F (36.8 °C)] 98.3 °F (36.8 °C)  Pulse:  [] 71  Resp:  [11-23] 23  SpO2:  [97 %-100 %] 98 %  BP: (142-166)/() 160/87     Weight: 62.6 kg (138 lb)  Body mass index is 19.25 kg/m².     Physical Exam  Constitutional:       General: He is not in acute distress.     Appearance: He is well-developed.   HENT:      Head: Normocephalic and atraumatic.   Eyes:      Pupils: Pupils are equal, round, and reactive to light.   Cardiovascular:      Rate and Rhythm: Normal rate and regular rhythm.      Heart sounds: No murmur heard.  Pulmonary:       Effort: Pulmonary effort is normal. No respiratory distress.      Breath sounds: Normal breath sounds. No wheezing or rales.   Abdominal:      General: Bowel sounds are normal. There is distension.      Palpations: Abdomen is soft.      Tenderness: There is abdominal tenderness (Left lower quadrant).   Musculoskeletal:         General: Normal range of motion.   Skin:     General: Skin is warm and dry.      Findings: No rash.   Neurological:      Mental Status: He is alert and oriented to person, place, and time.      Cranial Nerves: No cranial nerve deficit.   Psychiatric:         Behavior: Behavior normal.              CRANIAL NERVES     CN III, IV, VI   Pupils are equal, round, and reactive to light.       Significant Labs: All pertinent labs within the past 24 hours have been reviewed.    Significant Imaging: I have reviewed all pertinent imaging results/findings within the past 24 hours.

## 2024-03-30 NOTE — ASSESSMENT & PLAN NOTE
Schedule MiraLax and Colace.  Repeat KUB in a.m..  Constipation might be contributing to urinary retention.

## 2024-03-30 NOTE — NURSING
Patient arrived to unit, AAOx4, vitals stable. No signs of distress noted. Pt c/o pain 7/10 to abdomen. Pt stated last bowel movement was yesterday. Pt does not want family notified that he is hospitalized at this time. He states it is not necessary for this situation. Pt oriented to unit.

## 2024-03-30 NOTE — HPI
Patient is a 73-year-old male with history of cirrhosis, hypertension, pancreatitis who presented to the ED with complaint of left lower quadrant abdominal pain for 1 day.  Reports associated nausea but no vomiting.  Reports last bowel movement was yesterday.  Reports bowel movements have been fairly regular.  Was noted to have urinary retention 1500 cc in the ED and Cummings catheter was placed.  CT with fecal retention, distended bladder (prior to Cummings placement), chronic pancreatitis with pancreatic pseudocyst decreasing in size.  Sodium was found to be 117.  Patient was started on normal saline.  I was called for admission.  Discussed with Nephrology Dr. Duarte who recommended repeating sodium to determine if patient required ICU versus floor.  Repeat sodium was 126.  Per Nephrology patient okay for floor bed.  We will check sodium Q 8.  Regular diet with protein supplement.  Continue Cummings for now.  Placed on MiraLax and Colace to promote bowel movements.

## 2024-03-30 NOTE — ASSESSMENT & PLAN NOTE
Chronic, controlled. Latest blood pressure and vitals reviewed-     Temp:  [98.3 °F (36.8 °C)]   Pulse:  []   Resp:  [11-23]   BP: (142-166)/()   SpO2:  [97 %-100 %] .   Home meds for hypertension were reviewed and noted below.       Not currently on home medications    Will utilize p.r.n. blood pressure medication only if patient's blood pressure greater than 180/110 and he develops symptoms such as worsening chest pain or shortness of breath.

## 2024-03-30 NOTE — ED PROVIDER NOTES
Encounter Date: 3/30/2024       History     Chief Complaint   Patient presents with    Abdominal Pain     BIBA c/o LLQ abdominal pain with nausea     Patient presents emergency department with reported left lower quadrant abdominal pain onset this evening he denies any nausea vomiting he denies any diarrhea last bowel movement was proximally 1:00 p.m. it was normal no noted blood in his stool he denies any dysuria urgency or frequency denies any hematuria pain does not apparently radiate he denies eating anything out of the ordinary he does have a history of cirrhosis alcohol abuse he denies any fever chills no cough        Review of patient's allergies indicates:  No Known Allergies  Past Medical History:   Diagnosis Date    Alcohol abuse 02/02/2022    Decompensated hepatic cirrhosis 02/02/2022    Encounter for blood transfusion     Hypertension     Lab test positive for detection of COVID-19 virus 09/2021    Pancreatic cyst 06/03/2022     Past Surgical History:   Procedure Laterality Date    ABDOMINAL AORTOGRAPHY N/A 10/04/2022    Procedure: AORTOGRAM-ABDOMINAL;  Surgeon: Felice Epstein MD;  Location: [facility] CATH/EP LAB;  Service: Vascular;  Laterality: N/A;    APPENDECTOMY      ARTERIOGRAM, MESENTERIC N/A 10/04/2022    Procedure: ARTERIOGRAM, MESENTERIC;  Surgeon: Felice Epstein MD;  Location: [facility] CATH/EP LAB;  Service: Vascular;  Laterality: N/A;    COLONOSCOPY      ENDOSCOPIC ULTRASOUND OF UPPER GASTROINTESTINAL TRACT  02/04/2022    Procedure: ULTRASOUND, UPPER GI TRACT, ENDOSCOPIC;  Surgeon: Chuy Bay MD;  Location: [address];  Service: Endoscopy;;    ENDOSCOPIC ULTRASOUND OF UPPER GASTROINTESTINAL TRACT N/A 06/03/2022    Procedure: ULTRASOUND, UPPER GI TRACT, ENDOSCOPIC;  Surgeon: Roger Viveros III, MD;  Location: [facility] ENDO;  Service: Endoscopy;  Laterality: N/A;    ENDOSCOPIC ULTRASOUND OF UPPER GASTROINTESTINAL TRACT N/A 11/07/2022    Procedure: ULTRASOUND, UPPER GI TRACT,  ENDOSCOPIC;  Surgeon: Roger Viveros III, MD;  Location: [facility] ENDO;  Service: Endoscopy;  Laterality: N/A;    ERCP N/A 02/04/2022    Procedure: ERCP (ENDOSCOPIC RETROGRADE CHOLANGIOPANCREATOGRAPHY);  Surgeon: Chuy Bay MD;  Location: [facility] (2ND FLR);  Service: Endoscopy;  Laterality: N/A;    ERCP N/A 04/19/2022    Procedure: ERCP (ENDOSCOPIC RETROGRADE CHOLANGIOPANCREATOGRAPHY);  Surgeon: Chuy Bay MD;  Location: [facility] (2ND FLR);  Service: Endoscopy;  Laterality: N/A;  4/1: fully vaccinated. labs prior. instructions mailed to sister and patient.-SC  4/12/22-Confirmed new arrival time of 10:45am with pt's sister and updated instructions emailed-DS    ERCP N/A 10/05/2022    Procedure: ERCP (ENDOSCOPIC RETROGRADE CHOLANGIOPANCREATOGRAPHY);  Surgeon: Roger Viveros III, MD;  Location: [facility] ENDO;  Service: Endoscopy;  Laterality: N/A;    ERCP N/A 1/24/2023    Procedure: ERCP (ENDOSCOPIC RETROGRADE CHOLANGIOPANCREATOGRAPHY);  Surgeon: Roger Viveros III, MD;  Location: [facility];  Service: Endoscopy;  Laterality: N/A;    EYE SURGERY Left     JOINT REPLACEMENT Bilateral     knee replacement    KNEE SURGERY      RECONSTRUCTION OF SHOULDER Right     TONSILLECTOMY      UPPER ENDOSCOPIC ULTRASOUND W/ FNA  06/03/2022    VITRECTOMY BY PARS PLANA APPROACH Left 8/14/2023    Procedure: VITRECTOMY, PARS PLANA APPROACH;  Surgeon: Tee Crespo MD;  Location: [facility] OR 1ST FLR;  Service: Ophthalmology;  Laterality: Left;     No family history on file.  Social History     Tobacco Use    Smoking status: Every Day     Current packs/day: 0.25     Average packs/day: 0.3 packs/day for 40.0 years (10.0 ttl pk-yrs)     Types: Cigarettes    Smokeless tobacco: Never    Tobacco comments:     Pt has smoked on and off since age 19. Currently he smokes 1 pack per week and has cut back a lot throughout the years. No patch needed per his hospital visit. Information and education provided on our outpatient smoking  cessation program.   Substance Use Topics    Alcohol use: Yes     Alcohol/week: 10.0 standard drinks of alcohol     Types: 10 Cans of beer per week    Drug use: Never     Review of Systems   Constitutional:  Negative for chills and fever.   HENT:  Negative for congestion.    Respiratory:  Negative for cough.    Cardiovascular:  Negative for chest pain.   Gastrointestinal:  Positive for abdominal pain. Negative for constipation, diarrhea, nausea and vomiting.   Genitourinary:  Negative for dysuria, flank pain, frequency, hematuria and testicular pain.   All other systems reviewed and are negative.      Physical Exam     Initial Vitals   BP Pulse Resp Temp SpO2   03/30/24 0009 03/30/24 0009 03/30/24 0009 03/30/24 0010 03/30/24 0009   (!) 155/91 73 18 98.3 °F (36.8 °C) 100 %      MAP       --                Physical Exam    Constitutional: He appears well-developed and well-nourished. No distress.   HENT:   Head: Normocephalic and atraumatic.   Right Ear: External ear normal.   Left Ear: External ear normal.   Mouth/Throat: Oropharynx is clear and moist.   Eyes: Pupils are equal, round, and reactive to light.   Neck: Neck supple.   Normal range of motion.  Cardiovascular:  Normal rate, regular rhythm, S1 normal, S2 normal, normal heart sounds and intact distal pulses.           Pulmonary/Chest: Breath sounds normal.   Abdominal: Abdomen is soft. Bowel sounds are normal. There is abdominal tenderness in the left upper quadrant and left lower quadrant.   Musculoskeletal:         General: Normal range of motion.      Cervical back: Normal range of motion and neck supple.     Neurological: He is alert and oriented to person, place, and time. GCS eye subscore is 4. GCS verbal subscore is 5. GCS motor subscore is 6.   Skin: Skin is warm and dry. No rash noted.   Psychiatric: He has a normal mood and affect. His behavior is normal.         ED Course   Procedures  Labs Reviewed   CBC W/ AUTO DIFFERENTIAL - Abnormal; Notable  for the following components:       Result Value    RBC 4.44 (*)     Hemoglobin 12.7 (*)     Hematocrit 36.6 (*)     Platelets 129 (*)     MPV 9.1 (*)     Gran # (ANC) 1.5 (*)     Eos # 1.0 (*)     Gran % 35.3 (*)     Eosinophil % 24.4 (*)     All other components within normal limits   COMPREHENSIVE METABOLIC PANEL - Abnormal; Notable for the following components:    Sodium 117 (*)     Chloride 87 (*)     CO2 20 (*)     BUN 4 (*)     All other components within normal limits   BASIC METABOLIC PANEL - Abnormal; Notable for the following components:    Sodium 126 (*)     Potassium 3.0 (*)     CO2 17 (*)     BUN 3 (*)     Creatinine 0.2 (*)     Calcium 6.2 (*)     Anion Gap 5 (*)     All other components within normal limits   MAGNESIUM - Abnormal; Notable for the following components:    Magnesium 1.3 (*)     All other components within normal limits   URINALYSIS, REFLEX TO URINE CULTURE    Narrative:     In and Out Cath as needed it patient unable to void  Specimen Source->Urine   LIPASE   ALCOHOL,MEDICAL (ETHANOL)   SODIUM, URINE, RANDOM    Narrative:     Specimen Source->Urine   OSMOLALITY, URINE RANDOM    Narrative:     Specimen Source->Urine   TSH   TSH   MAGNESIUM   POCT GLUCOSE, HAND-HELD DEVICE          Imaging Results              CT Abdomen Pelvis With IV Contrast NO Oral Contrast (Final result)  Result time 03/30/24 06:34:52      Final result by Cristian Figueredo MD (03/30/24 06:34:52)                   Narrative:    EXAM DESCRIPTION:  Site:  CT ABDOMEN PELVIS WITH IV CONTRAST  RP: CT ABDOMEN PELVIS WITH IV CONTRAST    CLINICAL HISTORY:  73 years Male; LLQ abdominal pain;      TECHNIQUE:  CT of the abdomen and pelvis using intravenous contrast.  All CT scans at this facility use dose modulation, iterative reconstruction, and/or weight based dosing when appropriate to reduce radiation dose to as low as reasonably achievable.    COMPARISON: 12/25/2023    FINDINGS:    Abdomen:  Stomach: No significant  distention or surrounding edema.  Liver:No focal lesions. No intrahepatic ductal distention.  Gallbladder:Nondistended  Pancreas:Scattered coarse calcifications are again noted. Superior to the tibial, abutting the stomach, there is a low-density fluid collection 3.2 cm diameter. This is smaller and lower density since prior exam (previously 4.5 cm).  Spleen:Mild perisplenic edema is similar to prior exam.  Right kidney:No hydronephrosis. No focal lesion.  Left kidney:No hydronephrosis. No focal lesion.  Adrenal glands:Within normal limits  Vascular structures:Splenic embolization coils are again noted. There is moderate aortic and branch calcification plaque.  Calcific plaque at the superior mesenteric artery origin may result in significant stenosis, although there is no evidence for distal thrombosis.  No portal venous filling defects.    Pelvis:  Small bowel:No significant distention.  Appendix:Within normal limits  Colon:Diffuse colonic fecal retention, with proximal distention. No acute edema.  No free intraperitoneal fluid or air.  Bones: Chronic degenerative changes are again noted in the lumbar spine.  L5-S1: 6 mm anterior subluxation with severe endplate changes, as on prior study. There is a left L5 pars defect as on prior study.  Bladder: Distended, 21.6 x 10.1 x 12.6 cm (approximately 1400 mL) no focal wall thickening.  No pelvic mass or adenopathy.    IMPRESSION:    1.  Colonic fecal retention with proximal distention, suggesting constipation.  2.  Distended bladder, with no focal wall thickening. Possible bladder obstruction.  3.  Chronic pancreatitis. Pancreatic pseudocyst is smaller since 12/25/2023.  4.  Atherosclerosis.  5.  Chronic degenerative changes of the lumbar spine.    Electronically signed by:  Cristian Figueredo MD  03/30/2024 06:34 AM CDT Workstation: [id number]                                     X-Ray Ribs 2 View Left (Final result)  Result time 03/30/24 09:21:03      Final result by  Leland Frey MD (03/30/24 09:21:03)                   Narrative:    HISTORY: Left rib pain, abdominal pain.    FINDINGS: 4 views of the left ribs compared to prior exams show no acute fracture or destructive osseous lesion. There are remote healed fractures of the eighth and ninth ribs. Bone mineralization is normal.    IMPRESSION: Negative for acute left rib fracture.    Electronically signed by:  Leland Frey MD  03/30/2024 09:21 AM CDT Workstation: [id number]                                     Medications   potassium chloride SA CR tablet 20 mEq (20 mEq Oral Not Given 3/30/24 1500)   apixaban tablet 2.5 mg (2.5 mg Oral Given 3/30/24 1244)   aspirin tablet 325 mg (325 mg Oral Given 3/30/24 1244)   sodium chloride 0.9% flush 10 mL (has no administration in time range)   albuterol-ipratropium 2.5 mg-0.5 mg/3 mL nebulizer solution 3 mL (has no administration in time range)   melatonin tablet 6 mg (has no administration in time range)   ondansetron disintegrating tablet 8 mg (has no administration in time range)   prochlorperazine injection Soln 5 mg (has no administration in time range)   polyethylene glycol packet 17 g (17 g Oral Given 3/30/24 1243)   simethicone chewable tablet 80 mg (has no administration in time range)   aluminum-magnesium hydroxide-simethicone 200-200-20 mg/5 mL suspension 30 mL (has no administration in time range)   naloxone 0.4 mg/mL injection 0.02 mg (has no administration in time range)   potassium bicarbonate disintegrating tablet 50 mEq (has no administration in time range)   potassium bicarbonate disintegrating tablet 35 mEq (has no administration in time range)   potassium bicarbonate disintegrating tablet 60 mEq (has no administration in time range)   magnesium oxide tablet 800 mg (has no administration in time range)   magnesium oxide tablet 800 mg (has no administration in time range)   potassium, sodium phosphates 280-160-250 mg packet 2 packet (has no administration in time  range)   potassium, sodium phosphates 280-160-250 mg packet 2 packet (has no administration in time range)   potassium, sodium phosphates 280-160-250 mg packet 2 packet (has no administration in time range)   glucose chewable tablet 16 g (has no administration in time range)   glucose chewable tablet 24 g (has no administration in time range)   dextrose 50% injection 12.5 g (has no administration in time range)   dextrose 50% injection 25 g (has no administration in time range)   glucagon (human recombinant) injection 1 mg (has no administration in time range)   traMADoL tablet 50 mg (50 mg Oral Given 3/30/24 1244)   bisacodyL suppository 10 mg (has no administration in time range)   mupirocin 2 % ointment (has no administration in time range)   potassium bicarbonate disintegrating tablet 20 mEq (20 mEq Oral Given 3/30/24 1750)   morphine injection 4 mg (4 mg Intravenous Given 3/30/24 0506)   iohexoL (OMNIPAQUE 350) injection 100 mL (100 mLs Intravenous Given 3/30/24 0541)   0.9%  NaCl infusion (0 mLs Intravenous Stopped 3/30/24 0800)     Medical Decision Making  Laboratory evaluation reviewed patient has significant hyponatremia I have begun gentle IV fluid resuscitation CT scan shows evidence of urinary retention I have ordered Cummings catheter she be placed in the emergency department awaiting the final results of the CT scan admit patient for further evaluation treatment    Amount and/or Complexity of Data Reviewed  Labs: ordered. Decision-making details documented in ED Course.  Radiology: ordered. Decision-making details documented in ED Course.    Risk  Prescription drug management.  Decision regarding hospitalization.                                      Clinical Impression:  Final diagnoses:  [R52] Pain  [R10.32] Left lower quadrant abdominal pain (Primary)  [E87.1] Hyponatremia  [R33.9] Urinary retention  [R07.9] Chest pain          ED Disposition Condition    Admit Stable                Martín Smith,  MD  03/30/24 2032

## 2024-03-30 NOTE — PHARMACY MED REC
"Admission Medication History     The home medication history was taken by Lyle Cervantes.    You may go to "Admission" then "Reconcile Home Medications" tabs to review and/or act upon these items.     The home medication list has been updated by the Pharmacy department.   Please read ALL comments highlighted in yellow.   Please address this information as you see fit.    Feel free to contact us if you have any questions or require assistance.      The medications listed below were removed from the home medication list. Please reorder if appropriate:  Patient reports no longer taking the following medication(s):  Ursodiol 300 mg    Medications listed below were obtained from: Patient/family and Analytic software- [email or web address]  No current facility-administered medications on file prior to encounter.     Current Outpatient Medications on File Prior to Encounter   Medication Sig Dispense Refill    aspirin 325 MG tablet Take 325 mg by mouth once daily.      cholecalciferol, vitamin D3, (VITAMIN D3) 25 mcg (1,000 unit) capsule Take 1,000 Units by mouth once daily.      multivit-mins no.63/iron/folic (M-VIT ORAL) Take 1 tablet by mouth once daily.      omega-3 fatty acids/fish oil (FISH OIL-OMEGA-3 FATTY ACIDS) 300-1,000 mg capsule Take 1 capsule by mouth once daily.      apixaban (ELIQUIS) 2.5 mg Tab Take 1 tablet (2.5 mg total) by mouth 2 (two) times daily. (Patient not taking: Reported on 3/30/2024) 60 tablet 3    HYDROcodone-acetaminophen (NORCO) 5-325 mg per tablet TAKE 1 TABLET BY MOUTH EVERY 4 TO 6 HOURS AS NEEDED FOR PAIN (Patient not taking: Reported on 3/30/2024) 30 tablet 0    sodium chloride 5% (HUMAIRA 128) 5 % ophthalmic solution Place 1 drop into both eyes every 4 (four) hours as needed. (Patient not taking: Reported on 3/30/2024) 15 mL 6    [DISCONTINUED] ursodioL (ACTIGALL) 300 mg capsule Take 1 capsule (300 mg total) by mouth 2 (two) times daily. 60 capsule 11         Lyle Cervantes  EXT " 1922                .           General Sunscreen Counseling: I recommended a broad spectrum sunscreen with a SPF of 30 or higher.  I explained that SPF 30 sunscreens block approximately 97 percent of the sun's harmful rays.  Sunscreens should be applied at least 15 minutes prior to expected sun exposure and then every 2 hours after that as long as sun exposure continues. If swimming or exercising sunscreen should be reapplied every 45 minutes to an hour after getting wet or sweating.  One ounce, or the equivalent of a shot glass full of sunscreen, is adequate to protect the skin not covered by a bathing suit. I also recommended a lip balm with a sunscreen as well. Sun protective clothing can be used in lieu of sunscreen but must be worn the entire time you are exposed to the sun's rays. Detail Level: Detailed

## 2024-03-30 NOTE — CONSULTS
Nephrology Consult Note        Patient Name: Vic Reyez  MRN: [id number]    Patient Class: Emergency   Admission Date: 3/30/2024  Length of Stay: 0 days  Date of Service: 3/30/2024    Attending Physician: No att. providers found  Primary Care Provider: Keven Nj MD    Reason for Consult: hyponatremia    SUBJECTIVE:     HPI: 73M with known history of alcohol abuse presents with left lower quadrant abdominal pain for < 24h. Denies any nausea, vomiting, diarrhea, hematemesis or melena, dysuria, urgency or frequency. No fever, chills, cough. sNa noted to be 117 and he received IVF.    Past Medical History:   Diagnosis Date    Alcohol abuse 02/02/2022    Decompensated hepatic cirrhosis 02/02/2022    Encounter for blood transfusion     Hypertension     Lab test positive for detection of COVID-19 virus 09/2021    Pancreatic cyst 06/03/2022     Past Surgical History:   Procedure Laterality Date    ABDOMINAL AORTOGRAPHY N/A 10/04/2022    Procedure: AORTOGRAM-ABDOMINAL;  Surgeon: Felice Epstein MD;  Location: [facility] CATH/EP LAB;  Service: Vascular;  Laterality: N/A;    APPENDECTOMY      ARTERIOGRAM, MESENTERIC N/A 10/04/2022    Procedure: ARTERIOGRAM, MESENTERIC;  Surgeon: Felice Epstein MD;  Location: [facility] CATH/EP LAB;  Service: Vascular;  Laterality: N/A;    COLONOSCOPY      ENDOSCOPIC ULTRASOUND OF UPPER GASTROINTESTINAL TRACT  02/04/2022    Procedure: ULTRASOUND, UPPER GI TRACT, ENDOSCOPIC;  Surgeon: Chuy Bay MD;  Location: [facility] ([address] [place] [address]);  Service: Endoscopy;;    ENDOSCOPIC ULTRASOUND OF UPPER GASTROINTESTINAL TRACT N/A 06/03/2022    Procedure: ULTRASOUND, UPPER GI TRACT, ENDOSCOPIC;  Surgeon: Roger Viveros III, MD;  Location: [facility] ENDO;  Service: Endoscopy;  Laterality: N/A;    ENDOSCOPIC ULTRASOUND OF UPPER GASTROINTESTINAL TRACT N/A 11/07/2022    Procedure: ULTRASOUND, UPPER GI TRACT, ENDOSCOPIC;  Surgeon: Roger Viveros III, MD;  Location: [facility] ENDO;  Service: Endoscopy;   Laterality: N/A;    ERCP N/A 02/04/2022    Procedure: ERCP (ENDOSCOPIC RETROGRADE CHOLANGIOPANCREATOGRAPHY);  Surgeon: Chuy Bay MD;  Location: [facility] ENDO (2ND FLR);  Service: Endoscopy;  Laterality: N/A;    ERCP N/A 04/19/2022    Procedure: ERCP (ENDOSCOPIC RETROGRADE CHOLANGIOPANCREATOGRAPHY);  Surgeon: Chuy Bay MD;  Location: [facility] ENDO (2ND FLR);  Service: Endoscopy;  Laterality: N/A;  4/1: fully vaccinated. labs prior. instructions mailed to sister and patient.-SC  4/12/22-Confirmed new arrival time of 10:45am with pt's sister and updated instructions emailed-    ERCP N/A 10/05/2022    Procedure: ERCP (ENDOSCOPIC RETROGRADE CHOLANGIOPANCREATOGRAPHY);  Surgeon: Roger Viveros III, MD;  Location: [facility];  Service: Endoscopy;  Laterality: N/A;    ERCP N/A 1/24/2023    Procedure: ERCP (ENDOSCOPIC RETROGRADE CHOLANGIOPANCREATOGRAPHY);  Surgeon: Roger Viveros III, MD;  Location: [facility];  Service: Endoscopy;  Laterality: N/A;    EYE SURGERY Left     JOINT REPLACEMENT Bilateral     knee replacement    KNEE SURGERY      RECONSTRUCTION OF SHOULDER Right     TONSILLECTOMY      UPPER ENDOSCOPIC ULTRASOUND W/ FNA  06/03/2022    VITRECTOMY BY PARS PLANA APPROACH Left 8/14/2023    Procedure: VITRECTOMY, PARS PLANA APPROACH;  Surgeon: Tee Crespo MD;  Location: [facility] OR 1ST FLR;  Service: Ophthalmology;  Laterality: Left;     No family history on file.  Social History     Tobacco Use    Smoking status: Every Day     Current packs/day: 0.25     Average packs/day: 0.3 packs/day for 40.0 years (10.0 ttl pk-yrs)     Types: Cigarettes    Smokeless tobacco: Never    Tobacco comments:     Pt has smoked on and off since age 19. Currently he smokes 1 pack per week and has cut back a lot throughout the years. No patch needed per his hospital visit. Information and education provided on our outpatient smoking cessation program.   Substance Use Topics    Alcohol use: Yes     Alcohol/week: 10.0 standard  drinks of alcohol     Types: 10 Cans of beer per week    Drug use: Never       Review of patient's allergies indicates:  No Known Allergies    Outpatient meds:  No current facility-administered medications on file prior to encounter.     Current Outpatient Medications on File Prior to Encounter   Medication Sig Dispense Refill    apixaban (ELIQUIS) 2.5 mg Tab Take 1 tablet (2.5 mg total) by mouth 2 (two) times daily. 60 tablet 3    aspirin 325 MG tablet Take 325 mg by mouth once daily.      cholecalciferol, vitamin D3, (VITAMIN D3) 25 mcg (1,000 unit) capsule Take 1,000 Units by mouth once daily.      HYDROcodone-acetaminophen (NORCO) 5-325 mg per tablet TAKE 1 TABLET BY MOUTH EVERY 4 TO 6 HOURS AS NEEDED FOR PAIN 30 tablet 0    multivit-mins no.63/iron/folic (M-VIT ORAL) Take 1 tablet by mouth once daily.      omega-3 fatty acids/fish oil (FISH OIL-OMEGA-3 FATTY ACIDS) 300-1,000 mg capsule Take 1 capsule by mouth once daily.      sodium chloride 5% (HUMAIRA 128) 5 % ophthalmic solution Place 1 drop into both eyes every 4 (four) hours as needed. 15 mL 6    ursodioL (ACTIGALL) 300 mg capsule Take 1 capsule (300 mg total) by mouth 2 (two) times daily. 60 capsule 11       Scheduled meds:      Infusions:      PRN meds:      Review of Systems:  Constitutional:  Negative for chills, fever, malaise/fatigue and weight loss.   HENT:  Negative for hearing loss and nosebleeds.    Eyes:  Negative for blurred vision, double vision and photophobia.   Respiratory:  Negative for cough, shortness of breath and wheezing.    Cardiovascular:  Negative for chest pain, palpitations and leg swelling.   Gastrointestinal:  Negative for abdominal pain, constipation, diarrhea, heartburn, nausea and vomiting.   Genitourinary:  Negative for dysuria, frequency and urgency.   Musculoskeletal:  Negative for falls, joint pain and myalgias.   Skin:  Negative for itching and rash.   Neurological:  Negative for dizziness, speech change, focal weakness,  "loss of consciousness and headaches.   Endo/Heme/Allergies:  Does not bruise/bleed easily.   Psychiatric/Behavioral:  Negative for depression and substance abuse. The patient is not nervous/anxious.      OBJECTIVE:     Vital Signs and IO:  Temp:  [98.3 °F (36.8 °C)]   Pulse:  [73]   Resp:  [18]   BP: (155)/(91)   SpO2:  [100 %]   No intake/output data recorded.  Wt Readings from Last 5 Encounters:   03/30/24 62.6 kg (138 lb)   12/26/23 63 kg (138 lb 14.2 oz)   10/15/23 67 kg (147 lb 9.6 oz)   08/14/23 65.8 kg (145 lb)   06/21/23 66.2 kg (146 lb)     Body mass index is 19.25 kg/m².    Physical Exam  Constitutional:       General: Patient is not in acute distress.     Appearance: Patient is well-developed. She is not diaphoretic.   HENT:      Head: Normocephalic and atraumatic.      Mouth/Throat: Mucous membranes are moist.   Eyes:      General: No scleral icterus.     Pupils: Pupils are equal, round, and reactive to light.   Cardiovascular:      Rate and Rhythm: Normal rate and regular rhythm.   Pulmonary:      Effort: Pulmonary effort is normal. No respiratory distress.      Breath sounds: No stridor.   Abdominal:      General: There is no distension.      Palpations: Abdomen is soft.   Musculoskeletal:         General: No deformity. Normal range of motion.      Cervical back: Neck supple.   Skin:     General: Skin is warm and dry.      Findings: No rash present. No erythema.   Neurological:      Mental Status: Patient is alert and oriented to person, place, and time.      Cranial Nerves: No cranial nerve deficit.   Psychiatric:         Behavior: Behavior normal.     Laboratory:  Recent Labs   Lab 03/30/24  0100   *   K 3.5   CL 87*   CO2 20*   BUN 4*   CREATININE 0.5   GLU 97       Recent Labs   Lab 03/30/24  0100   CALCIUM 9.0   ALBUMIN 4.2             No results for input(s): "POCTGLUCOSE" in the last 168 hours.    Recent Labs   Lab 10/02/22  2052   Hemoglobin A1C SEE COMMENT       Recent Labs   Lab " 03/30/24  0100   WBC 4.14   HGB 12.7*   HCT 36.6*   *   MCV 82   MCHC 34.7   MONO 10.6  0.4   EOSINOPHIL 24.4*       Recent Labs   Lab 03/30/24  0100   BILITOT 0.4   PROT 7.8   ALBUMIN 4.2   ALKPHOS 100   ALT 18   AST 24       Recent Labs   Lab 10/02/22  2243 11/30/22  0151 12/26/23  0154 03/30/24  0606   Color, UA Yellow Yellow Yellow Yellow   Appearance, UA Clear Clear Clear Clear   pH, UA 7.0 7.0 7.0 6.0   Specific [place], UA 1.015 1.020 1.020 1.010   Protein, UA Negative Negative Negative Negative   Glucose, UA Negative Negative Negative Negative   Ketones, UA Negative Trace A Trace A Negative   Urobilinogen, UA Negative Negative Negative Negative   Bilirubin (UA) Negative Negative Negative Negative   Occult Blood UA Negative Negative Negative Negative   Nitrite, UA Negative Negative Negative Negative   RBC, UA 1 4  --   --    WBC, UA 17 H 56 H  --   --    Bacteria Negative Negative  --   --    Hyaline Casts, UA 7 A 10 A  --   --        Recent Labs   Lab 09/15/21  2311 11/29/22  2302 11/30/22  0750 12/25/23  2318   POC PH 7.416  --  7.381  --    POC PCO2 32.7 L  --  40.0  --    POC HCO3 21.0 L  --  23.7 L  --    POC PO2 49  --  42  --    POC SATURATED O2 85 L  --  76 L  --    POC BE -4  --  -1  --    Sample VENOUS VENOUS VENOUS VENOUS       Microbiology Results (last 7 days)       ** No results found for the last 168 hours. **            ASSESSMENT/PLAN:     Hyponatremia due to volume depletion and/or beer potomania with low solute intake and/or SIADH  Anemia  ETOH abuse with cirrhosis  CKD stage 2  HTN  No NSAIDs, ACEI/ARB, IV contrast or other nephrotoxins.  Keep MAP > 60, SBP > 100.  Agree with 1L NS.  Repeat BMP stat to determine if he needs ICU, PICC nd possibly 3% saline or can go to the regular floor.  Send TSH and usual urine studies.  Diet as tolerated, add protein shakes.  No thiazides or SSRI.    Thank you for allowing us to participate in the care of your patient!   We will follow the  patient and provide recommendations as needed.    Patient care time was spent personally by me on the following activities:     Obtaining a history.  Examination of patient.  Providing medical care at the patients bedside.  Developing a treatment plan with patient or surrogate and bedside caregivers.  Ordering and reviewing laboratory studies, radiographic studies, pulse oximetry.  Ordering and performing treatments and interventions.  Evaluation of patient's response to treatment.  Discussions with consultants while on the unit and immediately available to the patient.  Re-evaluation of the patient's condition.  Documentation in the medical record.     Florian Camacho MD    [facility]  [address] [place] [id number]    [phone] - tel  [phone] - fax    3/30/2024

## 2024-03-30 NOTE — ASSESSMENT & PLAN NOTE
Patient has hyponatremia which is uncontrolled,We will aim to correct the sodium by 4-6mEq in 24 hours. We will monitor sodium Every 8 hours. The hyponatremia is due to Dehydration/hypovolemia and Cirrhosis. We will obtain the following studies: Urine sodium, urine osmolality, serum osmolality. We will treat the hyponatremia with IV fluids as follows: 100 an hour. The patient's sodium results have been reviewed and are listed below.  Recent Labs   Lab 03/30/24  0907   *   Discussed with Dr. ABARCA

## 2024-03-30 NOTE — NURSING
Nurses Note -- 4 Eyes      3/30/2024   6:34 PM      Skin assessed during: Admit      [x] No Altered Skin Integrity Present    []Prevention Measures Documented      [] Yes- Altered Skin Integrity Present or Discovered   [] LDA Added if Not in Epic (Describe Wound)   [] New Altered Skin Integrity was Present on Admit and Documented in LDA   [] Wound Image Taken    Wound Care Consulted? Yes         Altered Skin Integrity 10/15/23 0120 Right lower;proximal;posterior Arm (Active)   10/15/23 0120   Altered Skin Integrity Present on Admission - Did Patient arrive to the hospital with altered skin?: yes   Side: Right   Orientation: lower;proximal;posterior   Location: Arm   Wound Number:    Is this injury device related?: No   Primary Wound Type:    Description of Altered Skin Integrity:    Ankle-Brachial Index:    Pulses:    Removal Indication and Assessment:    Wound Outcome:    (Retired) Wound Length (cm):    (Retired) Wound Width (cm):    (Retired) Depth (cm):    Wound Description (Comments):    Removal Indications:    Number of days: 167            Incision/Site 08/14/23 0734 Left Eye (Active)   08/14/23 0734   Present Prior to Hospital Arrival?:    Side: Left   Location: Eye   Orientation:    Incision Type:    Closure Method:    Additional Comments:    Removal Indication and Assessment:    Wound Outcome:    Removal Indications:    Number of days: 229       Attending Nurse:  Ivonne Manzo RN     Second RN/Staff Member:  Bhargavi Mcdonald

## 2024-03-30 NOTE — H&P
[facility] - Emergency Dept  Hospital Medicine  History & Physical    Patient Name: Vic Reyez  MRN: [id number]  Patient Class: IP- Inpatient  Admission Date: 3/30/2024  Attending Physician: Melanie Pittman MD  Primary Care Provider: Keven Nj MD         Patient information was obtained from patient, past medical records, and ER records.     Subjective:     Principal Problem:Hyponatremia    Chief Complaint:   Chief Complaint   Patient presents with    Abdominal Pain     BIBA c/o LLQ abdominal pain with nausea        HPI: Patient is a 73-year-old male with history of cirrhosis, hypertension, pancreatitis who presented to the ED with complaint of left lower quadrant abdominal pain for 1 day.  Reports associated nausea but no vomiting.  Reports last bowel movement was yesterday.  Reports bowel movements have been fairly regular.  Was noted to have urinary retention 1500 cc in the ED and Cummings catheter was placed.  CT with fecal retention, distended bladder (prior to Cummings placement), chronic pancreatitis with pancreatic pseudocyst decreasing in size.  Sodium was found to be 117.  Patient was started on normal saline.  I was called for admission.  Discussed with Nephrology Dr. Duarte who recommended repeating sodium to determine if patient required ICU versus floor.  Repeat sodium was 126.  Per Nephrology patient okay for floor bed.  We will check sodium Q 8.  Regular diet with protein supplement.  Continue Cummings for now.  Placed on MiraLax and Colace to promote bowel movements.    Past Medical History:   Diagnosis Date    Alcohol abuse 02/02/2022    Decompensated hepatic cirrhosis 02/02/2022    Encounter for blood transfusion     Hypertension     Lab test positive for detection of COVID-19 virus 09/2021    Pancreatic cyst 06/03/2022       Past Surgical History:   Procedure Laterality Date    ABDOMINAL AORTOGRAPHY N/A 10/04/2022    Procedure: AORTOGRAM-ABDOMINAL;  Surgeon: Felice Epstein MD;   Location: [facility] CATH/EP LAB;  Service: Vascular;  Laterality: N/A;    APPENDECTOMY      ARTERIOGRAM, MESENTERIC N/A 10/04/2022    Procedure: ARTERIOGRAM, MESENTERIC;  Surgeon: Felice Epstein MD;  Location: [facility] CATH/EP LAB;  Service: Vascular;  Laterality: N/A;    COLONOSCOPY      ENDOSCOPIC ULTRASOUND OF UPPER GASTROINTESTINAL TRACT  02/04/2022    Procedure: ULTRASOUND, UPPER GI TRACT, ENDOSCOPIC;  Surgeon: Chuy Bay MD;  Location: [facility] ENDO (2ND FLR);  Service: Endoscopy;;    ENDOSCOPIC ULTRASOUND OF UPPER GASTROINTESTINAL TRACT N/A 06/03/2022    Procedure: ULTRASOUND, UPPER GI TRACT, ENDOSCOPIC;  Surgeon: Roger Viveros III, MD;  Location: [facility] ENDO;  Service: Endoscopy;  Laterality: N/A;    ENDOSCOPIC ULTRASOUND OF UPPER GASTROINTESTINAL TRACT N/A 11/07/2022    Procedure: ULTRASOUND, UPPER GI TRACT, ENDOSCOPIC;  Surgeon: Roger Viveros III, MD;  Location: [facility] ENDO;  Service: Endoscopy;  Laterality: N/A;    ERCP N/A 02/04/2022    Procedure: ERCP (ENDOSCOPIC RETROGRADE CHOLANGIOPANCREATOGRAPHY);  Surgeon: Chuy Bay MD;  Location: [facility] ENDO (2ND FLR);  Service: Endoscopy;  Laterality: N/A;    ERCP N/A 04/19/2022    Procedure: ERCP (ENDOSCOPIC RETROGRADE CHOLANGIOPANCREATOGRAPHY);  Surgeon: Chuy Bay MD;  Location: [facility] ENDO (2ND FLR);  Service: Endoscopy;  Laterality: N/A;  4/1: fully vaccinated. labs prior. instructions mailed to sister and patient.-SC  4/12/22-Confirmed new arrival time of 10:45am with pt's sister and updated instructions emailed-    ERCP N/A 10/05/2022    Procedure: ERCP (ENDOSCOPIC RETROGRADE CHOLANGIOPANCREATOGRAPHY);  Surgeon: Roger Viveros III, MD;  Location: [facility] ENDO;  Service: Endoscopy;  Laterality: N/A;    ERCP N/A 1/24/2023    Procedure: ERCP (ENDOSCOPIC RETROGRADE CHOLANGIOPANCREATOGRAPHY);  Surgeon: Roger Viveros III, MD;  Location: [facility] ENDO;  Service: Endoscopy;  Laterality: N/A;    EYE SURGERY Left     JOINT REPLACEMENT Bilateral     knee  replacement    KNEE SURGERY      RECONSTRUCTION OF SHOULDER Right     TONSILLECTOMY      UPPER ENDOSCOPIC ULTRASOUND W/ FNA  06/03/2022    VITRECTOMY BY PARS PLANA APPROACH Left 8/14/2023    Procedure: VITRECTOMY, PARS PLANA APPROACH;  Surgeon: Tee Crespo MD;  Location: [facility] OR [address] [place] [address];  Service: Ophthalmology;  Laterality: Left;       Review of patient's allergies indicates:  No Known Allergies    No current facility-administered medications on file prior to encounter.     Current Outpatient Medications on File Prior to Encounter   Medication Sig    apixaban (ELIQUIS) 2.5 mg Tab Take 1 tablet (2.5 mg total) by mouth 2 (two) times daily.    aspirin 325 MG tablet Take 325 mg by mouth once daily.    cholecalciferol, vitamin D3, (VITAMIN D3) 25 mcg (1,000 unit) capsule Take 1,000 Units by mouth once daily.    HYDROcodone-acetaminophen (NORCO) 5-325 mg per tablet TAKE 1 TABLET BY MOUTH EVERY 4 TO 6 HOURS AS NEEDED FOR PAIN    multivit-mins no.63/iron/folic (M-VIT ORAL) Take 1 tablet by mouth once daily.    omega-3 fatty acids/fish oil (FISH OIL-OMEGA-3 FATTY ACIDS) 300-1,000 mg capsule Take 1 capsule by mouth once daily.    sodium chloride 5% (HUMAIRA 128) 5 % ophthalmic solution Place 1 drop into both eyes every 4 (four) hours as needed.    ursodioL (ACTIGALL) 300 mg capsule Take 1 capsule (300 mg total) by mouth 2 (two) times daily.     Family History    None       Tobacco Use    Smoking status: Every Day     Current packs/day: 0.25     Average packs/day: 0.3 packs/day for 40.0 years (10.0 ttl pk-yrs)     Types: Cigarettes    Smokeless tobacco: Never    Tobacco comments:     Pt has smoked on and off since age 19. Currently he smokes 1 pack per week and has cut back a lot throughout the years. No patch needed per his hospital visit. Information and education provided on our outpatient smoking cessation program.   Substance and Sexual Activity    Alcohol use: Yes     Alcohol/week: 10.0 standard drinks of alcohol      Types: 10 Cans of beer per week    Drug use: Never    Sexual activity: Not Currently     Review of Systems   Constitutional:  Negative for chills and fever.   HENT:  Negative for congestion and rhinorrhea.    Respiratory:  Negative for cough, shortness of breath and wheezing.    Cardiovascular:  Negative for chest pain, palpitations and leg swelling.   Gastrointestinal:  Positive for abdominal pain and nausea. Negative for abdominal distention and vomiting.   Endocrine: Negative for cold intolerance and heat intolerance.   Genitourinary:  Negative for dysuria and urgency.   Musculoskeletal:  Negative for arthralgias and neck stiffness.   Skin:  Negative for rash and wound.   Neurological:  Negative for dizziness and weakness.     Objective:     Vital Signs (Most Recent):  Temp: 98.3 °F (36.8 °C) (03/30/24 0010)  Pulse: 71 (03/30/24 0859)  Resp: (!) 23 (03/30/24 0859)  BP: (!) 160/87 (03/30/24 0859)  SpO2: 98 % (03/30/24 0859) Vital Signs (24h Range):  Temp:  [98.3 °F (36.8 °C)] 98.3 °F (36.8 °C)  Pulse:  [] 71  Resp:  [11-23] 23  SpO2:  [97 %-100 %] 98 %  BP: (142-166)/() 160/87     Weight: 62.6 kg (138 lb)  Body mass index is 19.25 kg/m².     Physical Exam  Constitutional:       General: He is not in acute distress.     Appearance: He is well-developed.   HENT:      Head: Normocephalic and atraumatic.   Eyes:      Pupils: Pupils are equal, round, and reactive to light.   Cardiovascular:      Rate and Rhythm: Normal rate and regular rhythm.      Heart sounds: No murmur heard.  Pulmonary:      Effort: Pulmonary effort is normal. No respiratory distress.      Breath sounds: Normal breath sounds. No wheezing or rales.   Abdominal:      General: Bowel sounds are normal. There is distension.      Palpations: Abdomen is soft.      Tenderness: There is abdominal tenderness (Left lower quadrant).   Musculoskeletal:         General: Normal range of motion.   Skin:     General: Skin is warm and dry.      Findings:  No rash.   Neurological:      Mental Status: He is alert and oriented to person, place, and time.      Cranial Nerves: No cranial nerve deficit.   Psychiatric:         Behavior: Behavior normal.              CRANIAL NERVES     CN III, IV, VI   Pupils are equal, round, and reactive to light.       Significant Labs: All pertinent labs within the past 24 hours have been reviewed.    Significant Imaging: I have reviewed all pertinent imaging results/findings within the past 24 hours.  Assessment/Plan:     * Hyponatremia  Patient has hyponatremia which is uncontrolled,We will aim to correct the sodium by 4-6mEq in 24 hours. We will monitor sodium Every 8 hours. The hyponatremia is due to Dehydration/hypovolemia and Cirrhosis. We will obtain the following studies: Urine sodium, urine osmolality, serum osmolality. We will treat the hyponatremia with IV fluids as follows: 100 an hour. The patient's sodium results have been reviewed and are listed below.  Recent Labs   Lab 03/30/24  0907   *   Discussed with Dr. ABARCA    Urinary retention  Possibly from constipation.  Treat constipation.  Continue Cummings for now.      Chronic pancreatitis  Chronic  Lipase 5 which is lower than his usual.  Pseudocyst smaller than prior    Constipation  Schedule MiraLax and Colace.  Repeat KUB in a.m..  Constipation might be contributing to urinary retention.      Primary hypertension  Chronic, controlled. Latest blood pressure and vitals reviewed-     Temp:  [98.3 °F (36.8 °C)]   Pulse:  []   Resp:  [11-23]   BP: (142-166)/()   SpO2:  [97 %-100 %] .   Home meds for hypertension were reviewed and noted below.       Not currently on home medications    Will utilize p.r.n. blood pressure medication only if patient's blood pressure greater than 180/110 and he develops symptoms such as worsening chest pain or shortness of breath.      VTE Risk Mitigation (From admission, onward)      None                            Melanie Pittman  MD  Department of Hospital Medicine  [facility] - Emergency Dept

## 2024-03-30 NOTE — NURSING
Patient had white catheter placed this AM 1500 ml output.  White was removed at 1530 per nephrology and had a total of 2200 ml in addition with a total of 3700 ml for the time that the catheter was in.

## 2024-03-30 NOTE — ED NOTES
Bed: 09  Expected date:   Expected time:   Means of arrival:   Comments:  These is a pt still in this room

## 2024-03-31 VITALS
HEART RATE: 64 BPM | WEIGHT: 145 LBS | HEIGHT: 71 IN | RESPIRATION RATE: 20 BRPM | DIASTOLIC BLOOD PRESSURE: 77 MMHG | BODY MASS INDEX: 20.3 KG/M2 | SYSTOLIC BLOOD PRESSURE: 145 MMHG | TEMPERATURE: 98 F | OXYGEN SATURATION: 98 %

## 2024-03-31 LAB
ANION GAP SERPL CALC-SCNC: 4 MMOL/L (ref 8–16)
ANION GAP SERPL CALC-SCNC: 6 MMOL/L (ref 8–16)
BASOPHILS # BLD AUTO: 0.01 K/UL (ref 0–0.2)
BASOPHILS NFR BLD: 0.2 % (ref 0–1.9)
BUN SERPL-MCNC: 10 MG/DL (ref 8–23)
BUN SERPL-MCNC: 12 MG/DL (ref 8–23)
CALCIUM SERPL-MCNC: 9.2 MG/DL (ref 8.7–10.5)
CALCIUM SERPL-MCNC: 9.3 MG/DL (ref 8.7–10.5)
CHLORIDE SERPL-SCNC: 95 MMOL/L (ref 95–110)
CHLORIDE SERPL-SCNC: 96 MMOL/L (ref 95–110)
CO2 SERPL-SCNC: 26 MMOL/L (ref 23–29)
CO2 SERPL-SCNC: 26 MMOL/L (ref 23–29)
CREAT SERPL-MCNC: 0.6 MG/DL (ref 0.5–1.4)
CREAT SERPL-MCNC: 0.6 MG/DL (ref 0.5–1.4)
DIFFERENTIAL METHOD BLD: ABNORMAL
EOSINOPHIL # BLD AUTO: 1 K/UL (ref 0–0.5)
EOSINOPHIL NFR BLD: 23.6 % (ref 0–8)
ERYTHROCYTE [DISTWIDTH] IN BLOOD BY AUTOMATED COUNT: 14.6 % (ref 11.5–14.5)
EST. GFR  (NO RACE VARIABLE): >60 ML/MIN/1.73 M^2
EST. GFR  (NO RACE VARIABLE): >60 ML/MIN/1.73 M^2
GLUCOSE SERPL-MCNC: 111 MG/DL (ref 70–110)
GLUCOSE SERPL-MCNC: 120 MG/DL (ref 70–110)
GLUCOSE SERPL-MCNC: 124 MG/DL (ref 70–110)
GLUCOSE SERPL-MCNC: 136 MG/DL (ref 70–110)
HCT VFR BLD AUTO: 36.1 % (ref 40–54)
HGB BLD-MCNC: 12.5 G/DL (ref 14–18)
IMM GRANULOCYTES # BLD AUTO: 0.01 K/UL (ref 0–0.04)
IMM GRANULOCYTES NFR BLD AUTO: 0.2 % (ref 0–0.5)
LYMPHOCYTES # BLD AUTO: 1.2 K/UL (ref 1–4.8)
LYMPHOCYTES NFR BLD: 27.4 % (ref 18–48)
MAGNESIUM SERPL-MCNC: 1.7 MG/DL (ref 1.6–2.6)
MCH RBC QN AUTO: 28.2 PG (ref 27–31)
MCHC RBC AUTO-ENTMCNC: 34.6 G/DL (ref 32–36)
MCV RBC AUTO: 81 FL (ref 82–98)
MONOCYTES # BLD AUTO: 0.7 K/UL (ref 0.3–1)
MONOCYTES NFR BLD: 15.4 % (ref 4–15)
NEUTROPHILS # BLD AUTO: 1.5 K/UL (ref 1.8–7.7)
NEUTROPHILS NFR BLD: 33.2 % (ref 38–73)
NRBC BLD-RTO: 0 /100 WBC
PHOSPHATE SERPL-MCNC: 3.8 MG/DL (ref 2.7–4.5)
PLATELET # BLD AUTO: 143 K/UL (ref 150–450)
PMV BLD AUTO: 9.8 FL (ref 9.2–12.9)
POTASSIUM SERPL-SCNC: 4.2 MMOL/L (ref 3.5–5.1)
POTASSIUM SERPL-SCNC: 4.7 MMOL/L (ref 3.5–5.1)
RBC # BLD AUTO: 4.44 M/UL (ref 4.6–6.2)
SODIUM SERPL-SCNC: 125 MMOL/L (ref 136–145)
SODIUM SERPL-SCNC: 128 MMOL/L (ref 136–145)
WBC # BLD AUTO: 4.41 K/UL (ref 3.9–12.7)

## 2024-03-31 PROCEDURE — 83735 ASSAY OF MAGNESIUM: CPT | Performed by: HOSPITALIST

## 2024-03-31 PROCEDURE — 99900035 HC TECH TIME PER 15 MIN (STAT)

## 2024-03-31 PROCEDURE — 25000003 PHARM REV CODE 250: Performed by: HOSPITALIST

## 2024-03-31 PROCEDURE — 85025 COMPLETE CBC W/AUTO DIFF WBC: CPT | Performed by: HOSPITALIST

## 2024-03-31 PROCEDURE — 80048 BASIC METABOLIC PNL TOTAL CA: CPT | Mod: XB | Performed by: HOSPITALIST

## 2024-03-31 PROCEDURE — G0378 HOSPITAL OBSERVATION PER HR: HCPCS

## 2024-03-31 PROCEDURE — 25000003 PHARM REV CODE 250: Performed by: INTERNAL MEDICINE

## 2024-03-31 PROCEDURE — 99900031 HC PATIENT EDUCATION (STAT)

## 2024-03-31 PROCEDURE — 94760 N-INVAS EAR/PLS OXIMETRY 1: CPT | Mod: GZ

## 2024-03-31 PROCEDURE — 84100 ASSAY OF PHOSPHORUS: CPT | Performed by: HOSPITALIST

## 2024-03-31 PROCEDURE — 80048 BASIC METABOLIC PNL TOTAL CA: CPT | Mod: 91 | Performed by: HOSPITALIST

## 2024-03-31 PROCEDURE — 36415 COLL VENOUS BLD VENIPUNCTURE: CPT | Performed by: HOSPITALIST

## 2024-03-31 RX ORDER — POLYETHYLENE GLYCOL 3350 17 G/17G
17 POWDER, FOR SOLUTION ORAL 2 TIMES DAILY
Qty: 60 PACKET | Refills: 0 | Status: SHIPPED | OUTPATIENT
Start: 2024-03-31

## 2024-03-31 RX ORDER — TRAMADOL HYDROCHLORIDE 50 MG/1
50 TABLET ORAL EVERY 6 HOURS PRN
Qty: 12 TABLET | Refills: 0 | Status: SHIPPED | OUTPATIENT
Start: 2024-03-31

## 2024-03-31 RX ADMIN — TRAMADOL HYDROCHLORIDE 50 MG: 50 TABLET, COATED ORAL at 02:03

## 2024-03-31 RX ADMIN — ASPIRIN 325 MG ORAL TABLET 325 MG: 325 PILL ORAL at 08:03

## 2024-03-31 RX ADMIN — Medication 800 MG: at 12:03

## 2024-03-31 RX ADMIN — Medication 800 MG: at 08:03

## 2024-03-31 RX ADMIN — MUPIROCIN 1 G: 20 OINTMENT TOPICAL at 08:03

## 2024-03-31 RX ADMIN — APIXABAN 2.5 MG: 2.5 TABLET, FILM COATED ORAL at 08:03

## 2024-03-31 RX ADMIN — POTASSIUM CHLORIDE 20 MEQ: 1500 TABLET, EXTENDED RELEASE ORAL at 08:03

## 2024-03-31 RX ADMIN — ALUMINUM HYDROXIDE, MAGNESIUM HYDROXIDE, AND SIMETHICONE 30 ML: 1200; 120; 1200 SUSPENSION ORAL at 08:03

## 2024-03-31 RX ADMIN — POLYETHYLENE GLYCOL 3350 17 G: 17 POWDER, FOR SOLUTION ORAL at 08:03

## 2024-03-31 NOTE — PLAN OF CARE
Problem: Adult Inpatient Plan of Care  Goal: Optimal Comfort and Wellbeing  Outcome: Met  Goal: Readiness for Transition of Care  Outcome: Met     Problem: Impaired Wound Healing  Goal: Optimal Wound Healing  Outcome: Met     Problem: Fall Injury Risk  Goal: Absence of Fall and Fall-Related Injury  Outcome: Met

## 2024-03-31 NOTE — PROGRESS NOTES
Nephrology Consult Note        Patient Name: Vic Reyez  MRN: [id number]    Patient Class: IP- Inpatient   Admission Date: 3/30/2024  Length of Stay: 1 days  Date of Service: 3/31/2024    Attending Physician: Melanie Pittman MD  Primary Care Provider: Keven Nj MD    Reason for Consult: hyponatremia    SUBJECTIVE:     HPI: 73M with known history of alcohol abuse presents with left lower quadrant abdominal pain for < 24h. Denies any nausea, vomiting, diarrhea, hematemesis or melena, dysuria, urgency or frequency. No fever, chills, cough. sNa noted to be 117 and he received IVF.    3/31 VSS. Doing great. OK to dc home and follow as outpatient. NO thiazides or SSRI. Should abstain from alcohol and eat more protein.    Past Medical History:   Diagnosis Date    Alcohol abuse 02/02/2022    Decompensated hepatic cirrhosis 02/02/2022    Encounter for blood transfusion     Hypertension     Lab test positive for detection of COVID-19 virus 09/2021    Pancreatic cyst 06/03/2022     Past Surgical History:   Procedure Laterality Date    ABDOMINAL AORTOGRAPHY N/A 10/04/2022    Procedure: AORTOGRAM-ABDOMINAL;  Surgeon: Felice Epstein MD;  Location: [facility] CATH/EP LAB;  Service: Vascular;  Laterality: N/A;    APPENDECTOMY      ARTERIOGRAM, MESENTERIC N/A 10/04/2022    Procedure: ARTERIOGRAM, MESENTERIC;  Surgeon: Felice Epstein MD;  Location: [facility] CATH/EP LAB;  Service: Vascular;  Laterality: N/A;    COLONOSCOPY      ENDOSCOPIC ULTRASOUND OF UPPER GASTROINTESTINAL TRACT  02/04/2022    Procedure: ULTRASOUND, UPPER GI TRACT, ENDOSCOPIC;  Surgeon: Chuy Bay MD;  Location: [facility] ([address] [place] [address]);  Service: Endoscopy;;    ENDOSCOPIC ULTRASOUND OF UPPER GASTROINTESTINAL TRACT N/A 06/03/2022    Procedure: ULTRASOUND, UPPER GI TRACT, ENDOSCOPIC;  Surgeon: Roger Viveros III, MD;  Location: [facility] ENDO;  Service: Endoscopy;  Laterality: N/A;    ENDOSCOPIC ULTRASOUND OF UPPER GASTROINTESTINAL TRACT N/A 11/07/2022     Procedure: ULTRASOUND, UPPER GI TRACT, ENDOSCOPIC;  Surgeon: Roger Viveros III, MD;  Location: [facility] ENDO;  Service: Endoscopy;  Laterality: N/A;    ERCP N/A 02/04/2022    Procedure: ERCP (ENDOSCOPIC RETROGRADE CHOLANGIOPANCREATOGRAPHY);  Surgeon: Chuy Bay MD;  Location: [facility] (2ND FLR);  Service: Endoscopy;  Laterality: N/A;    ERCP N/A 04/19/2022    Procedure: ERCP (ENDOSCOPIC RETROGRADE CHOLANGIOPANCREATOGRAPHY);  Surgeon: hCuy Bay MD;  Location: [facility] (2ND FLR);  Service: Endoscopy;  Laterality: N/A;  4/1: fully vaccinated. labs prior. instructions mailed to sister and patient.-SC  4/12/22-Confirmed new arrival time of 10:45am with pt's sister and updated instructions emailed-DS    ERCP N/A 10/05/2022    Procedure: ERCP (ENDOSCOPIC RETROGRADE CHOLANGIOPANCREATOGRAPHY);  Surgeon: Roger Viveros III, MD;  Location: [facility] ENDO;  Service: Endoscopy;  Laterality: N/A;    ERCP N/A 1/24/2023    Procedure: ERCP (ENDOSCOPIC RETROGRADE CHOLANGIOPANCREATOGRAPHY);  Surgeon: Roger Viveros III, MD;  Location: [facility];  Service: Endoscopy;  Laterality: N/A;    EYE SURGERY Left     JOINT REPLACEMENT Bilateral     knee replacement    KNEE SURGERY      RECONSTRUCTION OF SHOULDER Right     TONSILLECTOMY      UPPER ENDOSCOPIC ULTRASOUND W/ FNA  06/03/2022    VITRECTOMY BY PARS PLANA APPROACH Left 8/14/2023    Procedure: VITRECTOMY, PARS PLANA APPROACH;  Surgeon: Tee Crespo MD;  Location: [facility] OR 1ST FLR;  Service: Ophthalmology;  Laterality: Left;     No family history on file.  Social History     Tobacco Use    Smoking status: Every Day     Current packs/day: 0.25     Average packs/day: 0.3 packs/day for 40.0 years (10.0 ttl pk-yrs)     Types: Cigarettes    Smokeless tobacco: Never    Tobacco comments:     Pt has smoked on and off since age 19. Currently he smokes 1 pack per week and has cut back a lot throughout the years. No patch needed per his hospital visit. Information and education  provided on our outpatient smoking cessation program.   Substance Use Topics    Alcohol use: Yes     Alcohol/week: 10.0 standard drinks of alcohol     Types: 10 Cans of beer per week    Drug use: Never       Review of patient's allergies indicates:  No Known Allergies    Outpatient meds:  No current facility-administered medications on file prior to encounter.     Current Outpatient Medications on File Prior to Encounter   Medication Sig Dispense Refill    aspirin 325 MG tablet Take 325 mg by mouth once daily.      cholecalciferol, vitamin D3, (VITAMIN D3) 25 mcg (1,000 unit) capsule Take 1,000 Units by mouth once daily.      multivit-mins no.63/iron/folic (M-VIT ORAL) Take 1 tablet by mouth once daily.      omega-3 fatty acids/fish oil (FISH OIL-OMEGA-3 FATTY ACIDS) 300-1,000 mg capsule Take 1 capsule by mouth once daily.      apixaban (ELIQUIS) 2.5 mg Tab Take 1 tablet (2.5 mg total) by mouth 2 (two) times daily. (Patient not taking: Reported on 3/30/2024) 60 tablet 3    HYDROcodone-acetaminophen (NORCO) 5-325 mg per tablet TAKE 1 TABLET BY MOUTH EVERY 4 TO 6 HOURS AS NEEDED FOR PAIN (Patient not taking: Reported on 3/30/2024) 30 tablet 0    sodium chloride 5% (HUMAIRA 128) 5 % ophthalmic solution Place 1 drop into both eyes every 4 (four) hours as needed. (Patient not taking: Reported on 3/30/2024) 15 mL 6       Scheduled meds:      Infusions:      PRN meds:      Review of Systems:    OBJECTIVE:     Vital Signs and IO:  Temp:  [97.3 °F (36.3 °C)-98.3 °F (36.8 °C)]   Pulse:  [60-93]   Resp:  [12-32]   BP: (101-165)/(65-84)   SpO2:  [94 %-99 %]   I/O last 3 completed shifts:  In: -   Out: 1700 [Urine:1700]  Wt Readings from Last 5 Encounters:   03/30/24 65.8 kg (145 lb)   12/26/23 63 kg (138 lb 14.2 oz)   10/15/23 67 kg (147 lb 9.6 oz)   08/14/23 65.8 kg (145 lb)   06/21/23 66.2 kg (146 lb)     Body mass index is 20.22 kg/m².    Physical Exam  Constitutional:       General: Patient is not in acute distress.      "Appearance: Patient is well-developed. She is not diaphoretic.   HENT:      Head: Normocephalic and atraumatic.      Mouth/Throat: Mucous membranes are moist.   Eyes:      General: No scleral icterus.     Pupils: Pupils are equal, round, and reactive to light.   Cardiovascular:      Rate and Rhythm: Normal rate and regular rhythm.   Pulmonary:      Effort: Pulmonary effort is normal. No respiratory distress.      Breath sounds: No stridor.   Abdominal:      General: There is no distension.      Palpations: Abdomen is soft.   Musculoskeletal:         General: No deformity. Normal range of motion.      Cervical back: Neck supple.   Skin:     General: Skin is warm and dry.      Findings: No rash present. No erythema.   Neurological:      Mental Status: Patient is alert and oriented to person, place, and time.      Cranial Nerves: No cranial nerve deficit.   Psychiatric:         Behavior: Behavior normal.     Laboratory:  Recent Labs   Lab 03/30/24  1745 03/31/24  0013 03/31/24  0751   * 125* 128*   K 3.9 4.7 4.2   CL 94* 95 96   CO2 23 26 26   BUN 6* 12 10   CREATININE 0.5 0.6 0.6   * 111* 120*         Recent Labs   Lab 03/30/24  0100 03/30/24  0907 03/30/24  1745 03/31/24  0013 03/31/24  0751   CALCIUM 9.0 6.2* 9.0 9.3 9.2   ALBUMIN 4.2  --   --   --   --    PHOS  --   --   --   --  3.8   MG  --  1.3*  --   --  1.7               No results for input(s): "POCTGLUCOSE" in the last 168 hours.    Recent Labs   Lab 10/02/22  2052   Hemoglobin A1C SEE COMMENT         Recent Labs   Lab 03/30/24  0100 03/31/24  0751   WBC 4.14 4.41   HGB 12.7* 12.5*   HCT 36.6* 36.1*   * 143*   MCV 82 81*   MCHC 34.7 34.6   MONO 10.6  0.4 15.4*  0.7   EOSINOPHIL 24.4* 23.6*         Recent Labs   Lab 03/30/24  0100   BILITOT 0.4   PROT 7.8   ALBUMIN 4.2   ALKPHOS 100   ALT 18   AST 24         Recent Labs   Lab 10/02/22  2243 11/30/22  0151 12/26/23  0154 03/30/24  0606   Color, UA Yellow Yellow Yellow Yellow   Appearance, " UA Clear Clear Clear Clear   pH, UA 7.0 7.0 7.0 6.0   Specific [place], UA 1.015 1.020 1.020 1.010   Protein, UA Negative Negative Negative Negative   Glucose, UA Negative Negative Negative Negative   Ketones, UA Negative Trace A Trace A Negative   Urobilinogen, UA Negative Negative Negative Negative   Bilirubin (UA) Negative Negative Negative Negative   Occult Blood UA Negative Negative Negative Negative   Nitrite, UA Negative Negative Negative Negative   RBC, UA 1 4  --   --    WBC, UA 17 H 56 H  --   --    Bacteria Negative Negative  --   --    Hyaline Casts, UA 7 A 10 A  --   --          Recent Labs   Lab 09/15/21  2311 11/29/22  2302 11/30/22  0750 12/25/23  2318   POC PH 7.416  --  7.381  --    POC PCO2 32.7 L  --  40.0  --    POC HCO3 21.0 L  --  23.7 L  --    POC PO2 49  --  42  --    POC SATURATED O2 85 L  --  76 L  --    POC BE -4  --  -1  --    Sample VENOUS VENOUS VENOUS VENOUS         Microbiology Results (last 7 days)       ** No results found for the last 168 hours. **            ASSESSMENT/PLAN:     Hyponatremia due to volume depletion and/or beer potomania with low solute intake and/or SIADH  Anemia  ETOH abuse with cirrhosis  CKD stage 2  HTN  No NSAIDs, ACEI/ARB, IV contrast or other nephrotoxins.  Keep MAP > 60, SBP > 100.  Diet as tolerated, add protein shakes.  No thiazides or SSRI.    Thank you for allowing us to participate in the care of your patient!   We will follow the patient and provide recommendations as needed.    Patient care time was spent personally by me on the following activities:     Obtaining a history.  Examination of patient.  Providing medical care at the patients bedside.  Developing a treatment plan with patient or surrogate and bedside caregivers.  Ordering and reviewing laboratory studies, radiographic studies, pulse oximetry.  Ordering and performing treatments and interventions.  Evaluation of patient's response to treatment.  Discussions with consultants while on  the unit and immediately available to the patient.  Re-evaluation of the patient's condition.  Documentation in the medical record.     Florian Camacho MD    [facility]  [address] [place] [id number] [phone] - tel  [phone] - fax    3/31/2024

## 2024-03-31 NOTE — PLAN OF CARE
03/31/24 1405   Final Note   Assessment Type Final Discharge Note   Anticipated Discharge Disposition Home   What phone number can be called within the next 1-3 days to see how you are doing after discharge? [phone]   Post-Acute Status   Discharge Delays None known at this time     Patient cleared for discharge from case management standpoint    Chart and discharge orders reviewed.  Patient discharged home with no further case management needs.

## 2024-03-31 NOTE — PLAN OF CARE
03/31/24 1118   Final Note   Anticipated Discharge Disposition Home   What phone number can be called within the next 1-3 days to see how you are doing after discharge? [phone]   Post-Acute Status   Discharge Delays None known at this time     Patient cleared for discharge from case management standpoint.      Chart and discharge orders reviewed.  Patient discharged home with no further case management needs.

## 2024-03-31 NOTE — DISCHARGE SUMMARY
[facility]  Discharge Summary      Patient Name: Vic Reyez  MRN: [id number]  DEVAUGHN: [id number]  Patient Class: IP- Inpatient  Admission Date: 3/30/2024  Hospital Length of Stay: 1 days  Discharge Date and Time: 3/31/2024  2:43 PM  Attending Physician: No att. providers found   Discharging Provider: Melanie Pittman MD  Primary Care Provider: Keven Nj MD    Primary Care Team: Networked reference to record PCT     HPI:   Patient is a 73-year-old male with history of cirrhosis, hypertension, pancreatitis who presented to the ED with complaint of left lower quadrant abdominal pain for 1 day.  Reports associated nausea but no vomiting.  Reports last bowel movement was yesterday.  Reports bowel movements have been fairly regular.  Was noted to have urinary retention 1500 cc in the ED and Cummings catheter was placed.  CT with fecal retention, distended bladder (prior to Cummings placement), chronic pancreatitis with pancreatic pseudocyst decreasing in size.  Sodium was found to be 117.  Patient was started on normal saline.  I was called for admission.  Discussed with Nephrology Dr. Duarte who recommended repeating sodium to determine if patient required ICU versus floor.  Repeat sodium was 126.  Per Nephrology patient okay for floor bed.  We will check sodium Q 8.  Regular diet with protein supplement.  Continue Cummings for now.  Placed on MiraLax and Colace to promote bowel movements.    * No surgery found *      Hospital Course:   Patient admitted to the hospital medicine service with hyponatremia, abdominal pain, urinary retention, and constipation. Cummings placed in ED and removed later that day after patient has had multiple bowel movements.  Abdominal pain improved and he no longer had issues with urinary retention.  Nephrology was consulted for hyponatremia.  It improved with normal saline.  Patient was cleared for discharge the next day by Nephrology.  Instructed to increase  protein in diet and avoid alcohol.  Patient will follow-up with PCP.  Patient expressed understanding of discharge plan.     Goals of Care Treatment Preferences:  Code Status: Full Code      Consults:   Consults (From admission, onward)          Status Ordering Provider     Inpatient consult to Nephrology  Once        Provider:  Florian Camacho MD    Completed TRIPP FAITH            No new Assessment & Plan notes have been filed under this hospital service since the last note was generated.  Service: Hospital Medicine    Final Active Diagnoses:    Diagnosis Date Noted POA    PRINCIPAL PROBLEM:  Hyponatremia [E87.1] 12/26/2023 Yes    Urinary retention [R33.9] 03/30/2024 Yes    Chronic pancreatitis [K86.1] 11/30/2022 Yes    Primary hypertension [I10] 02/02/2022 Yes     Chronic    Constipation [K59.00] 02/02/2022 Yes      Problems Resolved During this Admission:       Discharged Condition: good    Disposition: Home or Self Care    Follow Up:   Follow-up Information       Keven Nj MD Follow up in 1 week(s).    Specialty: Family Medicine  Contact information:  [address] [place] [address]  [phone]                           Patient Instructions:      Notify your health care provider if you experience any of the following:  temperature >100.4     Notify your health care provider if you experience any of the following:  severe uncontrolled pain     Notify your health care provider if you experience any of the following:  difficulty breathing or increased cough     Notify your health care provider if you experience any of the following:  persistent dizziness, light-headedness, or visual disturbances     Notify your health care provider if you experience any of the following:  increased confusion or weakness     Activity as tolerated       Significant Diagnostic Studies: Labs: BMP:   Recent Labs   Lab 03/30/24  0907 03/30/24  1745 03/31/24  0013 03/31/24  0751   GLU 84 129* 111* 120*   *  124* 125* 128*   K 3.0* 3.9 4.7 4.2    94* 95 96   CO2 17* 23 26 26   BUN 3* 6* 12 10   CREATININE 0.2* 0.5 0.6 0.6   CALCIUM 6.2* 9.0 9.3 9.2   MG 1.3*  --   --  1.7    and CBC   Recent Labs   Lab 03/30/24  0100 03/31/24  0751   WBC 4.14 4.41   HGB 12.7* 12.5*   HCT 36.6* 36.1*   * 143*     Radiology Results (last 7 days)    Procedure Component Value Units Date/Time   X-Ray KUB [[id number]] Collected: 03/31/24 0800   Order Status: Completed Updated: 03/31/24 0847   Narrative:     HISTORY: eval stool burden  constipation    FINDINGS: 2 abdominal radiographs compared to the CT of the prior day show a nonobstructive bowel gas pattern, with large volume of stool throughout the colon and in the rectum. No evidence of intraperitoneal free air.    IMPRESSION: Nonobstructive bowel gas pattern, with large volume of stool throughout the colon and in the rectum.    Electronically signed by:  Leland Frey MD  03/31/2024 08:44 AM CDT Workstation: [id number]   X-Ray Ribs 2 View Left [[id number]] Collected: 03/30/24 0138   Order Status: Completed Updated: 03/30/24 0923   Narrative:     HISTORY: Left rib pain, abdominal pain.    FINDINGS: 4 views of the left ribs compared to prior exams show no acute fracture or destructive osseous lesion. There are remote healed fractures of the eighth and ninth ribs. Bone mineralization is normal.    IMPRESSION: Negative for acute left rib fracture.    Electronically signed by:  Leland Frey MD  03/30/2024 09:21 AM CDT Workstation: [email or web address]   CT Abdomen Pelvis With IV Contrast NO Oral Contrast [[id number]] Collected: 03/30/24 0526   Order Status: Completed Updated: 03/30/24 0636   Narrative:     EXAM DESCRIPTION:  Site:  CT ABDOMEN PELVIS WITH IV CONTRAST  RP: CT ABDOMEN PELVIS WITH IV CONTRAST    CLINICAL HISTORY:  73 years Male; LLQ abdominal pain;      TECHNIQUE:  CT of the abdomen and pelvis using intravenous contrast.  All CT scans at this facility use dose modulation,  iterative reconstruction, and/or weight based dosing when appropriate to reduce radiation dose to as low as reasonably achievable.    COMPARISON: 12/25/2023    FINDINGS:    Abdomen:  Stomach: No significant distention or surrounding edema.  Liver:No focal lesions. No intrahepatic ductal distention.  Gallbladder:Nondistended  Pancreas:Scattered coarse calcifications are again noted. Superior to the tibial, abutting the stomach, there is a low-density fluid collection 3.2 cm diameter. This is smaller and lower density since prior exam (previously 4.5 cm).  Spleen:Mild perisplenic edema is similar to prior exam.  Right kidney:No hydronephrosis. No focal lesion.  Left kidney:No hydronephrosis. No focal lesion.  Adrenal glands:Within normal limits  Vascular structures:Splenic embolization coils are again noted. There is moderate aortic and branch calcification plaque.  Calcific plaque at the superior mesenteric artery origin may result in significant stenosis, although there is no evidence for distal thrombosis.  No portal venous filling defects.    Pelvis:  Small bowel:No significant distention.  Appendix:Within normal limits  Colon:Diffuse colonic fecal retention, with proximal distention. No acute edema.  No free intraperitoneal fluid or air.  Bones: Chronic degenerative changes are again noted in the lumbar spine.  L5-S1: 6 mm anterior subluxation with severe endplate changes, as on prior study. There is a left L5 pars defect as on prior study.  Bladder: Distended, 21.6 x 10.1 x 12.6 cm (approximately 1400 mL) no focal wall thickening.  No pelvic mass or adenopathy.    IMPRESSION:    1.  Colonic fecal retention with proximal distention, suggesting constipation.  2.  Distended bladder, with no focal wall thickening. Possible bladder obstruction.  3.  Chronic pancreatitis. Pancreatic pseudocyst is smaller since 12/25/2023.  4.  Atherosclerosis.  5.  Chronic degenerative changes of the lumbar spine.    Electronically  signed by:  Cristian Figueredo MD  03/30/2024 06:34 AM CDT Workstation: [id number]     Pending Diagnostic Studies:       Procedure Component Value Units Date/Time    Basic Metabolic Panel [[id number]]     Order Status: Sent Lab Status: No result     Specimen: Blood            Medications:  Reconciled Home Medications:      Medication List        START taking these medications      polyethylene glycol 17 gram Pwpk  Commonly known as: GLYCOLAX  Take 17 g by mouth 2 (two) times daily.     traMADoL 50 mg tablet  Commonly known as: ULTRAM  Take 1 tablet (50 mg total) by mouth every 6 (six) hours as needed for Pain.            CONTINUE taking these medications      aspirin 325 MG tablet  Take 325 mg by mouth once daily.     fish oil-omega-3 fatty acids 300-1,000 mg capsule  Take 1 capsule by mouth once daily.     M-VIT ORAL  Take 1 tablet by mouth once daily.     VITAMIN D3 25 mcg (1,000 unit) capsule  Generic drug: cholecalciferol (vitamin D3)  Take 1,000 Units by mouth once daily.            STOP taking these medications      HYDROcodone-acetaminophen 5-325 mg per tablet  Commonly known as: NORCO     sodium chloride 5% 5 % ophthalmic solution  Commonly known as: HUMAIRA 128            ASK your doctor about these medications      apixaban 2.5 mg Tab  Commonly known as: ELIQUIS  Take 1 tablet (2.5 mg total) by mouth 2 (two) times daily.              Indwelling Lines/Drains at time of discharge:   Lines/Drains/Airways       None                   Time spent on the discharge of patient: 35 minutes         Melanie Pittman MD  Department of Hospital Medicine  [facility]

## 2024-03-31 NOTE — NURSING
Discharge instruction reviewed with pt verbal understanding receive. IV d/starla cath intact, personnel belongings accounted for.

## 2024-03-31 NOTE — RESPIRATORY THERAPY
03/30/24 2055   Patient Assessment/Suction   Level of Consciousness (AVPU) alert   Respiratory Effort Normal;Unlabored   Expansion/Accessory Muscles/Retractions no retractions;no use of accessory muscles   All Lung Fields Breath Sounds diminished   PRE-TX-O2   Device (Oxygen Therapy) room air   SpO2 99 %   Pulse Oximetry Type Intermittent   $ Pulse Oximetry - Multiple Charge Pulse Oximetry - Multiple   Pulse 71   Resp 16   Aerosol Therapy   $ Aerosol Therapy Charges PRN treatment not required   Education   $ Education Bronchodilator;15 min

## 2024-03-31 NOTE — PLAN OF CARE
[facility]  Initial Discharge Assessment       Primary Care Provider: Keven Nj MD    Admission Diagnosis: Hyponatremia [E87.1]    Admission Date: 3/30/2024  Expected Discharge Date: 4/2/2024    Transition of Care Barriers: None    Payor: [employer] MANAGED MEDICARE / Plan: [employer] MEDICARE PPO / Product Type: Medicare Advantage /     Extended Emergency Contact Information  Primary Emergency Contact: Bhargavi Mariee   [facility]  Home Phone: [phone]  Mobile Phone: [phone]  Relation: Sister    Discharge Plan A: Home  Discharge Plan B: Home      [facility] - [place] - [address]  [address]  [place] [address]  Phone: [phone] Fax: [phone]    [facility] - [place], [place] - [address]  [address]  [place] [address]  Phone: [phone] Fax: [phone]    CM completed discharge assessment with patient. Pt AAOx4s. Pt lives alone. Demographics, PCP, and insurance verified. No home health. No dialysis. Pt completes ADLs without assistance. Pt to discharge home via healthcare transportation or taxi. Pt has no other needs to be addressed at this time.    Initial Assessment (most recent)       Adult Discharge Assessment - 03/31/24 1038          Discharge Assessment    Assessment Type Discharge Planning Assessment     Confirmed/corrected address, phone number and insurance Yes     Confirmed Demographics Correct on Facesheet     Source of Information patient     When was your last doctors appointment? --   3 wks ago    Communicated NAFISA with patient/caregiver Date not available/Unable to determine     Reason For Admission Hyponatremia     People in Home alone     Do you expect to return to your current living situation? Yes     Do you have help at home or someone to help you manage your care at home? Yes     Who are your caregiver(s) and their phone number(s)? Bhargavi Mariee (sister) [phone]     Prior to hospitilization  cognitive status: Alert/Oriented     Current cognitive status: Alert/Oriented     Walking or Climbing Stairs Difficulty no     Dressing/Bathing Difficulty no     Home Layout Able to live on 1st floor     Equipment Currently Used at Home none     Patient currently being followed by outpatient case management? No     Do you currently have service(s) that help you manage your care at home? No     Do you take prescription medications? Yes     Do you have prescription coverage? Yes     Coverage [employer]     Do you have any problems affording any of your prescribed medications? No     Is the patient taking medications as prescribed? yes     Who is going to help you get home at discharge? insurance transportation or taxi     Are you on dialysis? No     Do you take coumadin? No     Discharge Plan A Home     Discharge Plan B Home     DME Needed Upon Discharge  none     Discharge Plan discussed with: Patient     Transition of Care Barriers None

## 2024-03-31 NOTE — HOSPITAL COURSE
Patient admitted to the hospital medicine service with hyponatremia, abdominal pain, urinary retention, and constipation. Cummings placed in ED and removed later that day after patient has had multiple bowel movements.  Abdominal pain improved and he no longer had issues with urinary retention.  Nephrology was consulted for hyponatremia.  It improved with normal saline.  Patient was cleared for discharge the next day by Nephrology.  Instructed to increase protein in diet and avoid alcohol.  Patient will follow-up with PCP.  Patient expressed understanding of discharge plan.

## 2024-04-09 NOTE — PLAN OF CARE
Through communication with [facility], the Inpatient order is being changed to observation as the payer will not authorize Inpatient and the facility agrees not to appeal or challenge the change in status. The account will be changed to Observation for billing purposes.       Archana Villafuerte MD  Physician Advisor

## 2024-05-20 NOTE — SUBJECTIVE & OBJECTIVE
Date: 5/31/2024 Time: 1145 Status: Scheduled   Location: [facility] MAIN OR Room: OR 05 Service: General   Patient class: Day Surgery Case classification: E - Elective      Surgeon Role   Tommy Phelps MD Primary    Procedure Laterality Anesthesia   LAPAROSCOPIC PERITONEAL DIALYSIS CATHETER PLACEMENT N/A General      Cardiac clearance     Phone [phone]     Fax number [phone]    Last seen 01/16/2024 gayle Erazo in ma folder      PLEASE SEND TO PSR POOL IF APPT NEEDED    Past Medical History:   Diagnosis Date    Hypertension        Past Surgical History:   Procedure Laterality Date    APPENDECTOMY      EYE SURGERY      JOINT REPLACEMENT      KNEE SURGERY      RECONSTRUCTION OF SHOULDER Right        Review of patient's allergies indicates:  No Known Allergies    Current Facility-Administered Medications on File Prior to Encounter   Medication    [COMPLETED] aluminum-magnesium hydroxide-simethicone 200-200-20 mg/5 mL suspension 30 mL    And    [COMPLETED] LIDOcaine HCl 2% oral solution 10 mL    [COMPLETED] dicyclomine 10 mg/5 mL syrup 20 mg    [COMPLETED] ondansetron injection 4 mg     Current Outpatient Medications on File Prior to Encounter   Medication Sig    aspirin (ECOTRIN) 81 MG EC tablet Take 81 mg by mouth once daily.    calcium-vitamin D (OSCAL) 250 (625)-125 mg-unit per tablet Take 1 tablet by mouth once daily.    docusate sodium (COLACE) 50 MG capsule Take by mouth 2 (two) times daily.    ferrous fumarate (HEMOCYTE) 324 mg (106 mg iron) Tab Take by mouth.    hydrocodone-acetaminophen 7.5-325mg (NORCO) 7.5-325 mg per tablet Take 1 tablet by mouth every 6 (six) hours as needed.    metoprolol tartrate (LOPRESSOR) 25 MG tablet Take 25 mg by mouth 2 (two) times daily.    naproxen (NAPROSYN) 500 MG tablet Take 500 mg by mouth 2 (two) times daily.    pravastatin (PRAVACHOL) 40 MG tablet Take 40 mg by mouth once daily.     Family History    None       Tobacco Use    Smoking status: Current Every Day Smoker     Packs/day: 0.50     Types: Cigarettes    Smokeless tobacco: Never Used   Substance and Sexual Activity    Alcohol use: Yes     Alcohol/week: 4.0 standard drinks     Types: 4 Cans of beer per week    Drug use: Never    Sexual activity: Not Currently     Review of Systems   Constitutional: Negative for chills and fever.   HENT: Negative for congestion and rhinorrhea.    Eyes: Negative for discharge, redness and visual disturbance.   Respiratory: Negative  for cough and shortness of breath.    Cardiovascular: Positive for leg swelling. Negative for chest pain and palpitations.   Gastrointestinal: Positive for abdominal distention, constipation and nausea. Negative for abdominal pain and diarrhea.   Genitourinary: Negative for dysuria, flank pain and hematuria.        Dark urine   Musculoskeletal: Negative for arthralgias and gait problem.   Skin: Positive for color change (yellowing).   Neurological: Negative for syncope, facial asymmetry, speech difficulty, light-headedness, numbness and headaches.   Psychiatric/Behavioral: Negative for agitation and confusion.     Objective:     Vital Signs (Most Recent):  Temp: 98.1 °F (36.7 °C) (02/02/22 0135)  Pulse: 79 (02/02/22 0135)  Resp: 17 (02/02/22 0135)  BP: (!) 99/57 (02/02/22 0135)  SpO2: 97 % (02/02/22 0135) Vital Signs (24h Range):  Temp:  [98 °F (36.7 °C)-98.1 °F (36.7 °C)] 98.1 °F (36.7 °C)  Pulse:  [77-96] 79  Resp:  [0-51] 17  SpO2:  [96 %-100 %] 97 %  BP: ()/(57-86) 99/57     Weight: 78.8 kg (173 lb 11.6 oz)  Body mass index is 24.23 kg/m².    Physical Exam  Constitutional:       General: He is not in acute distress.     Appearance: He is ill-appearing. He is not toxic-appearing or diaphoretic.   HENT:      Head: Normocephalic and atraumatic.      Nose: No congestion or rhinorrhea.      Mouth/Throat:      Mouth: Mucous membranes are moist.      Pharynx: Oropharynx is clear.   Eyes:      General: Scleral icterus present.   Cardiovascular:      Rate and Rhythm: Normal rate and regular rhythm.      Pulses: Normal pulses.      Heart sounds: Normal heart sounds. No murmur heard.      Pulmonary:      Effort: Pulmonary effort is normal. No respiratory distress.      Breath sounds: Normal breath sounds. No wheezing or rales.   Abdominal:      General: Bowel sounds are normal. There is distension.      Tenderness: There is no abdominal tenderness. There is no right CVA tenderness, left CVA tenderness, guarding or  rebound.      Comments: ascites with fluid wave    Musculoskeletal:         General: Normal range of motion.      Cervical back: Normal range of motion and neck supple.      Right lower leg: Edema (+1) present.      Left lower leg: Edema (+1) present.   Skin:     Coloration: Skin is jaundiced.   Neurological:      General: No focal deficit present.      Mental Status: He is alert and oriented to person, place, and time.      Sensory: No sensory deficit.      Motor: No weakness.      Comments: No asterixis    Psychiatric:         Mood and Affect: Mood normal.         Thought Content: Thought content normal.             Significant Labs: All pertinent labs within the past 24 hours have been reviewed.    Significant Imaging: I have reviewed all pertinent imaging results/findings within the past 24 hours.

## 2024-08-16 DIAGNOSIS — R59.1 LYMPHADENOPATHY: Primary | ICD-10-CM

## 2024-08-16 DIAGNOSIS — F17.210 NICOTINE DEPENDENCE, CIGARETTES, UNCOMPLICATED: ICD-10-CM

## 2025-02-24 ENCOUNTER — HOSPITAL ENCOUNTER (INPATIENT)
Facility: HOSPITAL | Age: 75
LOS: 1 days | Discharge: HOME | DRG: 439 | End: 2025-02-26
Attending: EMERGENCY MEDICINE | Admitting: INTERNAL MEDICINE
Payer: OTHER GOVERNMENT

## 2025-02-24 DIAGNOSIS — K85.90 ACUTE PANCREATITIS, UNSPECIFIED COMPLICATION STATUS, UNSPECIFIED PANCREATITIS TYPE: Primary | ICD-10-CM

## 2025-02-24 DIAGNOSIS — R07.9 CHEST PAIN: ICD-10-CM

## 2025-02-24 DIAGNOSIS — R79.89 ELEVATED TROPONIN: ICD-10-CM

## 2025-02-24 DIAGNOSIS — N39.0 URINARY TRACT INFECTION WITHOUT HEMATURIA, SITE UNSPECIFIED: ICD-10-CM

## 2025-02-24 DIAGNOSIS — E87.1 HYPONATREMIA: ICD-10-CM

## 2025-02-24 DIAGNOSIS — R10.13 EPIGASTRIC PAIN: ICD-10-CM

## 2025-02-24 LAB
BASOPHILS # BLD AUTO: 0.02 K/UL (ref 0–0.2)
BASOPHILS NFR BLD: 0.4 % (ref 0–1.9)
DIFFERENTIAL METHOD BLD: ABNORMAL
EOSINOPHIL # BLD AUTO: 0 K/UL (ref 0–0.5)
EOSINOPHIL NFR BLD: 0.8 % (ref 0–8)
ERYTHROCYTE [DISTWIDTH] IN BLOOD BY AUTOMATED COUNT: 13.9 % (ref 11.5–14.5)
HCT VFR BLD AUTO: 34.7 % (ref 40–54)
HGB BLD-MCNC: 12.3 G/DL (ref 14–18)
IMM GRANULOCYTES # BLD AUTO: 0.01 K/UL (ref 0–0.04)
IMM GRANULOCYTES NFR BLD AUTO: 0.2 % (ref 0–0.5)
LYMPHOCYTES # BLD AUTO: 0.6 K/UL (ref 1–4.8)
LYMPHOCYTES NFR BLD: 12 % (ref 18–48)
MAGNESIUM SERPL-MCNC: 1.3 MG/DL (ref 1.6–2.6)
MCH RBC QN AUTO: 30 PG (ref 27–31)
MCHC RBC AUTO-ENTMCNC: 35.4 G/DL (ref 32–36)
MCV RBC AUTO: 85 FL (ref 82–98)
MONOCYTES # BLD AUTO: 0.5 K/UL (ref 0.3–1)
MONOCYTES NFR BLD: 10.3 % (ref 4–15)
NEUTROPHILS # BLD AUTO: 3.8 K/UL (ref 1.8–7.7)
NEUTROPHILS NFR BLD: 76.3 % (ref 38–73)
NRBC BLD-RTO: 0 /100 WBC
PLATELET # BLD AUTO: 135 K/UL (ref 150–450)
PMV BLD AUTO: 8.7 FL (ref 9.2–12.9)
RBC # BLD AUTO: 4.1 M/UL (ref 4.6–6.2)
WBC # BLD AUTO: 4.93 K/UL (ref 3.9–12.7)

## 2025-02-24 PROCEDURE — 83880 ASSAY OF NATRIURETIC PEPTIDE: CPT | Performed by: EMERGENCY MEDICINE

## 2025-02-24 PROCEDURE — 82550 ASSAY OF CK (CPK): CPT | Performed by: EMERGENCY MEDICINE

## 2025-02-24 PROCEDURE — 83735 ASSAY OF MAGNESIUM: CPT | Performed by: EMERGENCY MEDICINE

## 2025-02-24 PROCEDURE — 85025 COMPLETE CBC W/AUTO DIFF WBC: CPT

## 2025-02-24 PROCEDURE — 93010 ELECTROCARDIOGRAM REPORT: CPT | Mod: ,,, | Performed by: INTERNAL MEDICINE

## 2025-02-24 PROCEDURE — 84443 ASSAY THYROID STIM HORMONE: CPT | Performed by: EMERGENCY MEDICINE

## 2025-02-24 PROCEDURE — 96365 THER/PROPH/DIAG IV INF INIT: CPT | Mod: 59

## 2025-02-24 PROCEDURE — 83690 ASSAY OF LIPASE: CPT

## 2025-02-24 PROCEDURE — 99285 EMERGENCY DEPT VISIT HI MDM: CPT | Mod: 25

## 2025-02-24 PROCEDURE — 96375 TX/PRO/DX INJ NEW DRUG ADDON: CPT

## 2025-02-24 PROCEDURE — 84484 ASSAY OF TROPONIN QUANT: CPT | Performed by: EMERGENCY MEDICINE

## 2025-02-24 PROCEDURE — 93005 ELECTROCARDIOGRAM TRACING: CPT | Performed by: INTERNAL MEDICINE

## 2025-02-24 PROCEDURE — 63600175 PHARM REV CODE 636 W HCPCS: Performed by: EMERGENCY MEDICINE

## 2025-02-24 PROCEDURE — 80053 COMPREHEN METABOLIC PANEL: CPT

## 2025-02-24 RX ORDER — MORPHINE SULFATE 2 MG/ML
2 INJECTION, SOLUTION INTRAMUSCULAR; INTRAVENOUS
Refills: 0 | Status: COMPLETED | OUTPATIENT
Start: 2025-02-24 | End: 2025-02-24

## 2025-02-24 RX ORDER — ONDANSETRON HYDROCHLORIDE 2 MG/ML
4 INJECTION, SOLUTION INTRAVENOUS
Status: COMPLETED | OUTPATIENT
Start: 2025-02-24 | End: 2025-02-24

## 2025-02-24 RX ORDER — PANTOPRAZOLE SODIUM 40 MG/10ML
40 INJECTION, POWDER, LYOPHILIZED, FOR SOLUTION INTRAVENOUS ONCE
Status: COMPLETED | OUTPATIENT
Start: 2025-02-24 | End: 2025-02-24

## 2025-02-24 RX ORDER — PANTOPRAZOLE SODIUM 40 MG/10ML
40 INJECTION, POWDER, LYOPHILIZED, FOR SOLUTION INTRAVENOUS ONCE
Status: DISCONTINUED | OUTPATIENT
Start: 2025-02-25 | End: 2025-02-24

## 2025-02-24 RX ADMIN — PANTOPRAZOLE SODIUM 40 MG: 40 INJECTION, POWDER, LYOPHILIZED, FOR SOLUTION INTRAVENOUS at 11:02

## 2025-02-24 RX ADMIN — MORPHINE SULFATE 2 MG: 2 INJECTION, SOLUTION INTRAMUSCULAR; INTRAVENOUS at 11:02

## 2025-02-24 RX ADMIN — ONDANSETRON 4 MG: 2 INJECTION INTRAMUSCULAR; INTRAVENOUS at 11:02

## 2025-02-25 ENCOUNTER — CLINICAL SUPPORT (OUTPATIENT)
Dept: CARDIOLOGY | Facility: HOSPITAL | Age: 75
End: 2025-02-25
Attending: EMERGENCY MEDICINE
Payer: OTHER GOVERNMENT

## 2025-02-25 VITALS — BODY MASS INDEX: 19.74 KG/M2 | WEIGHT: 141 LBS | HEIGHT: 71 IN

## 2025-02-25 PROBLEM — R79.89 ELEVATED TROPONIN: Status: ACTIVE | Noted: 2025-02-25

## 2025-02-25 PROBLEM — N39.0 UTI (URINARY TRACT INFECTION): Status: ACTIVE | Noted: 2025-02-25

## 2025-02-25 LAB
ALBUMIN SERPL BCP-MCNC: 4 G/DL (ref 3.5–5.2)
ALP SERPL-CCNC: 113 U/L (ref 55–135)
ALT SERPL W/O P-5'-P-CCNC: 22 U/L (ref 10–44)
AMPHET+METHAMPHET UR QL: NEGATIVE
ANION GAP SERPL CALC-SCNC: 15 MMOL/L (ref 8–16)
AORTIC ROOT ANNULUS: 3.7 CM
AORTIC VALVE CUSP SEPERATION: 1.5 CM
APICAL FOUR CHAMBER EJECTION FRACTION: 60 %
APICAL TWO CHAMBER EJECTION FRACTION: 58 %
APTT PPP: 27.1 SEC (ref 21–32)
APTT PPP: 27.1 SEC (ref 21–32)
ASCENDING AORTA: 3 CM
AST SERPL-CCNC: 31 U/L (ref 10–40)
AV INDEX (PROSTH): 0.7
AV MEAN GRADIENT: 4 MMHG
AV PEAK GRADIENT: 8 MMHG
AV REGURGITATION PRESSURE HALF TIME: 362 MS
AV VALVE AREA BY VELOCITY RATIO: 2.2 CM²
AV VALVE AREA: 2.2 CM²
AV VELOCITY RATIO: 0.71
BACTERIA #/AREA URNS HPF: ABNORMAL /HPF
BARBITURATES UR QL SCN>200 NG/ML: NEGATIVE
BASOPHILS # BLD AUTO: 0.01 K/UL (ref 0–0.2)
BASOPHILS NFR BLD: 0.2 % (ref 0–1.9)
BENZODIAZ UR QL SCN>200 NG/ML: NEGATIVE
BILIRUB SERPL-MCNC: 0.5 MG/DL (ref 0.1–1)
BILIRUB UR QL STRIP: NEGATIVE
BILIRUB UR QL STRIP: NEGATIVE
BNP SERPL-MCNC: 127 PG/ML (ref 0–99)
BSA FOR ECHO PROCEDURE: 1.79 M2
BUN SERPL-MCNC: 6 MG/DL (ref 8–23)
BZE UR QL SCN: NEGATIVE
CALCIUM SERPL-MCNC: 9.1 MG/DL (ref 8.7–10.5)
CANNABINOIDS UR QL SCN: NEGATIVE
CHLORIDE SERPL-SCNC: 88 MMOL/L (ref 95–110)
CK SERPL-CCNC: 56 U/L (ref 20–200)
CLARITY UR: CLEAR
CLARITY UR: CLEAR
CO2 SERPL-SCNC: 16 MMOL/L (ref 23–29)
COLOR UR: YELLOW
COLOR UR: YELLOW
CREAT SERPL-MCNC: 0.5 MG/DL (ref 0.5–1.4)
CREAT UR-MCNC: 26.1 MG/DL (ref 23–375)
CV ECHO LV RWT: 0.48 CM
DIFFERENTIAL METHOD BLD: ABNORMAL
DOP CALC AO PEAK VEL: 1.4 M/S
DOP CALC AO VTI: 29.1 CM
DOP CALC LVOT AREA: 3.1 CM2
DOP CALC LVOT DIAMETER: 2 CM
DOP CALC LVOT PEAK VEL: 1 M/S
DOP CALC LVOT STROKE VOLUME: 64.4 CM3
DOP CALC MV VTI: 23.8 CM
DOP CALCLVOT PEAK VEL VTI: 20.5 CM
E WAVE DECELERATION TIME: 243 MSEC
E/A RATIO: 1.06
E/E' RATIO: 7 M/S
ECHO LV POSTERIOR WALL: 1.1 CM (ref 0.6–1.1)
EOSINOPHIL # BLD AUTO: 0.1 K/UL (ref 0–0.5)
EOSINOPHIL NFR BLD: 2.6 % (ref 0–8)
ERYTHROCYTE [DISTWIDTH] IN BLOOD BY AUTOMATED COUNT: 14 % (ref 11.5–14.5)
EST. GFR  (NO RACE VARIABLE): >60 ML/MIN/1.73 M^2
FRACTIONAL SHORTENING: 34.8 % (ref 28–44)
GLUCOSE SERPL-MCNC: 149 MG/DL (ref 70–110)
GLUCOSE UR QL STRIP: NEGATIVE
GLUCOSE UR QL STRIP: NEGATIVE
HCT VFR BLD AUTO: 32.7 % (ref 40–54)
HGB BLD-MCNC: 11.8 G/DL (ref 14–18)
HGB UR QL STRIP: ABNORMAL
HGB UR QL STRIP: ABNORMAL
IMM GRANULOCYTES # BLD AUTO: 0.01 K/UL (ref 0–0.04)
IMM GRANULOCYTES NFR BLD AUTO: 0.2 % (ref 0–0.5)
INR PPP: 1.1 (ref 0.8–1.2)
INTERVENTRICULAR SEPTUM: 1.1 CM (ref 0.6–1.1)
IVC DIAMETER: 1.73 CM
KETONES UR QL STRIP: ABNORMAL
KETONES UR QL STRIP: ABNORMAL
LEFT ATRIUM AREA SYSTOLIC (APICAL 2 CHAMBER): 19.8 CM2
LEFT ATRIUM AREA SYSTOLIC (APICAL 4 CHAMBER): 14 CM2
LEFT ATRIUM SIZE: 4.1 CM
LEFT INTERNAL DIMENSION IN SYSTOLE: 3 CM (ref 2.1–4)
LEFT VENTRICLE DIASTOLIC VOLUME INDEX: 52.75 ML/M2
LEFT VENTRICLE DIASTOLIC VOLUME: 96 ML
LEFT VENTRICLE END DIASTOLIC VOLUME APICAL 2 CHAMBER: 90.3 ML
LEFT VENTRICLE END DIASTOLIC VOLUME APICAL 4 CHAMBER: 109 ML
LEFT VENTRICLE END SYSTOLIC VOLUME APICAL 2 CHAMBER: 60 ML
LEFT VENTRICLE END SYSTOLIC VOLUME APICAL 4 CHAMBER: 33.5 ML
LEFT VENTRICLE MASS INDEX: 99.6 G/M2
LEFT VENTRICLE SYSTOLIC VOLUME INDEX: 19.8 ML/M2
LEFT VENTRICLE SYSTOLIC VOLUME: 36 ML
LEFT VENTRICULAR INTERNAL DIMENSION IN DIASTOLE: 4.6 CM (ref 3.5–6)
LEFT VENTRICULAR MASS: 181.2 G
LEUKOCYTE ESTERASE UR QL STRIP: ABNORMAL
LEUKOCYTE ESTERASE UR QL STRIP: ABNORMAL
LIPASE SERPL-CCNC: 11 U/L (ref 4–60)
LV LATERAL E/E' RATIO: 6.7 M/S
LV SEPTAL E/E' RATIO: 8.4 M/S
LVED V (TEICH): 96.3 ML
LVES V (TEICH): 35.6 ML
LVOT MG: 2 MMHG
LVOT MV: 0.65 CM/S
LYMPHOCYTES # BLD AUTO: 0.8 K/UL (ref 1–4.8)
LYMPHOCYTES NFR BLD: 18.3 % (ref 18–48)
MCH RBC QN AUTO: 30.1 PG (ref 27–31)
MCHC RBC AUTO-ENTMCNC: 36.1 G/DL (ref 32–36)
MCV RBC AUTO: 83 FL (ref 82–98)
MICROSCOPIC COMMENT: ABNORMAL
MONOCYTES # BLD AUTO: 0.6 K/UL (ref 0.3–1)
MONOCYTES NFR BLD: 12.6 % (ref 4–15)
MV MEAN GRADIENT: 1 MMHG
MV PEAK A VEL: 0.63 M/S
MV PEAK E VEL: 0.67 M/S
MV PEAK GRADIENT: 3 MMHG
MV STENOSIS PRESSURE HALF TIME: 91 MS
MV VALVE AREA BY CONTINUITY EQUATION: 2.7 CM2
MV VALVE AREA P 1/2 METHOD: 2.42 CM2
NEUTROPHILS # BLD AUTO: 3 K/UL (ref 1.8–7.7)
NEUTROPHILS NFR BLD: 66.1 % (ref 38–73)
NITRITE UR QL STRIP: NEGATIVE
NITRITE UR QL STRIP: NEGATIVE
NRBC BLD-RTO: 0 /100 WBC
OHS LV EJECTION FRACTION SIMPSONS BIPLANE MOD: 58 %
OPIATES UR QL SCN: NEGATIVE
OSMOLALITY SERPL: 270 MOSM/KG (ref 280–300)
OSMOLALITY UR: 341 MOSM/KG (ref 50–1200)
PCP UR QL SCN>25 NG/ML: NEGATIVE
PH UR STRIP: 7 [PH] (ref 5–8)
PH UR STRIP: 7 [PH] (ref 5–8)
PISA AR MAX VEL: 1.75 M/S
PISA MRMAX VEL: 4.48 M/S
PISA TR MAX VEL: 2.7 M/S
PLATELET # BLD AUTO: 101 K/UL (ref 150–450)
PMV BLD AUTO: 8.5 FL (ref 9.2–12.9)
POTASSIUM SERPL-SCNC: 3.5 MMOL/L (ref 3.5–5.1)
PROT SERPL-MCNC: 7.9 G/DL (ref 6–8.4)
PROT UR QL STRIP: NEGATIVE
PROT UR QL STRIP: NEGATIVE
PROTHROMBIN TIME: 11.8 SEC (ref 9–12.5)
PV MV: 0.73 M/S
PV PEAK GRADIENT: 5 MMHG
PV PEAK VELOCITY: 1.07 M/S
RBC # BLD AUTO: 3.92 M/UL (ref 4.6–6.2)
RBC #/AREA URNS HPF: 4 /HPF (ref 0–4)
RIGHT ATRIUM VOLUME AREA LENGTH APICAL 4 CHAMBER: 21 ML
RV TISSUE DOPPLER FREE WALL SYSTOLIC VELOCITY 1 (APICAL 4 CHAMBER VIEW): 21.4 CM/S
SINUS: 2.93 CM
SODIUM SERPL-SCNC: 119 MMOL/L (ref 136–145)
SODIUM SERPL-SCNC: 124 MMOL/L (ref 136–145)
SODIUM SERPL-SCNC: 125 MMOL/L (ref 136–145)
SODIUM SERPL-SCNC: 126 MMOL/L (ref 136–145)
SODIUM SERPL-SCNC: 126 MMOL/L (ref 136–145)
SODIUM UR-SCNC: 107 MMOL/L (ref 20–250)
SP GR UR STRIP: 1.01 (ref 1–1.03)
SP GR UR STRIP: 1.01 (ref 1–1.03)
STJ: 2.5 CM
TDI LATERAL: 0.1 M/S
TDI SEPTAL: 0.08 M/S
TDI: 0.09 M/S
TOXICOLOGY INFORMATION: NORMAL
TRICUSPID ANNULAR PLANE SYSTOLIC EXCURSION: 1.63 CM
TROPONIN I SERPL HS-MCNC: 103.7 PG/ML (ref 0–14.9)
TROPONIN I SERPL HS-MCNC: 103.7 PG/ML (ref 0–14.9)
TROPONIN I SERPL HS-MCNC: 108.2 PG/ML (ref 0–14.9)
TROPONIN I SERPL HS-MCNC: 40.1 PG/ML (ref 0–14.9)
TSH SERPL DL<=0.005 MIU/L-ACNC: 1.39 UIU/ML (ref 0.34–5.6)
URN SPEC COLLECT METH UR: ABNORMAL
URN SPEC COLLECT METH UR: ABNORMAL
UROBILINOGEN UR STRIP-ACNC: NEGATIVE EU/DL
UROBILINOGEN UR STRIP-ACNC: NEGATIVE EU/DL
WBC # BLD AUTO: 4.6 K/UL (ref 3.9–12.7)
WBC #/AREA URNS HPF: 90 /HPF (ref 0–5)
Z-SCORE OF LEFT VENTRICULAR DIMENSION IN END DIASTOLE: -0.91
Z-SCORE OF LEFT VENTRICULAR DIMENSION IN END SYSTOLE: -0.29

## 2025-02-25 PROCEDURE — 36415 COLL VENOUS BLD VENIPUNCTURE: CPT | Performed by: EMERGENCY MEDICINE

## 2025-02-25 PROCEDURE — 63600175 PHARM REV CODE 636 W HCPCS: Performed by: INTERNAL MEDICINE

## 2025-02-25 PROCEDURE — 25000003 PHARM REV CODE 250: Performed by: STUDENT IN AN ORGANIZED HEALTH CARE EDUCATION/TRAINING PROGRAM

## 2025-02-25 PROCEDURE — 93306 TTE W/DOPPLER COMPLETE: CPT

## 2025-02-25 PROCEDURE — 83930 ASSAY OF BLOOD OSMOLALITY: CPT | Performed by: INTERNAL MEDICINE

## 2025-02-25 PROCEDURE — 87086 URINE CULTURE/COLONY COUNT: CPT

## 2025-02-25 PROCEDURE — 25000003 PHARM REV CODE 250: Performed by: INTERNAL MEDICINE

## 2025-02-25 PROCEDURE — 84295 ASSAY OF SERUM SODIUM: CPT | Mod: 91 | Performed by: STUDENT IN AN ORGANIZED HEALTH CARE EDUCATION/TRAINING PROGRAM

## 2025-02-25 PROCEDURE — 84295 ASSAY OF SERUM SODIUM: CPT | Performed by: INTERNAL MEDICINE

## 2025-02-25 PROCEDURE — 85730 THROMBOPLASTIN TIME PARTIAL: CPT | Performed by: INTERNAL MEDICINE

## 2025-02-25 PROCEDURE — 96376 TX/PRO/DX INJ SAME DRUG ADON: CPT

## 2025-02-25 PROCEDURE — 83935 ASSAY OF URINE OSMOLALITY: CPT | Performed by: INTERNAL MEDICINE

## 2025-02-25 PROCEDURE — 80307 DRUG TEST PRSMV CHEM ANLYZR: CPT | Performed by: EMERGENCY MEDICINE

## 2025-02-25 PROCEDURE — 63600175 PHARM REV CODE 636 W HCPCS: Performed by: EMERGENCY MEDICINE

## 2025-02-25 PROCEDURE — 36415 COLL VENOUS BLD VENIPUNCTURE: CPT | Performed by: STUDENT IN AN ORGANIZED HEALTH CARE EDUCATION/TRAINING PROGRAM

## 2025-02-25 PROCEDURE — 81003 URINALYSIS AUTO W/O SCOPE: CPT

## 2025-02-25 PROCEDURE — 96375 TX/PRO/DX INJ NEW DRUG ADDON: CPT

## 2025-02-25 PROCEDURE — 85025 COMPLETE CBC W/AUTO DIFF WBC: CPT | Performed by: INTERNAL MEDICINE

## 2025-02-25 PROCEDURE — 25000003 PHARM REV CODE 250: Performed by: EMERGENCY MEDICINE

## 2025-02-25 PROCEDURE — 63600175 PHARM REV CODE 636 W HCPCS: Performed by: STUDENT IN AN ORGANIZED HEALTH CARE EDUCATION/TRAINING PROGRAM

## 2025-02-25 PROCEDURE — 84484 ASSAY OF TROPONIN QUANT: CPT | Performed by: EMERGENCY MEDICINE

## 2025-02-25 PROCEDURE — 25500020 PHARM REV CODE 255: Performed by: EMERGENCY MEDICINE

## 2025-02-25 PROCEDURE — 99222 1ST HOSP IP/OBS MODERATE 55: CPT | Mod: ,,, | Performed by: INTERNAL MEDICINE

## 2025-02-25 PROCEDURE — 93306 TTE W/DOPPLER COMPLETE: CPT | Mod: 26,,, | Performed by: INTERNAL MEDICINE

## 2025-02-25 PROCEDURE — 12000002 HC ACUTE/MED SURGE SEMI-PRIVATE ROOM

## 2025-02-25 PROCEDURE — 96365 THER/PROPH/DIAG IV INF INIT: CPT

## 2025-02-25 PROCEDURE — 84300 ASSAY OF URINE SODIUM: CPT | Performed by: INTERNAL MEDICINE

## 2025-02-25 PROCEDURE — 81001 URINALYSIS AUTO W/SCOPE: CPT

## 2025-02-25 PROCEDURE — 84484 ASSAY OF TROPONIN QUANT: CPT | Mod: 91

## 2025-02-25 PROCEDURE — 96366 THER/PROPH/DIAG IV INF ADDON: CPT

## 2025-02-25 PROCEDURE — 85610 PROTHROMBIN TIME: CPT | Performed by: INTERNAL MEDICINE

## 2025-02-25 PROCEDURE — 36415 COLL VENOUS BLD VENIPUNCTURE: CPT | Performed by: INTERNAL MEDICINE

## 2025-02-25 RX ORDER — ACETAMINOPHEN 325 MG/1
650 TABLET ORAL EVERY 8 HOURS PRN
Status: DISCONTINUED | OUTPATIENT
Start: 2025-02-25 | End: 2025-02-25

## 2025-02-25 RX ORDER — SODIUM,POTASSIUM PHOSPHATES 280-250MG
2 POWDER IN PACKET (EA) ORAL
Status: DISCONTINUED | OUTPATIENT
Start: 2025-02-25 | End: 2025-02-26 | Stop reason: HOSPADM

## 2025-02-25 RX ORDER — ASPIRIN 325 MG
325 TABLET ORAL DAILY
Status: DISCONTINUED | OUTPATIENT
Start: 2025-02-26 | End: 2025-02-26 | Stop reason: HOSPADM

## 2025-02-25 RX ORDER — HYDROCODONE BITARTRATE AND ACETAMINOPHEN 5; 325 MG/1; MG/1
1 TABLET ORAL EVERY 6 HOURS PRN
Refills: 0 | Status: DISCONTINUED | OUTPATIENT
Start: 2025-02-25 | End: 2025-02-25

## 2025-02-25 RX ORDER — LANOLIN ALCOHOL/MO/W.PET/CERES
800 CREAM (GRAM) TOPICAL
Status: DISCONTINUED | OUTPATIENT
Start: 2025-02-25 | End: 2025-02-26 | Stop reason: HOSPADM

## 2025-02-25 RX ORDER — IBUPROFEN 200 MG
16 TABLET ORAL
Status: DISCONTINUED | OUTPATIENT
Start: 2025-02-25 | End: 2025-02-25

## 2025-02-25 RX ORDER — MORPHINE SULFATE 2 MG/ML
2 INJECTION, SOLUTION INTRAMUSCULAR; INTRAVENOUS
Status: COMPLETED | OUTPATIENT
Start: 2025-02-25 | End: 2025-02-25

## 2025-02-25 RX ORDER — LORAZEPAM 2 MG/ML
1 INJECTION INTRAMUSCULAR EVERY 4 HOURS PRN
Status: DISCONTINUED | OUTPATIENT
Start: 2025-02-25 | End: 2025-02-26 | Stop reason: HOSPADM

## 2025-02-25 RX ORDER — HYDRALAZINE HYDROCHLORIDE 20 MG/ML
10 INJECTION INTRAMUSCULAR; INTRAVENOUS EVERY 4 HOURS PRN
Status: DISCONTINUED | OUTPATIENT
Start: 2025-02-25 | End: 2025-02-26 | Stop reason: HOSPADM

## 2025-02-25 RX ORDER — IBUPROFEN 200 MG
24 TABLET ORAL
Status: DISCONTINUED | OUTPATIENT
Start: 2025-02-25 | End: 2025-02-25

## 2025-02-25 RX ORDER — VITAMIN E 268 MG
400 CAPSULE ORAL DAILY
COMMUNITY

## 2025-02-25 RX ORDER — LIDOCAINE HYDROCHLORIDE 20 MG/ML
JELLY TOPICAL
Status: COMPLETED | OUTPATIENT
Start: 2025-02-25 | End: 2025-02-25

## 2025-02-25 RX ORDER — AMOXICILLIN 250 MG
1 CAPSULE ORAL DAILY PRN
Status: DISCONTINUED | OUTPATIENT
Start: 2025-02-25 | End: 2025-02-26 | Stop reason: HOSPADM

## 2025-02-25 RX ORDER — ALUMINUM HYDROXIDE, MAGNESIUM HYDROXIDE, AND SIMETHICONE 1200; 120; 1200 MG/30ML; MG/30ML; MG/30ML
30 SUSPENSION ORAL 4 TIMES DAILY PRN
Status: DISCONTINUED | OUTPATIENT
Start: 2025-02-25 | End: 2025-02-26 | Stop reason: HOSPADM

## 2025-02-25 RX ORDER — ONDANSETRON HYDROCHLORIDE 2 MG/ML
4 INJECTION, SOLUTION INTRAVENOUS EVERY 6 HOURS PRN
Status: DISCONTINUED | OUTPATIENT
Start: 2025-02-25 | End: 2025-02-26 | Stop reason: HOSPADM

## 2025-02-25 RX ORDER — THIAMINE HCL 100 MG
100 TABLET ORAL DAILY
Status: DISCONTINUED | OUTPATIENT
Start: 2025-02-25 | End: 2025-02-26 | Stop reason: HOSPADM

## 2025-02-25 RX ORDER — PANTOPRAZOLE SODIUM 40 MG/10ML
40 INJECTION, POWDER, LYOPHILIZED, FOR SOLUTION INTRAVENOUS DAILY
Status: DISCONTINUED | OUTPATIENT
Start: 2025-02-25 | End: 2025-02-26 | Stop reason: HOSPADM

## 2025-02-25 RX ORDER — SODIUM CHLORIDE 1 G/1
1000 TABLET ORAL DAILY PRN
COMMUNITY

## 2025-02-25 RX ORDER — MORPHINE SULFATE 2 MG/ML
2 INJECTION, SOLUTION INTRAMUSCULAR; INTRAVENOUS
Refills: 0 | Status: DISCONTINUED | OUTPATIENT
Start: 2025-02-25 | End: 2025-02-25

## 2025-02-25 RX ORDER — CEFTRIAXONE 1 G/1
1 INJECTION, POWDER, FOR SOLUTION INTRAMUSCULAR; INTRAVENOUS
Status: DISCONTINUED | OUTPATIENT
Start: 2025-02-25 | End: 2025-02-26 | Stop reason: HOSPADM

## 2025-02-25 RX ORDER — MORPHINE SULFATE 2 MG/ML
2 INJECTION, SOLUTION INTRAMUSCULAR; INTRAVENOUS
Refills: 0 | Status: COMPLETED | OUTPATIENT
Start: 2025-02-25 | End: 2025-02-25

## 2025-02-25 RX ORDER — SODIUM CHLORIDE 9 MG/ML
INJECTION, SOLUTION INTRAVENOUS CONTINUOUS
Status: DISCONTINUED | OUTPATIENT
Start: 2025-02-25 | End: 2025-02-25

## 2025-02-25 RX ORDER — SODIUM CHLORIDE 9 MG/ML
1000 INJECTION, SOLUTION INTRAVENOUS
Status: COMPLETED | OUTPATIENT
Start: 2025-02-25 | End: 2025-02-25

## 2025-02-25 RX ORDER — MAGNESIUM SULFATE HEPTAHYDRATE 40 MG/ML
2 INJECTION, SOLUTION INTRAVENOUS ONCE
Status: COMPLETED | OUTPATIENT
Start: 2025-02-25 | End: 2025-02-25

## 2025-02-25 RX ORDER — NALOXONE HCL 0.4 MG/ML
0.02 VIAL (ML) INJECTION
Status: DISCONTINUED | OUTPATIENT
Start: 2025-02-25 | End: 2025-02-26 | Stop reason: HOSPADM

## 2025-02-25 RX ORDER — ENOXAPARIN SODIUM 100 MG/ML
40 INJECTION SUBCUTANEOUS EVERY 24 HOURS
Status: DISCONTINUED | OUTPATIENT
Start: 2025-02-25 | End: 2025-02-26 | Stop reason: HOSPADM

## 2025-02-25 RX ORDER — MORPHINE SULFATE 4 MG/ML
4 INJECTION, SOLUTION INTRAMUSCULAR; INTRAVENOUS
Status: DISCONTINUED | OUTPATIENT
Start: 2025-02-25 | End: 2025-02-26 | Stop reason: HOSPADM

## 2025-02-25 RX ORDER — GLUCAGON 1 MG
1 KIT INJECTION
Status: DISCONTINUED | OUTPATIENT
Start: 2025-02-25 | End: 2025-02-25

## 2025-02-25 RX ORDER — ACETAMINOPHEN 325 MG/1
650 TABLET ORAL EVERY 4 HOURS PRN
Status: DISCONTINUED | OUTPATIENT
Start: 2025-02-25 | End: 2025-02-25

## 2025-02-25 RX ORDER — LORAZEPAM 2 MG/ML
0.5 INJECTION INTRAMUSCULAR
Status: COMPLETED | OUTPATIENT
Start: 2025-02-25 | End: 2025-02-25

## 2025-02-25 RX ORDER — FOLIC ACID 1 MG/1
1 TABLET ORAL DAILY
Status: DISCONTINUED | OUTPATIENT
Start: 2025-02-25 | End: 2025-02-26 | Stop reason: HOSPADM

## 2025-02-25 RX ORDER — ASPIRIN 325 MG
325 TABLET, DELAYED RELEASE (ENTERIC COATED) ORAL DAILY
Status: DISCONTINUED | OUTPATIENT
Start: 2025-02-25 | End: 2025-02-25

## 2025-02-25 RX ORDER — HEPARIN SODIUM,PORCINE/D5W 25000/250
0-40 INTRAVENOUS SOLUTION INTRAVENOUS CONTINUOUS
Status: DISCONTINUED | OUTPATIENT
Start: 2025-02-25 | End: 2025-02-25

## 2025-02-25 RX ORDER — TALC
6 POWDER (GRAM) TOPICAL NIGHTLY PRN
Status: DISCONTINUED | OUTPATIENT
Start: 2025-02-25 | End: 2025-02-26 | Stop reason: HOSPADM

## 2025-02-25 RX ORDER — MUPIROCIN 20 MG/G
OINTMENT TOPICAL 2 TIMES DAILY
Status: DISCONTINUED | OUTPATIENT
Start: 2025-02-25 | End: 2025-02-26 | Stop reason: HOSPADM

## 2025-02-25 RX ADMIN — CEFTRIAXONE 1 G: 1 INJECTION, POWDER, FOR SOLUTION INTRAMUSCULAR; INTRAVENOUS at 06:02

## 2025-02-25 RX ADMIN — THERA TABS 1 TABLET: TAB at 08:02

## 2025-02-25 RX ADMIN — PIPERACILLIN SODIUM AND TAZOBACTAM SODIUM 4.5 G: 4; .5 INJECTION, POWDER, LYOPHILIZED, FOR SOLUTION INTRAVENOUS at 02:02

## 2025-02-25 RX ADMIN — MAGNESIUM SULFATE HEPTAHYDRATE 2 G: 40 INJECTION, SOLUTION INTRAVENOUS at 01:02

## 2025-02-25 RX ADMIN — MORPHINE SULFATE 2 MG: 2 INJECTION, SOLUTION INTRAMUSCULAR; INTRAVENOUS at 08:02

## 2025-02-25 RX ADMIN — THIAMINE HCL TAB 100 MG 100 MG: 100 TAB at 08:02

## 2025-02-25 RX ADMIN — ASPIRIN 325 MG: 325 TABLET, COATED ORAL at 06:02

## 2025-02-25 RX ADMIN — PANTOPRAZOLE SODIUM 40 MG: 40 INJECTION, POWDER, FOR SOLUTION INTRAVENOUS at 08:02

## 2025-02-25 RX ADMIN — MUPIROCIN 1 G: 20 OINTMENT TOPICAL at 10:02

## 2025-02-25 RX ADMIN — MORPHINE SULFATE 4 MG: 4 INJECTION, SOLUTION INTRAMUSCULAR; INTRAVENOUS at 08:02

## 2025-02-25 RX ADMIN — LORAZEPAM 0.5 MG: 2 INJECTION INTRAMUSCULAR; INTRAVENOUS at 03:02

## 2025-02-25 RX ADMIN — VANCOMYCIN HYDROCHLORIDE 1250 MG: 1.25 INJECTION, POWDER, LYOPHILIZED, FOR SOLUTION INTRAVENOUS at 06:02

## 2025-02-25 RX ADMIN — FOLIC ACID 1 MG: 1 TABLET ORAL at 08:02

## 2025-02-25 RX ADMIN — ENOXAPARIN SODIUM 40 MG: 40 INJECTION SUBCUTANEOUS at 05:02

## 2025-02-25 RX ADMIN — LORAZEPAM 1 MG: 2 INJECTION INTRAMUSCULAR; INTRAVENOUS at 10:02

## 2025-02-25 RX ADMIN — IOHEXOL 100 ML: 350 INJECTION, SOLUTION INTRAVENOUS at 02:02

## 2025-02-25 RX ADMIN — LORAZEPAM 1 MG: 2 INJECTION INTRAMUSCULAR; INTRAVENOUS at 11:02

## 2025-02-25 RX ADMIN — MORPHINE SULFATE 2 MG: 2 INJECTION, SOLUTION INTRAMUSCULAR; INTRAVENOUS at 01:02

## 2025-02-25 RX ADMIN — MORPHINE SULFATE 2 MG: 2 INJECTION, SOLUTION INTRAMUSCULAR; INTRAVENOUS at 05:02

## 2025-02-25 RX ADMIN — MUPIROCIN: 20 OINTMENT TOPICAL at 09:02

## 2025-02-25 RX ADMIN — SODIUM CHLORIDE 1000 MG: 9 INJECTION, SOLUTION INTRAVENOUS at 05:02

## 2025-02-25 RX ADMIN — LIDOCAINE HYDROCHLORIDE: 20 JELLY TOPICAL at 02:02

## 2025-02-25 RX ADMIN — MORPHINE SULFATE 4 MG: 4 INJECTION, SOLUTION INTRAMUSCULAR; INTRAVENOUS at 01:02

## 2025-02-25 RX ADMIN — SODIUM CHLORIDE 1000 ML: 9 INJECTION, SOLUTION INTRAVENOUS at 12:02

## 2025-02-25 RX ADMIN — HEPARIN SODIUM 12 UNITS/KG/HR: 10000 INJECTION, SOLUTION INTRAVENOUS at 06:02

## 2025-02-25 NOTE — ED PROVIDER NOTES
"Encounter Date: 2/24/2025       History     Chief Complaint   Patient presents with    Abdominal Pain     C/o diffuse abd pain, admits it started after drinking a few beers for lunch. Hx of cirrhosis, gallbladder/pancreas stents.      74-year-old male presents complaining of abdominal pain with nausea and vomiting.  The patient states that at 7 this evening he developed upper abdominal pain with multiple episodes of emesis.  Denies any gastrointestinal bleeding.  Denies diarrhea or constipation.  States he has had pain of this nature in the past associated with his pancreas.  He states that he does drink alcohol but is evasive in giving me the actual amount of alcohol he drinks daily.  He states "I drink beer".  Denies chest pain or shortness of breath.  Denies cough or cold symptoms.  Denies any urinary problems or changes.  Symptoms are moderate to severe in intensity with no exacerbating or alleviating factors.  He denies any other problems or complaints.        Review of patient's allergies indicates:  No Known Allergies  Past Medical History:   Diagnosis Date    Alcohol abuse 02/02/2022    Decompensated hepatic cirrhosis 02/02/2022    Encounter for blood transfusion     Hypertension     Lab test positive for detection of COVID-19 virus 09/2021    Pancreatic cyst 06/03/2022     Past Surgical History:   Procedure Laterality Date    ABDOMINAL AORTOGRAPHY N/A 10/04/2022    Procedure: AORTOGRAM-ABDOMINAL;  Surgeon: Felice Epstein MD;  Location: [facility] CATH/EP LAB;  Service: Vascular;  Laterality: N/A;    APPENDECTOMY      ARTERIOGRAM, MESENTERIC N/A 10/04/2022    Procedure: ARTERIOGRAM, MESENTERIC;  Surgeon: Felice Epstein MD;  Location: [facility] CATH/EP LAB;  Service: Vascular;  Laterality: N/A;    COLONOSCOPY      ENDOSCOPIC ULTRASOUND OF UPPER GASTROINTESTINAL TRACT  02/04/2022    Procedure: ULTRASOUND, UPPER GI TRACT, ENDOSCOPIC;  Surgeon: Chuy Bay MD;  Location: [facility] ENDO ([address] [place] [address]);  Service: Endoscopy;;    " ENDOSCOPIC ULTRASOUND OF UPPER GASTROINTESTINAL TRACT N/A 06/03/2022    Procedure: ULTRASOUND, UPPER GI TRACT, ENDOSCOPIC;  Surgeon: Roger Viveros III, MD;  Location: [facility];  Service: Endoscopy;  Laterality: N/A;    ENDOSCOPIC ULTRASOUND OF UPPER GASTROINTESTINAL TRACT N/A 11/07/2022    Procedure: ULTRASOUND, UPPER GI TRACT, ENDOSCOPIC;  Surgeon: Roger Viveros III, MD;  Location: [facility];  Service: Endoscopy;  Laterality: N/A;    ERCP N/A 02/04/2022    Procedure: ERCP (ENDOSCOPIC RETROGRADE CHOLANGIOPANCREATOGRAPHY);  Surgeon: Chuy Bay MD;  Location: [facility] (2ND FLR);  Service: Endoscopy;  Laterality: N/A;    ERCP N/A 04/19/2022    Procedure: ERCP (ENDOSCOPIC RETROGRADE CHOLANGIOPANCREATOGRAPHY);  Surgeon: Chuy Bay MD;  Location: [facility] (2ND FLR);  Service: Endoscopy;  Laterality: N/A;  4/1: fully vaccinated. labs prior. instructions mailed to sister and patient.-SC  4/12/22-Confirmed new arrival time of 10:45am with pt's sister and updated instructions emailed-DS    ERCP N/A 10/05/2022    Procedure: ERCP (ENDOSCOPIC RETROGRADE CHOLANGIOPANCREATOGRAPHY);  Surgeon: Roger Viveros III, MD;  Location: [facility];  Service: Endoscopy;  Laterality: N/A;    ERCP N/A 1/24/2023    Procedure: ERCP (ENDOSCOPIC RETROGRADE CHOLANGIOPANCREATOGRAPHY);  Surgeon: Roger Viveros III, MD;  Location: [facility];  Service: Endoscopy;  Laterality: N/A;    EYE SURGERY Left     JOINT REPLACEMENT Bilateral     knee replacement    KNEE SURGERY      RECONSTRUCTION OF SHOULDER Right     TONSILLECTOMY      UPPER ENDOSCOPIC ULTRASOUND W/ FNA  06/03/2022    VITRECTOMY BY PARS PLANA APPROACH Left 8/14/2023    Procedure: VITRECTOMY, PARS PLANA APPROACH;  Surgeon: Tee Crespo MD;  [address]  Service: Ophthalmology;  Laterality: Left;     No family history on file.  Social History[1]  Review of Systems   Constitutional: Negative.  Negative for activity change, appetite change, chills,  diaphoresis, fatigue, fever and unexpected weight change.   HENT: Negative.  Negative for congestion, dental problem, ear pain, nosebleeds, rhinorrhea, sinus pain, sore throat, trouble swallowing and voice change.    Eyes: Negative.  Negative for pain and visual disturbance.   Respiratory: Negative.  Negative for cough, chest tightness, shortness of breath and wheezing.    Cardiovascular: Negative.  Negative for chest pain, palpitations and leg swelling.   Gastrointestinal:  Positive for abdominal pain, nausea and vomiting. Negative for blood in stool, constipation and diarrhea.   Endocrine: Negative.    Genitourinary: Negative.  Negative for difficulty urinating, dysuria, flank pain, frequency, penile pain, testicular pain and urgency.   Musculoskeletal: Negative.  Negative for arthralgias, back pain, gait problem, joint swelling, myalgias, neck pain and neck stiffness.   Skin: Negative.  Negative for pallor and rash.   Neurological: Negative.  Negative for dizziness, seizures, syncope, facial asymmetry, speech difficulty, weakness, light-headedness, numbness and headaches.   Hematological: Negative.  Does not bruise/bleed easily.   Psychiatric/Behavioral: Negative.  Negative for confusion.    All other systems reviewed and are negative.      Physical Exam     Initial Vitals [02/24/25 2145]   BP Pulse Resp Temp SpO2   (!) 171/100 86 17 97.5 °F (36.4 °C) 100 %      MAP       --         Physical Exam    Nursing note and vitals reviewed.  Constitutional: He is cooperative. He does not appear ill. No distress.   HENT:   Head: Normocephalic and atraumatic.   Nose: Nose normal. No mucosal edema or rhinorrhea. Mouth/Throat: Uvula is midline, oropharynx is clear and moist and mucous membranes are normal. No uvula swelling. No oropharyngeal exudate, posterior oropharyngeal edema or posterior oropharyngeal erythema.   Eyes: Conjunctivae, EOM and lids are normal. Pupils are equal, round, and reactive to light. Right eye  exhibits no discharge. Left eye exhibits no discharge.   Neck: Trachea normal and phonation normal. Neck supple. No stridor present. No JVD present.   Normal range of motion.   Full passive range of motion without pain.     Cardiovascular:  Normal rate, regular rhythm, normal heart sounds, intact distal pulses and normal pulses.     Exam reveals no gallop, no distant heart sounds, no friction rub and no decreased pulses.       No murmur heard.  Pulmonary/Chest: Effort normal and breath sounds normal. No stridor. No respiratory distress. He has no decreased breath sounds. He has no wheezes. He has no rhonchi. He has no rales.   Abdominal: Abdomen is soft. Bowel sounds are normal. He exhibits no distension, no pulsatile midline mass and no mass. There is abdominal tenderness in the right upper quadrant, epigastric area and left upper quadrant. No hernia.   Upper abdominal tenderness with guarding but no rebound.  Abdomen is soft.   No right CVA tenderness.  No left CVA tenderness. There is guarding. There is no rebound, no tenderness at McBurney's point and negative Curry's sign. negative psoas sign and negative Rovsing's sign  Musculoskeletal:         General: No tenderness or edema. Normal range of motion.      Right hand: Normal. Normal capillary refill. Normal pulse.      Left hand: Normal. Normal capillary refill. Normal pulse.      Cervical back: Normal, full passive range of motion without pain, normal range of motion and neck supple. No tenderness or bony tenderness. No pain with movement. Normal range of motion and normal range of motion. Normal.      Thoracic back: Normal. No tenderness or bony tenderness. Normal range of motion.      Lumbar back: Normal. No tenderness or bony tenderness. Normal range of motion.      Right lower leg: No tenderness. No edema.      Left lower leg: No tenderness. No edema.      Right foot: Normal. Normal capillary refill. No swelling. Normal pulse.      Left foot: Normal.  Normal capillary refill. No swelling. Normal pulse.      Comments: Pulses are 2+ throughout, cap refill is less than 2 sec throughout, no edema noted, extremities are nontender throughout with full range of motion     Neurological: He is alert and oriented to person, place, and time. He has normal strength. No cranial nerve deficit or sensory deficit. Coordination and gait normal.   No focal deficits   Skin: Skin is warm and dry. Capillary refill takes less than 2 seconds. No petechiae and no rash noted. No cyanosis or erythema. No pallor.   Psychiatric: He has a normal mood and affect. His speech is normal and behavior is normal.         ED Course   Procedures  Labs Reviewed   CBC W/ AUTO DIFFERENTIAL - Abnormal       Result Value    WBC 4.93      RBC 4.10 (*)     Hemoglobin 12.3 (*)     Hematocrit 34.7 (*)     MCV 85      MCH 30.0      MCHC 35.4      RDW 13.9      Platelets 135 (*)     MPV 8.7 (*)     Immature Granulocytes 0.2      Gran # (ANC) 3.8      Immature Grans (Abs) 0.01      Lymph # 0.6 (*)     Mono # 0.5      Eos # 0.0      Baso # 0.02      nRBC 0      Gran % 76.3 (*)     Lymph % 12.0 (*)     Mono % 10.3      Eosinophil % 0.8      Basophil % 0.4      Differential Method Automated     COMPREHENSIVE METABOLIC PANEL - Abnormal    Sodium 119 (*)     Potassium 3.5      Chloride 88 (*)     CO2 16 (*)     Glucose 149 (*)     BUN 6 (*)     Creatinine 0.5      Calcium 9.1      Total Protein 7.9      Albumin 4.0      Total Bilirubin 0.5      Alkaline Phosphatase 113      AST 31      ALT 22      eGFR >60.0      Anion Gap 15     URINALYSIS, REFLEX TO URINE CULTURE - Abnormal    Specimen UA Urine, Clean Catch      Color, UA Yellow      Appearance, UA Clear      pH, UA 7.0      Specific Gravity, UA 1.010      Protein, UA Negative      Glucose, UA Negative      Ketones, UA 1+ (*)     Bilirubin (UA) Negative      Occult Blood UA TRACE      Nitrite, UA Negative      Urobilinogen, UA Negative      Leukocytes, UA 3+ (*)      Narrative:     In and Out Cath as needed it patient unable to void  Specimen Source->Urine   MAGNESIUM - Abnormal    Magnesium 1.3 (*)    TROPONIN I HIGH SENSITIVITY - Abnormal    Troponin I High Sensitivity 40.1 (*)    B-TYPE NATRIURETIC PEPTIDE - Abnormal     (*)    URINALYSIS, REFLEX TO URINE CULTURE - Abnormal    Specimen UA Urine, Clean Catch      Color, UA Yellow      Appearance, UA Clear      pH, UA 7.0      Specific Gravity, UA 1.010      Protein, UA Negative      Glucose, UA Negative      Ketones, UA 1+ (*)     Bilirubin (UA) Negative      Occult Blood UA TRACE      Nitrite, UA Negative      Urobilinogen, UA Negative      Leukocytes, UA 3+ (*)     Narrative:     In and Out Cath as needed it patient unable to void  Specimen Source->Urine   TROPONIN I HIGH SENSITIVITY - Abnormal    Troponin I High Sensitivity 108.2 (*)    URINALYSIS MICROSCOPIC - Abnormal    RBC, UA 4      WBC, UA 90 (*)     Bacteria Rare      Microscopic Comment SEE COMMENT      Narrative:     In and Out Cath as needed it patient unable to void  Specimen Source->Urine   CULTURE, URINE   LIPASE    Lipase 11     DRUG SCREEN PANEL, URINE EMERGENCY    Benzodiazepines Negative      Cocaine (Metab.) Negative      Opiate Scrn, Ur Negative      Barbiturate Screen, Ur Negative      Amphetamine Screen, Ur Negative      THC Negative      Phencyclidine Negative      Creatinine, Urine 26.1      Toxicology Information SEE COMMENT      Narrative:     In and Out Cath as needed it patient unable to void  Specimen Source->Urine   CK    CPK 56     TSH    TSH 1.389            Imaging Results               CT Abdomen Pelvis With IV Contrast NO Oral Contrast (Final result)  Result time 02/25/25 02:45:09      Final result by Conner Watkins MD (02/25/25 02:45:09)                   Impression:      Findings likely representing acute on chronic pancreatitis with associated fluid collection that may relate to a acute peripancreatic fluid collection  associated with acute pancreatitis, as discussed above.  Follow-up is recommended.    Appearance of gastric wall and fold thickening with prominent appearance of edema of the gastric wall adjacent to the aforementioned pancreatic fluid collection.  This may be reactive however can be re-evaluated on follow-up.    Mild perinephric stranding of the left kidney without evidence for ureteral calculus or obstructive uropathy, correlation for UTI/pyelonephritis is needed.    Nonspecific urinary bladder distention, correlation for urinary retention or urinary bladder outlet obstructive symptomatology is needed.    Additional findings as above.    This report was flagged in Epic as abnormal.      Electronically signed by: Conner Watkins  Date:    02/25/2025  Time:    02:45               Narrative:    EXAMINATION:  CT ABDOMEN PELVIS WITH IV CONTRAST    CLINICAL HISTORY:  Epigastric pain;    TECHNIQUE:  Low dose axial images, sagittal and coronal reformations were obtained from the lung bases to the pubic symphysis following the IV administration of 100 mL of Omnipaque 350 oral contrast was not utilized.  Single phase postcontrast CT examination of the abdomen and pelvis is submitted.    COMPARISON:  Prior examinations, most recent of which is CT examination abdomen and pelvis March 30, 2024    FINDINGS:  The visualized lung bases demonstrate atelectatic change.    There are findings consistent with chronic pancreatitis, including pancreatic calcifications.  Arising from the level of the distal body and tail of the pancreas there is appearance of a complex fluid collection measuring approximately 4.4 x 4.9 cm in size, there is mild wall enhancement and there is surrounding edema and inflammatory change.  Note is made that the patient has a history of a pancreatic pseudocyst in a similar location however this appears more prominent and demonstrates inflammatory change and may relate to acute peripancreatic fluid collection  associated with acute pancreatitis, there does appear to be surrounding edema and inflammation.  There is mild fluid tracking adjacent to the pancreatic bed, as well as adjacent to the spleen.  The adjacent stomach demonstrates appearance of diminished attenuation of the wall of the stomach, however this does not appear well-defined may relate to edema, with wall thickening of the stomach.  This is potentially reactive.  Follow-up is recommended.    The stomach otherwise demonstrates nonspecific appearance of mild-to-moderate distention with fluid and air.    There is no pericholecystic inflammatory change.  Mild diminished attenuation of the liver may relate to mild fatty infiltrate.  Mild heterogeneity of the spleen appears stable.  The adrenal glands appear unremarkable.    There is no evidence for ureteral calculus or obstructive uropathy.  Mild perinephric haziness of the left kidney noted, correlation for signs or symptoms of superimposed acute pyelonephritis is needed.    The abdominal aorta appears normal in caliber, demonstrates appropriate opacification.  Atherosclerotic change noted.  There is prominent distention of the urinary bladder without abnormal wall thickening, this is nonspecific, and although more prominent appears similar to the prior study, correlation for signs or symptoms of urinary retention or urinary bladder outlet obstructive change is needed.    There is no evidence for small bowel obstructive process.  The appendix is not identified.  There is no evidence for inflammatory or obstructive process of the colon.  There is no evidence for free intraperitoneal air.    There is grade 1/2 anterolisthesis of L5 with respect S1, prominent chronic endplate change at L5-S1 with vacuum phenomena.  The osseous structures overall demonstrate chronic change.                                       X-Ray Chest AP Portable (Final result)  Result time 02/24/25 23:45:16      Final result by Juanito Prasad  MD TIMO (02/24/25 23:45:16)                   Impression:      Chronic coarse interstitial attenuation.  No new large confluent airspace consolidation appreciated.      Electronically signed by: Juanito Prasad MD  Date:    02/24/2025  Time:    23:45               Narrative:    EXAMINATION:  XR CHEST AP PORTABLE    CLINICAL HISTORY:  Chest Pain;    TECHNIQUE:  Single frontal view of the chest was performed.    COMPARISON:  10/14/2023    FINDINGS:  Cardiac monitoring leads overlie the chest.  Cardiac silhouette is not significantly enlarged.  There is aortic atherosclerosis present.  Lungs are symmetrically expanded with chronic coarse interstitial attenuation.  No new large confluent airspace consolidation appreciated.  No significant volume of pleural fluid or pneumothorax appreciated.  Osseous structures demonstrate degenerative change.  Embolization coils project over the upper abdomen.                                       Medications   morphine injection 2 mg (2 mg Intravenous Given 2/24/25 2330)   ondansetron injection 4 mg (4 mg Intravenous Given 2/24/25 2328)   pantoprazole injection 40 mg (40 mg Intravenous Given 2/24/25 2332)   0.9% NaCl infusion (1,000 mLs Intravenous New Bag 2/25/25 0043)   magnesium sulfate 2g in water 50mL IVPB (premix) (2 g Intravenous New Bag 2/25/25 0137)   morphine injection 2 mg (2 mg Intravenous Given 2/25/25 0136)   piperacillin-tazobactam (ZOSYN) 4.5 g in D5W 100 mL IVPB (MB+) (0 g Intravenous Stopped 2/25/25 0305)   iohexoL (OMNIPAQUE 350) injection 100 mL (100 mLs Intravenous Given 2/25/25 0204)   LIDOcaine HCl 2% urojet ( Mucous Membrane Given 2/25/25 0230)   LORazepam injection 0.5 mg (0.5 mg Intravenous Given 2/25/25 0303)     Medical Decision Making  Patient feels better after ED treatment.  Has multiple lab abnormalities including hyponatremia, hypo magnesemia, low serum bicarbonate, elevated troponin.  Denies chest pain or shortness of breath.  Patient also has been  having some urinary difficulties which he has had problems with in the past.  Denies any associated back pain, bladder or bowel incontinence, saddle paresthesias.  Has no back pain by complaint and there is no midline tenderness either.  Catheter has been placed with relief of urinary outlet obstruction.  CT scan findings noted including evidence of pancreatitis and pseudocyst.  Gentle hydration with normal saline at a slow rate initiated.  Patient has not demonstrated any seizure activity and has had a normal mental status.  Blood cultures have been obtained.  Broad-spectrum IV antibiotics given.  Lactic acid is not elevated.  I have discussed the case with Dr. Vaca of the hospitalist service who has assumed care and will admit.  Patient does report feeling much better after ED care.    Amount and/or Complexity of Data Reviewed  Labs: ordered. Decision-making details documented in ED Course.  Radiology: ordered.    Risk  Prescription drug management.  Decision regarding hospitalization.              Attending Attestation:         Attending Critical Care:   Critical Care Times:   Direct Patient Care (initial evaluation, reassessments, and time considering the case)................................................................14 minutes.   Additional History from reviewing old medical records or taking additional history from the family, EMS, PCP, etc.......................7 minutes.   Ordering, Reviewing, and Interpreting Diagnostic Studies...............................................................................................................9 minutes.   Documentation..................................................................................................................................................................................8 minutes.   Consultation with other Physicians.  .................................................................................................................................................6 minutes.   Consultation with the patient's family directly relating to the patient's condition, care, and DNR status (when patient unable)......5 minutes.   ==============================================================  Total Critical Care Time - exclusive of procedural time: 49 minutes.  ==============================================================          ED Course as of 02/25/25 0420 Tu Feb 25, 2025 0027 BNP(!): 127 [AR]   0027 Sodium(!!): 119 [AR]   0027 Potassium: 3.5 [AR]   0027 Chloride(!): 88 [AR]   0027 CO2(!): 16 [AR]   0027 Glucose(!): 149 [AR]   0027 BUN(!): 6 [AR]   0027 Calcium: 9.1 [AR]   0027 PROTEIN TOTAL: 7.9 [AR]   0027 Albumin: 4.0 [AR]   0027 WBC: 4.93 [AR]   0027 Hemoglobin(!): 12.3 [AR]   0027 Hematocrit(!): 34.7 [AR]   0417 WBC, UA(!): 90 [AR]   0417 Leukocyte Esterase, UA(!): 3+ [AR]   0417 Ketones, UA(!): 1+ [AR]   0417 BNP(!): 127 [AR]   0417 Potassium: 3.5 [AR]   0417 Chloride(!): 88 [AR]   0417 CO2(!): 16 [AR]   0417 Glucose(!): 149 [AR]   0417 BUN(!): 6 [AR]   0417 Creatinine: 0.5 [AR]   0417 Calcium: 9.1 [AR]   0417 PROTEIN TOTAL: 7.9 [AR]   0417 Albumin: 4.0 [AR]   0417 BILIRUBIN TOTAL: 0.5 [AR]   0417 ALP: 113 [AR]   0417 AST: 31 [AR]   0417 WBC: 4.93 [AR]   0417 Hemoglobin(!): 12.3 [AR]   0417 Hematocrit(!): 34.7 [AR]   0417 Platelet Count(!): 135 [AR]   0417 Lipase: 11 [AR]      ED Course User Index  [AR] Annabelle Alanis MD                           Clinical Impression:  Final diagnoses:  [R10.13] Epigastric pain  [K85.90] Acute pancreatitis, unspecified complication status, unspecified pancreatitis type (Primary)  [E87.1] Hyponatremia  [N39.0] Urinary tract infection without hematuria, site unspecified          ED Disposition Condition    Admit Stable                    [1]   Social History  Tobacco Use    Smoking status: Every  Day     Current packs/day: 0.25     Average packs/day: 0.3 packs/day for 40.0 years (10.0 ttl pk-yrs)     Types: Cigarettes    Smokeless tobacco: Never    Tobacco comments:     Pt has smoked on and off since age 19. Currently he smokes 1 pack per week and has cut back a lot throughout the years. No patch needed per his hospital visit. Information and education provided on our outpatient smoking cessation program.   Substance Use Topics    Alcohol use: Yes     Alcohol/week: 10.0 standard drinks of alcohol     Types: 10 Cans of beer per week    Drug use: Never        Annabelle Alanis MD  02/25/25 [id number]

## 2025-02-25 NOTE — PLAN OF CARE
[facility]  Initial Discharge Assessment       Primary Care Provider: Keven Nj MD    Admission Diagnosis: Hyponatremia [E87.1]    Admission Date: 2/24/2025  Expected Discharge Date: 2/27/2025    Transition of Care Barriers: None    Payor: [employer] / Plan: [employer] / Product Type: Government /     Extended Emergency Contact Information  Primary Emergency Contact: Bhargavi Mariee   [facility]  Home Phone: [phone]  Mobile Phone: [phone]  Relation: Sister    Discharge Plan A: Home  Discharge Plan B: Home with family      [facility] - [place], [place] - [address]  [address]  [place] [address]  Phone: [phone] Fax: [phone]    SW met with patient at bedside to complete discharge planning assessment.  Patient alert and oriented xs 4.  Patient verified all demographic information on facesheet is correct.  Patient verified PCP is Dr. Nj.  Patient verified primary health insurance is [employer] and secondary is [employer].  Patient with NO home health or DME.  Patient with POA (sister Bhargavi Mariee) and Living Will.  Patient not on dialysis or medication coumadin.  Patient with no 30 day admission.  Patient with no financial issues at this time.  Patient family will provide transportation upon discharge from facility.  Patient independent with ADLs, live alone, drives self.      Initial Assessment (most recent)       Adult Discharge Assessment - 02/25/25 1116          Discharge Assessment    Assessment Type Discharge Planning Assessment     Confirmed/corrected address, phone number and insurance Yes     Confirmed Demographics Correct on Facesheet     Source of Information patient     Communicated NAFISA with patient/caregiver Date not available/Unable to determine     People in Home alone     Facility Arrived From: home     Do you expect to return to your current living situation? Yes     Do you have help at home or someone to help  you manage your care at home? Yes     Who are your caregiver(s) and their phone number(s)? sister     Prior to hospitilization cognitive status: Alert/Oriented     Current cognitive status: Alert/Oriented     Walking or Climbing Stairs Difficulty no     Dressing/Bathing Difficulty no     Equipment Currently Used at Home none     Readmission within 30 days? No     Patient currently being followed by outpatient case management? No     Do you currently have service(s) that help you manage your care at home? No     Do you take prescription medications? Yes     Do you have prescription coverage? Yes     Do you have any problems affording any of your prescribed medications? No     Is the patient taking medications as prescribed? yes     Who is going to help you get home at discharge? sister     How do you get to doctors appointments? car, drives self     Are you on dialysis? No     Do you take coumadin? No     Discharge Plan A Home     Discharge Plan B Home with family     DME Needed Upon Discharge  none     Discharge Plan discussed with: Patient     Transition of Care Barriers None

## 2025-02-25 NOTE — PLAN OF CARE
Assuming care of a 74 year old male with a past medical history of cirrhosis, EtOH abuse, splenic vein thrombosis, chronic pancreatitis, tobacco abuse, anemia and thrombocytopenia who presented with an acute on chronic pancreatitis with hyponatremia. He is being treated with low rate NS IV fluids, PRN analgesics and antiemetics. GI has been consulted. He is NPO and the sodium is being trended; urine studies are pending. He is also on Rocephin and vancomycin for a possible UTI (history of Enterococcus). A urine culture is pending. Lastly, he presented with an elevated troponin with no EKG changes or symptoms. A TTE has been ordered, troponin is being trended, and Cardiology has been consulted. He is being monitored for EtOH withdrawal.

## 2025-02-25 NOTE — SUBJECTIVE & OBJECTIVE
Past Medical History:   Diagnosis Date    Alcohol abuse 02/02/2022    Decompensated hepatic cirrhosis 02/02/2022    Encounter for blood transfusion     Hypertension     Lab test positive for detection of COVID-19 virus 09/2021    Pancreatic cyst 06/03/2022       Past Surgical History:   Procedure Laterality Date    ABDOMINAL AORTOGRAPHY N/A 10/04/2022    Procedure: AORTOGRAM-ABDOMINAL;  Surgeon: Felice Epstein MD;  Location: [facility] CATH/EP LAB;  Service: Vascular;  Laterality: N/A;    APPENDECTOMY      ARTERIOGRAM, MESENTERIC N/A 10/04/2022    Procedure: ARTERIOGRAM, MESENTERIC;  Surgeon: Felice Epstein MD;  Location: [facility] CATH/EP LAB;  Service: Vascular;  Laterality: N/A;    COLONOSCOPY      ENDOSCOPIC ULTRASOUND OF UPPER GASTROINTESTINAL TRACT  02/04/2022    Procedure: ULTRASOUND, UPPER GI TRACT, ENDOSCOPIC;  Surgeon: Chuy Bay MD;  Location: [facility] ENDO (2ND FLR);  Service: Endoscopy;;    ENDOSCOPIC ULTRASOUND OF UPPER GASTROINTESTINAL TRACT N/A 06/03/2022    Procedure: ULTRASOUND, UPPER GI TRACT, ENDOSCOPIC;  Surgeon: Roger Viveros III, MD;  Location: [facility] ENDO;  Service: Endoscopy;  Laterality: N/A;    ENDOSCOPIC ULTRASOUND OF UPPER GASTROINTESTINAL TRACT N/A 11/07/2022    Procedure: ULTRASOUND, UPPER GI TRACT, ENDOSCOPIC;  Surgeon: Roger Viveros III, MD;  Location: [facility] ENDO;  Service: Endoscopy;  Laterality: N/A;    ERCP N/A 02/04/2022    Procedure: ERCP (ENDOSCOPIC RETROGRADE CHOLANGIOPANCREATOGRAPHY);  Surgeon: Chuy Bay MD;  Location: [facility] ENDO (2ND FLR);  Service: Endoscopy;  Laterality: N/A;    ERCP N/A 04/19/2022    Procedure: ERCP (ENDOSCOPIC RETROGRADE CHOLANGIOPANCREATOGRAPHY);  Surgeon: Chuy Bay MD;  Location: [facility] ENDO (2ND FLR);  Service: Endoscopy;  Laterality: N/A;  4/1: fully vaccinated. labs prior. instructions mailed to sister and patient.-SC  4/12/22-Confirmed new arrival time of 10:45am with pt's sister and updated instructions emailed-DS    ERCP N/A 10/05/2022     Procedure: ERCP (ENDOSCOPIC RETROGRADE CHOLANGIOPANCREATOGRAPHY);  Surgeon: Roger Viveros III, MD;  Location: [facility] ENDO;  Service: Endoscopy;  Laterality: N/A;    ERCP N/A 1/24/2023    Procedure: ERCP (ENDOSCOPIC RETROGRADE CHOLANGIOPANCREATOGRAPHY);  Surgeon: Roger Viveros III, MD;  Location: [facility] ENDO;  Service: Endoscopy;  Laterality: N/A;    EYE SURGERY Left     JOINT REPLACEMENT Bilateral     knee replacement    KNEE SURGERY      RECONSTRUCTION OF SHOULDER Right     TONSILLECTOMY      UPPER ENDOSCOPIC ULTRASOUND W/ FNA  06/03/2022    VITRECTOMY BY PARS PLANA APPROACH Left 8/14/2023    Procedure: VITRECTOMY, PARS PLANA APPROACH;  Surgeon: Tee Crespo MD;  Location: [address];  Service: Ophthalmology;  Laterality: Left;       Review of patient's allergies indicates:  No Known Allergies    No current facility-administered medications on file prior to encounter.     Current Outpatient Medications on File Prior to Encounter   Medication Sig    sodium chloride 1,000 mg TbSO oral tablet Take 1,000 mg by mouth daily as needed (Dehydration).    vitamin E 400 UNIT capsule Take 400 Units by mouth once daily.    apixaban (ELIQUIS) 2.5 mg Tab Take 1 tablet (2.5 mg total) by mouth 2 (two) times daily. (Patient not taking: Reported on 3/30/2024)    aspirin 325 MG tablet Take 325 mg by mouth once daily.    cholecalciferol, vitamin D3, (VITAMIN D3) 25 mcg (1,000 unit) capsule Take 1,000 Units by mouth once daily.    multivit-mins no.63/iron/folic (M-VIT ORAL) Take 1 tablet by mouth once daily.    polyethylene glycol (GLYCOLAX) 17 gram PwPk Take 17 g by mouth 2 (two) times daily. (Patient not taking: Reported on 2/25/2025)    traMADoL (ULTRAM) 50 mg tablet Take 1 tablet (50 mg total) by mouth every 6 (six) hours as needed for Pain. (Patient not taking: Reported on 2/25/2025)    [DISCONTINUED] omega-3 fatty acids/fish oil (FISH OIL-OMEGA-3 FATTY ACIDS) 300-1,000 mg capsule Take 1 capsule by mouth once  daily.     Family History    None       Tobacco Use    Smoking status: Every Day     Current packs/day: 0.25     Average packs/day: 0.3 packs/day for 40.0 years (10.0 ttl pk-yrs)     Types: Cigarettes    Smokeless tobacco: Never    Tobacco comments:     Pt has smoked on and off since age 19. Currently he smokes 1 pack per week and has cut back a lot throughout the years. No patch needed per his hospital visit. Information and education provided on our outpatient smoking cessation program.   Substance and Sexual Activity    Alcohol use: Yes     Alcohol/week: 10.0 standard drinks of alcohol     Types: 10 Cans of beer per week    Drug use: Never    Sexual activity: Not Currently     Review of Systems   Constitutional:  Positive for chills and fever. Negative for unexpected weight change (Occurred several months ago).   HENT:  Negative for rhinorrhea and sore throat.    Respiratory:  Positive for shortness of breath (With pain).    Cardiovascular:  Negative for chest pain and leg swelling.   Gastrointestinal:  Positive for abdominal pain and vomiting. Negative for blood in stool, constipation and diarrhea.   Genitourinary:  Negative for dysuria.   Musculoskeletal:  Negative for myalgias.   Skin: Negative.    Neurological:  Negative for numbness.   Hematological:  Negative for adenopathy.   Psychiatric/Behavioral:  The patient is nervous/anxious.      Objective:     Vital Signs (Most Recent):  Temp: 97.5 °F (36.4 °C) (02/24/25 2145)  Pulse: 67 (02/25/25 0645)  Resp: 16 (02/25/25 0513)  BP: (!) 140/74 (02/25/25 0645)  SpO2: 98 % (02/25/25 0645) Vital Signs (24h Range):  Temp:  [97.5 °F (36.4 °C)] 97.5 °F (36.4 °C)  Pulse:  [66-86] 67  Resp:  [16-18] 16  SpO2:  [97 %-100 %] 98 %  BP: (135-187)/() 140/74     Weight: 65.8 kg (145 lb)  Body mass index is 20.22 kg/m².     Physical Exam  Constitutional:       General: He is not in acute distress.     Appearance: Normal appearance. He is not ill-appearing, toxic-appearing  or diaphoretic.   HENT:      Head: Normocephalic and atraumatic.      Mouth/Throat:      Mouth: Mucous membranes are dry.   Eyes:      General: No scleral icterus.  Neck:      Comments: No JVD/retractions  Cardiovascular:      Rate and Rhythm: Regular rhythm.      Heart sounds: Normal heart sounds. No murmur heard.     No friction rub. No gallop.   Pulmonary:      Effort: Pulmonary effort is normal. No respiratory distress.      Breath sounds: Normal breath sounds.   Abdominal:      General: Bowel sounds are normal. There is no distension.      Palpations: Abdomen is soft. There is no mass.      Tenderness: There is abdominal tenderness (Diffuse).   Musculoskeletal:      Right lower leg: No edema.      Left lower leg: No edema.   Skin:     General: Skin is warm and dry.      Coloration: Skin is not jaundiced.   Neurological:      Mental Status: He is alert.                Significant studies: Reviewed.

## 2025-02-25 NOTE — H&P
"  [facility] - Emergency Dept  Hospital Medicine  History & Physical    Patient Name: Vic Reyez  MRN: [id number]  Patient Class: IP- Inpatient  Admission Date: 2/24/2025  Attending Physician: Genevieve Vaca MD  Primary Care Provider: Keven Nj MD    Patient seen at 5:53 a.m. on 02/25/2025.  History is obtained from the patient/ER physician/records  Subjective:     Principal Problem:  Abdominal pain    Chief Complaint:   Chief Complaint   Patient presents with    Abdominal Pain     C/o diffuse abd pain, admits it started after drinking a few beers for lunch. Hx of cirrhosis, gallbladder/pancreas stents.       HPI: Patient is a 74-year-old  male with a history of suspected alcohol abuse-> he states that he drinks a couple of beers around dinnertime but is unable to tell me exactly how much that would be or how often he does this  -He was diagnosed with a condition per GI who has been managing this (history of cirrhosis, pancreatitis, and biliary stricture); has seen GI at [facility] in the past).  Per Dr. 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   Labs 12/'23 - Nml CMP / lipase  EtOH + 10/'23   ERCP 1/'23 - stent removed; improved biliary stricture  CT 11/'22 - CAD/PVD; splenic artery embolization; biliary stent in place; stable TOP cyst; tics  EUS 11/'22 - acute / chronic panc w/ focal irregularity in HOP s/p FNA. 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 Atypical cells."    -Starting around 7:00 PM last night, the patient had lower abdominal pain which was a constant pain; with this he had fever/chills and vomited twice but denies any other bowel symptoms  -He develops shortness of breath with the pain but denies any chest pain; he states that he lost about 70 lb over a matter of weeks (approximately 8 months ago)  -Patient is afebrile and hypertensive with SBP in the 160s-180s; he had no desaturations  -Workup thus far shows no leukocytosis but 135 platelets; CMP showed a sodium of 119, down from 128 previously and renal function remains intact  -Mag level was 1.3 with a normal TSH, CK, and lipase   -BNP was 127 and Troponin went from 40.1-> 108.2  -EKG showed, per my interpretation, sinus rhythm 75 beats per minute; choppy baseline precluded formal assessment  -Tox screen was negative; Urinalysis showed 90 WBCs with +3 leukocytes; review of prior urine cultures revealed Enterococcus faecalis (2022)  -Chest x-ray was unrevealing; CT abdomen/pelvis with IV contrast was performed and showed, per radiologist Dr. Watkins:    "FINDINGS:  The visualized lung bases demonstrate atelectatic change.     There are findings consistent with chronic pancreatitis, including pancreatic calcifications.  Arising from the level of the distal body and tail of the pancreas there is appearance of a complex fluid collection measuring approximately 4.4 x 4.9 cm in size, there is mild wall enhancement and there is surrounding edema and inflammatory change.  Note is made that the patient has a history of a pancreatic pseudocyst in a similar location however this appears more prominent and demonstrates inflammatory change and may relate to acute " peripancreatic fluid collection associated with acute pancreatitis, there does appear to be surrounding edema and inflammation.  There is mild fluid tracking adjacent to the pancreatic bed, as well as adjacent to the spleen.  The adjacent stomach demonstrates appearance of diminished attenuation of the wall of the stomach, however this does not appear well-defined may relate to edema, with wall thickening of the stomach.  This is potentially reactive.  Follow-up is recommended.     The stomach otherwise demonstrates nonspecific appearance of mild-to-moderate distention with fluid and air.     There is no pericholecystic inflammatory change.  Mild diminished attenuation of the liver may relate to mild fatty infiltrate.  Mild heterogeneity of the spleen appears stable.  The adrenal glands appear unremarkable.     There is no evidence for ureteral calculus or obstructive uropathy.  Mild perinephric haziness of the left kidney noted, correlation for signs or symptoms of superimposed acute pyelonephritis is needed.     The abdominal aorta appears normal in caliber, demonstrates appropriate opacification.  Atherosclerotic change noted.  There is prominent distention of the urinary bladder without abnormal wall thickening, this is nonspecific, and although more prominent appears similar to the prior study, correlation for signs or symptoms of urinary retention or urinary bladder outlet obstructive change is needed.     There is no evidence for small bowel obstructive process.  The appendix is not identified.  There is no evidence for inflammatory or obstructive process of the colon.  There is no evidence for free intraperitoneal air.     There is grade 1/2 anterolisthesis of L5 with respect S1, prominent chronic endplate change at L5-S1 with vacuum phenomena.  The osseous structures overall demonstrate chronic change.  Impression:     Findings likely representing acute on chronic pancreatitis with associated fluid  "collection that may relate to a acute peripancreatic fluid collection associated with acute pancreatitis, as discussed above.  Follow-up is recommended.     Appearance of gastric wall and fold thickening with prominent appearance of edema of the gastric wall adjacent to the aforementioned pancreatic fluid collection.  This may be reactive however can be re-evaluated on follow-up.     Mild perinephric stranding of the left kidney without evidence for ureteral calculus or obstructive uropathy, correlation for UTI/pyelonephritis is needed.     Nonspecific urinary bladder distention, correlation for urinary retention or urinary bladder outlet obstructive symptomatology is needed.     Additional findings as above.     This report was flagged in Epic as abnormal."    -Thus far, patient has received a dose of IV Ativan, 2 g of Magnesium sulfate IV, 6 mg of IV Morphine (in total; pain is currently 6/10 in severity), Zofran, Protonix, Zosyn, and a L of fluids    Past Medical History:   Diagnosis Date    Alcohol abuse 02/02/2022    Decompensated hepatic cirrhosis 02/02/2022    Encounter for blood transfusion     Hypertension     Lab test positive for detection of COVID-19 virus 09/2021    Pancreatic cyst 06/03/2022       Past Surgical History:   Procedure Laterality Date    ABDOMINAL AORTOGRAPHY N/A 10/04/2022    Procedure: AORTOGRAM-ABDOMINAL;  Surgeon: Felice Epstein MD;  Location: [facility] CATH/EP LAB;  Service: Vascular;  Laterality: N/A;    APPENDECTOMY      ARTERIOGRAM, MESENTERIC N/A 10/04/2022    Procedure: ARTERIOGRAM, MESENTERIC;  Surgeon: Felice Epstein MD;  Location: [facility] CATH/EP LAB;  Service: Vascular;  Laterality: N/A;    COLONOSCOPY      ENDOSCOPIC ULTRASOUND OF UPPER GASTROINTESTINAL TRACT  02/04/2022    Procedure: ULTRASOUND, UPPER GI TRACT, ENDOSCOPIC;  Surgeon: Chuy Bay MD;  Location: [facility] ENDO ([address] [place] [address]);  Service: Endoscopy;;    ENDOSCOPIC ULTRASOUND OF UPPER GASTROINTESTINAL TRACT N/A 06/03/2022    " Procedure: ULTRASOUND, UPPER GI TRACT, ENDOSCOPIC;  Surgeon: Roger Viveros III, MD;  Location: [facility] ENDO;  Service: Endoscopy;  Laterality: N/A;    ENDOSCOPIC ULTRASOUND OF UPPER GASTROINTESTINAL TRACT N/A 11/07/2022    Procedure: ULTRASOUND, UPPER GI TRACT, ENDOSCOPIC;  Surgeon: Roger Viveros III, MD;  Location: [facility] ENDO;  Service: Endoscopy;  Laterality: N/A;    ERCP N/A 02/04/2022    Procedure: ERCP (ENDOSCOPIC RETROGRADE CHOLANGIOPANCREATOGRAPHY);  Surgeon: Chuy Bay MD;  Location: [facility] (2ND FLR);  Service: Endoscopy;  Laterality: N/A;    ERCP N/A 04/19/2022    Procedure: ERCP (ENDOSCOPIC RETROGRADE CHOLANGIOPANCREATOGRAPHY);  Surgeon: Chuy Bay MD;  Location: [facility] (2ND FLR);  Service: Endoscopy;  Laterality: N/A;  4/1: fully vaccinated. labs prior. instructions mailed to sister and patient.-SC  4/12/22-Confirmed new arrival time of 10:45am with pt's sister and updated instructions emailed-    ERCP N/A 10/05/2022    Procedure: ERCP (ENDOSCOPIC RETROGRADE CHOLANGIOPANCREATOGRAPHY);  Surgeon: Roger Viveros III, MD;  Location: [facility];  Service: Endoscopy;  Laterality: N/A;    ERCP N/A 1/24/2023    Procedure: ERCP (ENDOSCOPIC RETROGRADE CHOLANGIOPANCREATOGRAPHY);  Surgeon: Roger Viveros III, MD;  Location: [facility];  Service: Endoscopy;  Laterality: N/A;    EYE SURGERY Left     JOINT REPLACEMENT Bilateral     knee replacement    KNEE SURGERY      RECONSTRUCTION OF SHOULDER Right     TONSILLECTOMY      UPPER ENDOSCOPIC ULTRASOUND W/ FNA  06/03/2022    VITRECTOMY BY PARS PLANA APPROACH Left 8/14/2023    Procedure: VITRECTOMY, PARS PLANA APPROACH;  Surgeon: Tee Crespo MD;  [facility];  Service: Ophthalmology;  Laterality: Left;       Review of patient's allergies indicates:  No Known Allergies    No current facility-administered medications on file prior to encounter.     Current Outpatient Medications on File Prior to Encounter   Medication Sig     sodium chloride 1,000 mg TbSO oral tablet Take 1,000 mg by mouth daily as needed (Dehydration).    vitamin E 400 UNIT capsule Take 400 Units by mouth once daily.    apixaban (ELIQUIS) 2.5 mg Tab Take 1 tablet (2.5 mg total) by mouth 2 (two) times daily. (Patient not taking: Reported on 3/30/2024)    aspirin 325 MG tablet Take 325 mg by mouth once daily.    cholecalciferol, vitamin D3, (VITAMIN D3) 25 mcg (1,000 unit) capsule Take 1,000 Units by mouth once daily.    multivit-mins no.63/iron/folic (M-VIT ORAL) Take 1 tablet by mouth once daily.    polyethylene glycol (GLYCOLAX) 17 gram PwPk Take 17 g by mouth 2 (two) times daily. (Patient not taking: Reported on 2/25/2025)    traMADoL (ULTRAM) 50 mg tablet Take 1 tablet (50 mg total) by mouth every 6 (six) hours as needed for Pain. (Patient not taking: Reported on 2/25/2025)    [DISCONTINUED] omega-3 fatty acids/fish oil (FISH OIL-OMEGA-3 FATTY ACIDS) 300-1,000 mg capsule Take 1 capsule by mouth once daily.     Family History    None       Tobacco Use    Smoking status: Every Day ( pack per week)     Current packs/day: 0.25     Average packs/day: 0.3 packs/day for 40.0 years (10.0 ttl pk-yrs)     Types: Cigarettes    Smokeless tobacco: Never    Tobacco comments:     Pt has smoked on and off since age 19. Currently he smokes 1 pack per week and has cut back a lot throughout the years. No patch needed per his hospital visit. Information and education provided on our outpatient smoking cessation program.   Substance and Sexual Activity    Alcohol use: Yes-couple of beers with dinner     Alcohol/week:      Types:     Drug use: Never          Review of Systems   Constitutional:  Positive for chills and fever. Negative for unexpected weight change (Occurred several months ago).   HENT:  Negative for rhinorrhea and sore throat.    Respiratory:  Positive for shortness of breath (With pain).    Cardiovascular:  Negative for chest pain and leg swelling.   Gastrointestinal:   Positive for abdominal pain and vomiting. Negative for blood in stool, constipation and diarrhea.   Genitourinary:  Negative for dysuria.   Musculoskeletal:  Negative for myalgias.   Skin: Negative.    Neurological:  Negative for numbness.   Hematological:  Negative for adenopathy.   Psychiatric/Behavioral:  The patient is nervous/anxious.      Objective:     Vital Signs (Most Recent):  Temp: 97.5 °F (36.4 °C) (02/24/25 2145)  Pulse: 67 (02/25/25 0645)  Resp: 16 (02/25/25 0513)  BP: (!) 140/74 (02/25/25 0645)  SpO2: 98 % (02/25/25 0645) Vital Signs (24h Range):  Temp:  [97.5 °F (36.4 °C)] 97.5 °F (36.4 °C)  Pulse:  [66-86] 67  Resp:  [16-18] 16  SpO2:  [97 %-100 %] 98 %  BP: (135-187)/() 140/74     Weight: 65.8 kg (145 lb)  Body mass index is 20.22 kg/m².     Physical Exam  Constitutional:       General: He is not in acute distress.     Appearance: Normal appearance. He is not ill-appearing, toxic-appearing or diaphoretic.   HENT:      Head: Normocephalic and atraumatic.      Mouth/Throat:      Mouth: Mucous membranes are dry.   Eyes:      General: No scleral icterus.  Neck:      Comments: No JVD/retractions  Cardiovascular:      Rate and Rhythm: Regular rhythm.      Heart sounds: Normal heart sounds. No murmur heard.     No friction rub. No gallop.   Pulmonary:      Effort: Pulmonary effort is normal. No respiratory distress.      Breath sounds: Normal breath sounds.   Abdominal:      General: Bowel sounds are normal. There is no distension.      Palpations: Abdomen is soft. There is no mass.      Tenderness: There is abdominal tenderness (Diffuse).   Musculoskeletal:      Right lower leg: No edema.      Left lower leg: No edema.   Skin:     General: Skin is warm and dry.      Coloration: Skin is not jaundiced.   Neurological:      Mental Status: He is alert.      Significant studies: Reviewed.  Assessment/Plan:     Alcohol-induced acute pancreatitis/Chronic pancreatitis/Pseudocyst of pancreas    -Lipase  negative, but with ongoing alcohol abuse, suspect he has flared his chronic pancreatitis; for now:  -NPO  -Judicious IV fluids-> received a L of fluids in the ER but will hold on further fluids until repeat sodium results as this will have to be corrected slowly and sodium trend will determine type/rate of IV fluids  -IV Protonix  -Prn Zofran  -Prn IV Morphine  -Given his complex GI history, will place GI consult    Hyponatremia    -Will presume this is chronic as he has not had a sodium level in several months  -Suspect secondary to alcohol abuse  -Status post 1 L bolus fluids; for now:  -Hold on further fluids until repeat sodium results  -At that point, resume fluids judiciously and adjust based off sodium levels  -Check sodium level q.4 hours, with goal correction rate no more than 10 points within 24 hours  -Check urine/serum Osmol, urine sodium  -Seizure precautions given initial sodium was <120    Elevated troponin    -Denies chest pain and EKG unrevealing  -Troponins elevating in an ACS pattern-> ?atypical chest pain  -Continue aspirin  -Heparin drip per protocol (currently off Eliquis and coags are normal by the time of this editing)  -No recent echo->will check  -Telemetry monitoring  -Cardiology consultation    UTI (urinary tract infection)    -With concern raised for left pyelonephritis, based on CT imaging  -Denies dysuria; history of Enterococcus faecalis  -Status post Zosyn in the ER-> will transition to Rocephin  -And IV Vancomycin  -Vancomycin level per pharmacy protocol, to monitor for nephrotoxicity  -Follow up urine culture    Primary hypertension    -Uncontrolled; patient on no scheduled antihypertensives  -Prn IV Hydralazine    Alcohol use disorder, severe, dependence    -Patient vague regarding his alcohol use  -Start multivitamins/thiamine/folate  -CIWA protocol  -Seizure precautions  -Prn IV Ativan       Genevieve Vaca MD  Department of Hospital Medicine  [facility] - Emergency  Dept

## 2025-02-25 NOTE — ASSESSMENT & PLAN NOTE
Denies dysuria; history of Enterococcus faecalis  Status post dose in the ER-> will transition to Rocephin  And IV vancomycin  Vancomycin level per pharmacy protocol, to monitor for nephrotoxicity  Follow up urine culture

## 2025-02-25 NOTE — PROGRESS NOTES
Pharmacokinetic Initial Assessment: IV Vancomycin    Assessment/Plan:  1  Initiate intravenous vancomycin with loading dose of 1250 mg once followed by a maintenance dose of vancomycin 1000mg IV every 12 hours  Desired empiric serum trough concentration is 10 to 15 mcg/mL  Draw vancomycin trough level 60 min prior to fourth dose on 2/26 at approximately 1700  Pharmacy will continue to follow and monitor vancomycin.      Please contact pharmacy at extension [phone] with any questions regarding this assessment.     Thank you for the consult,   Nikki Idalmis       Patient brief summary:  Vic Reyez is a 74 y.o. male initiated on antimicrobial therapy with IV Vancomycin for treatment of suspected urinary tract infection    Drug Allergies:   Review of patient's allergies indicates:  No Known Allergies    Actual Body Weight:   64.2    Renal Function:   Estimated Creatinine Clearance: 121.3 mL/min (based on SCr of 0.5 mg/dL).,     Dialysis Method (if applicable):  N/A    CBC (last 72 hours):  Recent Labs   Lab Result Units 02/24/25  2336 02/25/25  0635   WBC K/uL 4.93 4.60   Hemoglobin g/dL 12.3* 11.8*   Hematocrit % 34.7* 32.7*   Platelets K/uL 135* 101*   Gran % % 76.3* 66.1   Lymph % % 12.0* 18.3   Mono % % 10.3 12.6   Eosinophil % % 0.8 2.6   Basophil % % 0.4 0.2   Differential Method  Automated Automated       Metabolic Panel (last 72 hours):  Recent Labs   Lab Result Units 02/24/25  2336 02/25/25  0019 02/25/25  0855   Sodium mmol/L 119*  --  124*   Potassium mmol/L 3.5  --   --    Chloride mmol/L 88*  --   --    CO2 mmol/L 16*  --   --    Glucose mg/dL 149*  --   --    Glucose, UA   --  Negative  Negative  --    BUN mg/dL 6*  --   --    Creatinine mg/dL 0.5  --   --    Creatinine, Urine mg/dL  --  26.1  --    Albumin g/dL 4.0  --   --    Total Bilirubin mg/dL 0.5  --   --    Alkaline Phosphatase U/L 113  --   --    AST U/L 31  --   --    ALT U/L 22  --   --    Magnesium mg/dL 1.3*  --   --        Drug levels (last  "3 results):  No results for input(s): "VANCOMYCINRA", "VANCORANDOM", "VANCOMYCINPE", "VANCOPEAK", "VANCOMYCINTR", "VANCOTROUGH" in the last 72 hours.    Microbiologic Results:  Microbiology Results (last 7 days)       Procedure Component Value Units Date/Time    Urine culture [[id number]] Collected: 02/25/25 0019    Order Status: Completed Specimen: Urine Updated: 02/25/25 0705     Urine Culture, Routine No growth to date    Narrative:      In and Out Cath as needed it patient unable to void  Specimen Source->Urine            "

## 2025-02-25 NOTE — PLAN OF CARE
Problem: Infection  Goal: Absence of Infection Signs and Symptoms  Outcome: Progressing     Problem: Adult Inpatient Plan of Care  Goal: Plan of Care Review  Outcome: Progressing  Goal: Patient-Specific Goal (Individualized)  Outcome: Progressing  Goal: Absence of Hospital-Acquired Illness or Injury  Outcome: Progressing  Goal: Optimal Comfort and Wellbeing  Outcome: Progressing  Goal: Readiness for Transition of Care  Outcome: Progressing     Problem: Wound  Goal: Optimal Coping  Outcome: Progressing  Goal: Optimal Functional Ability  Outcome: Progressing  Goal: Absence of Infection Signs and Symptoms  Outcome: Progressing  Goal: Improved Oral Intake  Outcome: Progressing  Goal: Optimal Pain Control and Function  Outcome: Progressing  Goal: Skin Health and Integrity  Outcome: Progressing  Goal: Optimal Wound Healing  Outcome: Progressing

## 2025-02-25 NOTE — ASSESSMENT & PLAN NOTE
Lipase negative, but with the ongoing alcohol abuse, suspect he has flare his chronic pancreatitis; for now:  NPO  Judicious IV fluids-> received a L of fluids in the ER but we will hold on further fluids until repeat sodium results as this will have to be corrected slowly  IV Protonix  Prn Zofran  Prn IV Morphine  Given his complex GI history, will place GI consult

## 2025-02-25 NOTE — ASSESSMENT & PLAN NOTE
Denies chest pain and EKG unrevealing  Troponins elevating in an ACS pattern-> ?atypical chest pain  Continue aspirin  Heparin drip per protocol (currently off Eliquis and coags are normal by the time of this editing)  No recent echo->will check  Telemetry monitoring  Cardiology consultation

## 2025-02-25 NOTE — ASSESSMENT & PLAN NOTE
Will presume this is chronic as he has not had a sodium level in several months  Suspect secondary to alcohol abuse  Status post 1 L bolus fluids; for now:  Hold on further fluids until repeat sodium results  At that point, resume fluids judiciously and adjust based off sodium levels  Check sodium level q.4 hours, with goal correction rate no more than 10 points within 24 hours  Check urine/serum Osmo, urine sodium  Seizure precautions given sodium was 120

## 2025-02-25 NOTE — CONSULTS
"[facility]  Department of Cardiology  Consult Note      PATIENT NAME: Vic Reyez  MRN: [id number]  TODAY'S DATE: 02/25/2025  ADMIT DATE: 2/24/2025                          CONSULT REQUESTED BY: Preston Majano MD    SUBJECTIVE     PRINCIPAL PROBLEM: <principal problem not specified>      REASON FOR CONSULT:  Elevated troponin      HPI:  74yoM with hx of cirrhosis, biliary stricture, alcohol- induced pancreatitis, HTN, and alcohol abuse presented with complaints of abdominal pain after having a few beers. The CT A/P showed a cyst and increased fluid collection on the pancreas. GI is consulted. UA suggestive for UTI, antibiotics were started. Subsequently, his troponin was elevated. Cardiology was consulted for further evaluation.    He denies any chest pain, palpitations, or shortness of breath. Denies any prior cardiac problems. Reports ongoing right upper and central abdominal pain.     Troponin peaked at 108.2. . Mg 1.3, K 3.5, Na 119--> 124      From Hospitalist H&P:  Principal Problem:  Abdominal pain     Chief Complaint:        Chief Complaint   Patient presents with    Abdominal Pain       C/o diffuse abd pain, admits it started after drinking a few beers for lunch. Hx of cirrhosis, gallbladder/pancreas stents.       HPI: Patient is a 74-year-old  male with a history of suspected alcohol abuse-> he states that he drinks a couple of beers around dinnertime but is unable to tell me exactly how much that would be or how often he does this  -He was diagnosed with a condition per GI who has been managing this (history of cirrhosis, pancreatitis, and biliary stricture); has seen GI at [facility] in the past).  Per Dr. 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   Labs 12/'23 - Nml CMP / lipase  EtOH + 10/'23   ERCP 1/'23 - stent removed; improved biliary stricture  CT 11/'22 - CAD/PVD; splenic artery embolization; biliary stent in place; stable TOP cyst; tics  EUS 11/'22 - acute / chronic panc w/ focal irregularity in HOP s/p FNA. 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 Atypical cells."     -Starting around 7:00 PM last night, the patient had lower abdominal pain which was a constant pain; with this he had fever/chills and vomited twice but denies any other bowel symptoms  -He develops shortness of breath with the pain but denies any chest pain; he states that he lost about 70 lb over a matter of weeks (approximately 8 months ago)  -Patient is afebrile and hypertensive with SBP in the 160s-180s; he had no desaturations  -Workup thus far shows no leukocytosis but 135 platelets; CMP showed a sodium of 119, down from 128 previously and renal function remains intact  -Mag level was 1.3 with a normal TSH, CK, and lipase   -BNP was 127 and Troponin went from 40.1-> 108.2  -EKG showed, per my interpretation, sinus rhythm 75 beats per minute; choppy baseline precluded formal assessment  -Tox screen was negative; Urinalysis showed 90 WBCs with +3 leukocytes; review of prior urine cultures revealed Enterococcus faecalis (2022)  -Chest x-ray was unrevealing; CT abdomen/pelvis with IV contrast was performed and showed, per radiologist " "Dr. Watkins:     "FINDINGS:  The visualized lung bases demonstrate atelectatic change.     There are findings consistent with chronic pancreatitis, including pancreatic calcifications.  Arising from the level of the distal body and tail of the pancreas there is appearance of a complex fluid collection measuring approximately 4.4 x 4.9 cm in size, there is mild wall enhancement and there is surrounding edema and inflammatory change.  Note is made that the patient has a history of a pancreatic pseudocyst in a similar location however this appears more prominent and demonstrates inflammatory change and may relate to acute peripancreatic fluid collection associated with acute pancreatitis, there does appear to be surrounding edema and inflammation.  There is mild fluid tracking adjacent to the pancreatic bed, as well as adjacent to the spleen.  The adjacent stomach demonstrates appearance of diminished attenuation of the wall of the stomach, however this does not appear well-defined may relate to edema, with wall thickening of the stomach.  This is potentially reactive.  Follow-up is recommended.     The stomach otherwise demonstrates nonspecific appearance of mild-to-moderate distention with fluid and air.     There is no pericholecystic inflammatory change.  Mild diminished attenuation of the liver may relate to mild fatty infiltrate.  Mild heterogeneity of the spleen appears stable.  The adrenal glands appear unremarkable.     There is no evidence for ureteral calculus or obstructive uropathy.  Mild perinephric haziness of the left kidney noted, correlation for signs or symptoms of superimposed acute pyelonephritis is needed.     The abdominal aorta appears normal in caliber, demonstrates appropriate opacification.  Atherosclerotic change noted.  There is prominent distention of the urinary bladder without abnormal wall thickening, this is nonspecific, and although more prominent appears similar to the prior study, " "correlation for signs or symptoms of urinary retention or urinary bladder outlet obstructive change is needed.     There is no evidence for small bowel obstructive process.  The appendix is not identified.  There is no evidence for inflammatory or obstructive process of the colon.  There is no evidence for free intraperitoneal air.     There is grade 1/2 anterolisthesis of L5 with respect S1, prominent chronic endplate change at L5-S1 with vacuum phenomena.  The osseous structures overall demonstrate chronic change.  Impression:     Findings likely representing acute on chronic pancreatitis with associated fluid collection that may relate to a acute peripancreatic fluid collection associated with acute pancreatitis, as discussed above.  Follow-up is recommended.     Appearance of gastric wall and fold thickening with prominent appearance of edema of the gastric wall adjacent to the aforementioned pancreatic fluid collection.  This may be reactive however can be re-evaluated on follow-up.     Mild perinephric stranding of the left kidney without evidence for ureteral calculus or obstructive uropathy, correlation for UTI/pyelonephritis is needed.     Nonspecific urinary bladder distention, correlation for urinary retention or urinary bladder outlet obstructive symptomatology is needed.     Additional findings as above.     This report was flagged in Epic as abnormal."     -Thus far, patient has received a dose of IV Ativan, 2 g of Magnesium sulfate IV, 6 mg of IV Morphine (in total; pain is currently 6/10 in severity), Zofran, Protonix, Zosyn, and a L of fluids    Review of patient's allergies indicates:  No Known Allergies    Past Medical History:   Diagnosis Date    Alcohol abuse 02/02/2022    Decompensated hepatic cirrhosis 02/02/2022    Encounter for blood transfusion     Hypertension     Lab test positive for detection of COVID-19 virus 09/2021    Pancreatic cyst 06/03/2022     Past Surgical History:   Procedure " Laterality Date    ABDOMINAL AORTOGRAPHY N/A 10/04/2022    Procedure: AORTOGRAM-ABDOMINAL;  Surgeon: Felice Epstein MD;  Location: [facility] CATH/EP LAB;  Service: Vascular;  Laterality: N/A;    APPENDECTOMY      ARTERIOGRAM, MESENTERIC N/A 10/04/2022    Procedure: ARTERIOGRAM, MESENTERIC;  Surgeon: Felice Epstein MD;  Location: [facility] CATH/EP LAB;  Service: Vascular;  Laterality: N/A;    COLONOSCOPY      ENDOSCOPIC ULTRASOUND OF UPPER GASTROINTESTINAL TRACT  02/04/2022    Procedure: ULTRASOUND, UPPER GI TRACT, ENDOSCOPIC;  Surgeon: Chuy Bay MD;  Location: [facility] ENDO (2ND FLR);  Service: Endoscopy;;    ENDOSCOPIC ULTRASOUND OF UPPER GASTROINTESTINAL TRACT N/A 06/03/2022    Procedure: ULTRASOUND, UPPER GI TRACT, ENDOSCOPIC;  Surgeon: Roger Viveros III, MD;  Location: [facility] ENDO;  Service: Endoscopy;  Laterality: N/A;    ENDOSCOPIC ULTRASOUND OF UPPER GASTROINTESTINAL TRACT N/A 11/07/2022    Procedure: ULTRASOUND, UPPER GI TRACT, ENDOSCOPIC;  Surgeon: Roger Viveros III, MD;  Location: [facility] ENDO;  Service: Endoscopy;  Laterality: N/A;    ERCP N/A 02/04/2022    Procedure: ERCP (ENDOSCOPIC RETROGRADE CHOLANGIOPANCREATOGRAPHY);  Surgeon: Chuy Bay MD;  Location: [facility] ENDO (2ND FLR);  Service: Endoscopy;  Laterality: N/A;    ERCP N/A 04/19/2022    Procedure: ERCP (ENDOSCOPIC RETROGRADE CHOLANGIOPANCREATOGRAPHY);  Surgeon: Chuy Bay MD;  Location: [facility] ENDO (2ND FLR);  Service: Endoscopy;  Laterality: N/A;  4/1: fully vaccinated. labs prior. instructions mailed to sister and patient.-SC  4/12/22-Confirmed new arrival time of 10:45am with pt's sister and updated instructions emailed-DS    ERCP N/A 10/05/2022    Procedure: ERCP (ENDOSCOPIC RETROGRADE CHOLANGIOPANCREATOGRAPHY);  Surgeon: Roger Viveros III, MD;  Location: [facility] ENDO;  Service: Endoscopy;  Laterality: N/A;    ERCP N/A 1/24/2023    Procedure: ERCP (ENDOSCOPIC RETROGRADE CHOLANGIOPANCREATOGRAPHY);  Surgeon: Roger Viveros III, MD;   Location: [facility];  Service: Endoscopy;  Laterality: N/A;    EYE SURGERY Left     JOINT REPLACEMENT Bilateral     knee replacement    KNEE SURGERY      RECONSTRUCTION OF SHOULDER Right     TONSILLECTOMY      UPPER ENDOSCOPIC ULTRASOUND W/ FNA  06/03/2022    VITRECTOMY BY PARS PLANA APPROACH Left 8/14/2023    Procedure: VITRECTOMY, PARS PLANA APPROACH;  Surgeon: Tee Crespo MD;  Location: [address];  Service: Ophthalmology;  Laterality: Left;     Social History[1]     REVIEW OF SYSTEMS    As mentioned in HPI    OBJECTIVE     VITAL SIGNS (Most Recent)  Temp: 97.7 °F (36.5 °C) (02/25/25 0825)  Pulse: 68 (02/25/25 0825)  Resp: 18 (02/25/25 0853)  BP: 137/76 (02/25/25 0825)  SpO2: 99 % (02/25/25 0825)    VENTILATION STATUS  Resp: 18 (02/25/25 0853)  SpO2: 99 % (02/25/25 0825)           I & O (Last 24H):  Intake/Output Summary (Last 24 hours) at 2/25/2025 0947  Last data filed at 2/25/2025 0825  Gross per 24 hour   Intake 400 ml   Output 2450 ml   Net -2050 ml       WEIGHTS  Wt Readings from Last 3 Encounters:   02/25/25 0825 64.2 kg (141 lb 8.6 oz)   02/24/25 2145 65.8 kg (145 lb)   03/30/24 1819 65.8 kg (145 lb)   03/30/24 0009 62.6 kg (138 lb)   12/26/23 0425 63 kg (138 lb 14.2 oz)   12/25/23 1924 66.7 kg (147 lb)       PHYSICAL EXAM    CONSTITUTIONAL: NAD, frail  HEENT: Normocephalic.   NECK: no JVD  LUNGS: CTA b/l  HEART: regular rate and rhythm, S1, S2 normal, no murmur   ABDOMEN: soft  EXTREMITIES: No edema.   NEURO: AAO X 3  PSYCH: normal affect      HOME MEDICATIONS:Medications Ordered Prior to Encounter[2]    SCHEDULED MEDS:   [START ON 2/26/2025] aspirin  325 mg Oral Daily    cefTRIAXone (Rocephin) IV (PEDS and ADULTS)  1 g Intravenous Q24H    folic acid  1 mg Oral Daily    multivitamin  1 tablet Oral Daily    pantoprazole  40 mg Intravenous Daily    thiamine  100 mg Oral Daily       CONTINUOUS INFUSIONS:   heparin (porcine) in D5W  0-40 Units/kg/hr Intravenous Continuous 7.9 mL/hr at 02/25/25 0640  12 Units/kg/hr at 02/25/25 0640       PRN MEDS:  Current Facility-Administered Medications:     aluminum-magnesium hydroxide-simethicone, 30 mL, Oral, QID PRN    dextrose 50%, 12.5 g, Intravenous, PRN    dextrose 50%, 25 g, Intravenous, PRN    glucagon (human recombinant), 1 mg, Intramuscular, PRN    glucose, 16 g, Oral, PRN    glucose, 24 g, Oral, PRN    heparin (PORCINE), 60 Units/kg, Intravenous, PRN    heparin (PORCINE), 30 Units/kg, Intravenous, PRN    hydrALAZINE, 10 mg, Intravenous, Q4H PRN    lorazepam, 1 mg, Intravenous, Q4H PRN    magnesium oxide, 800 mg, Oral, PRN    magnesium oxide, 800 mg, Oral, PRN    melatonin, 6 mg, Oral, Nightly PRN    morphine, 2 mg, Intravenous, Q2H PRN    naloxone, 0.02 mg, Intravenous, PRN    ondansetron, 4 mg, Intravenous, Q6H PRN    potassium bicarbonate, 35 mEq, Oral, PRN    potassium bicarbonate, 50 mEq, Oral, PRN    potassium bicarbonate, 60 mEq, Oral, PRN    potassium, sodium phosphates, 2 packet, Oral, PRN    potassium, sodium phosphates, 2 packet, Oral, PRN    potassium, sodium phosphates, 2 packet, Oral, PRN    senna-docusate 8.6-50 mg, 1 tablet, Oral, Daily PRN    Pharmacy to dose Vancomycin consult, , , Once **AND** vancomycin - pharmacy to dose, , Intravenous, pharmacy to manage frequency    LABS AND DIAGNOSTICS     CBC LAST 3 DAYS  Recent Labs   Lab 02/24/25  2336 02/25/25  0635   WBC 4.93 4.60   RBC 4.10* 3.92*   HGB 12.3* 11.8*   HCT 34.7* 32.7*   MCV 85 83   MCH 30.0 30.1   MCHC 35.4 36.1*   RDW 13.9 14.0   * 101*   MPV 8.7* 8.5*   GRAN 76.3*  3.8 66.1  3.0   LYMPH 12.0*  0.6* 18.3  0.8*   MONO 10.3  0.5 12.6  0.6   BASO 0.02 0.01   NRBC 0 0       COAGULATION LAST 3 DAYS  Recent Labs   Lab 02/25/25  0635   INR 1.1   APTT 27.1  27.1       CHEMISTRY LAST 3 DAYS  Recent Labs   Lab 02/24/25  2336 02/25/25  0855   * 124*   K 3.5  --    CL 88*  --    CO2 16*  --    ANIONGAP 15  --    BUN 6*  --    CREATININE 0.5  --    *  --    CALCIUM 9.1   "--    MG 1.3*  --    ALBUMIN 4.0  --    PROT 7.9  --    ALKPHOS 113  --    ALT 22  --    AST 31  --    BILITOT 0.5  --        CARDIAC PROFILE LAST 3 DAYS  Recent Labs   Lab 02/24/25  2336 02/25/25  0140   *  --    CPK 56  --    TROPONINIHS 40.1* 108.2*       ENDOCRINE LAST 3 DAYS  Recent Labs   Lab 02/24/25  2336   TSH 1.389       LAST ARTERIAL BLOOD GAS  ABG  No results for input(s): "PH", "PO2", "PCO2", "HCO3", "BE" in the last 168 hours.    LAST 7 DAYS MICROBIOLOGY   Microbiology Results (last 7 days)       Procedure Component Value Units Date/Time    Urine culture [[id number]] Collected: 02/25/25 0019    Order Status: Completed Specimen: Urine Updated: 02/25/25 0705     Urine Culture, Routine No growth to date    Narrative:      In and Out Cath as needed it patient unable to void  Specimen Source->Urine            MOST RECENT IMAGING  CT Abdomen Pelvis With IV Contrast NO Oral Contrast  Narrative: EXAMINATION:  CT ABDOMEN PELVIS WITH IV CONTRAST    CLINICAL HISTORY:  Epigastric pain;    TECHNIQUE:  Low dose axial images, sagittal and coronal reformations were obtained from the lung bases to the pubic symphysis following the IV administration of 100 mL of Omnipaque 350 oral contrast was not utilized.  Single phase postcontrast CT examination of the abdomen and pelvis is submitted.    COMPARISON:  Prior examinations, most recent of which is CT examination abdomen and pelvis March 30, 2024    FINDINGS:  The visualized lung bases demonstrate atelectatic change.    There are findings consistent with chronic pancreatitis, including pancreatic calcifications.  Arising from the level of the distal body and tail of the pancreas there is appearance of a complex fluid collection measuring approximately 4.4 x 4.9 cm in size, there is mild wall enhancement and there is surrounding edema and inflammatory change.  Note is made that the patient has a history of a pancreatic pseudocyst in a similar location however this " appears more prominent and demonstrates inflammatory change and may relate to acute peripancreatic fluid collection associated with acute pancreatitis, there does appear to be surrounding edema and inflammation.  There is mild fluid tracking adjacent to the pancreatic bed, as well as adjacent to the spleen.  The adjacent stomach demonstrates appearance of diminished attenuation of the wall of the stomach, however this does not appear well-defined may relate to edema, with wall thickening of the stomach.  This is potentially reactive.  Follow-up is recommended.    The stomach otherwise demonstrates nonspecific appearance of mild-to-moderate distention with fluid and air.    There is no pericholecystic inflammatory change.  Mild diminished attenuation of the liver may relate to mild fatty infiltrate.  Mild heterogeneity of the spleen appears stable.  The adrenal glands appear unremarkable.    There is no evidence for ureteral calculus or obstructive uropathy.  Mild perinephric haziness of the left kidney noted, correlation for signs or symptoms of superimposed acute pyelonephritis is needed.    The abdominal aorta appears normal in caliber, demonstrates appropriate opacification.  Atherosclerotic change noted.  There is prominent distention of the urinary bladder without abnormal wall thickening, this is nonspecific, and although more prominent appears similar to the prior study, correlation for signs or symptoms of urinary retention or urinary bladder outlet obstructive change is needed.    There is no evidence for small bowel obstructive process.  The appendix is not identified.  There is no evidence for inflammatory or obstructive process of the colon.  There is no evidence for free intraperitoneal air.    There is grade 1/2 anterolisthesis of L5 with respect S1, prominent chronic endplate change at L5-S1 with vacuum phenomena.  The osseous structures overall demonstrate chronic change.  Impression: Findings likely  representing acute on chronic pancreatitis with associated fluid collection that may relate to a acute peripancreatic fluid collection associated with acute pancreatitis, as discussed above.  Follow-up is recommended.    Appearance of gastric wall and fold thickening with prominent appearance of edema of the gastric wall adjacent to the aforementioned pancreatic fluid collection.  This may be reactive however can be re-evaluated on follow-up.    Mild perinephric stranding of the left kidney without evidence for ureteral calculus or obstructive uropathy, correlation for UTI/pyelonephritis is needed.    Nonspecific urinary bladder distention, correlation for urinary retention or urinary bladder outlet obstructive symptomatology is needed.    Additional findings as above.    This report was flagged in Epic as abnormal.    Electronically signed by: Conner Watkins  Date:    02/25/2025  Time:    02:45      ECHOCARDIOGRAM RESULTS (last 5)  No results found for this or any previous visit.      CURRENT/PREVIOUS VISIT EKG      Results for orders placed or performed during the hospital encounter of 02/24/25   EKG 12-lead    Collection Time: 02/24/25 10:33 PM   Result Value Ref Range    QRS Duration 108 ms    [facility] QTC Calculation 446 ms    Narrative    Test Reason : R10.13,    Vent. Rate :  75 BPM     Atrial Rate :  75 BPM     P-R Int : 186 ms          QRS Dur : 108 ms      QT Int : 400 ms       P-R-T Axes :  56  89  33 degrees    QTcB Int : 446 ms    Normal sinus rhythm  Normal ECG  When compared with ECG of 25-Dec-2023 20:54,  Questionable change in The axis    Referred By: AAAREFERRAL SELF           Confirmed By:            ASSESSMENT/PLAN:     Active Hospital Problems    Diagnosis    UTI (urinary tract infection)    Elevated troponin    Alcohol-induced acute pancreatitis    Hyponatremia    Chronic pancreatitis    Pseudocyst of pancreas    Alcohol use disorder, severe, dependence    Primary hypertension       ASSESSMENT &  PLAN:     Acute alochol-induced pancreatitis  Hx of chronic pancreatitis  Pseudocyst of pancreas  UTI  Elevated troponin  HTN  Alcohol use disorder  Electrolyte abnormalities      RECOMMENDATIONS:    - No chest pain. Troponin peaked at 108.2. EKG shows sinus rhythm,  no evidence of acute MI. Telemetry also showing sinus rhythm, some PACs noted.  - Echo shows LVEF 55-60%, RV with normal function, no significant valvular abnormalities.  - Replace electrolytes per protocol.  - Recommend alcohol cessation.  - Suspect patient's mildly elevated troponin is in the setting of his acute pancreatitis and UTI.      Thank you for consulting cardiology. Will sign off at this time. Please reconsult for any further cardiac specific concerns.        Marry Silva, AGACNPSSM Health [facility] Cardiology  Date of Service: 02/25/2025        I have personally interviewed and examined the patient, I have reviewed the Nurse Practitioner's history and physical, assessment, and plan. I have personally evaluated the patient at bedside and agree with the findings and made appropriate changes as necessary in recommendations.  All pertinent recent labs, imaging and EKGs independently reviewed and interpreted.   Mild troponin elevation possibly related to demand ischemia.  Preserved ejection fraction on echo.  No significant ischemic EKG changes.  Patient can follow up with Cardiology as outpatient.    Checo Larson MD [facility]  Department of Cardiology  [facility]  2/25/25          [1]   Social History  Tobacco Use    Smoking status: Every Day     Current packs/day: 0.25     Average packs/day: 0.3 packs/day for 40.0 years (10.0 ttl pk-yrs)     Types: Cigarettes    Smokeless tobacco: Never    Tobacco comments:     Pt has smoked on and off since age 19. Currently he smokes 1 pack per week and has cut back a lot throughout the years. No patch needed per his hospital visit. Information and education provided on our outpatient smoking  cessation program.   Substance Use Topics    Alcohol use: Yes     Alcohol/week: 10.0 standard drinks of alcohol     Types: 10 Cans of beer per week    Drug use: Never   [2]   No current facility-administered medications on file prior to encounter.     Current Outpatient Medications on File Prior to Encounter   Medication Sig Dispense Refill    aspirin 325 MG tablet Take 325 mg by mouth once daily.      cholecalciferol, vitamin D3, (VITAMIN D3) 25 mcg (1,000 unit) capsule Take 1,000 Units by mouth once daily.      multivit-mins no.63/iron/folic (M-VIT ORAL) Take 1 tablet by mouth once daily.      sodium chloride 1,000 mg TbSO oral tablet Take 1,000 mg by mouth daily as needed (Dehydration).      vitamin E 400 UNIT capsule Take 400 Units by mouth once daily.      apixaban (ELIQUIS) 2.5 mg Tab Take 1 tablet (2.5 mg total) by mouth 2 (two) times daily. (Patient not taking: Reported on 3/30/2024) 60 tablet 3    polyethylene glycol (GLYCOLAX) 17 gram PwPk Take 17 g by mouth 2 (two) times daily. (Patient not taking: Reported on 2/25/2025) 60 packet 0    traMADoL (ULTRAM) 50 mg tablet Take 1 tablet (50 mg total) by mouth every 6 (six) hours as needed for Pain. (Patient not taking: Reported on 2/25/2025) 12 tablet 0    [DISCONTINUED] omega-3 fatty acids/fish oil (FISH OIL-OMEGA-3 FATTY ACIDS) 300-1,000 mg capsule Take 1 capsule by mouth once daily.

## 2025-02-25 NOTE — HPI
"Patient is a 74-year-old  male with a history of suspected alcohol abuse-> he states that he drinks a couple of beers around dinnertime but is unable to tell me exactly how much that would be or how often he does this  He was diagnosed with a condition per GI who has been managing this (history of cirrhosis, pancreatitis, and biliary stricture); has seen GI at [facility] in the past).  Per Dr. 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   Labs 12/'23 - Nml CMP / lipase  EtOH + 10/'23   ERCP 1/'23 - stent removed; improved biliary stricture  CT 11/'22 - CAD/PVD; splenic artery embolization; biliary stent in place; stable TOP cyst; tics  EUS 11/'22 - acute / chronic panc w/ focal irregularity in HOP s/p FNA. 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 Atypical cells."    Starting around 7:00 PM last night, the patient had lower abdominal pain which was a constant pain; with this he had fever/chills and vomited twice but denies any other bowel symptoms  He develops shortness of breath with the pain but denies any chest pain; he states that he lost about 70 lb over a matter of weeks approximately 8 months ago  Patient is afebrile and " "hypertensive with SBP in the 160s-180s; he had no desaturations  Workup thus far shows no leukocytosis but 135 platelets; CMP showed a sodium of 119, down from 128 previously and renal function remains intact  Mag level was 1.3 with a normal TSH, CK, and lipase   BNP was 127 and Troponin went from 40.1-> 108.2  EKG showed, per my interpretation, sinus rhythm 75 beats per minute; choppy baseline precluded formal assessment  Tox screen was negative; Urinalysis showed 90 WBCs with +3 leukocytes; review of prior urine cultures revealed Enterococcus faecalis (2022)  Chest x-ray was unrevealing; CT abdomen/pelvis with IV contrast was performed and showed, per radiologist Dr. Watkins:    "FINDINGS:  The visualized lung bases demonstrate atelectatic change.     There are findings consistent with chronic pancreatitis, including pancreatic calcifications.  Arising from the level of the distal body and tail of the pancreas there is appearance of a complex fluid collection measuring approximately 4.4 x 4.9 cm in size, there is mild wall enhancement and there is surrounding edema and inflammatory change.  Note is made that the patient has a history of a pancreatic pseudocyst in a similar location however this appears more prominent and demonstrates inflammatory change and may relate to acute peripancreatic fluid collection associated with acute pancreatitis, there does appear to be surrounding edema and inflammation.  There is mild fluid tracking adjacent to the pancreatic bed, as well as adjacent to the spleen.  The adjacent stomach demonstrates appearance of diminished attenuation of the wall of the stomach, however this does not appear well-defined may relate to edema, with wall thickening of the stomach.  This is potentially reactive.  Follow-up is recommended.     The stomach otherwise demonstrates nonspecific appearance of mild-to-moderate distention with fluid and air.     There is no pericholecystic inflammatory change.  " Mild diminished attenuation of the liver may relate to mild fatty infiltrate.  Mild heterogeneity of the spleen appears stable.  The adrenal glands appear unremarkable.     There is no evidence for ureteral calculus or obstructive uropathy.  Mild perinephric haziness of the left kidney noted, correlation for signs or symptoms of superimposed acute pyelonephritis is needed.     The abdominal aorta appears normal in caliber, demonstrates appropriate opacification.  Atherosclerotic change noted.  There is prominent distention of the urinary bladder without abnormal wall thickening, this is nonspecific, and although more prominent appears similar to the prior study, correlation for signs or symptoms of urinary retention or urinary bladder outlet obstructive change is needed.     There is no evidence for small bowel obstructive process.  The appendix is not identified.  There is no evidence for inflammatory or obstructive process of the colon.  There is no evidence for free intraperitoneal air.     There is grade 1/2 anterolisthesis of L5 with respect S1, prominent chronic endplate change at L5-S1 with vacuum phenomena.  The osseous structures overall demonstrate chronic change.  Impression:     Findings likely representing acute on chronic pancreatitis with associated fluid collection that may relate to a acute peripancreatic fluid collection associated with acute pancreatitis, as discussed above.  Follow-up is recommended.     Appearance of gastric wall and fold thickening with prominent appearance of edema of the gastric wall adjacent to the aforementioned pancreatic fluid collection.  This may be reactive however can be re-evaluated on follow-up.     Mild perinephric stranding of the left kidney without evidence for ureteral calculus or obstructive uropathy, correlation for UTI/pyelonephritis is needed.     Nonspecific urinary bladder distention, correlation for urinary retention or urinary bladder outlet obstructive  "symptomatology is needed.     Additional findings as above.     This report was flagged in Epic as abnormal."    Thus far, patient has received a dose of IV Ativan, 2 g of magnesium sulfate IV, 6 mg of IV Morphine (in total), Zofran, Protonix, Zosyn, and a L of fluids  "

## 2025-02-25 NOTE — PHARMACY MED REC
"Admission Medication History     The home medication history was taken by Lyle Cervantes.    You may go to "Admission" then "Reconcile Home Medications" tabs to review and/or act upon these items.     The home medication list has been updated by the Pharmacy department.   Please read ALL comments highlighted in yellow.   Please address this information as you see fit.    Feel free to contact us if you have any questions or require assistance.      The medications listed below were removed from the home medication list. Please reorder if appropriate:  Patient reports no longer taking the following medication(s):  Fish Oil    Medications listed below were obtained from: Patient/family and Analytic software- [employer]  No current facility-administered medications on file prior to encounter.     Current Outpatient Medications on File Prior to Encounter   Medication Sig Dispense Refill    sodium chloride 1,000 mg TbSO oral tablet Take 1,000 mg by mouth daily as needed (Dehydration).      vitamin E 400 UNIT capsule Take 400 Units by mouth once daily.      apixaban (ELIQUIS) 2.5 mg Tab Take 1 tablet (2.5 mg total) by mouth 2 (two) times daily. (Patient not taking: Reported on 3/30/2024) 60 tablet 3    aspirin 325 MG tablet Take 325 mg by mouth once daily.      cholecalciferol, vitamin D3, (VITAMIN D3) 25 mcg (1,000 unit) capsule Take 1,000 Units by mouth once daily.      multivit-mins no.63/iron/folic (M-VIT ORAL) Take 1 tablet by mouth once daily.      polyethylene glycol (GLYCOLAX) 17 gram PwPk Take 17 g by mouth 2 (two) times daily. (Patient not taking: Reported on 2/25/2025) 60 packet 0    traMADoL (ULTRAM) 50 mg tablet Take 1 tablet (50 mg total) by mouth every 6 (six) hours as needed for Pain. (Patient not taking: Reported on 2/25/2025) 12 tablet 0    [DISCONTINUED] omega-3 fatty acids/fish oil (FISH OIL-OMEGA-3 FATTY ACIDS) 300-1,000 mg capsule Take 1 capsule by mouth once daily.             Lyle Cervantes  EXT " 1919                .

## 2025-02-25 NOTE — HOSPITAL COURSE
Vic Reyez is a 74 year old male with a past medical history of cirrhosis, EtOH abuse, splenic vein thrombosis, chronic pancreatitis, tobacco abuse, anemia and thrombocytopenia who presented with an acute on chronic pancreatitis with hyponatremia. He is being treated with low rate NS IV fluids, PRN analgesics and antiemetics. GI has been consulted. He is NPO and the sodium is being trended; urine studies are pending. He is also on Rocephin and vancomycin for a possible UTI (history of Enterococcus). A urine culture is pending. Lastly, he presented with an elevated troponin with no EKG changes or symptoms. A TTE has been ordered, troponin is being trended, and Cardiology has been consulted. He is being monitored for EtOH withdrawal but CIWA is a improving , and has not required Ativan and Librium.  and states he only drinks 1-2 drinks 2-3 times per week.  Abdominal pain as resolved and Gastroenterology says they will follow up with them as outpatient for consideration of FNA aspiration of peripancreatic cysts.  Phone further discussion with patient he admits to drinking 12 12 oz bottles of water per day because his doctor told him he needs to be hydrated.  I told patient at his size he should only be taking 4-6 bottles of water per day, alternate water with Gatorade to prevent hyponatremia from recurring.

## 2025-02-25 NOTE — ASSESSMENT & PLAN NOTE
Patient vague regarding his alcohol use  Start multivitamins/thiamine/folate  CIWA protocol  Seizure precautions  Prn IV Ativan

## 2025-02-25 NOTE — CONSULTS
GASTROENTEROLOGY INPATIENT CONSULT NOTE  Patient Name: Vic Reyez  Patient MRN: [id number]  Patient : 1950    Admit Date: 2025  Service date: 2025    Reason for Consult: pancreatic cyst    PCP: Keven Nj MD    Chief Complaint   Patient presents with    Abdominal Pain     C/o diffuse abd pain, admits it started after drinking a few beers for lunch. Hx of cirrhosis, gallbladder/pancreas stents.        HPI: Patient is a 74 y.o. male with PMHx appy, cirrhosis, pancreatitis w/ ongoing EtOH use, EtOH / tobacco use, biliary stricture s/p past ERCP (EtOH panc vs IgG4 related), splenic artery embolization presents for evaluation of epig pain. Acute onset, constant, progressive over past 2-3 days but now resolving. Still drinking beer nightly and smoking regularly. Pain improved and pratik PO currently in bed w/o issues.       CHART REVIEW:  Labs  - Nml CBC / CMP / lipase  CT  Abd  -  acute/chronic panc; 5cm BOP pseudocyst present since ; mild vasc dz. 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   EtOH + 10/'23   ERCP  - stent removed; improved biliary stricture  CT  - CAD/PVD; splenic artery embolization; biliary stent in place; stable TOP cyst; tics  IgG4  - Normal (41)  EUS  - acute / chronic panc w/ focal irregularity in HOP s/p FNA. 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 Atypical cells. . .     Past Medical History:  Past Medical History:   Diagnosis Date    Alcohol abuse 02/02/2022    Decompensated hepatic cirrhosis 02/02/2022    Encounter for blood transfusion     Hypertension     Lab test positive for detection of COVID-19 virus 09/2021    Pancreatic cyst 06/03/2022        Past Surgical History:  Past Surgical History:   Procedure Laterality Date    ABDOMINAL AORTOGRAPHY N/A 10/04/2022    Procedure: AORTOGRAM-ABDOMINAL;  Surgeon: Felice Epstein MD;  Location: [facility] CATH/EP LAB;  Service: Vascular;  Laterality: N/A;    APPENDECTOMY      ARTERIOGRAM, MESENTERIC N/A 10/04/2022    Procedure: ARTERIOGRAM, MESENTERIC;  Surgeon: Felice Epstein MD;  Location: [facility] CATH/EP LAB;  Service: Vascular;  Laterality: N/A;    COLONOSCOPY      ENDOSCOPIC ULTRASOUND OF UPPER GASTROINTESTINAL TRACT  02/04/2022    Procedure: ULTRASOUND, UPPER GI TRACT, ENDOSCOPIC;  Surgeon: Chuy Bay MD;  Location: [facility] ([address] [place] [address]);  Service: Endoscopy;;    ENDOSCOPIC ULTRASOUND OF UPPER GASTROINTESTINAL TRACT N/A 06/03/2022    Procedure: ULTRASOUND, UPPER GI TRACT, ENDOSCOPIC;  Surgeon: Roger Viveros III, MD;  Location: [facility];  Service: Endoscopy;  Laterality: N/A;    ENDOSCOPIC ULTRASOUND OF UPPER GASTROINTESTINAL TRACT N/A 11/07/2022    Procedure: ULTRASOUND, UPPER GI TRACT, ENDOSCOPIC;  Surgeon: Roger Viveros III, MD;  Location: [facility] ENDO;  Service: Endoscopy;  Laterality: N/A;    ERCP N/A 02/04/2022    Procedure: ERCP (ENDOSCOPIC RETROGRADE CHOLANGIOPANCREATOGRAPHY);  Surgeon: Chuy Bay MD;  [facility]  Service: Endoscopy;  Laterality: N/A;    ERCP N/A 04/19/2022    Procedure: ERCP (ENDOSCOPIC RETROGRADE  CHOLANGIOPANCREATOGRAPHY);  Surgeon: Chuy Bay MD;  Location: [facility] ENDO (2ND FLR);  Service: Endoscopy;  Laterality: N/A;  4/1: fully vaccinated. labs prior. instructions mailed to sister and patient.-SC  4/12/22-Confirmed new arrival time of 10:45am with pt's sister and updated instructions emailed-DS    ERCP N/A 10/05/2022    Procedure: ERCP (ENDOSCOPIC RETROGRADE CHOLANGIOPANCREATOGRAPHY);  Surgeon: Roger Viveros III, MD;  Location: [facility] ENDO;  Service: Endoscopy;  Laterality: N/A;    ERCP N/A 1/24/2023    Procedure: ERCP (ENDOSCOPIC RETROGRADE CHOLANGIOPANCREATOGRAPHY);  Surgeon: Roger Viveros III, MD;  Location: [facility] ENDO;  Service: Endoscopy;  Laterality: N/A;    EYE SURGERY Left     JOINT REPLACEMENT Bilateral     knee replacement    KNEE SURGERY      RECONSTRUCTION OF SHOULDER Right     TONSILLECTOMY      UPPER ENDOSCOPIC ULTRASOUND W/ FNA  06/03/2022    VITRECTOMY BY PARS PLANA APPROACH Left 8/14/2023    Procedure: VITRECTOMY, PARS PLANA APPROACH;  Surgeon: Tee Crespo MD;  [facility];  Service: Ophthalmology;  Laterality: Left;        Home Medications:  Prescriptions Prior to Admission[1]    Inpatient Medications:   [START ON 2/26/2025] aspirin  325 mg Oral Daily    cefTRIAXone (Rocephin) IV (PEDS and ADULTS)  1 g Intravenous Q24H    enoxparin  40 mg Subcutaneous Q24H (prophylaxis, 1700)    folic acid  1 mg Oral Daily    multivitamin  1 tablet Oral Daily    mupirocin   Nasal BID    pantoprazole  40 mg Intravenous Daily    thiamine  100 mg Oral Daily    vancomycin (VANCOCIN) IV (PEDS and ADULTS)  1,000 mg Intravenous Q12H       Current Facility-Administered Medications:     aluminum-magnesium hydroxide-simethicone, 30 mL, Oral, QID PRN    hydrALAZINE, 10 mg, Intravenous, Q4H PRN    lorazepam, 1 mg, Intravenous, Q4H PRN    magnesium oxide, 800 mg, Oral, PRN    magnesium oxide, 800 mg, Oral, PRN    melatonin, 6 mg, Oral, Nightly PRN    morphine, 4 mg, Intravenous, Q2H PRN     naloxone, 0.02 mg, Intravenous, PRN    ondansetron, 4 mg, Intravenous, Q6H PRN    potassium bicarbonate, 35 mEq, Oral, PRN    potassium bicarbonate, 50 mEq, Oral, PRN    potassium bicarbonate, 60 mEq, Oral, PRN    potassium, sodium phosphates, 2 packet, Oral, PRN    potassium, sodium phosphates, 2 packet, Oral, PRN    potassium, sodium phosphates, 2 packet, Oral, PRN    senna-docusate 8.6-50 mg, 1 tablet, Oral, Daily PRN    Pharmacy to dose Vancomycin consult, , , Once **AND** vancomycin - pharmacy to dose, , Intravenous, pharmacy to manage frequency    Review of patient's allergies indicates:  No Known Allergies    Social History:   Social History     Occupational History    Not on file   Tobacco Use    Smoking status: Every Day     Current packs/day: 0.25     Average packs/day: 0.3 packs/day for 40.0 years (10.0 ttl pk-yrs)     Types: Cigarettes    Smokeless tobacco: Never    Tobacco comments:     Pt has smoked on and off since age 19. Currently he smokes 1 pack per week and has cut back a lot throughout the years. No patch needed per his hospital visit. Information and education provided on our outpatient smoking cessation program.   Substance and Sexual Activity    Alcohol use: Yes     Alcohol/week: 10.0 standard drinks of alcohol     Types: 10 Cans of beer per week    Drug use: Never    Sexual activity: Not Currently       Family History:   No family history on file.    Review of Systems:  A 10 point review of systems was performed and was normal, except as mentioned in the HPI, including constitutional, HEENT, heme, lymph, cardiovascular, respiratory, gastrointestinal, genitourinary, neurologic, endocrine, psychiatric and musculoskeletal.      OBJECTIVE:    Physical Exam:  24 Hour Vital Sign Ranges: Temp:  [97.5 °F (36.4 °C)-98.2 °F (36.8 °C)] 98.2 °F (36.8 °C)  Pulse:  [66-86] 69  Resp:  [16-18] 18  SpO2:  [97 %-100 %] 97 %  BP: (135-187)/() 157/79  Most recent vitals: BP (!) 157/79   Pulse 69   Temp  "98.2 °F (36.8 °C)   Resp 18   Ht 5' 11" (1.803 m)   Wt 64.2 kg (141 lb 8.6 oz)   SpO2 97%   BMI 19.74 kg/m²    GEN: well-developed, well-nourished, awake and alert, non-toxic appearing adult  HEENT: PERRL, sclera anicteric, oral mucosa pink and moist without lesion  NECK: trachea midline; Good ROM  CV: regular rate and rhythm, no murmurs or gallops  RESP: clear to auscultation bilaterally, no wheezes, rhonci or rales  ABD: soft, non-tender, non-distended, normal bowel sounds  EXT: no swelling or edema, 2+ pulses distally  SKIN: no rashes or jaundice  PSYCH: normal affect    Labs:   Recent Labs     02/24/25 2336 02/25/25  0635   WBC 4.93 4.60   MCV 85 83   * 101*     Recent Labs     02/24/25  2336 02/25/25  0855 02/25/25  1301   * 124* 126*   K 3.5  --   --    CL 88*  --   --    CO2 16*  --   --    BUN 6*  --   --    *  --   --      No results for input(s): "ALB" in the last 72 hours.    Invalid input(s): "ALKP", "SGOT", "SGPT", "TBIL", "DBIL", "TPRO"  Recent Labs     02/25/25  0635   INR 1.1         Radiology Review:  CT Abdomen Pelvis With IV Contrast NO Oral Contrast   Final Result   Abnormal      Findings likely representing acute on chronic pancreatitis with associated fluid collection that may relate to a acute peripancreatic fluid collection associated with acute pancreatitis, as discussed above.  Follow-up is recommended.      Appearance of gastric wall and fold thickening with prominent appearance of edema of the gastric wall adjacent to the aforementioned pancreatic fluid collection.  This may be reactive however can be re-evaluated on follow-up.      Mild perinephric stranding of the left kidney without evidence for ureteral calculus or obstructive uropathy, correlation for UTI/pyelonephritis is needed.      Nonspecific urinary bladder distention, correlation for urinary retention or urinary bladder outlet obstructive symptomatology is needed.      Additional findings as above.    "   This report was flagged in Epic as abnormal.         Electronically signed by: Conner Watkins   Date:    02/25/2025   Time:    02:45      X-Ray Chest AP Portable   Final Result      Chronic coarse interstitial attenuation.  No new large confluent airspace consolidation appreciated.         Electronically signed by: Juanito Prasad MD   Date:    02/24/2025   Time:    23:45            IMPRESSION / RECOMMENDATIONS:  74 y.o. male with PMHx appy, cirrhosis, pancreatitis w/ ongoing EtOH use, EtOH / tobacco use, biliary stricture s/p past ERCP (EtOH panc vs IgG4 related), splenic artery embolization presents for evaluation of epig pain. Acute / chronic panc w/ known pseudocysts in this region x years. Clinically better, WBC normal.     -Conservative for now as sx improved  -If pain continues, can consider FNA of cyst in future if any concerns.   -EtOH / tobacco cessation again discussed    Thank you for this consult.    Roger BERTRAND Dauterive III  2/25/2025  4:07 PM             [1]   Medications Prior to Admission   Medication Sig Dispense Refill Last Dose/Taking    aspirin 325 MG tablet Take 325 mg by mouth once daily.   2/24/2025 Morning    cholecalciferol, vitamin D3, (VITAMIN D3) 25 mcg (1,000 unit) capsule Take 1,000 Units by mouth once daily.   2/24/2025 Morning    multivit-mins no.63/iron/folic (M-VIT ORAL) Take 1 tablet by mouth once daily.   2/24/2025 Morning    sodium chloride 1,000 mg TbSO oral tablet Take 1,000 mg by mouth daily as needed (Dehydration).   2/24/2025 Evening    vitamin E 400 UNIT capsule Take 400 Units by mouth once daily.   2/24/2025 Morning    apixaban (ELIQUIS) 2.5 mg Tab Take 1 tablet (2.5 mg total) by mouth 2 (two) times daily. (Patient not taking: Reported on 3/30/2024) 60 tablet 3     polyethylene glycol (GLYCOLAX) 17 gram PwPk Take 17 g by mouth 2 (two) times daily. (Patient not taking: Reported on 2/25/2025) 60 packet 0 Not Taking    traMADoL (ULTRAM) 50 mg tablet Take 1 tablet (50 mg  total) by mouth every 6 (six) hours as needed for Pain. (Patient not taking: Reported on 2/25/2025) 12 tablet 0 Not Taking

## 2025-02-26 VITALS
TEMPERATURE: 98 F | RESPIRATION RATE: 18 BRPM | HEART RATE: 95 BPM | HEIGHT: 71 IN | SYSTOLIC BLOOD PRESSURE: 126 MMHG | DIASTOLIC BLOOD PRESSURE: 79 MMHG | WEIGHT: 141.56 LBS | BODY MASS INDEX: 19.82 KG/M2 | OXYGEN SATURATION: 97 %

## 2025-02-26 PROBLEM — N39.0 UTI (URINARY TRACT INFECTION): Status: RESOLVED | Noted: 2025-02-25 | Resolved: 2025-02-26

## 2025-02-26 PROBLEM — R79.89 ELEVATED TROPONIN: Status: RESOLVED | Noted: 2025-02-25 | Resolved: 2025-02-26

## 2025-02-26 PROBLEM — K85.20 ALCOHOL-INDUCED ACUTE PANCREATITIS: Status: RESOLVED | Noted: 2023-12-26 | Resolved: 2025-02-26

## 2025-02-26 LAB
ALBUMIN SERPL BCP-MCNC: 3.5 G/DL (ref 3.5–5.2)
ALP SERPL-CCNC: 93 U/L (ref 55–135)
ALT SERPL W/O P-5'-P-CCNC: 17 U/L (ref 10–44)
ANION GAP SERPL CALC-SCNC: 7 MMOL/L (ref 8–16)
AST SERPL-CCNC: 22 U/L (ref 10–40)
BILIRUB SERPL-MCNC: 0.6 MG/DL (ref 0.1–1)
BUN SERPL-MCNC: 6 MG/DL (ref 8–23)
CALCIUM SERPL-MCNC: 8.9 MG/DL (ref 8.7–10.5)
CHLORIDE SERPL-SCNC: 98 MMOL/L (ref 95–110)
CO2 SERPL-SCNC: 23 MMOL/L (ref 23–29)
CREAT SERPL-MCNC: 0.5 MG/DL (ref 0.5–1.4)
EST. GFR  (NO RACE VARIABLE): >60 ML/MIN/1.73 M^2
GLUCOSE SERPL-MCNC: 122 MG/DL (ref 70–110)
MAGNESIUM SERPL-MCNC: 1.7 MG/DL (ref 1.6–2.6)
OHS QRS DURATION: 108 MS
OHS QTC CALCULATION: 446 MS
POTASSIUM SERPL-SCNC: 3.8 MMOL/L (ref 3.5–5.1)
PROT SERPL-MCNC: 7.1 G/DL (ref 6–8.4)
SODIUM SERPL-SCNC: 124 MMOL/L (ref 136–145)
SODIUM SERPL-SCNC: 126 MMOL/L (ref 136–145)
SODIUM SERPL-SCNC: 127 MMOL/L (ref 136–145)
SODIUM SERPL-SCNC: 127 MMOL/L (ref 136–145)
SODIUM SERPL-SCNC: 128 MMOL/L (ref 136–145)
SODIUM SERPL-SCNC: 128 MMOL/L (ref 136–145)

## 2025-02-26 PROCEDURE — 36415 COLL VENOUS BLD VENIPUNCTURE: CPT | Performed by: STUDENT IN AN ORGANIZED HEALTH CARE EDUCATION/TRAINING PROGRAM

## 2025-02-26 PROCEDURE — 84295 ASSAY OF SERUM SODIUM: CPT | Mod: 91 | Performed by: STUDENT IN AN ORGANIZED HEALTH CARE EDUCATION/TRAINING PROGRAM

## 2025-02-26 PROCEDURE — 63600175 PHARM REV CODE 636 W HCPCS: Performed by: INTERNAL MEDICINE

## 2025-02-26 PROCEDURE — 80053 COMPREHEN METABOLIC PANEL: CPT | Performed by: INTERNAL MEDICINE

## 2025-02-26 PROCEDURE — 36415 COLL VENOUS BLD VENIPUNCTURE: CPT

## 2025-02-26 PROCEDURE — 25000003 PHARM REV CODE 250: Performed by: FAMILY MEDICINE

## 2025-02-26 PROCEDURE — 25000003 PHARM REV CODE 250: Performed by: STUDENT IN AN ORGANIZED HEALTH CARE EDUCATION/TRAINING PROGRAM

## 2025-02-26 PROCEDURE — 84295 ASSAY OF SERUM SODIUM: CPT | Performed by: STUDENT IN AN ORGANIZED HEALTH CARE EDUCATION/TRAINING PROGRAM

## 2025-02-26 PROCEDURE — 84295 ASSAY OF SERUM SODIUM: CPT | Mod: 91

## 2025-02-26 PROCEDURE — 25000003 PHARM REV CODE 250: Performed by: INTERNAL MEDICINE

## 2025-02-26 PROCEDURE — 36415 COLL VENOUS BLD VENIPUNCTURE: CPT | Performed by: INTERNAL MEDICINE

## 2025-02-26 PROCEDURE — 63600175 PHARM REV CODE 636 W HCPCS: Performed by: STUDENT IN AN ORGANIZED HEALTH CARE EDUCATION/TRAINING PROGRAM

## 2025-02-26 PROCEDURE — 83735 ASSAY OF MAGNESIUM: CPT | Performed by: INTERNAL MEDICINE

## 2025-02-26 RX ORDER — ASPIRIN 81 MG/1
81 TABLET ORAL DAILY
Start: 2025-02-26

## 2025-02-26 RX ORDER — TAMSULOSIN HYDROCHLORIDE 0.4 MG/1
0.4 CAPSULE ORAL DAILY
Status: DISCONTINUED | OUTPATIENT
Start: 2025-02-26 | End: 2025-02-26 | Stop reason: HOSPADM

## 2025-02-26 RX ORDER — TAMSULOSIN HYDROCHLORIDE 0.4 MG/1
0.4 CAPSULE ORAL DAILY
Qty: 10 CAPSULE | Refills: 0 | Status: SHIPPED | OUTPATIENT
Start: 2025-02-27 | End: 2025-03-13

## 2025-02-26 RX ORDER — FOLIC ACID 1 MG/1
1 TABLET ORAL DAILY
Qty: 30 TABLET | Refills: 0 | Status: SHIPPED | OUTPATIENT
Start: 2025-02-27 | End: 2025-04-02

## 2025-02-26 RX ORDER — LANOLIN ALCOHOL/MO/W.PET/CERES
100 CREAM (GRAM) TOPICAL DAILY
Qty: 30 TABLET | Refills: 0 | Status: SHIPPED | OUTPATIENT
Start: 2025-02-27 | End: 2025-04-02

## 2025-02-26 RX ORDER — CIPROFLOXACIN 500 MG/1
500 TABLET ORAL 2 TIMES DAILY
Qty: 10 TABLET | Refills: 0 | Status: SHIPPED | OUTPATIENT
Start: 2025-02-26 | End: 2025-03-08

## 2025-02-26 RX ADMIN — Medication 800 MG: at 02:02

## 2025-02-26 RX ADMIN — MORPHINE SULFATE 4 MG: 4 INJECTION, SOLUTION INTRAMUSCULAR; INTRAVENOUS at 05:02

## 2025-02-26 RX ADMIN — PANTOPRAZOLE SODIUM 40 MG: 40 INJECTION, POWDER, FOR SOLUTION INTRAVENOUS at 07:02

## 2025-02-26 RX ADMIN — MORPHINE SULFATE 4 MG: 4 INJECTION, SOLUTION INTRAMUSCULAR; INTRAVENOUS at 01:02

## 2025-02-26 RX ADMIN — LORAZEPAM 1 MG: 2 INJECTION INTRAMUSCULAR; INTRAVENOUS at 02:02

## 2025-02-26 RX ADMIN — FOLIC ACID 1 MG: 1 TABLET ORAL at 07:02

## 2025-02-26 RX ADMIN — THIAMINE HCL TAB 100 MG 100 MG: 100 TAB at 07:02

## 2025-02-26 RX ADMIN — CEFTRIAXONE 1 G: 1 INJECTION, POWDER, FOR SOLUTION INTRAMUSCULAR; INTRAVENOUS at 07:02

## 2025-02-26 RX ADMIN — Medication 800 MG: at 10:02

## 2025-02-26 RX ADMIN — ONDANSETRON 4 MG: 2 INJECTION INTRAMUSCULAR; INTRAVENOUS at 09:02

## 2025-02-26 RX ADMIN — ASPIRIN 325 MG ORAL TABLET 325 MG: 325 PILL ORAL at 07:02

## 2025-02-26 RX ADMIN — MUPIROCIN 1 G: 20 OINTMENT TOPICAL at 07:02

## 2025-02-26 RX ADMIN — POTASSIUM BICARBONATE 50 MEQ: 978 TABLET, EFFERVESCENT ORAL at 10:02

## 2025-02-26 RX ADMIN — THERA TABS 1 TABLET: TAB at 07:02

## 2025-02-26 RX ADMIN — SODIUM CHLORIDE 1000 MG: 9 INJECTION, SOLUTION INTRAVENOUS at 05:02

## 2025-02-26 RX ADMIN — TAMSULOSIN HYDROCHLORIDE 0.4 MG: 0.4 CAPSULE ORAL at 09:02

## 2025-02-26 NOTE — NURSING
Patient declines to wait to void. Educated on risk and benefits.  Verbalized understanding and disregards. MD notified.

## 2025-02-26 NOTE — NURSING
Patient declines to wait in his room for his ride. Demands to go to lobby in the front. Education on risk and benefits, verbalized understanding and disregards.  Wheeled patient to front lobby where he waits on his ride.

## 2025-02-26 NOTE — PLAN OF CARE
Pt clear to dc after he voids.  Declined f/u appointment set up.       02/26/25 1558   Final Note   Assessment Type Final Discharge Note   Anticipated Discharge Disposition Home   What phone number can be called within the next 1-3 days to see how you are doing after discharge? [phone]   Hospital Resources/Appts/Education Provided Patient refused appointment set-up   Post-Acute Status   Discharge Delays   (Pt to void post white removal)

## 2025-02-26 NOTE — DISCHARGE SUMMARY
"[facility] Medicine  Discharge Summary      Patient Name: Vic Reyez  MRN: [id number]  DEVAUGHN: [id number]  Patient Class: IP- Inpatient  Admission Date: 2/24/2025  Hospital Length of Stay: 1 days  Discharge Date and Time:  02/26/2025 4:10 PM  Attending Physician: Flip Rowley DO   Discharging Provider: Flip Rowley DO  Primary Care Provider: Keven Nj MD    Primary Care Team: Networked reference to record PCT     HPI:   Patient is a 74-year-old  male with a history of suspected alcohol abuse-> he states that he drinks a couple of beers around dinnertime but is unable to tell me exactly how much that would be or how often he does this  He was diagnosed with a condition per GI who has been managing this (history of cirrhosis, pancreatitis, and biliary stricture); has seen GI at [facility] in the past).  Per Dr. 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   Labs 12/'23 - Nml CMP / lipase  EtOH + 10/'23   ERCP 1/'23 - stent removed; improved biliary stricture  CT 11/'22 - CAD/PVD; splenic artery embolization; biliary stent in place; stable TOP cyst; tics  EUS 11/'22 - acute / chronic panc w/ focal irregularity in HOP s/p FNA. 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 Atypical cells."    Starting around 7:00 PM last night, the patient had lower abdominal pain which was a constant pain; with this he had fever/chills and vomited twice but denies any other bowel symptoms  He develops shortness of breath with the pain but denies any chest pain; he states that he lost about 70 lb over a matter of weeks approximately 8 months ago  Patient is afebrile and hypertensive with SBP in the 160s-180s; he had no desaturations  Workup thus far shows no leukocytosis but 135 platelets; CMP showed a sodium of 119, down from 128 previously and renal function remains intact  Mag level was 1.3 with a normal TSH, CK, and lipase   BNP was 127 and Troponin went from 40.1-> 108.2  EKG showed, per my interpretation, sinus rhythm 75 beats per minute; choppy baseline precluded formal assessment  Tox screen was negative; Urinalysis showed 90 WBCs with +3 leukocytes; review of prior urine cultures revealed Enterococcus faecalis (2022)  Chest x-ray was unrevealing; CT abdomen/pelvis with IV contrast was performed and showed, per radiologist Dr. Watkins:    "FINDINGS:  The visualized lung bases demonstrate atelectatic change.     There are findings consistent with chronic pancreatitis, including pancreatic calcifications.  Arising from the level of the distal body and tail of the pancreas there is appearance of a complex fluid collection measuring approximately 4.4 x 4.9 cm in size, there is mild wall enhancement and there is surrounding edema and inflammatory change.  Note is made that the patient has a history of a pancreatic pseudocyst in a similar location however this appears more prominent and demonstrates inflammatory change and may relate to acute peripancreatic fluid collection associated with acute pancreatitis, there does appear to be surrounding edema and inflammation.  There is mild fluid tracking adjacent to the " pancreatic bed, as well as adjacent to the spleen.  The adjacent stomach demonstrates appearance of diminished attenuation of the wall of the stomach, however this does not appear well-defined may relate to edema, with wall thickening of the stomach.  This is potentially reactive.  Follow-up is recommended.     The stomach otherwise demonstrates nonspecific appearance of mild-to-moderate distention with fluid and air.     There is no pericholecystic inflammatory change.  Mild diminished attenuation of the liver may relate to mild fatty infiltrate.  Mild heterogeneity of the spleen appears stable.  The adrenal glands appear unremarkable.     There is no evidence for ureteral calculus or obstructive uropathy.  Mild perinephric haziness of the left kidney noted, correlation for signs or symptoms of superimposed acute pyelonephritis is needed.     The abdominal aorta appears normal in caliber, demonstrates appropriate opacification.  Atherosclerotic change noted.  There is prominent distention of the urinary bladder without abnormal wall thickening, this is nonspecific, and although more prominent appears similar to the prior study, correlation for signs or symptoms of urinary retention or urinary bladder outlet obstructive change is needed.     There is no evidence for small bowel obstructive process.  The appendix is not identified.  There is no evidence for inflammatory or obstructive process of the colon.  There is no evidence for free intraperitoneal air.     There is grade 1/2 anterolisthesis of L5 with respect S1, prominent chronic endplate change at L5-S1 with vacuum phenomena.  The osseous structures overall demonstrate chronic change.  Impression:     Findings likely representing acute on chronic pancreatitis with associated fluid collection that may relate to a acute peripancreatic fluid collection associated with acute pancreatitis, as discussed above.  Follow-up is recommended.     Appearance of gastric  "wall and fold thickening with prominent appearance of edema of the gastric wall adjacent to the aforementioned pancreatic fluid collection.  This may be reactive however can be re-evaluated on follow-up.     Mild perinephric stranding of the left kidney without evidence for ureteral calculus or obstructive uropathy, correlation for UTI/pyelonephritis is needed.     Nonspecific urinary bladder distention, correlation for urinary retention or urinary bladder outlet obstructive symptomatology is needed.     Additional findings as above.     This report was flagged in Epic as abnormal."    Thus far, patient has received a dose of IV Ativan, 2 g of magnesium sulfate IV, 6 mg of IV Morphine (in total), Zofran, Protonix, Zosyn, and a L of fluids    * No surgery found *      Hospital Course:   Vic Reyez is a 74 year old male with a past medical history of cirrhosis, EtOH abuse, splenic vein thrombosis, chronic pancreatitis, tobacco abuse, anemia and thrombocytopenia who presented with an acute on chronic pancreatitis with hyponatremia. He is being treated with low rate NS IV fluids, PRN analgesics and antiemetics. GI has been consulted. He is NPO and the sodium is being trended; urine studies are pending. He is also on Rocephin and vancomycin for a possible UTI (history of Enterococcus). A urine culture is pending. Lastly, he presented with an elevated troponin with no EKG changes or symptoms. A TTE has been ordered, troponin is being trended, and Cardiology has been consulted. He is being monitored for EtOH withdrawal but CIWA is a improving , and has not required Ativan and Librium.  and states he only drinks 1-2 drinks 2-3 times per week.  Abdominal pain as resolved and Gastroenterology says they will follow up with them as outpatient for consideration of FNA aspiration of peripancreatic cysts.  Phone further discussion with patient he admits to drinking 12 12 oz bottles of water per day because his doctor told him he " "needs to be hydrated.  I told patient at his size he should only be taking 4-6 bottles of water per day, alternate water with Gatorade to prevent hyponatremia from recurring.     Goals of Care Treatment Preferences:  Code Status: Full Code      SDOH Screening:  The patient was screened for utility difficulties, food insecurity, transport difficulties, housing insecurity, and interpersonal safety and there were no concerns identified this admission.     Consults:   Consults (From admission, onward)          Status Ordering Provider     Pharmacy to dose Vancomycin consult  Once        Provider:  (Not yet assigned)   Placed in "And" Linked Group    Acknowledged CESAR HASSAN     Inpatient consult to Cardiology  Once        Provider:  Checo Larson MD    Completed CESAR HASSAN     Inpatient consult to Gastroenterology  Once        Provider:  Alex Kim MD    Completed CESAR HASSAN            Hyponatremia  Will presume this is chronic as he has not had a sodium level in several months  Suspect secondary to alcohol abuse  Status post 1 L bolus fluids; for now:  Hold on further fluids until repeat sodium results  At that point, resume fluids judiciously and adjust based off sodium levels  Check sodium level q.4 hours, with goal correction rate no more than 10 points within 24 hours  Check urine/serum Osmo, urine sodium  Seizure precautions given sodium was 120    Chronic pancreatitis    Lipase negative, but with the ongoing alcohol abuse, suspect he has flare his chronic pancreatitis; for now:  NPO  Judicious IV fluids-> received a L of fluids in the ER but we will hold on further fluids until repeat sodium results as this will have to be corrected slowly  IV Protonix  Prn Zofran  Prn IV Morphine  Given his complex GI history, will place GI consult    Pseudocyst of pancreas  As above      Primary hypertension  Uncontrolled; patient on no scheduled antihypertensives  Prn IV Hydralazine    Alcohol use " disorder, severe, dependence    Patient vague regarding his alcohol use  Start multivitamins/thiamine/folate  CIWA protocol  Seizure precautions  Prn IV Ativan      Final Active Diagnoses:    Diagnosis Date Noted POA    Hyponatremia [E87.1] 12/26/2023 Yes    Chronic pancreatitis [K86.1] 11/30/2022 Yes    Pseudocyst of pancreas [K86.3] 02/04/2022 Yes    Alcohol use disorder, severe, dependence [F10.20] 02/02/2022 Yes     Chronic    Primary hypertension [I10] 02/02/2022 Yes     Chronic      Problems Resolved During this Admission:    Diagnosis Date Noted Date Resolved POA    PRINCIPAL PROBLEM:  Alcohol-induced acute pancreatitis [K85.20] 12/26/2023 02/26/2025 Yes    UTI (urinary tract infection) [N39.0] 02/25/2025 02/26/2025 Yes    Elevated troponin [R79.89] 02/25/2025 02/26/2025 Yes       Discharged Condition: good    Disposition: Home or Self Care    Follow Up:   Follow-up Information       Keven Nj MD Follow up.    Specialty: Family Medicine  Contact information:  [address] [facility] [address]  [phone]               Roger Viveros III, MD Follow up in 2 week(s).    Specialty: Gastroenterology  Contact information:  [address] [facility] [address] [phone]                           Patient Instructions:      Notify your health care provider if you experience any of the following:  temperature >100.4     Notify your health care provider if you experience any of the following:  persistent nausea and vomiting or diarrhea     Notify your health care provider if you experience any of the following:  severe uncontrolled pain     Notify your health care provider if you experience any of the following:  difficulty breathing or increased cough     Notify your health care provider if you experience any of the following:  persistent dizziness, light-headedness, or visual disturbances     Activity as tolerated       Significant Diagnostic Studies: Labs: BMP:   Recent Labs   Lab  "02/24/25 2336 02/25/25  0855 02/26/25  0506 02/26/25  0757 02/26/25  1145 02/26/25  1434   *  --  122*  --   --   --    *   < > 128*  128* 126* 124* 127*   K 3.5  --  3.8  --   --   --    CL 88*  --  98  --   --   --    CO2 16*  --  23  --   --   --    BUN 6*  --  6*  --   --   --    CREATININE 0.5  --  0.5  --   --   --    CALCIUM 9.1  --  8.9  --   --   --    MG 1.3*  --  1.7  --   --   --     < > = values in this interval not displayed.   , CMP   Recent Labs   Lab 02/24/25 2336 02/25/25 0855 02/26/25  0506 02/26/25 0757 02/26/25  1145 02/26/25  1434   *   < > 128*  128* 126* 124* 127*   K 3.5  --  3.8  --   --   --    CL 88*  --  98  --   --   --    CO2 16*  --  23  --   --   --    *  --  122*  --   --   --    BUN 6*  --  6*  --   --   --    CREATININE 0.5  --  0.5  --   --   --    CALCIUM 9.1  --  8.9  --   --   --    PROT 7.9  --  7.1  --   --   --    ALBUMIN 4.0  --  3.5  --   --   --    BILITOT 0.5  --  0.6  --   --   --    ALKPHOS 113  --  93  --   --   --    AST 31  --  22  --   --   --    ALT 22  --  17  --   --   --    ANIONGAP 15  --  7*  --   --   --     < > = values in this interval not displayed.   , CBC   Recent Labs   Lab 02/24/25 2336 02/25/25  0635   WBC 4.93 4.60   HGB 12.3* 11.8*   HCT 34.7* 32.7*   * 101*   , INR   Lab Results   Component Value Date    INR 1.1 02/25/2025    INR 1.1 12/26/2023    INR 1.3 12/01/2022   , Lipid Panel   Lab Results   Component Value Date    CHOL 122 12/26/2023    HDL 65 12/26/2023    LDLCALC 48.4 (L) 12/26/2023    TRIG 43 12/26/2023    CHOLHDL 53.3 (H) 12/26/2023   , Troponin No results for input(s): "TROPONINI" in the last 168 hours., A1C: No results for input(s): "HGBA1C" in the last 4320 hours., and All labs within the past 24 hours have been reviewed  Radiology:   Imaging Results               CT Abdomen Pelvis With IV Contrast NO Oral Contrast (Final result)  Result time 02/25/25 02:45:09      Final result by June, " Conner GOMES MD (02/25/25 02:45:09)                   Impression:      Findings likely representing acute on chronic pancreatitis with associated fluid collection that may relate to a acute peripancreatic fluid collection associated with acute pancreatitis, as discussed above.  Follow-up is recommended.    Appearance of gastric wall and fold thickening with prominent appearance of edema of the gastric wall adjacent to the aforementioned pancreatic fluid collection.  This may be reactive however can be re-evaluated on follow-up.    Mild perinephric stranding of the left kidney without evidence for ureteral calculus or obstructive uropathy, correlation for UTI/pyelonephritis is needed.    Nonspecific urinary bladder distention, correlation for urinary retention or urinary bladder outlet obstructive symptomatology is needed.    Additional findings as above.    This report was flagged in Epic as abnormal.      Electronically signed by: Conner Watkins  Date:    02/25/2025  Time:    02:45               Narrative:    EXAMINATION:  CT ABDOMEN PELVIS WITH IV CONTRAST    CLINICAL HISTORY:  Epigastric pain;    TECHNIQUE:  Low dose axial images, sagittal and coronal reformations were obtained from the lung bases to the pubic symphysis following the IV administration of 100 mL of Omnipaque 350 oral contrast was not utilized.  Single phase postcontrast CT examination of the abdomen and pelvis is submitted.    COMPARISON:  Prior examinations, most recent of which is CT examination abdomen and pelvis March 30, 2024    FINDINGS:  The visualized lung bases demonstrate atelectatic change.    There are findings consistent with chronic pancreatitis, including pancreatic calcifications.  Arising from the level of the distal body and tail of the pancreas there is appearance of a complex fluid collection measuring approximately 4.4 x 4.9 cm in size, there is mild wall enhancement and there is surrounding edema and inflammatory change.  Note  is made that the patient has a history of a pancreatic pseudocyst in a similar location however this appears more prominent and demonstrates inflammatory change and may relate to acute peripancreatic fluid collection associated with acute pancreatitis, there does appear to be surrounding edema and inflammation.  There is mild fluid tracking adjacent to the pancreatic bed, as well as adjacent to the spleen.  The adjacent stomach demonstrates appearance of diminished attenuation of the wall of the stomach, however this does not appear well-defined may relate to edema, with wall thickening of the stomach.  This is potentially reactive.  Follow-up is recommended.    The stomach otherwise demonstrates nonspecific appearance of mild-to-moderate distention with fluid and air.    There is no pericholecystic inflammatory change.  Mild diminished attenuation of the liver may relate to mild fatty infiltrate.  Mild heterogeneity of the spleen appears stable.  The adrenal glands appear unremarkable.    There is no evidence for ureteral calculus or obstructive uropathy.  Mild perinephric haziness of the left kidney noted, correlation for signs or symptoms of superimposed acute pyelonephritis is needed.    The abdominal aorta appears normal in caliber, demonstrates appropriate opacification.  Atherosclerotic change noted.  There is prominent distention of the urinary bladder without abnormal wall thickening, this is nonspecific, and although more prominent appears similar to the prior study, correlation for signs or symptoms of urinary retention or urinary bladder outlet obstructive change is needed.    There is no evidence for small bowel obstructive process.  The appendix is not identified.  There is no evidence for inflammatory or obstructive process of the colon.  There is no evidence for free intraperitoneal air.    There is grade 1/2 anterolisthesis of L5 with respect S1, prominent chronic endplate change at L5-S1 with  vacuum phenomena.  The osseous structures overall demonstrate chronic change.                                       X-Ray Chest AP Portable (Final result)  Result time 02/24/25 23:45:16      Final result by Juanito Prasad MD (02/24/25 23:45:16)                   Impression:      Chronic coarse interstitial attenuation.  No new large confluent airspace consolidation appreciated.      Electronically signed by: Juanito Prasad MD  Date:    02/24/2025  Time:    23:45               Narrative:    EXAMINATION:  XR CHEST AP PORTABLE    CLINICAL HISTORY:  Chest Pain;    TECHNIQUE:  Single frontal view of the chest was performed.    COMPARISON:  10/14/2023    FINDINGS:  Cardiac monitoring leads overlie the chest.  Cardiac silhouette is not significantly enlarged.  There is aortic atherosclerosis present.  Lungs are symmetrically expanded with chronic coarse interstitial attenuation.  No new large confluent airspace consolidation appreciated.  No significant volume of pleural fluid or pneumothorax appreciated.  Osseous structures demonstrate degenerative change.  Embolization coils project over the upper abdomen.                                        Pending Diagnostic Studies:       Procedure Component Value Units Date/Time    Osmolality, Serum [[id number]] Collected: 02/25/25 0855    Order Status: Sent Lab Status: In process Updated: 02/25/25 0920    Specimen: Blood     Osmolality, urine [[id number]] Collected: 02/25/25 1056    Order Status: Sent Lab Status: In process Updated: 02/25/25 1101    Specimen: Urine, Catheterized     Sodium [[id number]]     Order Status: Sent Lab Status: No result     Specimen: Blood     Sodium [[id number]]     Order Status: Sent Lab Status: No result     Specimen: Blood     VANCOMYCIN, TROUGH [[id number]]     Order Status: Sent Lab Status: No result     Specimen: Blood            Medications:  Reconciled Home Medications:      Medication List        START taking these medications       aspirin 81 MG EC tablet  Commonly known as: ECOTRIN  Take 1 tablet (81 mg total) by mouth once daily.  Replaces: aspirin 325 MG tablet     ciprofloxacin HCl 500 MG tablet  Commonly known as: CIPRO  Take 1 tablet (500 mg total) by mouth 2 (two) times daily. for 5 days     folic acid 1 MG tablet  Commonly known as: FOLVITE  Take 1 tablet (1 mg total) by mouth once daily.  Start taking on: February 27, 2025     multivitamin Tab  Take 1 tablet by mouth once daily.  Start taking on: February 27, 2025     tamsulosin 0.4 mg Cap  Commonly known as: FLOMAX  Take 1 capsule (0.4 mg total) by mouth once daily. for 10 days  Start taking on: February 27, 2025     thiamine 100 MG tablet  Take 1 tablet (100 mg total) by mouth once daily.  Start taking on: February 27, 2025            CONTINUE taking these medications      M-VIT ORAL  Take 1 tablet by mouth once daily.     sodium chloride 1,000 mg Tbso oral tablet  Take 1,000 mg by mouth daily as needed (Dehydration).     VITAMIN D3 25 mcg (1,000 unit) capsule  Generic drug: cholecalciferol (vitamin D3)  Take 1,000 Units by mouth once daily.     vitamin E 400 UNIT capsule  Take 400 Units by mouth once daily.            STOP taking these medications      apixaban 2.5 mg Tab  Commonly known as: ELIQUIS     aspirin 325 MG tablet  Replaced by: aspirin 81 MG EC tablet     traMADoL 50 mg tablet  Commonly known as: ULTRAM            ASK your doctor about these medications      polyethylene glycol 17 gram Pwpk  Commonly known as: GLYCOLAX  Take 17 g by mouth 2 (two) times daily.              Indwelling Lines/Drains at time of discharge:   Lines/Drains/Airways       None                   Time spent on the discharge of patient: 34 minutes         Flip Rowley DO  Department of Hospital Medicine  [facility]

## 2025-02-27 LAB — BACTERIA UR CULT: NO GROWTH

## (undated) DEVICE — GLIDECATH ANGLED TAPER 5FR 100CM CG508

## (undated) DEVICE — SOL BETADINE 5%

## (undated) DEVICE — KNIFE OPHTH MICRO UNITOME 5MM

## (undated) DEVICE — SYR 1CC TB SG 27GX1/2

## (undated) DEVICE — GUIDE 6FR RDC 55CM

## (undated) DEVICE — PROBE ILLUM FLEX CURVE LASER

## (undated) DEVICE — SYRINGE 30CC LL W/O NDL

## (undated) DEVICE — Device

## (undated) DEVICE — SOL WATER STRL IRR 1000ML

## (undated) DEVICE — SOL BALANCED SALT 500ML

## (undated) DEVICE — TRAY MUSCLE LID EYE

## (undated) DEVICE — KIT GREY EYE

## (undated) DEVICE — CATHETER L155CM BERENSTEIN TIP DIREXION FATHOM

## (undated) DEVICE — DRESSING EYE OVAL LF

## (undated) DEVICE — FORCEP GRIESHABER MAXGRIP 25G

## (undated) DEVICE — NDL HYPO A BEVEL 30X1/2

## (undated) DEVICE — SHEATH PINNACLE 5FRX10CM W/GUIDEWIRE

## (undated) DEVICE — FORCEP GRASPING 25GA SMOOTH

## (undated) DEVICE — COVER MAYO STAND REINFRCD 30

## (undated) DEVICE — GOWN SURGICAL X-LARGE

## (undated) DEVICE — PACK TOTAL PLUS 25G VITRECTOMY

## (undated) DEVICE — CONTAINER SPECIMEN STRL 4OZ

## (undated) DEVICE — HOLDER TUBE

## (undated) DEVICE — STRIP MEDI WND CLSR 1/2X4IN

## (undated) DEVICE — NDL 22GA X1 1/2 REG BEVEL

## (undated) DEVICE — PACK INSTRUMENT COVER DISPO

## (undated) DEVICE — SUT 7/0 18IN COATED VICRYL

## (undated) DEVICE — COVER PROXIMA MAYO STAND

## (undated) DEVICE — SKINMARKER & RULER REGULAR X-F

## (undated) DEVICE — SHIELD EYE METAL FOX 50/BX

## (undated) DEVICE — BACKFLUSH 25GA SOFT-TIP DISP

## (undated) DEVICE — SYR DISP LL 5CC

## (undated) DEVICE — SYR 10CC LUER LOCK

## (undated) DEVICE — DRAPE THREE-QTR REINF 53X77IN

## (undated) DEVICE — CORD FOR BIPOLAR FORCEPS 12

## (undated) DEVICE — 6F LIMA

## (undated) DEVICE — LENS VITRCTMY OPHTH 30DEG 59DE

## (undated) DEVICE — SOL BSS BALANCED SALT

## (undated) DEVICE — NEEDLE HYPODERMIC HUB LUER LOC

## (undated) DEVICE — SHEATH PINNACLE 6FRX10CM W/GUIDEWIRE

## (undated) DEVICE — GUIDEWIRE REG. ANGLED .035X260 LAUREATE